# Patient Record
Sex: MALE | Race: BLACK OR AFRICAN AMERICAN | NOT HISPANIC OR LATINO | Employment: FULL TIME | URBAN - METROPOLITAN AREA
[De-identification: names, ages, dates, MRNs, and addresses within clinical notes are randomized per-mention and may not be internally consistent; named-entity substitution may affect disease eponyms.]

---

## 2017-04-07 ENCOUNTER — OFFICE VISIT (OUTPATIENT)
Dept: INTERNAL MEDICINE | Facility: CLINIC | Age: 52
End: 2017-04-07
Payer: COMMERCIAL

## 2017-04-07 ENCOUNTER — LAB VISIT (OUTPATIENT)
Dept: LAB | Facility: HOSPITAL | Age: 52
End: 2017-04-07
Attending: INTERNAL MEDICINE
Payer: COMMERCIAL

## 2017-04-07 DIAGNOSIS — M54.5 LOW BACK PAIN, UNSPECIFIED BACK PAIN LATERALITY, UNSPECIFIED CHRONICITY, WITH SCIATICA PRESENCE UNSPECIFIED: ICD-10-CM

## 2017-04-07 DIAGNOSIS — K76.0 NAFLD (NONALCOHOLIC FATTY LIVER DISEASE): ICD-10-CM

## 2017-04-07 DIAGNOSIS — I10 ESSENTIAL HYPERTENSION: ICD-10-CM

## 2017-04-07 DIAGNOSIS — R07.89 CHEST DISCOMFORT: Primary | ICD-10-CM

## 2017-04-07 LAB
ALBUMIN SERPL BCP-MCNC: 4.2 G/DL
ALP SERPL-CCNC: 52 U/L
ALT SERPL W/O P-5'-P-CCNC: 73 U/L
ANION GAP SERPL CALC-SCNC: 8 MMOL/L
AST SERPL-CCNC: 42 U/L
BILIRUB SERPL-MCNC: 0.8 MG/DL
BUN SERPL-MCNC: 19 MG/DL
CALCIUM SERPL-MCNC: 9.7 MG/DL
CHLORIDE SERPL-SCNC: 102 MMOL/L
CO2 SERPL-SCNC: 29 MMOL/L
COMPLEXED PSA SERPL-MCNC: 0.4 NG/ML
CREAT SERPL-MCNC: 1.1 MG/DL
EST. GFR  (AFRICAN AMERICAN): >60 ML/MIN/1.73 M^2
EST. GFR  (NON AFRICAN AMERICAN): >60 ML/MIN/1.73 M^2
GLUCOSE SERPL-MCNC: 106 MG/DL
POTASSIUM SERPL-SCNC: 4.1 MMOL/L
PROT SERPL-MCNC: 8.2 G/DL
SODIUM SERPL-SCNC: 139 MMOL/L

## 2017-04-07 PROCEDURE — 3079F DIAST BP 80-89 MM HG: CPT | Mod: S$GLB,,, | Performed by: INTERNAL MEDICINE

## 2017-04-07 PROCEDURE — 80053 COMPREHEN METABOLIC PANEL: CPT

## 2017-04-07 PROCEDURE — 1160F RVW MEDS BY RX/DR IN RCRD: CPT | Mod: S$GLB,,, | Performed by: INTERNAL MEDICINE

## 2017-04-07 PROCEDURE — 99999 PR PBB SHADOW E&M-EST. PATIENT-LVL V: CPT | Mod: PBBFAC,,, | Performed by: INTERNAL MEDICINE

## 2017-04-07 PROCEDURE — 99214 OFFICE O/P EST MOD 30 MIN: CPT | Mod: S$GLB,,, | Performed by: INTERNAL MEDICINE

## 2017-04-07 PROCEDURE — 84153 ASSAY OF PSA TOTAL: CPT

## 2017-04-07 PROCEDURE — 36415 COLL VENOUS BLD VENIPUNCTURE: CPT

## 2017-04-07 PROCEDURE — 3075F SYST BP GE 130 - 139MM HG: CPT | Mod: S$GLB,,, | Performed by: INTERNAL MEDICINE

## 2017-04-07 RX ORDER — PRAVASTATIN SODIUM 40 MG/1
40 TABLET ORAL DAILY
Qty: 90 TABLET | Refills: 1 | Status: SHIPPED | OUTPATIENT
Start: 2017-04-07 | End: 2018-01-12 | Stop reason: SDUPTHER

## 2017-04-07 RX ORDER — AMLODIPINE BESYLATE 5 MG/1
5 TABLET ORAL DAILY
Qty: 90 TABLET | Refills: 1 | Status: SHIPPED | OUTPATIENT
Start: 2017-04-07 | End: 2017-06-27 | Stop reason: DRUGHIGH

## 2017-04-07 RX ORDER — LOSARTAN POTASSIUM 100 MG/1
100 TABLET ORAL DAILY
Qty: 90 TABLET | Refills: 1 | Status: SHIPPED | OUTPATIENT
Start: 2017-04-07 | End: 2017-04-11 | Stop reason: ALTCHOICE

## 2017-04-07 NOTE — MR AVS SNAPSHOT
[facility]  [address]  [place] [address]  Phone: [phone]  Fax: [phone]                  Андрей Wooten   2017 10:00 AM   Office Visit    Description:  Male : 1965   Provider:  Khushboo Pugh MD   Department:  [facility]           Reason for Visit     Follow-up           Diagnoses this Visit        Comments    Chest discomfort    -  Primary     NAFLD (nonalcoholic fatty liver disease)         Essential hypertension         Low back pain, unspecified back pain laterality, unspecified chronicity, with sciatica presence unspecified                To Do List           Future Appointments        Provider Department Dept Phone    2017 10:15 AM Khushboo Pugh MD [facility] [phone]    2017 10:30 AM Khushboo Pugh MD [facility] [phone]      Goals (5 Years of Data)     None      Follow-Up and Disposition     Return for EP 4 months.       These Medications        Disp Refills Start End    losartan (COZAAR) 100 MG tablet 90 tablet 1 2017    Take 1 tablet (100 mg total) by mouth once daily. - Oral    Pharmacy: [facility] [address]   [facility] Ph #: [phone]       amlodipine (NORVASC) 5 MG tablet 90 tablet 1 2017    Take 1 tablet (5 mg total) by mouth once daily. - Oral    Pharmacy: [facility] [address] [place] [address] [address] [facility] Ph #: [phone]       pravastatin (PRAVACHOL) 40 MG tablet 90 tablet 1 2017    Take 1 tablet (40 mg total) by mouth once daily. - Oral    Pharmacy: [facility] [address] [place] [address], [place] - [address] [facility] Ph #: [phone]         [facility]     [facility] Nurse Care Line -  Assistance  Unless otherwise directed by your provider, please contact [facility], our nurse care line that is available for 24/7 assistance.     Registered nurses in the [facility] provide: appointment scheduling, clinical advisement, health education, and other advisory services.  Call: [phone] (toll free)               Medications           Message regarding Medications     Verify the changes and/or additions to your medication regime listed below are the same as discussed with your clinician today.  If any of these changes or additions are incorrect, please notify your healthcare provider.             Verify that the below list of medications is an accurate representation of the medications you are currently taking.  If none reported, the list may be blank. If incorrect, please contact your healthcare provider. Carry this list with you in case of emergency.           Current Medications     amlodipine (NORVASC) 5 MG tablet Take 1 tablet (5 mg total) by mouth once daily.    aspirin (ECOTRIN) 81 MG EC tablet Take 81 mg by mouth once daily.    fish oil-omega-3 fatty acids 300-1,000 mg capsule Take 1 g by mouth once daily.    fluticasone (FLONASE) 50 mcg/actuation nasal spray 1 spray by Each Nare route once daily.    losartan (COZAAR) 100 MG tablet Take 1 tablet (100 mg total) by mouth once daily.    pravastatin (PRAVACHOL) 40 MG tablet Take 1 tablet (40 mg total) by mouth once daily.    UBIDECARENONE (COENZYME Q10) 100 mg Tab Take 1 tablet by mouth once daily.    vitamin D 1000 units Tab Take 185 mg by mouth once daily.    albuterol 90 mcg/actuation inhaler Inhale 2 puffs into the lungs every 6 (six) hours as needed for Wheezing.    cetirizine (ZYRTEC) 10 MG tablet Take 1 tablet (10 mg total) by mouth once daily.           Clinical Reference Information           Your Vitals Were     BP Pulse Temp Height Weight SpO2    140/84 (BP Location: Right arm, Patient Position: Sitting, BP Method: Manual) 67 98.2 °F (36.8 °C) (Oral) 5' 6" (1.676 m) 116.6 kg (257 lb) 97%    BMI "                41.48 kg/m2          Blood Pressure          Most Recent Value    BP  (!)  140/84      Allergies as of 4/7/2017     No Known Allergies      Immunizations Administered on Date of Encounter - 4/7/2017     None      Orders Placed During Today's Visit      Normal Orders This Visit    Ambulatory consult to Physical Therapy     Ambulatory Referral to Cardiology     Ambulatory Referral to Hepatology     Future Labs/Procedures Expected by Expires    Comprehensive metabolic panel  4/7/2017 4/7/2018    PSA, Screening  4/7/2017 6/6/2018      Language Assistance Services     ATTENTION: Language assistance services are available, free of charge. Please call [phone].      ATENCIÓN: Si habla español, tiene a cleary disposición servicios gratuitos de asistencia lingüística. Llame al [phone].     CHÚ Ý: N?u b?n nói Ti?ng Vi?t, có các d?ch v? h? tr? ngôn ng? mi?n phí dành cho b?n. G?i s? [phone].         [facility] complies with applicable Federal civil rights laws and does not discriminate on the basis of race, color, national origin, age, disability, or sex.

## 2017-04-11 ENCOUNTER — OFFICE VISIT (OUTPATIENT)
Dept: CARDIOLOGY | Facility: CLINIC | Age: 52
End: 2017-04-11
Payer: COMMERCIAL

## 2017-04-11 VITALS
BODY MASS INDEX: 42.27 KG/M2 | HEART RATE: 68 BPM | HEIGHT: 66 IN | WEIGHT: 263 LBS | SYSTOLIC BLOOD PRESSURE: 146 MMHG | DIASTOLIC BLOOD PRESSURE: 93 MMHG | OXYGEN SATURATION: 97 %

## 2017-04-11 VITALS
SYSTOLIC BLOOD PRESSURE: 138 MMHG | HEIGHT: 66 IN | DIASTOLIC BLOOD PRESSURE: 86 MMHG | BODY MASS INDEX: 41.3 KG/M2 | HEART RATE: 67 BPM | OXYGEN SATURATION: 97 % | WEIGHT: 257 LBS | TEMPERATURE: 98 F

## 2017-04-11 DIAGNOSIS — R94.31 ABNORMAL EKG: ICD-10-CM

## 2017-04-11 DIAGNOSIS — R94.31 ABNORMAL ECG: Chronic | ICD-10-CM

## 2017-04-11 DIAGNOSIS — I10 ESSENTIAL HYPERTENSION: Primary | ICD-10-CM

## 2017-04-11 DIAGNOSIS — E78.5 HYPERLIPIDEMIA, UNSPECIFIED HYPERLIPIDEMIA TYPE: ICD-10-CM

## 2017-04-11 PROCEDURE — 99999 PR PBB SHADOW E&M-EST. PATIENT-LVL III: CPT | Mod: PBBFAC,,, | Performed by: INTERNAL MEDICINE

## 2017-04-11 PROCEDURE — 3077F SYST BP >= 140 MM HG: CPT | Mod: S$GLB,,, | Performed by: INTERNAL MEDICINE

## 2017-04-11 PROCEDURE — 93000 ELECTROCARDIOGRAM COMPLETE: CPT | Mod: S$GLB,,, | Performed by: INTERNAL MEDICINE

## 2017-04-11 PROCEDURE — 3080F DIAST BP >= 90 MM HG: CPT | Mod: S$GLB,,, | Performed by: INTERNAL MEDICINE

## 2017-04-11 PROCEDURE — 1160F RVW MEDS BY RX/DR IN RCRD: CPT | Mod: S$GLB,,, | Performed by: INTERNAL MEDICINE

## 2017-04-11 PROCEDURE — 99204 OFFICE O/P NEW MOD 45 MIN: CPT | Mod: S$GLB,,, | Performed by: INTERNAL MEDICINE

## 2017-04-11 RX ORDER — LOSARTAN POTASSIUM AND HYDROCHLOROTHIAZIDE 12.5; 1 MG/1; MG/1
1 TABLET ORAL DAILY
Qty: 90 TABLET | Refills: 3 | Status: SHIPPED | OUTPATIENT
Start: 2017-04-11 | End: 2017-06-27

## 2017-04-11 RX ORDER — CETIRIZINE HYDROCHLORIDE 10 MG/1
10 TABLET ORAL
COMMUNITY

## 2017-04-11 NOTE — PATIENT INSTRUCTIONS
1.  Discontinue Cozaar (losartan)  2.  Start Hyzaar (losartan/HCTZ) - 1 tablet daily  3.  Obtain lab tests in 2-3 weeks.  4.  Return in 2 months.

## 2017-04-11 NOTE — PROGRESS NOTES
Chief Complaint:  Андрей Wooten is a 52 y.o. male who presents for evaluation of Consult and Abnormal ECG      HPI:   Mr. Wooten is a 52 year-old male referred for abnormal ECG and chest discomfort.  Patient reports no chest discomfort however, just that his ECG is always abnormal.  He was evaluated with stress testing in  - negative for ischemia.  He report no chest pain.  Very mild dyspnea - nonlimiting with walking up stairs.  He has hypertension, VOGEL, and HLP.  Reports DBP is always high.  Uncertain whether he has taken a diuretic in past.      Patient Active Problem List    Diagnosis Date Noted    Abnormal ECG 2017     LVH, NSST, early repolarization      Decreased range of motion of left shoulder 2015    Left shoulder pain 2015    Muscle weakness of left upper extremity 2015    VOGEL (nonalcoholic steatohepatitis) 2015      liver biopsy -  4/1/15- very minimal lobular steatohepatitis, macrosteatosis 20%, microsteatosis 10% no fibrosis. Biopsy was a small sample size of 0.2 and 0.6 cm.     -- Fibrosure= F0-F1  -- Elastography= F2-F3      Morbid obesity 2015    HLD (hyperlipidemia) 2015    HTN (hypertension) 2015    Adhesive capsulitis of left shoulder 2015       Right Arm BP - Sittin/93  Left Arm BP - Sittin/97    Current Outpatient Prescriptions   Medication Sig    albuterol 90 mcg/actuation inhaler Inhale 2 puffs into the lungs every 6 (six) hours as needed for Wheezing.    amlodipine (NORVASC) 5 MG tablet Take 1 tablet (5 mg total) by mouth once daily.    aspirin (ECOTRIN) 81 MG EC tablet Take 81 mg by mouth once daily.    cetirizine (ZYRTEC) 10 MG tablet Take 10 mg by mouth once daily.    fish oil-omega-3 fatty acids 300-1,000 mg capsule Take 1 g by mouth once daily.    fluticasone (FLONASE) 50 mcg/actuation nasal spray 1 spray by Each Nare route once daily.    pravastatin (PRAVACHOL) 40 MG tablet Take 1 tablet (40 mg total)  by mouth once daily.    UBIDECARENONE (COENZYME Q10) 100 mg Tab Take 1 tablet by mouth once daily.    vitamin D 1000 units Tab Take 185 mg by mouth once daily.    losartan-hydrochlorothiazide 100-12.5 mg (HYZAAR) 100-12.5 mg Tab Take 1 tablet by mouth once daily.     Current Facility-Administered Medications   Medication    triamcinolone acetonide injection 40 mg     Review of Systems   Constitution: Negative for diaphoresis, weakness, malaise/fatigue, weight gain and weight loss.   Cardiovascular: Negative for chest pain, claudication, cyanosis, dyspnea on exertion, irregular heartbeat, leg swelling, near-syncope, orthopnea, palpitations, paroxysmal nocturnal dyspnea and syncope.   Respiratory: Negative for cough, hemoptysis, shortness of breath, sleep disturbances due to breathing, snoring, sputum production and wheezing.    Hematologic/Lymphatic: Negative for bleeding problem. Does not bruise/bleed easily.   Skin: Negative for poor wound healing and rash.   Gastrointestinal: Negative for heartburn, hematemesis, hematochezia and melena.   Neurological: Negative for dizziness, light-headedness and loss of balance.   Psychiatric/Behavioral: Negative for altered mental status, depression and memory loss.         Objective:     Physical Exam   Constitutional: He is oriented to person, place, and time. He appears well-developed and well-nourished. No distress. He is not intubated.   HENT:   Head: Atraumatic.   Neck: No JVD present.   Cardiovascular: Normal rate, regular rhythm, S1 normal, S2 normal, normal heart sounds and intact distal pulses.  PMI is not displaced.  Exam reveals no gallop, no S3, no S4, no distant heart sounds, no friction rub, no midsystolic click and no opening snap.    No murmur heard.  Pulses:       Carotid pulses are 2+ on the right side, and 2+ on the left side.       Radial pulses are 2+ on the right side, and 2+ on the left side.        Dorsalis pedis pulses are 2+ on the right side, and  2+ on the left side.        Posterior tibial pulses are 2+ on the right side, and 2+ on the left side.   Pulmonary/Chest: Effort normal and breath sounds normal. No accessory muscle usage. No apnea, no tachypnea and no bradypnea. He is not intubated. No respiratory distress. He has no decreased breath sounds. He has no wheezes. He has no rhonchi. He has no rales. He exhibits no tenderness.   Abdominal: Soft. Normal aorta and bowel sounds are normal. He exhibits no distension, no abdominal bruit, no pulsatile midline mass and no mass. There is no tenderness.   Musculoskeletal: He exhibits no edema.   Neurological: He is alert and oriented to person, place, and time.   Skin: Skin is warm. No rash noted. No erythema. No pallor.   Psychiatric: He has a normal mood and affect. His behavior is normal. Judgment and thought content normal.   Vitals reviewed.      LABS    CMP      Lab Results   Component Value Date    WBC 3.65 (L) 04/22/2016    HGB 14.7 04/22/2016    HCT 44.1 04/22/2016    MCV 83 04/22/2016     04/22/2016     ECHOCARDIOGRAM:    ECG:  NSR, LVH, early repolarization    STRESS TESTING:   Sept 2014:  PET  No ischemia  EF > 70%    Aug 2014:  [place]  EF 63%  ETT  - equivocal, DTS 7      CARDIAC CATHETERIZATION:    Assessment:     1. Essential hypertension    2. Hyperlipidemia, unspecified hyperlipidemia type    3. Abnormal ECG      Doing well - no chest pain.  Chronically abnormal ECG.  BP not adequately controlled.  Addition of a diuretic would be ideal for improved BP control  Plan:     Change Losartan to Losartan 100mg/HCTZ 12.5mg   Check BMP in 2-3 weeks.  RTC 2 months.  No further stress testing indicated at present time.    Continue with current medical plan and lifestyle changes.    I have reviewed the patient's medical history in detail and updated the computerized patient record.    Orders Placed This Encounter   Procedures    Basic metabolic panel     Standing Status:   Future     Standing  Expiration Date:   6/10/2018    IN OFFICE EKG 12-LEAD (to Muse)     Order Specific Question:   Diagnosis     Answer:   Abnormal EKG [[id number]]     He expressed verbal understanding and agreed with the plan          Dav Carney MD, FACC, CCDS  Interventional Cardiology  [facility]

## 2017-04-11 NOTE — LETTER
April 11, 2017      Khushboo Pugh MD  [address]  [place] [address]           [facility]  [address] [place] [address], [address]  [facility] [address]  Phone: [phone]  Fax: [phone]          Patient: Андрей Wooten   MR Number: [id number]   YOB: 1965   Date of Visit: 4/11/2017       Dear Dr. Khushboo Pugh:    Thank you for referring Андрей Wooten to me for evaluation. Attached you will find relevant portions of my assessment and plan of care.    If you have questions, please do not hesitate to call me. I look forward to following Андрей Wooten along with you.    Sincerely,    Dav Carney MD    Enclosure  CC:  No Recipients    If you would like to receive this communication electronically, please contact [email or web address] or [phone] to request more information on [facility] Link access.    For providers and/or their staff who would like to refer a patient to [facility], please contact us through our one-stop-shop provider referral line, [facility], at [phone].    If you feel you have received this communication in error or would no longer like to receive these types of communications, please e-mail [email or web address]

## 2017-04-12 NOTE — PROGRESS NOTES
Subjective:       Patient ID: Андрей Wooten is a 52 y.o. male.    Chief Complaint: Hypertension    HPI: He returns for management of hypertension.  He has had hypertension for over a year.  Current treatment has included medications outlined in medication list.  He denies chest pain or shortness of breath.  No palpitations.  Denies left arm or neck pain.  He complains of lower back pain radiating to the sacral area.  Denies any acute trauma.  No numbness, no weakness.  No dysuria.  No penile discharge    Medications: See med card    Social history: Does not smoke, does not drink alcohol      Review of Systems   Constitutional: Negative for chills, fatigue, fever and unexpected weight change.   Respiratory: Negative for chest tightness and shortness of breath.    Cardiovascular: Negative for chest pain and palpitations.   Gastrointestinal: Negative for abdominal pain and blood in stool.   Neurological: Negative for dizziness, syncope, numbness and headaches.       Objective:      Physical Exam   HENT:   Right Ear: External ear normal.   Left Ear: External ear normal.   Nose: Nose normal.   Mouth/Throat: Oropharynx is clear and moist.   Eyes: Pupils are equal, round, and reactive to light.   Neck: Normal range of motion.   Cardiovascular: Normal rate and regular rhythm.    No murmur heard.  Pulmonary/Chest: Breath sounds normal.   Abdominal: He exhibits no distension. There is no hepatosplenomegaly. There is no tenderness.   Lymphadenopathy:     He has no cervical adenopathy.     He has no axillary adenopathy.   Neurological: He has normal strength and normal reflexes. No cranial nerve deficit or sensory deficit.     rectal exam: Heme-negative, no mass palpated  Lumbar spine without tenderness or redness  Assessment:         assessment and plan: 1.  Hypertension: Check CMP  2.  Lower back pain: Schedule physical therapy  3.  Check PSA  Plan:       As above

## 2017-04-13 ENCOUNTER — OFFICE VISIT (OUTPATIENT)
Dept: URGENT CARE | Facility: CLINIC | Age: 52
End: 2017-04-13
Payer: COMMERCIAL

## 2017-04-13 VITALS
HEIGHT: 66 IN | OXYGEN SATURATION: 98 % | WEIGHT: 260.5 LBS | HEART RATE: 107 BPM | BODY MASS INDEX: 41.87 KG/M2 | SYSTOLIC BLOOD PRESSURE: 140 MMHG | TEMPERATURE: 101 F | DIASTOLIC BLOOD PRESSURE: 96 MMHG | RESPIRATION RATE: 20 BRPM

## 2017-04-13 DIAGNOSIS — M79.10 MYALGIA: ICD-10-CM

## 2017-04-13 DIAGNOSIS — J11.1 INFLUENZA-LIKE ILLNESS: Primary | ICD-10-CM

## 2017-04-13 DIAGNOSIS — R50.9 FEVER, UNSPECIFIED FEVER CAUSE: ICD-10-CM

## 2017-04-13 LAB
CTP QC/QA: YES
FLUAV AG NPH QL: NEGATIVE
FLUBV AG NPH QL: NEGATIVE

## 2017-04-13 PROCEDURE — 3077F SYST BP >= 140 MM HG: CPT | Mod: S$GLB,,, | Performed by: NURSE PRACTITIONER

## 2017-04-13 PROCEDURE — 99213 OFFICE O/P EST LOW 20 MIN: CPT | Mod: S$GLB,,, | Performed by: NURSE PRACTITIONER

## 2017-04-13 PROCEDURE — 99999 PR PBB SHADOW E&M-EST. PATIENT-LVL IV: CPT | Mod: PBBFAC,,, | Performed by: NURSE PRACTITIONER

## 2017-04-13 PROCEDURE — 3080F DIAST BP >= 90 MM HG: CPT | Mod: S$GLB,,, | Performed by: NURSE PRACTITIONER

## 2017-04-13 PROCEDURE — 1160F RVW MEDS BY RX/DR IN RCRD: CPT | Mod: S$GLB,,, | Performed by: NURSE PRACTITIONER

## 2017-04-13 PROCEDURE — 87804 INFLUENZA ASSAY W/OPTIC: CPT | Mod: QW,S$GLB,, | Performed by: NURSE PRACTITIONER

## 2017-04-13 RX ORDER — OSELTAMIVIR PHOSPHATE 75 MG/1
75 CAPSULE ORAL 2 TIMES DAILY
Qty: 10 CAPSULE | Refills: 0 | Status: SHIPPED | OUTPATIENT
Start: 2017-04-13 | End: 2017-04-18

## 2017-04-13 NOTE — MR AVS SNAPSHOT
[facility]  [address] [place] [address]  Phone: [phone]  Fax: [phone]                  Андрей Wooten   2017 5:20 PM   Office Visit    Description:  Male : 1965   Provider:  Leobardo Patrick NP   Department:  [facility]           Reason for Visit     Generalized Body Aches           Diagnoses this Visit        Comments    Influenza-like illness    -  Primary     Fever, unspecified fever cause         Myalgia                To Do List           Future Appointments        Provider Department Dept Phone    2017 1:00 PM Viridiana Stoner, PT [facility] [phone]    2017 3:40 PM ROBERT Childress [facility] - Hepatology [phone]    2017 10:15 AM MD Olvin Chakraborty [facility] - Internal Medicine [phone]    2017 4:00 PM Dav Carney MD [facility] - Cardiology [phone]    2017 10:30 AM MD Olvin Chakraborty [facility] - Internal Medicine [phone]      Goals (5 Years of Data)     None      Follow-Up and Disposition     Return if symptoms worsen or fail to improve.       These Medications        Disp Refills Start End    oseltamivir (TAMIFLU) 75 MG capsule 10 capsule 0 2017    Take 1 capsule (75 mg total) by mouth 2 (two) times daily. - Oral    Pharmacy: [facility] [address] [place] [address] AIRLINE [facility] Ph #: [phone]         [facility]     [facility] On Call Nurse Care Line -  Assistance  Unless otherwise directed by your provider, please contact [facility], our nurse care line that is available for  assistance.     Registered nurses in the [facility] provide: appointment scheduling, clinical advisement, health education, and other advisory services.  Call: [phone] (toll free)               Medications           Message regarding Medications     Verify the changes and/or additions to your medication  "regime listed below are the same as discussed with your clinician today.  If any of these changes or additions are incorrect, please notify your healthcare provider.        START taking these NEW medications        Refills    oseltamivir (TAMIFLU) 75 MG capsule 0    Sig: Take 1 capsule (75 mg total) by mouth 2 (two) times daily.    Class: Normal    Route: Oral           Verify that the below list of medications is an accurate representation of the medications you are currently taking.  If none reported, the list may be blank. If incorrect, please contact your healthcare provider. Carry this list with you in case of emergency.           Current Medications     albuterol 90 mcg/actuation inhaler Inhale 2 puffs into the lungs every 6 (six) hours as needed for Wheezing.    amlodipine (NORVASC) 5 MG tablet Take 1 tablet (5 mg total) by mouth once daily.    aspirin (ECOTRIN) 81 MG EC tablet Take 81 mg by mouth once daily.    cetirizine (ZYRTEC) 10 MG tablet Take 10 mg by mouth once daily.    fish oil-omega-3 fatty acids 300-1,000 mg capsule Take 1 g by mouth once daily.    fluticasone (FLONASE) 50 mcg/actuation nasal spray 1 spray by Each Nare route once daily.    losartan-hydrochlorothiazide 100-12.5 mg (HYZAAR) 100-12.5 mg Tab Take 1 tablet by mouth once daily.    pravastatin (PRAVACHOL) 40 MG tablet Take 1 tablet (40 mg total) by mouth once daily.    UBIDECARENONE (COENZYME Q10) 100 mg Tab Take 1 tablet by mouth once daily.    vitamin D 1000 units Tab Take 185 mg by mouth once daily.    oseltamivir (TAMIFLU) 75 MG capsule Take 1 capsule (75 mg total) by mouth 2 (two) times daily.           Clinical Reference Information           Your Vitals Were     BP Pulse Temp Resp    140/96 (BP Location: Right arm, Patient Position: Sitting, BP Method: Manual) 107 100.5 °F (38.1 °C) (Tympanic) 20    Height Weight SpO2 BMI    5' 6" (1.676 m) 118.2 kg (260 lb 7.6 oz) 98% 42.04 kg/m2      Blood Pressure          Most Recent Value    " BP  (!)  140/96      Allergies as of 4/13/2017     No Known Allergies      Immunizations Administered on Date of Encounter - 4/13/2017     None      Orders Placed During Today's Visit      Normal Orders This Visit    POCT Influenza A/B       Instructions      Influenza     Viruses that cause influenza spread through the air in droplets when someone who has the flu coughs, sneezes, laughs, or talks.   Influenza (the flu) is an infection that affects your respiratory tract. This tract is made up of your mouth, nose, and lungs, and the passages between them. Unlike a cold, the flu can make you very ill. And it can lead to pneumonia, a serious lung infection. The flu can have serious complications and even be fatal for some people. These include older adults, young children, and people with certain chronic conditions.  Who is at risk for the flu?  Anyone can get the flu. But you are more likely to become infected if you:  · Have a weakened immune system  · Work in a healthcare setting where you may be exposed to flu germs  · Live or work with someone who has the flu  · Havent had an annual flu shot  How does the flu spread?  The flu is caused by viruses. The viruses spread through the air in droplets when someone who has the flu coughs, sneezes, laughs, or talks. You can become infected when you inhale these viruses directly. You can also become infected when you touch a surface on which the droplets have landed and then transfer the germs to your eyes, nose, or mouth. Touching used tissues, or sharing utensils, drinking glasses, or a toothbrush with an infected person can expose you to flu viruses, too.  What are the symptoms of the flu?  Flu symptoms tend to come on quickly and may last a few days to a few weeks. They include:  · Fever usually higher than 100.4°F  (38°C) and chills  · Sore throat and headache  · Dry cough  · Runny nose  · Tiredness and weakness  · Muscle aches  Things that make the flu worse  For  some people, the flu can be very serious. The risk for complications is greater for:  · Children younger than age 5  · Adults ages 65 and older  · People with a chronic illness such as diabetes or heart, kidney, or lung disease  · People who live in a nursing home or long-term care facility   How is the flu treated?  The flu usually gets better after 7 days or so. In some cases, your healthcare provider may prescribe an antiviral medicine. This may help you get well sooner. For the medicine to help, you need to take it as soon as possible (ideally within 48 hours) after your symptoms start. If you develop pneumonia or other serious illness, you may need to stay in the hospital.  Easing flu symptoms  · Drink lots of fluids such as water, juice, and warm soup. A good rule is to drink enough so that you urinate your normal amount.  · Get plenty of rest.  · Ask your healthcare provider what to take for fever and pain.  · Call your provider if your fever is 100.4°F (38°C) or higher, or you become dizzy, lightheaded, or short of breath.  Taking steps to protect others  · Wash your hands often, especially after coughing or sneezing. Or clean your hands with an alcohol-based hand  containing at least 60% alcohol.  · Cough or sneeze into a tissue. Then throw the tissue away and wash your hands. If you dont have a tissue, cough and sneeze into the crook of your elbow.  · Stay home until at least 24 hours after you no longer have a fever or chills. Be sure the fever isnt being hidden by fever-reducing medicine.  · Dont share food, utensils, drinking glasses, or a toothbrush with others.  · Ask your healthcare provider if others in your household should get antiviral medicine to help them avoid infection.  How can the flu be prevented?  · One of the best ways to avoid the flu is to get a flu vaccine each year. Viruses that cause the flu change from year to year. For that reason, doctors recommend getting the flu  vaccine each year, as soon as it's available in your area. The vaccine may be given as a shot or as a nasal spray. Your healthcare provider can tell you which vaccine is right for you. The nasal spray is not recommended for the 0867-8712 flu season. The CDC says this is because the nasal spray did not seem to protect against the flu over the last several flu seasons. In the past, it was meant for people ages 2 to 49.  · Wash your hands often. Frequent handwashing is a proven way to help prevent infection.  · Carry an alcohol-based hand gel containing at least 60% alcohol. Use it when you can't use soap and water. Then wash your hands as soon as you can.  · Avoid touching your eyes, nose, and mouth.  · At home and work, clean phones, computer keyboards, and toys often with disinfectant wipes.  · If possible, avoid close contact with others who have the flu or symptoms of the flu.  Handwashing tips  Handwashing is one of the best ways to prevent many common infections. If you are caring for or visiting someone with the flu, wash your hands each time you enter and leave the room. Follow these steps:  · Use warm water and plenty of soap. Rub your hands together well.  · Clean the whole hand, under your nails, between your fingers, and up the wrists.  · Wash for at least 15 seconds.  · Rinse, letting the water run down your fingers, not up your wrists.  · Dry your hands well. Use a paper towel to turn off the faucet and open the door.  Using alcohol-based hand   Alcohol-based hand  are also a good choice. Use them when you can't use soap and water. Follow these steps:  · Squeeze about a tablespoon of gel into the palm of one hand.  · Rub your hands together briskly, cleaning the backs of your hands, the palms, between your fingers, and up the wrists.  · Rub until the gel is gone and your hands are completely dry.  Preventing influenza in healthcare settings  The flu is a special concern for people in  hospitals and long-term care facilities. To help prevent the spread of flu, many hospitals and nursing homes take these steps:  · Healthcare providers wash their hands or use an alcohol-based hand  before and after treating each patient.  · People with the flu have private rooms and bathrooms or share a room with someone with the same infection.  · People at high-risk for the flu but don't have it are encouraged to get the flu and pneumonia vaccines.  · All healthcare workers are encouraged or required to get flu shots.   Date Last Reviewed: 8/27/2014  © 4155-3644 [employer] [address] [place] [address]. All rights reserved. This information is not intended as a substitute for professional medical care. Always follow your healthcare professional's instructions.             Language Assistance Services     ATTENTION: Language assistance services are available, free of charge. Please call [phone].      ATENCIÓN: Si habla español, tiene a cleary disposición servicios gratuitos de asistencia lingüística. Llame al [phone].     CHÚ Ý: N?u b?n nói Ti?ng Vi?t, có các d?ch v? h? tr? ngôn ng? mi?n phí dành cho b?n. G?i s? [phone].         [facility] complies with applicable Federal civil rights laws and does not discriminate on the basis of race, color, national origin, age, disability, or sex.

## 2017-04-13 NOTE — PATIENT INSTRUCTIONS
Influenza     Viruses that cause influenza spread through the air in droplets when someone who has the flu coughs, sneezes, laughs, or talks.   Influenza (the flu) is an infection that affects your respiratory tract. This tract is made up of your mouth, nose, and lungs, and the passages between them. Unlike a cold, the flu can make you very ill. And it can lead to pneumonia, a serious lung infection. The flu can have serious complications and even be fatal for some people. These include older adults, young children, and people with certain chronic conditions.  Who is at risk for the flu?  Anyone can get the flu. But you are more likely to become infected if you:  · Have a weakened immune system  · Work in a healthcare setting where you may be exposed to flu germs  · Live or work with someone who has the flu  · Havent had an annual flu shot  How does the flu spread?  The flu is caused by viruses. The viruses spread through the air in droplets when someone who has the flu coughs, sneezes, laughs, or talks. You can become infected when you inhale these viruses directly. You can also become infected when you touch a surface on which the droplets have landed and then transfer the germs to your eyes, nose, or mouth. Touching used tissues, or sharing utensils, drinking glasses, or a toothbrush with an infected person can expose you to flu viruses, too.  What are the symptoms of the flu?  Flu symptoms tend to come on quickly and may last a few days to a few weeks. They include:  · Fever usually higher than 100.4°F  (38°C) and chills  · Sore throat and headache  · Dry cough  · Runny nose  · Tiredness and weakness  · Muscle aches  Things that make the flu worse  For some people, the flu can be very serious. The risk for complications is greater for:  · Children younger than age 5  · Adults ages 65 and older  · People with a chronic illness such as diabetes or heart, kidney, or lung disease  · People who live in a nursing  home or long-term care facility   How is the flu treated?  The flu usually gets better after 7 days or so. In some cases, your healthcare provider may prescribe an antiviral medicine. This may help you get well sooner. For the medicine to help, you need to take it as soon as possible (ideally within 48 hours) after your symptoms start. If you develop pneumonia or other serious illness, you may need to stay in the hospital.  Easing flu symptoms  · Drink lots of fluids such as water, juice, and warm soup. A good rule is to drink enough so that you urinate your normal amount.  · Get plenty of rest.  · Ask your healthcare provider what to take for fever and pain.  · Call your provider if your fever is 100.4°F (38°C) or higher, or you become dizzy, lightheaded, or short of breath.  Taking steps to protect others  · Wash your hands often, especially after coughing or sneezing. Or clean your hands with an alcohol-based hand  containing at least 60% alcohol.  · Cough or sneeze into a tissue. Then throw the tissue away and wash your hands. If you dont have a tissue, cough and sneeze into the crook of your elbow.  · Stay home until at least 24 hours after you no longer have a fever or chills. Be sure the fever isnt being hidden by fever-reducing medicine.  · Dont share food, utensils, drinking glasses, or a toothbrush with others.  · Ask your healthcare provider if others in your household should get antiviral medicine to help them avoid infection.  How can the flu be prevented?  · One of the best ways to avoid the flu is to get a flu vaccine each year. Viruses that cause the flu change from year to year. For that reason, doctors recommend getting the flu vaccine each year, as soon as it's available in your area. The vaccine may be given as a shot or as a nasal spray. Your healthcare provider can tell you which vaccine is right for you. The nasal spray is not recommended for the 3400-8509 flu season. The CDC says  this is because the nasal spray did not seem to protect against the flu over the last several flu seasons. In the past, it was meant for people ages 2 to 49.  · Wash your hands often. Frequent handwashing is a proven way to help prevent infection.  · Carry an alcohol-based hand gel containing at least 60% alcohol. Use it when you can't use soap and water. Then wash your hands as soon as you can.  · Avoid touching your eyes, nose, and mouth.  · At home and work, clean phones, computer keyboards, and toys often with disinfectant wipes.  · If possible, avoid close contact with others who have the flu or symptoms of the flu.  Handwashing tips  Handwashing is one of the best ways to prevent many common infections. If you are caring for or visiting someone with the flu, wash your hands each time you enter and leave the room. Follow these steps:  · Use warm water and plenty of soap. Rub your hands together well.  · Clean the whole hand, under your nails, between your fingers, and up the wrists.  · Wash for at least 15 seconds.  · Rinse, letting the water run down your fingers, not up your wrists.  · Dry your hands well. Use a paper towel to turn off the faucet and open the door.  Using alcohol-based hand   Alcohol-based hand  are also a good choice. Use them when you can't use soap and water. Follow these steps:  · Squeeze about a tablespoon of gel into the palm of one hand.  · Rub your hands together briskly, cleaning the backs of your hands, the palms, between your fingers, and up the wrists.  · Rub until the gel is gone and your hands are completely dry.  Preventing influenza in healthcare settings  The flu is a special concern for people in hospitals and long-term care facilities. To help prevent the spread of flu, many hospitals and nursing homes take these steps:  · Healthcare providers wash their hands or use an alcohol-based hand  before and after treating each patient.  · People with the flu  have private rooms and bathrooms or share a room with someone with the same infection.  · People at high-risk for the flu but don't have it are encouraged to get the flu and pneumonia vaccines.  · All healthcare workers are encouraged or required to get flu shots.   Date Last Reviewed: 8/27/2014  © 3027-3482 [employer]. [address] [place] [address], [place] [address]. All rights reserved. This information is not intended as a substitute for professional medical care. Always follow your healthcare professional's instructions.

## 2017-04-13 NOTE — PROGRESS NOTES
Subjective:       Patient ID: Андрей Wooten is a 52 y.o. male.    Chief Complaint: Generalized Body Aches    HPI Comments: Pt is a 52 year old male to clinic today with complaints of myalgia, fever, and congestion that began yesterday.     Sinusitis   This is a new problem. The current episode started yesterday. The problem has been gradually worsening since onset. The maximum temperature recorded prior to his arrival was 100.4 - 100.9 F. The fever has been present for less than 1 day. His pain is at a severity of 7/10. The pain is moderate. Associated symptoms include chills, congestion, headaches and sinus pressure. Pertinent negatives include no coughing, diaphoresis, ear pain, hoarse voice, neck pain, shortness of breath, sneezing, sore throat or swollen glands. Past treatments include nothing. The treatment provided no relief.     Review of Systems   Constitutional: Positive for chills and fever. Negative for diaphoresis and fatigue.   HENT: Positive for congestion and sinus pressure. Negative for ear discharge, ear pain, hoarse voice, postnasal drip, rhinorrhea, sneezing and sore throat.    Eyes: Negative for pain.   Respiratory: Negative for cough, chest tightness, shortness of breath and wheezing.    Cardiovascular: Negative for chest pain and palpitations.   Gastrointestinal: Negative for abdominal pain, diarrhea, nausea and vomiting.   Genitourinary: Negative for dysuria.   Musculoskeletal: Positive for myalgias. Negative for back pain and neck pain.   Skin: Negative for rash.   Neurological: Positive for headaches. Negative for dizziness and light-headedness.       Objective:      Physical Exam   Constitutional: He is oriented to person, place, and time. He appears well-developed and well-nourished. No distress.   HENT:   Head: Normocephalic.   Right Ear: Tympanic membrane, external ear and ear canal normal. No tenderness. Tympanic membrane is not bulging.   Left Ear: Tympanic membrane, external ear and  ear canal normal. No tenderness. Tympanic membrane is not bulging.   Nose: No mucosal edema or rhinorrhea. Right sinus exhibits frontal sinus tenderness. Left sinus exhibits maxillary sinus tenderness and frontal sinus tenderness.   Mouth/Throat: Uvula is midline, oropharynx is clear and moist and mucous membranes are normal. No oropharyngeal exudate, posterior oropharyngeal edema or posterior oropharyngeal erythema.   Eyes: Conjunctivae and EOM are normal. Pupils are equal, round, and reactive to light.   Neck: Normal range of motion. Neck supple.   Cardiovascular: Normal rate, regular rhythm, S1 normal, S2 normal and normal heart sounds.  Exam reveals no gallop and no friction rub.    No murmur heard.  Pulmonary/Chest: Effort normal and breath sounds normal. No accessory muscle usage. No apnea, no tachypnea and no bradypnea. No respiratory distress. He has no decreased breath sounds. He has no wheezes. He has no rhonchi. He has no rales.   Abdominal: Bowel sounds are normal.   Musculoskeletal: Normal range of motion.   Lymphadenopathy:        Head (right side): No submental, no submandibular and no tonsillar adenopathy present.        Head (left side): No submental, no submandibular and no tonsillar adenopathy present.     He has no cervical adenopathy.   Neurological: He is alert and oriented to person, place, and time.   Skin: Skin is warm and dry. No rash noted. He is not diaphoretic.   Psychiatric: He has a normal mood and affect. His speech is normal and behavior is normal.   Nursing note and vitals reviewed.      Assessment:       1. Influenza-like illness    2. Fever, unspecified fever cause    3. Myalgia        Plan:   Influenza-like illness  -     oseltamivir (TAMIFLU) 75 MG capsule; Take 1 capsule (75 mg total) by mouth 2 (two) times daily.  Dispense: 10 capsule; Refill: 0    Fever, unspecified fever cause  -     POCT Influenza A/B    Myalgia  -     POCT Influenza A/B        · Your symptoms are likely  due to a viral infection. These infections can last up to 14 days, but you should notice some improvement of your symptoms within the first 7-10 days. Viral infections will not improve with antibiotics. If your symptoms persist >10 days without improvement or if you have any new or worsening symptoms, this is an indication that you may have developed a bacterial infection and should return to your primary care provider or to Urgent Care.   · Getting plenty of rest is very important to fighting infections.  · Increase fluids.   · May apply warm compresses as needed for facial pain and congestion.   · Saline nasal spray to loosen nasal congestion.  · Flonase or Nasacort to reduce inflammation in the sinus cavities.  · You may take an over the counter antihistamine for allergy symptoms such as sneezing, itchy/watery eyes, scratchy throat, or congestion.  · Take Tylenol or Ibuprofen as needed for sore throat, body aches, or fever.  · Follow up with your primary care provider if symptoms persist >10 days or sooner for any new or worsening symptoms.   · Go to the ER for any fever that does not improve with Tylenol/Ibuprofen, neck stiffness, rash, severe headache, vision changes, shortness of breath, chest pain, facial swelling, severe facial pain, or any other new and concerning symptoms.

## 2017-04-17 ENCOUNTER — CLINICAL SUPPORT (OUTPATIENT)
Dept: REHABILITATION | Facility: HOSPITAL | Age: 52
End: 2017-04-17
Attending: INTERNAL MEDICINE
Payer: COMMERCIAL

## 2017-04-17 DIAGNOSIS — M62.9 HAMSTRING TIGHTNESS OF BOTH LOWER EXTREMITIES: ICD-10-CM

## 2017-04-17 DIAGNOSIS — R29.898 WEAKNESS OF BOTH LOWER EXTREMITIES: ICD-10-CM

## 2017-04-17 DIAGNOSIS — R29.898 HIP TIGHTNESS: ICD-10-CM

## 2017-04-17 NOTE — PLAN OF CARE
TIME RECORD    Date: 04/19/2017    Start Time:  1:00  Stop Time:  2:00    PROCEDURES:    TIMED  Procedure Min.   TE 10                     UNTIMED  Procedure Min.   PT eval          Total Timed Minutes:  10  Total Timed Units:  1  Total Untimed Units:  1  Charges Billed/# of units:  2    OUTPATIENT PHYSICAL THERAPY   PATIENT EVALUATION  Onset Date: About 1 year ago  Primary Diagnosis:   1. Weakness of both lower extremities     2. Hamstring tightness of both lower extremities     3. Hip tightness       Treatment Diagnosis: weakness, poor core stabilization, HS and hip tightness  Past Medical History:   Diagnosis Date    HLD (hyperlipidemia)     Hypertension     NAFLD (nonalcoholic fatty liver disease)      Precautions: Standard  Prior Therapy: Therapy for L shoulder  Medications: Андрей Wooten has a current medication list which includes the following prescription(s): albuterol, amlodipine, aspirin, cetirizine, fish oil-omega-3 fatty acids, fluticasone, losartan-hydrochlorothiazide 100-12.5 mg, pravastatin, coenzyme q10, and vitamin d, and the following Facility-Administered Medications: triamcinolone acetonide.  Nutrition:  Obese  History of Present Illness: Insidious onset of low back pain, has recently started to get better with OTC medication  Prior Level of Function: Independent  Social History: Lives with family  Place of Residence (Steps/Adaptations): two story home, bedroom on first floor, stairs at work climb once a week  Functional Deficits Leading to Referral/Nature of Injury: Difficulty with prolonged sitting, bending over, vehicle transfers, and yardwork  Patient Therapy Goals: Get rid of back pain. Become active again.    Subjective     Андрей Wotoen states he has been having pain in his low back/tailbone area that started about a year ago. He does not recall any particular incident that started his pain. He reports being diagnosed with the beginnings of RA in his low back about three years ago.  Previously he was walking for exercise, jumping rope, and stretching but he has not been exercising as much due to increased pain when walking for exercises. 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 Currently his pain gets worse with prolonged sitting, bending over to pick something up from the floor, getting in/out of his vehicle, driving for a long period of time(40 minute drive to get to work), Pain low back has gone down since going to urgent care for flu-like symptoms and starting to rotate between tylenol and ibuprofen for his fever.      Pain:  Location: back  and tailbone   Description: Deep bruise  Activities Which Increase Pain: Sitting, Bending, Walking and Lifting  Activities Which Decrease Pain: OTC medication  Pain Scale: 3/10 at best 4/10 now  8/10 at worst    Objective     Posture: Flat back posture   Palpation: LS junction pain  Sensation: light touch intact  DTRs: 1+ B LE  Range of Motion/Strength:   Lumbar AROM: Pain/Dysfunction with Movement:   Flexion 80* Pulling in hamstrings   Extension 20* Pain LS junction   Right side bending 18*    Left side bending 20*        L/E MMT Right Left Pain/Dysfunction with Movement   Hip Flexion 3+/5 3+/5    Hip Extension 3/5 3/5    Hip Abduction 3+/5 3/5    Knee Flexion 5/5 5/5    Knee Extension 5/5 4+/5    Ankle DF 5/5 5/5    Ankle PF 5/5 5/5          Flexibility: SLR L 60*, SLR R 57* pulling in HS  Gait: Without AD  Analysis: Assistance none, unremarkable  Bed Mobility:Independent  Transfers: Independent  Special Tests: Hip 90/90 L -25*, R -35*, DANETTE negative, Prone Instability Test is negative, PIVM WNL with no complaints of pain  Other: FOTO lumbar 54, 40%<60%  Treatment: Patient was educated on the plan of care and treatment options. He is in agreement with the plan of care. He performed therapeutic exercises 1:1 with PT x 10 minutes of seated and supine hamstring stretch, piriformis  stretch, and TAs.    Assessment       Initial Assessment (Pertinent finding, problem list and factors affecting outcome): Mr. Wooten is a 52 year old male, who presents to the clinic with complaints of low back pain of at least 1 year in duration. He demonstrates lumbar AROM WNL but with complaints of pain at end range extension in his lumbosacral junction. His B hamstring and hip tightness limit his ability to maintain a neutral alignment with ADLs and to use correct body mechanics with ADLs. His B hip weakness and poor core stabilization also limits his ability to perform his usual ADLs with no increase in symptoms and to use correct body mechanics. He is able to ambulate with a normal gait pattern with no increase in symptoms inside the clinic. His score on FOTO lumbar places him in the 40%<60% impaired, limited, or restricted category. He would benefit from physical therapy to improve his strength, flexibility, and core stability in order to return to his PLOF and decrease his complaints of pain with ADLs.  History  Co-morbidities and personal factors that may impact the plan of care Examination  Body Structures and Functions, activity limitations and participation restrictions that may impact the plan of care Clinical Presentation   Decision Making/ Complexity Score   Co-morbidities:   Obesity              Personal Factors:   None Body Regions:B LE, lumbar spine, sacrum    Body Systems: Musculoskeletal - LE weakness, poor core stabilization, B HS and hip tightness, tenderness to palpation over lumbosacral junction; Neuromuscular - flat back posture; Cardiovascular - N/A; Integumentary - N/A          Activity limitations/Participation Restrictions: Patient may not be able to participate in therapy on a consistent basis due to his work schedule.       evolving with changing clinical characteristics Moderate complexity    FOTO lumbar 54, 40%<60%       Rehab Potiential: good    Short Term Goals (4 Weeks):   1. This  patient will be independent with a basic HEP.  2. This patient will increase B SLR by 20* in order to use correct body mechanics when lifting light objects off the floor.   3. This patient will increase B LE strength by 1 grade in order to be able to perform transfers with no increase in symptoms.   4. This patient will have a pain rating of 4/10 at worst with ADLs.  5. Patient able to score greater than or equal to 74 on the FOTO lumbar placing patient in 20%<40% impaired, limited, or restricted category demonstrating overall decreased low back pain with functional activities.   Long Term Goals ( Weeks):   1. This patient will be independent with an updated HEP.  2. This patient will have B LE strength of 5/5 in order to perform his usual yardwork with no increase in symptoms.  3. This patient will have a pain rating of 2/10 at worst with ADLs.  4. Patient able to score greater than or equal to 90 on the FOTO lumbar placing patient in 1%<20% impaired, limited, or restricted category demonstrating overall decreased low back pain with functional activities.    Plan     Certification Period: 04/17/17 to 06/17/17  Recommended Treatment Plan: 1-2 times per week for 8 weeks: Group Therapy, Manual Therapy, Moist Heat/ Ice, Neuromuscular Re-ed, Patient Education, Therapeutic Exercise and Other moadlities prn.  Other Recommendations: None      Therapist: Viridiana Stoner, PT    I CERTIFY THE NEED FOR THESE SERVICES FURNISHED UNDER THIS PLAN OF TREATMENT AND WHILE UNDER MY CARE    Physician's comments: ________________________________________________________________________________________________________________________________________________      Physician's Name: ___________________________________

## 2017-04-19 PROBLEM — R29.898 WEAKNESS OF BOTH LOWER EXTREMITIES: Status: ACTIVE | Noted: 2017-04-19

## 2017-04-19 PROBLEM — R29.898 HIP TIGHTNESS: Status: ACTIVE | Noted: 2017-04-19

## 2017-04-19 PROBLEM — M62.9 HAMSTRING TIGHTNESS OF BOTH LOWER EXTREMITIES: Status: ACTIVE | Noted: 2017-04-19

## 2017-04-21 ENCOUNTER — OFFICE VISIT (OUTPATIENT)
Dept: HEPATOLOGY | Facility: CLINIC | Age: 52
End: 2017-04-21
Payer: COMMERCIAL

## 2017-04-21 VITALS
HEART RATE: 82 BPM | DIASTOLIC BLOOD PRESSURE: 90 MMHG | OXYGEN SATURATION: 98 % | HEIGHT: 67 IN | RESPIRATION RATE: 18 BRPM | BODY MASS INDEX: 40.14 KG/M2 | WEIGHT: 255.75 LBS | TEMPERATURE: 98 F | SYSTOLIC BLOOD PRESSURE: 158 MMHG

## 2017-04-21 DIAGNOSIS — I10 ESSENTIAL HYPERTENSION: ICD-10-CM

## 2017-04-21 DIAGNOSIS — K75.81 NASH (NONALCOHOLIC STEATOHEPATITIS): ICD-10-CM

## 2017-04-21 DIAGNOSIS — E66.01 MORBID OBESITY, UNSPECIFIED OBESITY TYPE: ICD-10-CM

## 2017-04-21 DIAGNOSIS — K76.0 FATTY LIVER: Primary | ICD-10-CM

## 2017-04-21 DIAGNOSIS — R74.8 ELEVATED LIVER ENZYMES: ICD-10-CM

## 2017-04-21 DIAGNOSIS — E78.5 HYPERLIPIDEMIA, UNSPECIFIED HYPERLIPIDEMIA TYPE: ICD-10-CM

## 2017-04-21 PROCEDURE — 99999 PR PBB SHADOW E&M-EST. PATIENT-LVL IV: CPT | Mod: PBBFAC,,, | Performed by: NURSE PRACTITIONER

## 2017-04-21 PROCEDURE — 1160F RVW MEDS BY RX/DR IN RCRD: CPT | Mod: S$GLB,,, | Performed by: NURSE PRACTITIONER

## 2017-04-21 PROCEDURE — 99213 OFFICE O/P EST LOW 20 MIN: CPT | Mod: S$GLB,,, | Performed by: NURSE PRACTITIONER

## 2017-04-21 PROCEDURE — 3080F DIAST BP >= 90 MM HG: CPT | Mod: S$GLB,,, | Performed by: NURSE PRACTITIONER

## 2017-04-21 PROCEDURE — 3077F SYST BP >= 140 MM HG: CPT | Mod: S$GLB,,, | Performed by: NURSE PRACTITIONER

## 2017-04-21 NOTE — MR AVS SNAPSHOT
[facility]  [address]  [facility] [address]  Phone: [phone]  Fax: [phone]                  Андрей Wooten   2017 3:40 PM   Office Visit    Description:  Male : 1965   Provider:  Hailee Reyes NP   Department:  [facility]           Reason for Visit     Fatty Liver           Diagnoses this Visit        Comments    Fatty liver    -  Primary            To Do List           Future Appointments        Provider Department Dept Phone    2017 10:00 AM Viridiana Stoner, PT [facility] [phone]    2017 10:15 AM Khushboo Pugh MD [facility] [phone]    2017 9:00 AM [facility] MRI4 [facility] [phone]    2017 4:00 PM Dav Carney MD [facility] - Cardiology [phone]    2017 10:30 AM Khushboo Pugh MD [facility] [phone]      Goals (5 Years of Data)     None      [facility]     [facility] Nurse Care Line -  Assistance  Unless otherwise directed by your provider, please contact [facility], our nurse care line that is available for  assistance.     Registered nurses in the [facility] Center provide: appointment scheduling, clinical advisement, health education, and other advisory services.  Call: [phone] (toll free)               Medications           Message regarding Medications     Verify the changes and/or additions to your medication regime listed below are the same as discussed with your clinician today.  If any of these changes or additions are incorrect, please notify your healthcare provider.             Verify that the below list of medications is an accurate representation of the medications you are currently taking.  If none reported, the list may be blank. If incorrect, please contact your healthcare provider. Carry this list with you in case of emergency.           Current Medications     amlodipine  "(NORVASC) 5 MG tablet Take 1 tablet (5 mg total) by mouth once daily.    aspirin (ECOTRIN) 81 MG EC tablet Take 81 mg by mouth once daily.    cetirizine (ZYRTEC) 10 MG tablet Take 10 mg by mouth once daily.    fish oil-omega-3 fatty acids 300-1,000 mg capsule Take 1 g by mouth once daily.    fluticasone (FLONASE) 50 mcg/actuation nasal spray 1 spray by Each Nare route once daily.    losartan-hydrochlorothiazide 100-12.5 mg (HYZAAR) 100-12.5 mg Tab Take 1 tablet by mouth once daily.    pravastatin (PRAVACHOL) 40 MG tablet Take 1 tablet (40 mg total) by mouth once daily.    UBIDECARENONE (COENZYME Q10) 100 mg Tab Take 1 tablet by mouth once daily.    vitamin D 1000 units Tab Take 185 mg by mouth once daily.    albuterol 90 mcg/actuation inhaler Inhale 2 puffs into the lungs every 6 (six) hours as needed for Wheezing.           Clinical Reference Information           Your Vitals Were     BP Pulse Temp Resp Height Weight    158/90 (BP Location: Left arm, Patient Position: Sitting) 82 97.6 °F (36.4 °C) (Oral) 18 5' 7" (1.702 m) 116 kg (255 lb 11.7 oz)    SpO2 BMI             98% 40.05 kg/m2         Blood Pressure          Most Recent Value    BP  (!)  158/90      Allergies as of 4/21/2017     No Known Allergies      Immunizations Administered on Date of Encounter - 4/21/2017     None      Orders Placed During Today's Visit     Future Labs/Procedures Expected by Expires    MRI Abdomen Without Contrast  4/21/2017 4/21/2018      Language Assistance Services     ATTENTION: Language assistance services are available, free of charge. Please call [phone].      ATENCIÓN: Si habla español, tiene a cleary disposición servicios gratuitos de asistencia lingüística. Llame al [phone].     GEOVANI Ý: N?u b?n nói Ti?ng Vi?t, có các d?ch v? h? tr? ngôn ng? mi?n phí dành cho b?n. G?i s? [phone].         [facility] complies with applicable Federal civil rights laws and does not discriminate on the basis of race, " color, national origin, age, disability, or sex.

## 2017-04-21 NOTE — PROGRESS NOTES
[facility] Established Patient Visit    Reason for Visit:  F/u elevated liver enzymes    PCP: Khushboo Pugh    HPI:  This is a 52 y.o. male here for f/u of elevated liver enzymes due to VOGEL. He underwent liver biospy in 4/2015 to clarify his liver staging since Fibrosure and elastography scan was conflicting. His fibrosure in 3/2015 showed F0-F1. Elastography scan in 3/2015 showed F2-F3. Liver biopsy revealed minimal steatohepatitis with no fibrosis.     FINAL PATHOLOGIC DIAGNOSIS  LIVER (NEEDLE BIOPSY):  -Very minimal lobular steatohepatitis  -Macrosteatosis, 20%  -Macrosteatosis, 10%  -No Fibrosis  -Blush hepatocyte iron  -Iron and trichrome stains with appropriate controls    Biopsy was a small sample size of 0.2 and 0.6 cm. After last visit, we did Fibroscan that showed F4, cirrhosis and repeat Fibrosure that showed F1-F2. U/s showed enlarged liver at 18.8 cm with steatosis. Spleen nl 10.8. He has normal plts at 244. Normal synthetic liver functioning. ALT > AST. No findings to suggest cirrhosis.     He got 3 Twinrix vaccines but almost a year apart. We can repeat immunity markers with next labs to see if he is immune. If not, will need to repeat vaccines again. His weight is stable. Recent labs are stable. Enzymes were better last year when weight was lower.     He reports he feels well and denies any complaints today. Denies any symptoms of advanced chronic liver disease including jaundice, dark urine, abdominal distention, hematemesis, melena, slowed mentation.       ROS:   GENERAL: Denies fever, chills, (+) weight gain, no fatigue  HEENT: Denies headaches, dizziness, vision/hearing changes  CARDIOVASCULAR: Denies chest pain, palpitations, or edema  RESPIRATORY: Denies dyspnea, cough  GI: Denies abdominal pain, rectal bleeding, nausea, vomiting. No change in bowel pattern or color  : Denies dysuria, hematuria   SKIN: Denies rash, itching   NEURO: Denies confusion, memory loss, or mood  "changes  PSYCH: Denies depression or anxiety  HEME/LYMPH: Denies easy bruising or bleeding      PMHX:  has a past medical history of HLD (hyperlipidemia); Hypertension; and NAFLD (nonalcoholic fatty liver disease).    PSHX:  has a past surgical history that includes Liver biopsy (4/1/15) and Colonoscopy (N/A, 3/2/2016).    The patient's social and family histories were reviewed by me and updated in the appropriate section of the electronic medical record.    Review of patient's allergies indicates:  No Known Allergies    Current Outpatient Rx   Name  Route  Sig  Dispense  Refill    amlodipine (NORVASC) 5 MG tablet    Oral    Take 1 tablet (5 mg total) by mouth once daily.    90 tablet    1      aspirin (ECOTRIN) 81 MG EC tablet    Oral    Take 81 mg by mouth once daily.              fish oil-omega-3 fatty acids 300-1,000 mg capsule    Oral    Take 1 g by mouth once daily.              losartan (COZAAR) 100 MG tablet    Oral    Take 1 tablet (100 mg total) by mouth once daily.    90 tablet    1      UBIDECARENONE (COENZYME Q10) 100 mg Tab    Oral    Take 1 tablet by mouth once daily.              vitamin D 1000 units Tab    Oral    Take 185 mg by mouth once daily.              DISCONTD: polyethylene glycol (GOLYTELY,NULYTELY) 236-22.74-6.74 -5.86 gram suspension        As directed    1 Bottle    0           Objective Findings:    PHYSICAL EXAM:   Friendly Black or  male, in no acute distress; alert and oriented to person, place and time  VITALS: BP (!) 158/90 (BP Location: Left arm, Patient Position: Sitting)  Pulse 82  Temp 97.6 °F (36.4 °C) (Oral)   Resp 18  Ht 5' 7" (1.702 m)  Wt 116 kg (255 lb 11.7 oz)  SpO2 98%  BMI 40.05 kg/m2  HENT: Normocephalic, without obvious abnormality. Oral mucosa pink and moist. Dentition good.  EYES: Sclerae anicteric. No conjunctival pallor.   NECK: Supple. No masses or cervical adenopathy.  CARDIOVASCULAR: Regular rate and rhythm. No " murmurs.  RESPIRATORY: Normal respiratory effort. BBS CTA. No wheezes or crackles.  GI: Obese, soft, non-tender, non-distended. Liver edge palpable. No masses palpable. No ascites.  EXTREMITIES: No clubbing, cyanosis or edema.  SKIN: Warm and dry. No jaundice. No rashes noted to exposed skin. No telangectasias noted. No palmar erythema.  NEURO: Normal gate. No asterixis.  PSYCH: Memory intact. Thought and speech pattern appropriate. Behavior normal. No depression or anxiety noted.      Labs:  Lab Results   Component Value Date    WBC 3.65 (L) 04/22/2016    HGB 14.7 04/22/2016    HCT 44.1 04/22/2016     04/22/2016    CHOL 195 06/28/2016    TRIG 158 (H) 06/28/2016    HDL 29 (L) 06/28/2016     04/07/2017    K 4.1 04/07/2017    CREATININE 1.1 04/07/2017    ALT 73 (H) 04/07/2017    AST 42 (H) 04/07/2017    ALKPHOS 52 (L) 04/07/2017    BILITOT 0.8 04/07/2017    ALBUMIN 4.2 04/07/2017    INR 1.0 04/22/2016       ASSESSMENT:  52 y.o. Black or  male with:  1.  NAFLD  -- transaminases mildly elevated  -- US showed hepatomegaly with steatosis  -- synthetic liver function nl  -- risk factors for fatty liver include morbid obesity, HLD, HTN  2. Conflicting Liver staging  -- Fibrosure 3/2015 = F0-F1  -- Elastography 3/2015 = F2-F3  -- liver biopsy -  4/1/15- very minimal lobular steatohepatitis, macrosteatosis 20%, microsteatosis 10% no fibrosis (biopsy was small sample size though so concern for understaging with this)  -- Fibroscan 4/2016 = F4  -- Fibrosure 4/2016 = F1-F2  -- no findings on labs or imaging to suggest cirrhosis  -- will do MR elastography since BMI > 40 to clarify staging since Fibroscan was outlier and Fibroscan is not accurate in pts with BMI > 40      EDUCATION:   We discussed the manifestations of NAFLD. At this time there is no FDA approved therapy for NAFLD.   The patient and I discussed the importance of diet, exercise, and weight loss for management of NAFLD. We discussed a  low fat, low carb/low sugar, high fiber diet, and a goal of 30 minutes of exercise 5 times per week.   Pt is aware that fatty liver can progress to steatohepatitis and possibly to cirrhosis, putting one at increased risk for liver cancer, liver failure, and death.        PLAN:  1. MRI elastography. Pt requested sedation  2. Will check immunity markers for HBV/HAV with next labs and arrange for vaccination if needed. Did not complete vaccines in proper order but did get 3 w/i one year. Will revaccinate if necessary  3. Weight loss goal of 20-25 lbs over next 6-12 months (5-10 lbs over next 3-6 months)  4. Low fat, low cholesterol, low carb, high fiber diet  5. Exercise, 5 days a week, 30 min a day  6. Recommend good control of cholesterol, blood pressure, blood sugar levels  7. F/u with me in 6 months pending results    Thank you for allowing me to participate in the care of Андрей Wooten    VASQUEZ PinaC

## 2017-04-21 NOTE — LETTER
April 21, 2017      Khushboo Pugh MD  [address]  [place] [address]           [facility]  [address]  [place] [address]  Phone: [phone]  Fax: [phone]          Patient: Андрей Wooten   MR Number: [id number]   YOB: 1965   Date of Visit: 4/21/2017       Dear Dr. Khushboo Pugh:    Thank you for referring Андрей Wooten to me for evaluation. Attached you will find relevant portions of my assessment and plan of care.    If you have questions, please do not hesitate to call me. I look forward to following Андрей Wooten along with you.    Sincerely,    Hailee Reyes, NP    Enclosure  CC:  No Recipients    If you would like to receive this communication electronically, please contact [email or web address] or [phone] to request more information on [facility] access.    For providers and/or their staff who would like to refer a patient to [facility], please contact us through our one-stop-shop provider referral line, [facility], at [phone].    If you feel you have received this communication in error or would no longer like to receive these types of communications, please e-mail [email or web address]

## 2017-04-26 ENCOUNTER — CLINICAL SUPPORT (OUTPATIENT)
Dept: REHABILITATION | Facility: HOSPITAL | Age: 52
End: 2017-04-26
Attending: INTERNAL MEDICINE
Payer: COMMERCIAL

## 2017-04-26 DIAGNOSIS — R29.898 WEAKNESS OF BOTH LOWER EXTREMITIES: ICD-10-CM

## 2017-04-26 DIAGNOSIS — M62.9 HAMSTRING TIGHTNESS OF BOTH LOWER EXTREMITIES: ICD-10-CM

## 2017-04-26 DIAGNOSIS — R29.898 HIP TIGHTNESS: ICD-10-CM

## 2017-04-26 PROCEDURE — 97110 THERAPEUTIC EXERCISES: CPT

## 2017-04-26 NOTE — PROGRESS NOTES
"TIME RECORD    Date:  04/26/2017    Start Time:  10:00  Stop Time:  10:45    PROCEDURES:    TIMED  Procedure Min.   TE 45                     UNTIMED  Procedure Min.             Total Timed Minutes:  45  Total Timed Units:  3  Total Untimed Units:  0  Charges Billed/# of units:  3      Progress/Current Status    Subjective:     Patient ID: Андрей Wooten is a 52 y.o. male.  Diagnosis:   1. Weakness of both lower extremities     2. Hamstring tightness of both lower extremities     3. Hip tightness       Pain: 3 /10  He reports feeling better and has been doing his exercises daily.     Objective:     April was educated and performed therapeutic exercises as per log, 1:1 with PT x 45 minutes to improve strength, flexibility, and core stability. He declined a cold pack at the end of the session.     Date 04/26/17   Visit 2/25   Gcode --   FOTO 2/5   POC exp 06/17/17   FTF 05/17/17   MHP    MT --   HS stretch 10 x 10"   Piriformis stretch 10 x 10"   Supine:    TAs 10 x 3"   LTR 1 x 10   March 1 x 10   Bug --   Bridge 1 x 10   SL hip abd 1 x 10   SLR 1 x 10   Prone:    Alt LE 1 x 10   Alt LE/UE --   Seated:    TAs --   March --   LAQs --   Lats --   Rows --       Leg Press --   Initials DG       Assessment:     Patient was able to perform all of today's exercises with no increase in symptoms prior to leaving the clinic, he reported feeling better. He required occasional cues for positioning with side lying exercises and to maintain his core stabilization with all exercises.     Patient Education/Response:     Patient was given handouts of today's exercises for HEP and instructed to perform his HEP to his tolerance twice a day. He verbalized understanding instructions.     Plans and Goals:     Continue with the plan of care and progress as the patient tolerates.     Short Term Goals (4 Weeks):   1. This patient will be independent with a basic HEP.  2. This patient will increase B SLR by 20* in order to use correct body " mechanics when lifting light objects off the floor.   3. This patient will increase B LE strength by 1 grade in order to be able to perform transfers with no increase in symptoms.   4. This patient will have a pain rating of 4/10 at worst with ADLs.  5. Patient able to score greater than or equal to 74 on the FOTO lumbar placing patient in 20%<40% impaired, limited, or restricted category demonstrating overall decreased low back pain with functional activities.   Long Term Goals ( Weeks):   1. This patient will be independent with an updated HEP.  2. This patient will have B LE strength of 5/5 in order to perform his usual yardwork with no increase in symptoms.  3. This patient will have a pain rating of 2/10 at worst with ADLs.  4. Patient able to score greater than or equal to 90 on the FOTO lumbar placing patient in 1%<20% impaired, limited, or restricted category demonstrating overall decreased low back pain with functional activities.

## 2017-05-01 ENCOUNTER — CLINICAL SUPPORT (OUTPATIENT)
Dept: REHABILITATION | Facility: HOSPITAL | Age: 52
End: 2017-05-01
Attending: INTERNAL MEDICINE
Payer: COMMERCIAL

## 2017-05-01 DIAGNOSIS — R29.898 WEAKNESS OF BOTH LOWER EXTREMITIES: ICD-10-CM

## 2017-05-01 DIAGNOSIS — M62.9 HAMSTRING TIGHTNESS OF BOTH LOWER EXTREMITIES: ICD-10-CM

## 2017-05-01 DIAGNOSIS — R29.898 HIP TIGHTNESS: ICD-10-CM

## 2017-05-01 PROCEDURE — 97110 THERAPEUTIC EXERCISES: CPT

## 2017-05-01 NOTE — PROGRESS NOTES
"TIME RECORD    Date:  05/01/2017    Start Time:  9:00  Stop Time:  9:50    PROCEDURES:    TIMED  Procedure Min.   TE 25   TE sup 25NC                 UNTIMED  Procedure Min.             Total Timed Minutes: 25  Total Timed Units: 2  Total Untimed Units:  0  Charges Billed/# of units: 2      Progress/Current Status    Subjective:     Patient ID: Андрей Wooten is a 52 y.o. male.  Diagnosis:   1. Weakness of both lower extremities     2. Hamstring tightness of both lower extremities     3. Hip tightness       Pain: 4 /10  Patient reports that he has noticed he gets more pain when he lays still too long. He has been doing his HEP with no difficulty.     Objective:     April was educated and performed therapeutic exercises as per log, along with today's progressions and new exercises 1:1 with PT x 25 minutes and supervised by PT x 25 minutes to improve strength, flexibility, and core stability. He declined a cold pack at the end of the session.     Date 05/01/17 04/26/17   Visit 3/25 2/25   Gcode -- --   FOTO 3/5 2/5   POC exp 06/17/17 06/17/17   FTF 05/17/17 05/17/17   MHP     MT  --   HS stretch 10 x 10" 10 x 10"   Piriformis stretch 10 x 10" 10 x 10"   Supine:     TAs 10 x 3" 10 x 3"   LTR 1 x 15 1 x 10   March 1 x 15 1 x 10   Bug -- --   Bridge 1 x 15 1 x 10   SL hip abd 2 x 10 1 x 10   SLR 2 x 10 1 x 10   Prone:     Alt LE 1 x 15 1 x 10   Alt LE/UE -- --   Seated:     TAs -- --   March 1 x 10 --   LAQs 1 x 10 --   Lats -- --   Rows -- --        Leg Press -- --   Initials DG DG       Assessment:     He required occasional cues to maintain his core stabilization with seated exercises. He was able to perform all of today's new exercises and progressions with no increase in symptoms prior to leaving the clinic.     Patient Education/Response:     Patient was instructed to perform his HEP to his tolerance twice a day. He verbalized understanding instructions.     Plans and Goals:     Continue with the plan of care and " progress as the patient tolerates.     Short Term Goals (4 Weeks):   1. This patient will be independent with a basic HEP.  2. This patient will increase B SLR by 20* in order to use correct body mechanics when lifting light objects off the floor.   3. This patient will increase B LE strength by 1 grade in order to be able to perform transfers with no increase in symptoms.   4. This patient will have a pain rating of 4/10 at worst with ADLs.  5. Patient able to score greater than or equal to 74 on the FOTO lumbar placing patient in 20%<40% impaired, limited, or restricted category demonstrating overall decreased low back pain with functional activities.   Long Term Goals ( Weeks):   1. This patient will be independent with an updated HEP.  2. This patient will have B LE strength of 5/5 in order to perform his usual yardwork with no increase in symptoms.  3. This patient will have a pain rating of 2/10 at worst with ADLs.  4. Patient able to score greater than or equal to 90 on the FOTO lumbar placing patient in 1%<20% impaired, limited, or restricted category demonstrating overall decreased low back pain with functional activities.

## 2017-05-16 ENCOUNTER — CLINICAL SUPPORT (OUTPATIENT)
Dept: REHABILITATION | Facility: HOSPITAL | Age: 52
End: 2017-05-16
Attending: INTERNAL MEDICINE
Payer: COMMERCIAL

## 2017-05-16 ENCOUNTER — DOCUMENTATION ONLY (OUTPATIENT)
Dept: REHABILITATION | Facility: HOSPITAL | Age: 52
End: 2017-05-16

## 2017-05-16 DIAGNOSIS — R29.898 HIP TIGHTNESS: ICD-10-CM

## 2017-05-16 DIAGNOSIS — R29.898 WEAKNESS OF BOTH LOWER EXTREMITIES: ICD-10-CM

## 2017-05-16 DIAGNOSIS — M62.9 HAMSTRING TIGHTNESS OF BOTH LOWER EXTREMITIES: ICD-10-CM

## 2017-05-16 PROCEDURE — 97110 THERAPEUTIC EXERCISES: CPT

## 2017-05-16 NOTE — PROGRESS NOTES
"TIME RECORD    Date:  05/16/2017    Start Time:  9:00  Stop Time:  9:47    PROCEDURES:    TIMED  Procedure Min.   TE 47                     UNTIMED  Procedure Min.             Total Timed Minutes: 47  Total Timed Units: 3  Total Untimed Units:  0  Charges Billed/# of units: 3      Progress/Current Status    Subjective:     Patient ID: Андрей Wooten is a 52 y.o. male.  Diagnosis:   1. Weakness of both lower extremities     2. Hamstring tightness of both lower extremities     3. Hip tightness       Pain: 0 /10  He reports doing good this morning with no complaints of pain. He didn't do his HEP as much this past week as he was on vacation.     Objective:     Hallietennadiya was educated and performed therapeutic exercises as per log, along with today's progressions and today's reassessment 1:1 with PT x 47 minutes to improve strength, flexibility, and core stability. He declined a cold pack at the end of the session.     Date 05/16/17 05/01/17 04/26/17   Visit 4/25 3/25 2/25   Gcode -- -- --   FOTO 4/5 3/5 2/5   POC exp 06/17/17 06/17/17 06/17/17   FTF 05/17/17 05/17/17 05/17/17   MHP --     MT --  --   HS stretch 10 x 10" 10 x 10" 10 x 10"   Piriformis stretch 10 x 10" 10 x 10" 10 x 10"   Supine:      TAs 10 x 3" 10 x 3" 10 x 3"   LTR 1 x 15 1 x 15 1 x 10   March 1  x15 1 x 15 1 x 10   Bug -- -- --   Bridge 1 x 15 1 x 15 1 x 10   SL hip abd 2 x 10 2 x 10 1 x 10   SLR 2 x 10 2 x 10 1 x 10   Prone:      Alt LE 1 x 15 1 x 15 1 x 10   Alt LE/UE -- -- --   Seated:      TAs -- -- --   March 1 x 10 1 x 10 --   LAQs 1 x 10 1 x 10 --   Lats -- -- --   Rows -- -- --         Leg Press -- -- --   Initials DG DG DG     Lumbar AROM: Pain/Dysfunction with Movement:   Flexion 80*    Extension 20*    Right side bending 20*    Left side bending 20*        L/E MMT Right Left Pain/Dysfunction with Movement   Hip Flexion 5/5 5/5    Hip Extension 5/5 5/5    Hip Abduction 5/5 4/5    Knee Flexion 5/5 5/5    Knee Extension 5/5 5/5    Ankle DF 5/5 5/5  "   Ankle PF 5/5 5/5      SLR R 70*, L 65*  FOTO lumbar 75, 20%<40%  Hip 90/90 R -20*, L -25*  Assessment:     Patient demonstrated lumbar AROM WNL with no complaints of pain and an increase in LE strength with no complaints of pain. His SLR and Hip 90/90 has also improved with no complaints of back pain. He is able to perform his HEP independent with no difficulty. His score on FOTO lumbar has improved to 75, which places him in the 20%<40% impaired, limited, or restricted category. He has met all goals set for him at this time and is ready for discharge to [facility].    Patient Education/Response:     Patient was instructed to perform his HEP to his tolerance twice a day. He verbalized understanding instructions.     Plans and Goals:     Discharge to [facility].     Short Term Goals (4 Weeks):   1. This patient will be independent with a basic HEP. MET  2. This patient will increase B SLR by 20* in order to use correct body mechanics when lifting light objects off the floor. MET  3. This patient will increase B LE strength by 1 grade in order to be able to perform transfers with no increase in symptoms. MET  4. This patient will have a pain rating of 4/10 at worst with ADLs. MET  5. Patient able to score greater than or equal to 74 on the FOTO lumbar placing patient in 20%<40% impaired, limited, or restricted category demonstrating overall decreased low back pain with functional activities. MET  Long Term Goals ( Weeks):   1. This patient will be independent with an updated HEP. MET  2. This patient will have B LE strength of 5/5 in order to perform his usual yardwork with no increase in symptoms.MET  3. This patient will have a pain rating of 2/10 at worst with ADLs. MET  4. Patient able to score greater than or equal to 90 on the FOTO lumbar placing patient in 1%<20% impaired, limited, or restricted category demonstrating overall decreased low back pain with functional activities.PARTIALLY MET

## 2017-05-16 NOTE — PROGRESS NOTES
Face to Face PTA Conference performed with Yonas Almazan, PTA regarding patient's current status, overall progress, and plan of care    Face to Face PTA Conference performed with Viridiana Stoner, PT regarding patient's current status, overall progress, and plan of care    Yonas Almazan  5/16/2017

## 2017-05-17 NOTE — PLAN OF CARE
REHAB SERVICES OUTPATIENT DISCHARGE SUMMARY  Physical Therapy      Name:  Андрей Wooten  Date:  05/17/17  Date of Evaluation:  04/17/17  Physician:  Khushboo Pugh MD  Total # Of Visits:  4  Cancelled:  1  No Shows:  0  Diagnosis:    1. Weakness of both lower extremities     2. Hamstring tightness of both lower extremities     3. Hip tightness         Physical/Functional Status:  At time of discharge, patient was able to demonstrated lumbar AROM WNL with no complaints of pain and an increase in LE strength with no complaints of pain. His SLR and Hip 90/90 has also improved with no complaints of back pain. He is able to perform his HEP independent with no difficulty. His score on FOTO lumbar has improved to 75, which places him in the 20%<40% impaired, limited, or restricted category. He has met all goals set for him at this time and is ready for discharge to [facility].      The patient is to be discharged from our Therapy service for the following reason(s):  Patient is now asymptomatic and Patient has met all of his/her goals    Degree of Goal Achievement:  Patient has met all goals    Patient Education:  Patient educated to continue with his HEP three times a week to maintain his strength, flexibility, and core stability.    Discharge Plan:  Home Program:  For LE strength, flexibility, core stability, and range of motion.     Viridiana Stoner, PT

## 2017-05-29 ENCOUNTER — HOSPITAL ENCOUNTER (OUTPATIENT)
Dept: RADIOLOGY | Facility: HOSPITAL | Age: 52
Discharge: HOME | End: 2017-05-29
Attending: NURSE PRACTITIONER
Payer: COMMERCIAL

## 2017-05-29 DIAGNOSIS — K76.0 FATTY LIVER: ICD-10-CM

## 2017-05-29 PROCEDURE — 74181 MRI ABDOMEN W/O CONTRAST: CPT | Mod: TC

## 2017-05-29 PROCEDURE — 74181 MRI ABDOMEN W/O CONTRAST: CPT | Mod: 26,,, | Performed by: RADIOLOGY

## 2017-05-30 ENCOUNTER — PATIENT MESSAGE (OUTPATIENT)
Dept: HEPATOLOGY | Facility: CLINIC | Age: 52
End: 2017-05-30

## 2017-06-07 ENCOUNTER — LAB VISIT (OUTPATIENT)
Dept: LAB | Facility: HOSPITAL | Age: 52
End: 2017-06-07
Attending: INTERNAL MEDICINE
Payer: COMMERCIAL

## 2017-06-07 DIAGNOSIS — I10 ESSENTIAL HYPERTENSION: ICD-10-CM

## 2017-06-07 LAB
ANION GAP SERPL CALC-SCNC: 10 MMOL/L
BUN SERPL-MCNC: 21 MG/DL
CALCIUM SERPL-MCNC: 9.4 MG/DL
CHLORIDE SERPL-SCNC: 101 MMOL/L
CO2 SERPL-SCNC: 30 MMOL/L
CREAT SERPL-MCNC: 1.01 MG/DL
EST. GFR  (AFRICAN AMERICAN): >60 ML/MIN/1.73 M^2
EST. GFR  (NON AFRICAN AMERICAN): >60 ML/MIN/1.73 M^2
GLUCOSE SERPL-MCNC: 117 MG/DL
POTASSIUM SERPL-SCNC: 4 MMOL/L
SODIUM SERPL-SCNC: 141 MMOL/L

## 2017-06-07 PROCEDURE — 36415 COLL VENOUS BLD VENIPUNCTURE: CPT | Mod: PO

## 2017-06-07 PROCEDURE — 80048 BASIC METABOLIC PNL TOTAL CA: CPT | Mod: PO

## 2017-06-27 ENCOUNTER — OFFICE VISIT (OUTPATIENT)
Dept: CARDIOLOGY | Facility: CLINIC | Age: 52
End: 2017-06-27
Payer: COMMERCIAL

## 2017-06-27 VITALS
DIASTOLIC BLOOD PRESSURE: 94 MMHG | HEART RATE: 90 BPM | OXYGEN SATURATION: 97 % | HEIGHT: 67 IN | SYSTOLIC BLOOD PRESSURE: 154 MMHG

## 2017-06-27 DIAGNOSIS — E78.5 HYPERLIPIDEMIA, UNSPECIFIED HYPERLIPIDEMIA TYPE: ICD-10-CM

## 2017-06-27 DIAGNOSIS — I10 ESSENTIAL HYPERTENSION: Primary | ICD-10-CM

## 2017-06-27 DIAGNOSIS — R94.31 ABNORMAL ECG: Chronic | ICD-10-CM

## 2017-06-27 PROCEDURE — 99214 OFFICE O/P EST MOD 30 MIN: CPT | Mod: S$GLB,,, | Performed by: INTERNAL MEDICINE

## 2017-06-27 PROCEDURE — 99999 PR PBB SHADOW E&M-EST. PATIENT-LVL II: CPT | Mod: PBBFAC,,, | Performed by: INTERNAL MEDICINE

## 2017-06-27 RX ORDER — HYDROCHLOROTHIAZIDE 12.5 MG/1
1 CAPSULE ORAL DAILY
COMMUNITY
Start: 2017-04-11 | End: 2017-08-08 | Stop reason: SDUPTHER

## 2017-06-27 RX ORDER — AMLODIPINE BESYLATE 10 MG/1
10 TABLET ORAL DAILY
Qty: 90 TABLET | Refills: 3 | Status: SHIPPED | OUTPATIENT
Start: 2017-06-27 | End: 2018-03-19 | Stop reason: SDUPTHER

## 2017-06-27 NOTE — PROGRESS NOTES
Subjective:   Chief Complaint:  Андрей Wooten is a 52 y.o. male who presents for follow-up of Follow-up and Hypertension      HPI:   Mr. Wooten is a 52 year-old male referred for abnormal ECG and chest discomfort.  Patient reports no chest discomfort however, just that his ECG is always abnormal.  He was evaluated with stress testing in  - negative for ischemia.  He report no chest pain.  Very mild dyspnea - nonlimiting with walking up stairs.  He has hypertension, VOGEL, and HLP.  Reports DBP is always high.  Uncertain whether he has taken a diuretic in past.      Last visit 2017.  HCTZ added at that time for uncontrolled hypertension.  Returns for f/u.  BP still uncontrolled.    Patient Active Problem List    Diagnosis Date Noted    Weakness of both lower extremities 2017    Hamstring tightness of both lower extremities 2017    Hip tightness 2017    Abnormal ECG 2017     LVH, NSST, early repolarization      Decreased range of motion of left shoulder 2015    Left shoulder pain 2015    Muscle weakness of left upper extremity 2015    VOGEL (nonalcoholic steatohepatitis) 2015      liver biopsy -  4/1/15- very minimal lobular steatohepatitis, macrosteatosis 20%, microsteatosis 10% no fibrosis. Biopsy was a small sample size of 0.2 and 0.6 cm.     -- Fibrosure= F0-F1  -- Elastography= F2-F3      Morbid obesity 2015    HLD (hyperlipidemia) 2015    HTN (hypertension) 2015    Adhesive capsulitis of left shoulder 2015           Right Arm BP - Sittin/94  Left Arm BP - Sittin/89    Current Outpatient Prescriptions   Medication Sig    albuterol 90 mcg/actuation inhaler Inhale 2 puffs into the lungs every 6 (six) hours as needed for Wheezing.    aspirin (ECOTRIN) 81 MG EC tablet Take 81 mg by mouth once daily.    cetirizine (ZYRTEC) 10 MG tablet Take 10 mg by mouth once daily.    fish oil-omega-3 fatty acids 300-1,000 mg  capsule Take 1 g by mouth once daily.    fluticasone (FLONASE) 50 mcg/actuation nasal spray 1 spray by Each Nare route once daily.    hydrochlorothiazide (MICROZIDE) 12.5 mg capsule Take 1 capsule by mouth once daily at 6am.    pravastatin (PRAVACHOL) 40 MG tablet Take 1 tablet (40 mg total) by mouth once daily.    UBIDECARENONE (COENZYME Q10) 100 mg Tab Take 1 tablet by mouth once daily.    vitamin D 1000 units Tab Take 185 mg by mouth once daily.    amlodipine (NORVASC) 10 MG tablet Take 1 tablet (10 mg total) by mouth once daily.     Current Facility-Administered Medications   Medication    triamcinolone acetonide injection 40 mg       Review of Systems   Constitution: Negative for diaphoresis, weakness, malaise/fatigue, weight gain and weight loss.   HENT: Negative for nosebleeds.    Cardiovascular: Negative for chest pain, claudication, cyanosis, dyspnea on exertion, irregular heartbeat, leg swelling, near-syncope, orthopnea, palpitations, paroxysmal nocturnal dyspnea and syncope.   Respiratory: Negative for cough, hemoptysis, shortness of breath, sleep disturbances due to breathing, snoring, sputum production and wheezing.    Hematologic/Lymphatic: Negative for bleeding problem. Does not bruise/bleed easily.   Skin: Negative for poor wound healing and rash.   Musculoskeletal: Negative for myalgias.   Gastrointestinal: Negative for heartburn, hematemesis, hematochezia and melena.   Neurological: Negative for dizziness, focal weakness, light-headedness and loss of balance.   Psychiatric/Behavioral: Negative for altered mental status, depression and memory loss.         Objective:   Physical Exam   Constitutional: He is oriented to person, place, and time. He appears well-developed and well-nourished. No distress. He is not intubated.   HENT:   Head: Atraumatic.   Neck: No JVD present.   Cardiovascular: Normal rate, regular rhythm, S1 normal, S2 normal, normal heart sounds and intact distal pulses.  PMI is  not displaced.  Exam reveals no gallop, no S3, no S4, no distant heart sounds, no friction rub, no midsystolic click and no opening snap.    No murmur heard.  Pulses:       Carotid pulses are 2+ on the right side, and 2+ on the left side.       Radial pulses are 2+ on the right side, and 2+ on the left side.   Pulmonary/Chest: Effort normal and breath sounds normal. No accessory muscle usage. No apnea, no tachypnea and no bradypnea. He is not intubated. No respiratory distress. He has no decreased breath sounds. He has no wheezes. He has no rhonchi. He has no rales. He exhibits no tenderness.   Abdominal: Soft. Normal aorta and bowel sounds are normal. He exhibits no distension, no abdominal bruit, no pulsatile midline mass and no mass. There is no tenderness.   Musculoskeletal: He exhibits no edema.   Neurological: He is alert and oriented to person, place, and time.   Skin: Skin is warm. No rash noted. No erythema. No pallor.   Psychiatric: He has a normal mood and affect. His behavior is normal. Judgment and thought content normal.   Vitals reviewed.      LABS, ECHO, ECG DATA REVIEWED.  ECHOCARDIOGRAM:     ECG:  NSR, LVH, early repolarization     STRESS TESTING:   Sept 2014:  PET  No ischemia  EF > 70%     Aug 2014:  SALV[facility]OR  EF 63%  ETT  - equivocal, DTS 7        CARDIAC CATHETERIZATION:      Assessment:     1. Essential hypertension    2. Hyperlipidemia, unspecified hyperlipidemia type    3. Abnormal ECG      BP not at target.  Plan:     Increase Amlodipine to 10mg.  May need screening for JENNIFER and hyperaldo if BP still elevated on 3 drugs.  RTC 4 weeks.    Continue with current medical plan and lifestyle changes.    I have reviewed the patient's medical history in detail and updated the computerized patient record.    No orders of the defined types were placed in this encounter.      Follow up as scheduled. Return sooner for concerns or questions      He expressed verbal understanding and agreed with the  plan          Dav Carney MD, FACC, CCDS  Interventional Cardiology  [facility]

## 2017-08-08 ENCOUNTER — OFFICE VISIT (OUTPATIENT)
Dept: INTERNAL MEDICINE | Facility: CLINIC | Age: 52
End: 2017-08-08
Payer: COMMERCIAL

## 2017-08-08 VITALS
OXYGEN SATURATION: 97 % | HEIGHT: 66 IN | DIASTOLIC BLOOD PRESSURE: 82 MMHG | BODY MASS INDEX: 42.31 KG/M2 | WEIGHT: 263.25 LBS | TEMPERATURE: 98 F | SYSTOLIC BLOOD PRESSURE: 120 MMHG | HEART RATE: 81 BPM

## 2017-08-08 DIAGNOSIS — I10 ESSENTIAL HYPERTENSION: Primary | ICD-10-CM

## 2017-08-08 PROCEDURE — 99999 PR PBB SHADOW E&M-EST. PATIENT-LVL IV: CPT | Mod: PBBFAC,,, | Performed by: INTERNAL MEDICINE

## 2017-08-08 PROCEDURE — 3074F SYST BP LT 130 MM HG: CPT | Mod: S$GLB,,, | Performed by: INTERNAL MEDICINE

## 2017-08-08 PROCEDURE — 3079F DIAST BP 80-89 MM HG: CPT | Mod: S$GLB,,, | Performed by: INTERNAL MEDICINE

## 2017-08-08 PROCEDURE — 99214 OFFICE O/P EST MOD 30 MIN: CPT | Mod: S$GLB,,, | Performed by: INTERNAL MEDICINE

## 2017-08-08 PROCEDURE — 3008F BODY MASS INDEX DOCD: CPT | Mod: S$GLB,,, | Performed by: INTERNAL MEDICINE

## 2017-08-08 RX ORDER — HYDROCHLOROTHIAZIDE 12.5 MG/1
12.5 CAPSULE ORAL DAILY
Qty: 30 CAPSULE | Refills: 3 | Status: SHIPPED | OUTPATIENT
Start: 2017-08-08 | End: 2018-03-19 | Stop reason: SDUPTHER

## 2017-08-14 NOTE — PROGRESS NOTES
Subjective:       Patient ID: Андрей Wooten is a 52 y.o. male.    Chief Complaint: Hypertension    HPI: He returns for management of hypertension.  He has had hypertension for over a year.  Current treatment has included medications outlined in medication list.  He denies chest pain or shortness of breath.  No palpitations.  Denies left arm or neck pain.    Past medical history: Hypertension, hyperlipidemia,NAFLD.  He had a colonoscopy March 2016     Medications:  one aspirin daily, Norvasc 10 mg daily, Pravachol 40 mg daily, hydrochlorothiazide 12.5 mg daily     NO KNOWN DRUG ALLERGIES     Review of Systems   Constitutional: Negative for chills, fatigue, fever and unexpected weight change.   Respiratory: Negative for chest tightness and shortness of breath.    Cardiovascular: Negative for chest pain and palpitations.   Gastrointestinal: Negative for abdominal pain and blood in stool.   Neurological: Negative for dizziness, syncope, numbness and headaches.       Objective:      Physical Exam   HENT:   Right Ear: External ear normal.   Left Ear: External ear normal.   Nose: Nose normal.   Mouth/Throat: Oropharynx is clear and moist.   Eyes: Pupils are equal, round, and reactive to light.   Neck: Normal range of motion.   Cardiovascular: Normal rate and regular rhythm.    No murmur heard.  Pulmonary/Chest: Breath sounds normal.   Abdominal: He exhibits no distension. There is no hepatosplenomegaly. There is no tenderness.   Lymphadenopathy:     He has no cervical adenopathy.     He has no axillary adenopathy.   Neurological: He has normal strength and normal reflexes. No cranial nerve deficit or sensory deficit.       Assessment:     assessment and plan: Hypertension: Check CMP and lipid panel      Plan:       As above

## 2017-09-08 ENCOUNTER — CLINICAL SUPPORT (OUTPATIENT)
Dept: INFECTIOUS DISEASES | Facility: CLINIC | Age: 52
End: 2017-09-08
Payer: COMMERCIAL

## 2017-09-08 ENCOUNTER — OFFICE VISIT (OUTPATIENT)
Dept: HEPATOLOGY | Facility: CLINIC | Age: 52
End: 2017-09-08
Payer: COMMERCIAL

## 2017-09-08 VITALS
RESPIRATION RATE: 18 BRPM | DIASTOLIC BLOOD PRESSURE: 89 MMHG | TEMPERATURE: 98 F | BODY MASS INDEX: 42.55 KG/M2 | HEIGHT: 66 IN | WEIGHT: 264.75 LBS | OXYGEN SATURATION: 97 % | SYSTOLIC BLOOD PRESSURE: 164 MMHG | HEART RATE: 81 BPM

## 2017-09-08 DIAGNOSIS — K75.81 NASH (NONALCOHOLIC STEATOHEPATITIS): ICD-10-CM

## 2017-09-08 DIAGNOSIS — E66.01 MORBID OBESITY, UNSPECIFIED OBESITY TYPE: ICD-10-CM

## 2017-09-08 DIAGNOSIS — E78.5 HYPERLIPIDEMIA, UNSPECIFIED HYPERLIPIDEMIA TYPE: ICD-10-CM

## 2017-09-08 DIAGNOSIS — I10 ESSENTIAL HYPERTENSION: ICD-10-CM

## 2017-09-08 DIAGNOSIS — K76.0 FATTY LIVER: ICD-10-CM

## 2017-09-08 DIAGNOSIS — K76.0 FATTY LIVER: Primary | ICD-10-CM

## 2017-09-08 PROCEDURE — 99999 PR PBB SHADOW E&M-EST. PATIENT-LVL V: CPT | Mod: PBBFAC,,, | Performed by: NURSE PRACTITIONER

## 2017-09-08 PROCEDURE — 99214 OFFICE O/P EST MOD 30 MIN: CPT | Mod: S$GLB,,, | Performed by: NURSE PRACTITIONER

## 2017-09-08 PROCEDURE — 3077F SYST BP >= 140 MM HG: CPT | Mod: S$GLB,,, | Performed by: NURSE PRACTITIONER

## 2017-09-08 PROCEDURE — 3079F DIAST BP 80-89 MM HG: CPT | Mod: S$GLB,,, | Performed by: NURSE PRACTITIONER

## 2017-09-08 PROCEDURE — 3008F BODY MASS INDEX DOCD: CPT | Mod: S$GLB,,, | Performed by: NURSE PRACTITIONER

## 2017-09-08 PROCEDURE — 99999 PR PBB SHADOW E&M-EST. PATIENT-LVL II: CPT | Mod: PBBFAC,,,

## 2017-09-08 PROCEDURE — 90636 HEP A/HEP B VACC ADULT IM: CPT | Mod: S$GLB,,, | Performed by: INTERNAL MEDICINE

## 2017-09-08 PROCEDURE — 90471 IMMUNIZATION ADMIN: CPT | Mod: S$GLB,,, | Performed by: INTERNAL MEDICINE

## 2017-09-08 NOTE — PROGRESS NOTES
[facility] Established Patient Visit    Reason for Visit:  F/u elevated liver enzymes    PCP: Khushboo Pugh    HPI:  This is a 52 y.o. male here for f/u of elevated liver enzymes due to VOGEL. He has had conflicting liver staging.    -- Fibrosure 3/2015 = F0-F1  -- Elastography 3/2015 = F2-F3  -- liver biopsy -  4/1/15- very minimal lobular steatohepatitis, macrosteatosis 20%, microsteatosis 10% no fibrosis (biopsy was small sample size though so concern for understaging with this)  -- Fibroscan 4/2016 = F4  -- Fibrosure 4/2016 = F1-F2  -- MRI elastography 5/29/17 - F1-F2    U/s 4/2016 showed enlarged liver at 18.8 cm with steatosis. Spleen nl 10.8. He has normal plts at 244. Normal synthetic liver functioning. ALT > AST. No findings to suggest cirrhosis.     He got 3 Twinrix vaccines but almost a year apart. Will arrange for repeat vaccinations since labs were not done to check immunity. His weight is stable, has gained a few lbs but he just got back from vacation. Recent labs are stable.     He reports he feels well and denies any complaints today. Denies any symptoms of advanced chronic liver disease including jaundice, dark urine, abdominal distention, hematemesis, melena, slowed mentation.       ROS:   GENERAL: Denies fever, chills, (+) weight gain, no fatigue  HEENT: Denies headaches, dizziness, vision/hearing changes  CARDIOVASCULAR: Denies chest pain, palpitations, or edema  RESPIRATORY: Denies dyspnea, cough  GI: Denies abdominal pain, rectal bleeding, nausea, vomiting. No change in bowel pattern or color  : Denies dysuria, hematuria   SKIN: Denies rash, itching   NEURO: Denies confusion, memory loss, or mood changes  PSYCH: Denies depression or anxiety  HEME/LYMPH: Denies easy bruising or bleeding      PMHX:  has a past medical history of HLD (hyperlipidemia); Hypertension; and NAFLD (nonalcoholic fatty liver disease).    PSHX:  has a past surgical history that includes Liver biopsy  "(4/1/15) and Colonoscopy (N/A, 3/2/2016).    The patient's social and family histories were reviewed by me and updated in the appropriate section of the electronic medical record.    Review of patient's allergies indicates:  No Known Allergies    Medications reviewed in Epic      Objective Findings:    PHYSICAL EXAM:   Friendly Black or  male, in no acute distress; alert and oriented to person, place and time  VITALS: BP (!) 164/89 (BP Location: Left arm, Patient Position: Sitting)   Pulse 81   Temp 97.6 °F (36.4 °C) (Oral)   Resp 18   Ht 5' 6" (1.676 m)   Wt 120.1 kg (264 lb 12.4 oz)   SpO2 97%   BMI 42.74 kg/m²   HENT: Normocephalic, without obvious abnormality. Oral mucosa pink and moist. Dentition good.  EYES: Sclerae anicteric.    NECK: Supple.   CARDIOVASCULAR: Regular rate and rhythm. No murmurs.  RESPIRATORY: Normal respiratory effort. BBS CTA. No wheezes or crackles.  GI: Obese, soft, non-tender, non-distended. Liver edge palpable. No masses palpable. No ascites.  EXTREMITIES: No clubbing, cyanosis or edema.  SKIN: Warm and dry. No jaundice. No rashes noted to exposed skin. No telangectasias noted. No palmar erythema.  NEURO: Normal gate. No asterixis.  PSYCH: Memory intact. Thought and speech pattern appropriate. Behavior normal. No depression or anxiety noted.      Labs:  Lab Results   Component Value Date    WBC 3.65 (L) 04/22/2016    HGB 14.7 04/22/2016    HCT 44.1 04/22/2016     04/22/2016    CHOL 161 08/08/2017    TRIG 183 (H) 08/08/2017    HDL 29 (L) 08/08/2017     08/08/2017    K 4.2 08/08/2017    CREATININE 1.0 08/08/2017    ALT 70 (H) 08/08/2017    AST 40 08/08/2017    ALKPHOS 45 (L) 08/08/2017    BILITOT 0.5 08/08/2017    ALBUMIN 3.9 08/08/2017    INR 1.0 04/22/2016       ASSESSMENT:  52 y.o. Black or  male with:  1.  NAFLD  -- transaminases mildly elevated  -- US showed hepatomegaly with steatosis  -- synthetic liver function nl  -- risk " factors for fatty liver include morbid obesity, HLD, HTN  2. Conflicting Liver staging  -- Fibrosure 3/2015 = F0-F1  -- Elastography 3/2015 = F2-F3  -- liver biopsy -  4/1/15- very minimal lobular steatohepatitis, macrosteatosis 20%, microsteatosis 10% no fibrosis (biopsy was small sample size though so concern for understaging with this)  -- Fibroscan 4/2016 = F4  -- Fibrosure 4/2016 = F1-F2  -- MRI elastography 5/29/17 - F1-F2  -- no findings on labs or imaging to suggest cirrhosis      EDUCATION:   We discussed the manifestations of NAFLD. At this time there is no FDA approved therapy for NAFLD.   The patient and I discussed the importance of diet, exercise, and weight loss for management of NAFLD. We discussed a low fat, low carb/low sugar, high fiber diet, and a goal of 30 minutes of exercise 5 times per week.   Pt is aware that fatty liver can progress to steatohepatitis and possibly to cirrhosis, putting one at increased risk for liver cancer, liver failure, and death.        PLAN:  1. Twinrix vaccines, start again today  2. Will submit for VOGEL fibrosis studies that are currently enrolling. He could have repeat biopsy as part of the study to further assess fibrosis staging. No overt findings to suggest cirrhosis except Fibroscan results  3. Weight loss goal of 20-25 lbs over next 6-12 months (5-10 lbs over next 3-6 months)  4. Low fat, low cholesterol, low carb, high fiber diet  5. Exercise, 5 days a week, 30 min a day  6. Recommend good control of cholesterol, blood pressure, blood sugar levels  7. F/u with me in 6 months with u/s and labs same day    Thank you for allowing me to participate in the care of Андрей Wooten    Hailee Reyes, ROBERT-C

## 2017-09-08 NOTE — Clinical Note
Pt for VOGEL fibrosis studies. He has conflicting liver staging.  -- Fibrosure 3/2015 = F0-F1 -- Elastography 3/2015 = F2-F3 -- liver biopsy -  4/1/15- very minimal lobular steatohepatitis, macrosteatosis 20%, microsteatosis 10% no fibrosis (biopsy was small sample size though so concern for understaging with this) -- Fibroscan 4/2016 = F4 -- Fibrosure 4/2016 = F1-F2 -- MR elastography = F1-F2

## 2017-10-06 ENCOUNTER — CLINICAL SUPPORT (OUTPATIENT)
Dept: INFECTIOUS DISEASES | Facility: CLINIC | Age: 52
End: 2017-10-06
Payer: COMMERCIAL

## 2017-10-06 DIAGNOSIS — K76.0 FATTY LIVER: ICD-10-CM

## 2017-10-06 PROCEDURE — 90471 IMMUNIZATION ADMIN: CPT | Mod: S$GLB,,, | Performed by: INTERNAL MEDICINE

## 2017-10-06 PROCEDURE — 90636 HEP A/HEP B VACC ADULT IM: CPT | Mod: S$GLB,,, | Performed by: INTERNAL MEDICINE

## 2017-10-06 PROCEDURE — 99999 PR PBB SHADOW E&M-EST. PATIENT-LVL II: CPT | Mod: PBBFAC,,,

## 2017-11-03 ENCOUNTER — OFFICE VISIT (OUTPATIENT)
Dept: URGENT CARE | Facility: CLINIC | Age: 52
End: 2017-11-03
Payer: COMMERCIAL

## 2017-11-03 VITALS
TEMPERATURE: 98 F | HEART RATE: 98 BPM | RESPIRATION RATE: 18 BRPM | SYSTOLIC BLOOD PRESSURE: 150 MMHG | HEIGHT: 66 IN | WEIGHT: 216 LBS | DIASTOLIC BLOOD PRESSURE: 92 MMHG | BODY MASS INDEX: 34.72 KG/M2 | OXYGEN SATURATION: 98 %

## 2017-11-03 DIAGNOSIS — J32.9 SINUSITIS, UNSPECIFIED CHRONICITY, UNSPECIFIED LOCATION: Primary | ICD-10-CM

## 2017-11-03 PROCEDURE — 99213 OFFICE O/P EST LOW 20 MIN: CPT | Mod: 25,S$GLB,, | Performed by: PHYSICIAN ASSISTANT

## 2017-11-03 PROCEDURE — 96372 THER/PROPH/DIAG INJ SC/IM: CPT | Mod: S$GLB,,, | Performed by: EMERGENCY MEDICINE

## 2017-11-03 RX ORDER — BETAMETHASONE SODIUM PHOSPHATE AND BETAMETHASONE ACETATE 3; 3 MG/ML; MG/ML
6 INJECTION, SUSPENSION INTRA-ARTICULAR; INTRALESIONAL; INTRAMUSCULAR; SOFT TISSUE
Status: COMPLETED | OUTPATIENT
Start: 2017-11-03 | End: 2017-11-03

## 2017-11-03 RX ORDER — AZITHROMYCIN 250 MG/1
TABLET, FILM COATED ORAL
Qty: 6 TABLET | Refills: 0 | Status: SHIPPED | OUTPATIENT
Start: 2017-11-03 | End: 2018-03-19 | Stop reason: ALTCHOICE

## 2017-11-03 RX ADMIN — BETAMETHASONE SODIUM PHOSPHATE AND BETAMETHASONE ACETATE 6 MG: 3; 3 INJECTION, SUSPENSION INTRA-ARTICULAR; INTRALESIONAL; INTRAMUSCULAR; SOFT TISSUE at 04:11

## 2017-11-03 NOTE — PROGRESS NOTES
"Subjective:       Patient ID: Андрей Wooten is a 52 y.o. male.    Vitals:  height is 5' 6" (1.676 m) and weight is 98 kg (216 lb). His temperature is 97.6 °F (36.4 °C). His blood pressure is 150/92 (abnormal) and his pulse is 98. His respiration is 18 and oxygen saturation is 98%.     Chief Complaint: Sinus Problem    Sinus Problem   This is a new problem. Episode onset: 2 days. The problem has been gradually worsening since onset. There has been no fever. His pain is at a severity of 0/10. He is experiencing no pain. Associated symptoms include congestion, coughing and a sore throat. Pertinent negatives include no chills, ear pain, headaches, hoarse voice or shortness of breath. Past treatments include oral decongestants. The treatment provided mild relief.     Review of Systems   Constitution: Positive for malaise/fatigue. Negative for chills and fever.   HENT: Positive for congestion and sore throat. Negative for ear pain and hoarse voice.    Eyes: Negative for discharge and redness.   Cardiovascular: Negative for chest pain, dyspnea on exertion and leg swelling.   Respiratory: Positive for cough and sputum production. Negative for shortness of breath and wheezing.    Musculoskeletal: Negative for myalgias.   Gastrointestinal: Negative for abdominal pain and nausea.   Neurological: Negative for headaches.       Objective:      Physical Exam   Constitutional: He is oriented to person, place, and time. He appears well-developed and well-nourished. He is cooperative.  Non-toxic appearance. He does not appear ill. No distress.   HENT:   Head: Normocephalic and atraumatic.   Right Ear: Hearing, tympanic membrane, external ear and ear canal normal.   Left Ear: Hearing, tympanic membrane, external ear and ear canal normal.   Nose: Mucosal edema and rhinorrhea present. No nasal deformity. No epistaxis. Right sinus exhibits maxillary sinus tenderness. Right sinus exhibits no frontal sinus tenderness. Left sinus exhibits " maxillary sinus tenderness. Left sinus exhibits no frontal sinus tenderness.   Mouth/Throat: Uvula is midline, oropharynx is clear and moist and mucous membranes are normal. No trismus in the jaw. Normal dentition. No uvula swelling. No posterior oropharyngeal erythema.   Eyes: Conjunctivae and lids are normal. No scleral icterus.   Sclera clear bilat   Neck: Trachea normal, full passive range of motion without pain and phonation normal. Neck supple.   Cardiovascular: Normal rate, regular rhythm, normal heart sounds, intact distal pulses and normal pulses.    Pulmonary/Chest: Effort normal and breath sounds normal. No respiratory distress.   Abdominal: Soft. Normal appearance and bowel sounds are normal. He exhibits no distension. There is no tenderness.   Musculoskeletal: Normal range of motion. He exhibits no edema or deformity.   Neurological: He is alert and oriented to person, place, and time. He exhibits normal muscle tone. Coordination normal.   Skin: Skin is warm, dry and intact. He is not diaphoretic. No pallor.   Psychiatric: He has a normal mood and affect. His speech is normal and behavior is normal. Judgment and thought content normal. Cognition and memory are normal.   Nursing note and vitals reviewed.      Assessment:       1. Sinusitis, unspecified chronicity, unspecified location        Plan:         Sinusitis, unspecified chronicity, unspecified location  -     betamethasone acetate-betamethasone sodium phosphate injection 6 mg; Inject 1 mL (6 mg total) into the muscle one time.  -     azithromycin (ZITHROMAX Z-JOON) 250 MG tablet; Take 2 tablets (500 mg) on  Day 1,  followed by 1 tablet (250 mg) once daily on Days 2 through 5.  Dispense: 6 tablet; Refill: 0        Sinusitis (Antibiotic Treatment)    The sinuses are air-filled spaces within the bones of the face. They connect to the inside of the nose. Sinusitis is an inflammation of the tissue lining the sinus cavity. Sinus inflammation can occur  during a cold. It can also be due to allergies to pollens and other particles in the air. Sinusitis can cause symptoms of sinus congestion and fullness. A sinus infection causes fever, headache and facial pain. There is often green or yellow drainage from the nose or into the back of the throat (post-nasal drip). You have been given antibiotics to treat this condition.  Home care:  · Take the full course of antibiotics as instructed. Do not stop taking them, even if you feel better.  · Drink plenty of water, hot tea, and other liquids. This may help thin mucus. It also may promote sinus drainage.  · Heat may help soothe painful areas of the face. Use a towel soaked in hot water. Or,  the shower and direct the hot spray onto your face. Using a vaporizer along with a menthol rub at night may also help.   · An expectorant containing guaifenesin may help thin the mucus and promote drainage from the sinuses.  · Over-the-counter decongestants may be used unless a similar medicine was prescribed. Nasal sprays work the fastest. Use one that contains phenylephrine or oxymetazoline. First blow the nose gently. Then use the spray. Do not use these medicines more often than directed on the label or symptoms may get worse. You may also use tablets containing pseudoephedrine. Avoid products that combine ingredients, because side effects may be increased. Read labels. You can also ask the pharmacist for help. (NOTE: Persons with high blood pressure should not use decongestants. They can raise blood pressure.)  · Over-the-counter antihistamines may help if allergies contributed to your sinusitis.    · Do not use nasal rinses or irrigation during an acute sinus infection, unless told to by your health care provider. Rinsing may spread the infection to other sinuses.  · Use acetaminophen or ibuprofen to control pain, unless another pain medicine was prescribed. (If you have chronic liver or kidney disease or ever had a  stomach ulcer, talk with your doctor before using these medicines. Aspirin should never be used in anyone under 18 years of age who is ill with a fever. It may cause severe liver damage.)  · Don't smoke. This can worsen symptoms.  Follow-up care  Follow up with your healthcare provider or our staff if you are not improving within the next week.  When to seek medical advice  Call your healthcare provider if any of these occur:  · Facial pain or headache becoming more severe  · Stiff neck  · Unusual drowsiness or confusion  · Swelling of the forehead or eyelids  · Vision problems, including blurred or double vision  · Fever of 100.4ºF (38ºC) or higher, or as directed by your healthcare provider  · Seizure  · Breathing problems  · Symptoms not resolving within 10 days  Date Last Reviewed: 4/13/2015  © 8422-1851 [employer]. [address] [place] [address]. All rights reserved. This information is not intended as a substitute for professional medical care. Always follow your healthcare professional's instructions.      Please follow up with your Primary care provider within 2-5 days if your signs and symptoms have not resolved or worsen.     If your condition worsens or fails to improve we recommend that you receive another evaluation at the emergency room immediately or contact your primary medical clinic to discuss your concerns.   You must understand that you have received an Urgent Care treatment only and that you may be released before all of your medical problems are known or treated. You, the patient, will arrange for follow up care as instructed.

## 2017-11-03 NOTE — PATIENT INSTRUCTIONS
Sinusitis (Antibiotic Treatment)    The sinuses are air-filled spaces within the bones of the face. They connect to the inside of the nose. Sinusitis is an inflammation of the tissue lining the sinus cavity. Sinus inflammation can occur during a cold. It can also be due to allergies to pollens and other particles in the air. Sinusitis can cause symptoms of sinus congestion and fullness. A sinus infection causes fever, headache and facial pain. There is often green or yellow drainage from the nose or into the back of the throat (post-nasal drip). You have been given antibiotics to treat this condition.  Home care:  · Take the full course of antibiotics as instructed. Do not stop taking them, even if you feel better.  · Drink plenty of water, hot tea, and other liquids. This may help thin mucus. It also may promote sinus drainage.  · Heat may help soothe painful areas of the face. Use a towel soaked in hot water. Or,  the shower and direct the hot spray onto your face. Using a vaporizer along with a menthol rub at night may also help.   · An expectorant containing guaifenesin may help thin the mucus and promote drainage from the sinuses.  · Over-the-counter decongestants may be used unless a similar medicine was prescribed. Nasal sprays work the fastest. Use one that contains phenylephrine or oxymetazoline. First blow the nose gently. Then use the spray. Do not use these medicines more often than directed on the label or symptoms may get worse. You may also use tablets containing pseudoephedrine. Avoid products that combine ingredients, because side effects may be increased. Read labels. You can also ask the pharmacist for help. (NOTE: Persons with high blood pressure should not use decongestants. They can raise blood pressure.)  · Over-the-counter antihistamines may help if allergies contributed to your sinusitis.    · Do not use nasal rinses or irrigation during an acute sinus infection, unless told to by  your health care provider. Rinsing may spread the infection to other sinuses.  · Use acetaminophen or ibuprofen to control pain, unless another pain medicine was prescribed. (If you have chronic liver or kidney disease or ever had a stomach ulcer, talk with your doctor before using these medicines. Aspirin should never be used in anyone under 18 years of age who is ill with a fever. It may cause severe liver damage.)  · Don't smoke. This can worsen symptoms.  Follow-up care  Follow up with your healthcare provider or our staff if you are not improving within the next week.  When to seek medical advice  Call your healthcare provider if any of these occur:  · Facial pain or headache becoming more severe  · Stiff neck  · Unusual drowsiness or confusion  · Swelling of the forehead or eyelids  · Vision problems, including blurred or double vision  · Fever of 100.4ºF (38ºC) or higher, or as directed by your healthcare provider  · Seizure  · Breathing problems  · Symptoms not resolving within 10 days  Date Last Reviewed: 4/13/2015  © 7897-2092 [employer]. [address] [place] [address]. All rights reserved. This information is not intended as a substitute for professional medical care. Always follow your healthcare professional's instructions.      Please follow up with your Primary care provider within 2-5 days if your signs and symptoms have not resolved or worsen.     If your condition worsens or fails to improve we recommend that you receive another evaluation at the emergency room immediately or contact your primary medical clinic to discuss your concerns.   You must understand that you have received an Urgent Care treatment only and that you may be released before all of your medical problems are known or treated. You, the patient, will arrange for follow up care as instructed.

## 2018-01-12 RX ORDER — PRAVASTATIN SODIUM 40 MG/1
TABLET ORAL
Qty: 90 TABLET | Refills: 0 | Status: SHIPPED | OUTPATIENT
Start: 2018-01-12 | End: 2018-03-19 | Stop reason: SDUPTHER

## 2018-02-15 ENCOUNTER — OFFICE VISIT (OUTPATIENT)
Dept: URGENT CARE | Facility: CLINIC | Age: 53
End: 2018-02-15
Payer: COMMERCIAL

## 2018-02-15 VITALS
OXYGEN SATURATION: 96 % | TEMPERATURE: 98 F | WEIGHT: 260 LBS | SYSTOLIC BLOOD PRESSURE: 165 MMHG | RESPIRATION RATE: 16 BRPM | HEIGHT: 66 IN | HEART RATE: 80 BPM | BODY MASS INDEX: 41.78 KG/M2 | DIASTOLIC BLOOD PRESSURE: 97 MMHG

## 2018-02-15 DIAGNOSIS — J06.9 UPPER RESPIRATORY TRACT INFECTION, UNSPECIFIED TYPE: Primary | ICD-10-CM

## 2018-02-15 PROCEDURE — 99214 OFFICE O/P EST MOD 30 MIN: CPT | Mod: 25,S$GLB,, | Performed by: FAMILY MEDICINE

## 2018-02-15 PROCEDURE — 3008F BODY MASS INDEX DOCD: CPT | Mod: S$GLB,,, | Performed by: FAMILY MEDICINE

## 2018-02-15 PROCEDURE — 96372 THER/PROPH/DIAG INJ SC/IM: CPT | Mod: S$GLB,,, | Performed by: FAMILY MEDICINE

## 2018-02-15 RX ORDER — BETAMETHASONE SODIUM PHOSPHATE AND BETAMETHASONE ACETATE 3; 3 MG/ML; MG/ML
9 INJECTION, SUSPENSION INTRA-ARTICULAR; INTRALESIONAL; INTRAMUSCULAR; SOFT TISSUE
Status: COMPLETED | OUTPATIENT
Start: 2018-02-15 | End: 2018-02-15

## 2018-02-15 RX ORDER — AZITHROMYCIN 250 MG/1
TABLET, FILM COATED ORAL
Qty: 6 TABLET | Refills: 0 | Status: SHIPPED | OUTPATIENT
Start: 2018-02-15 | End: 2018-03-19 | Stop reason: ALTCHOICE

## 2018-02-15 RX ORDER — BENZONATATE 100 MG/1
200 CAPSULE ORAL 3 TIMES DAILY PRN
Qty: 20 CAPSULE | Refills: 0 | Status: SHIPPED | OUTPATIENT
Start: 2018-02-15 | End: 2018-03-19 | Stop reason: ALTCHOICE

## 2018-02-15 RX ADMIN — BETAMETHASONE SODIUM PHOSPHATE AND BETAMETHASONE ACETATE 9 MG: 3; 3 INJECTION, SUSPENSION INTRA-ARTICULAR; INTRALESIONAL; INTRAMUSCULAR; SOFT TISSUE at 01:02

## 2018-02-15 NOTE — PATIENT INSTRUCTIONS
Viral Upper Respiratory Illness (Adult)  You have a viral upper respiratory illness (URI), which is another term for the common cold. This illness is contagious during the first few days. It is spread through the air by coughing and sneezing. It may also be spread by direct contact (touching the sick person and then touching your own eyes, nose, or mouth). Frequent handwashing will decrease risk of spread. Most viral illnesses go away within 7 to 10 days with rest and simple home remedies. Sometimes the illness may last for several weeks. Antibiotics will not kill a virus, and they are generally not prescribed for this condition.    Home care  · If symptoms are severe, rest at home for the first 2 to 3 days. When you resume activity, don't let yourself get too tired.  · Avoid being exposed to cigarette smoke (yours or others).  · You may use acetaminophen or ibuprofen to control pain and fever, unless another medicine was prescribed. (Note: If you have chronic liver or kidney disease, have ever had a stomach ulcer or gastrointestinal bleeding, or are taking blood-thinning medicines, talk with your healthcare provider before using these medicines.) Aspirin should never be given to anyone under 18 years of age who is ill with a viral infection or fever. It may cause severe liver or brain damage.  · Your appetite may be poor, so a light diet is fine. Avoid dehydration by drinking 6 to 8 glasses of fluids per day (water, soft drinks, juices, tea, or soup). Extra fluids will help loosen secretions in the nose and lungs.  · Over-the-counter cold medicines will not shorten the length of time youre sick, but they may be helpful for the following symptoms: cough, sore throat, and nasal and sinus congestion. (Note: Do not use decongestants if you have high blood pressure.)  Follow-up care  Follow up with your healthcare provider, or as advised.  When to seek medical advice  Call your healthcare provider right away if any  "of these occur:  · Cough with lots of colored sputum (mucus)  · Severe headache; face, neck, or ear pain  · Difficulty swallowing due to throat pain  · Fever of 100.4°F (38°C)  Call 911, or get immediate medical care  Call emergency services right away if any of these occur:  · Chest pain, shortness of breath, wheezing, or difficulty breathing  · Coughing up blood  · Inability to swallow due to throat pain  Date Last Reviewed: 9/13/2015 © 2000-2016 [employer] [address] [place] [address]. All rights reserved. This information is not intended as a substitute for professional medical care. Always follow your healthcare professional's instructions.                                                                   URI   If your condition worsens or fails to improve we recommend that you receive another evaluation at the ER immediately or contact your PCP to discuss your concerns or return here. You must understand that you've received an urgent care treatment only and that you may be released before all your medical problems are known or treated. You the patient will arrange for follw care as instructed.   If we discussed that I think your illness is viral, it will not respond to antibiotics and will last 10-14 days. If we discussed "wait and see" antibiotics and if over the next few days the symptoms worsen start the antibiotics I have given you.   If you are female and on BCP and do take the antibiotics, use additional methods to prevent pregnancy while on the antibiotics and for one cycle after.   Flonase (fluticasone) is a nasal spray which is available over the counter and may help with your symptoms.   Zyrtec D, Claritin D or Allegra D can also help with symptoms of congestion and drainage.   If you have hypertension avoid using the "D" which is the decongestant   If you just have drainage you can take plain zyrtec, claritin or allegra   If you just have a congested feeling you can " take pseudoephedrine (unless you have high blood pressure) which you have to sign for behind the counter. Do not buy the phenylephrine which is on the shelf as it is not effective   Rest and fluids are also important.   Tylenol or ibuprofen can also be used as directed for pain unless you have an allergy to them or medical condition such as stomach ulcers, kidney or liver disease or blood thinners etc for which you should not be taking these type of medications.   If you are flying in the next few days Afrin nose drops for the airplane flight upon take off and landing may help. Other than at those times refrain from using afrin.   If you were prescribed a narcotic do not drive or operate heavy machinery while taking these medications.

## 2018-02-15 NOTE — PROGRESS NOTES
"Subjective:       Patient ID: Андрей Wooten is a 53 y.o. male.    Vitals:  height is 5' 6" (1.676 m) and weight is 117.9 kg (260 lb). His temperature is 98.2 °F (36.8 °C). His blood pressure is 165/97 (abnormal) and his pulse is 80. His respiration is 16 and oxygen saturation is 96%.     Chief Complaint: Cough and Sinus Problem    COUGHING, CHEST CONGESTION, AND SINUS PROBLEMS.      Cough   This is a new problem. The current episode started 1 to 4 weeks ago. The problem has been waxing and waning. The problem occurs hourly. The cough is productive of sputum. Associated symptoms include nasal congestion and postnasal drip. Pertinent negatives include no chest pain, chills, ear pain, eye redness, fever, headaches, myalgias, sore throat, shortness of breath or wheezing. The symptoms are aggravated by lying down. He has tried nothing for the symptoms. His past medical history is significant for bronchitis and pneumonia.   Sinus Problem   This is a new problem. The current episode started 1 to 4 weeks ago. The problem has been waxing and waning since onset. There has been no fever. Associated symptoms include congestion and coughing. Pertinent negatives include no chills, ear pain, headaches, hoarse voice, shortness of breath or sore throat.     Review of Systems   Constitution: Negative for chills, fever and malaise/fatigue.   HENT: Positive for congestion and postnasal drip. Negative for ear pain, hoarse voice and sore throat.    Eyes: Negative for discharge and redness.   Cardiovascular: Negative for chest pain, dyspnea on exertion and leg swelling.   Respiratory: Positive for cough and sputum production. Negative for shortness of breath and wheezing.         CHEST CONGESTION   Musculoskeletal: Negative for myalgias.   Gastrointestinal: Negative for abdominal pain and nausea.   Neurological: Negative for headaches.       Objective:      Physical Exam   Constitutional: He is oriented to person, place, and time. He " appears well-developed and well-nourished. He is cooperative.  Non-toxic appearance. He does not appear ill. No distress.   HENT:   Head: Normocephalic and atraumatic.   Right Ear: Hearing, tympanic membrane, external ear and ear canal normal.   Left Ear: Hearing, tympanic membrane, external ear and ear canal normal.   Nose: Nose normal. No mucosal edema, rhinorrhea or nasal deformity. No epistaxis. Right sinus exhibits no maxillary sinus tenderness and no frontal sinus tenderness. Left sinus exhibits no maxillary sinus tenderness and no frontal sinus tenderness.   Mouth/Throat: Uvula is midline, oropharynx is clear and moist and mucous membranes are normal. No trismus in the jaw. Normal dentition. No uvula swelling. No oropharyngeal exudate, posterior oropharyngeal edema or posterior oropharyngeal erythema.   Eyes: Conjunctivae and lids are normal. No scleral icterus.   Sclera clear bilat   Neck: Trachea normal, full passive range of motion without pain and phonation normal. Neck supple.   Cardiovascular: Normal rate, regular rhythm, normal heart sounds, intact distal pulses and normal pulses.    Pulmonary/Chest: Effort normal and breath sounds normal. No respiratory distress. He has no decreased breath sounds. He has no wheezes. He has no rhonchi. He has no rales.   Dry irritated cough.  NO wheezes, rales or rhonchi.    Abdominal: Soft. Normal appearance and bowel sounds are normal. He exhibits no distension. There is no tenderness.   Musculoskeletal: Normal range of motion. He exhibits no edema or deformity.   Lymphadenopathy:        Head (right side): No submental adenopathy present.        Head (left side): No submental adenopathy present.     He has no cervical adenopathy.        Right cervical: No superficial cervical adenopathy present.       Left cervical: No superficial cervical adenopathy present.   Neurological: He is alert and oriented to person, place, and time. He exhibits normal muscle tone.  Coordination normal.   Skin: Skin is warm, dry and intact. He is not diaphoretic. No pallor.   Psychiatric: He has a normal mood and affect. His speech is normal and behavior is normal. Judgment and thought content normal. Cognition and memory are normal.   Nursing note and vitals reviewed.      Assessment:       1. Upper respiratory tract infection, unspecified type        Plan:         Upper respiratory tract infection, unspecified type  -     betamethasone acetate-betamethasone sodium phosphate injection 9 mg; Inject 1.5 mLs (9 mg total) into the muscle one time.  -     azithromycin (ZITHROMAX Z-JOON) 250 MG tablet; Take 2 tablets (500 mg) on  Day 1,  followed by 1 tablet (250 mg) once daily on Days 2 through 5.  Dispense: 6 tablet; Refill: 0  -     benzonatate (TESSALON PERLES) 100 MG capsule; Take 2 capsules (200 mg total) by mouth 3 (three) times daily as needed for Cough.  Dispense: 20 capsule; Refill: 0      Follow Up Comments   Make sure that you follow up with your primary care doctor in the next 2-5 days if needed .  Return to the Urgent Care if signs or symptoms change and certainly if you have worsening symptoms go to the nearest emergency department for further evaluation.

## 2018-03-09 ENCOUNTER — CLINICAL SUPPORT (OUTPATIENT)
Dept: INFECTIOUS DISEASES | Facility: CLINIC | Age: 53
End: 2018-03-09
Payer: COMMERCIAL

## 2018-03-09 DIAGNOSIS — K76.0 FATTY LIVER: ICD-10-CM

## 2018-03-09 PROCEDURE — 90471 IMMUNIZATION ADMIN: CPT | Mod: S$GLB,,, | Performed by: INTERNAL MEDICINE

## 2018-03-09 PROCEDURE — 99999 PR PBB SHADOW E&M-EST. PATIENT-LVL II: CPT | Mod: PBBFAC,,,

## 2018-03-09 PROCEDURE — 90636 HEP A/HEP B VACC ADULT IM: CPT | Mod: S$GLB,,, | Performed by: INTERNAL MEDICINE

## 2018-03-19 ENCOUNTER — LAB VISIT (OUTPATIENT)
Dept: LAB | Facility: HOSPITAL | Age: 53
End: 2018-03-19
Attending: INTERNAL MEDICINE
Payer: COMMERCIAL

## 2018-03-19 ENCOUNTER — OFFICE VISIT (OUTPATIENT)
Dept: INTERNAL MEDICINE | Facility: CLINIC | Age: 53
End: 2018-03-19
Payer: COMMERCIAL

## 2018-03-19 DIAGNOSIS — R94.31 ABNORMAL EKG: Primary | ICD-10-CM

## 2018-03-19 DIAGNOSIS — I10 ESSENTIAL HYPERTENSION: ICD-10-CM

## 2018-03-19 DIAGNOSIS — K76.0 NAFLD (NONALCOHOLIC FATTY LIVER DISEASE): ICD-10-CM

## 2018-03-19 LAB
ALBUMIN SERPL BCP-MCNC: 4.4 G/DL
ALP SERPL-CCNC: 52 U/L
ALT SERPL W/O P-5'-P-CCNC: 108 U/L
ANION GAP SERPL CALC-SCNC: 8 MMOL/L
AST SERPL-CCNC: 51 U/L
BILIRUB SERPL-MCNC: 0.5 MG/DL
BUN SERPL-MCNC: 15 MG/DL
CALCIUM SERPL-MCNC: 10 MG/DL
CHLORIDE SERPL-SCNC: 105 MMOL/L
CO2 SERPL-SCNC: 29 MMOL/L
COMPLEXED PSA SERPL-MCNC: 1.1 NG/ML
CREAT SERPL-MCNC: 1.1 MG/DL
EST. GFR  (AFRICAN AMERICAN): >60 ML/MIN/1.73 M^2
EST. GFR  (NON AFRICAN AMERICAN): >60 ML/MIN/1.73 M^2
GLUCOSE SERPL-MCNC: 123 MG/DL
POTASSIUM SERPL-SCNC: 4 MMOL/L
PROT SERPL-MCNC: 8 G/DL
SODIUM SERPL-SCNC: 142 MMOL/L

## 2018-03-19 PROCEDURE — 36415 COLL VENOUS BLD VENIPUNCTURE: CPT

## 2018-03-19 PROCEDURE — 80053 COMPREHEN METABOLIC PANEL: CPT

## 2018-03-19 PROCEDURE — 99214 OFFICE O/P EST MOD 30 MIN: CPT | Mod: S$GLB,,, | Performed by: INTERNAL MEDICINE

## 2018-03-19 PROCEDURE — 84153 ASSAY OF PSA TOTAL: CPT

## 2018-03-19 PROCEDURE — 99999 PR PBB SHADOW E&M-EST. PATIENT-LVL IV: CPT | Mod: PBBFAC,,, | Performed by: INTERNAL MEDICINE

## 2018-03-19 RX ORDER — PRAVASTATIN SODIUM 40 MG/1
TABLET ORAL
Qty: 90 TABLET | Refills: 1 | Status: SHIPPED | OUTPATIENT
Start: 2018-03-19 | End: 2018-10-29 | Stop reason: SDUPTHER

## 2018-03-19 RX ORDER — AMLODIPINE BESYLATE 10 MG/1
10 TABLET ORAL DAILY
Qty: 90 TABLET | Refills: 1 | Status: SHIPPED | OUTPATIENT
Start: 2018-03-19 | End: 2018-10-29 | Stop reason: SDUPTHER

## 2018-03-19 RX ORDER — HYDROCHLOROTHIAZIDE 12.5 MG/1
12.5 CAPSULE ORAL DAILY
Qty: 90 CAPSULE | Refills: 1 | Status: SHIPPED | OUTPATIENT
Start: 2018-03-19 | End: 2018-05-18

## 2018-03-24 VITALS
WEIGHT: 267 LBS | HEART RATE: 85 BPM | DIASTOLIC BLOOD PRESSURE: 90 MMHG | HEIGHT: 66 IN | SYSTOLIC BLOOD PRESSURE: 146 MMHG | BODY MASS INDEX: 42.91 KG/M2

## 2018-03-25 NOTE — PROGRESS NOTES
Subjective:       Patient ID: Андрей Wooten is a 53 y.o. male.    Chief Complaint: Hypertension    HPI  He returns for management of hypertension.  He has had hypertension for over a year.  Current treatment has included medications outlined in medication list.  He denies chest pain or shortness of breath.  No palpitations.  Denies left arm or neck pain.    Medications: See med card    Social history: Does not smoke, does not drink alcohol      Review of Systems   Constitutional: Negative for chills, fatigue, fever and unexpected weight change.   Respiratory: Negative for chest tightness and shortness of breath.    Cardiovascular: Negative for chest pain and palpitations.   Gastrointestinal: Negative for abdominal pain and blood in stool.   Neurological: Negative for dizziness, syncope, numbness and headaches.       Objective:      Physical Exam   HENT:   Right Ear: External ear normal.   Left Ear: External ear normal.   Nose: Nose normal.   Mouth/Throat: Oropharynx is clear and moist.   Eyes: Pupils are equal, round, and reactive to light.   Neck: Normal range of motion.   Cardiovascular: Normal rate and regular rhythm.    No murmur heard.  Pulmonary/Chest: Breath sounds normal.   Abdominal: He exhibits no distension. There is no hepatomegaly. There is no tenderness.   Lymphadenopathy:     He has no cervical adenopathy.     He has no axillary adenopathy.   Neurological: He has normal strength and normal reflexes. No cranial nerve deficit or sensory deficit.       Assessment/Plan     assessment and plan: Hypertension: He ran out of Norvasc.  Advised him to restart medication as previously prescribed.  Return to clinic in 1 month blood pressure check.  Check CMP and PSA

## 2018-04-16 ENCOUNTER — PATIENT MESSAGE (OUTPATIENT)
Dept: ADMINISTRATIVE | Facility: OTHER | Age: 53
End: 2018-04-16

## 2018-04-26 ENCOUNTER — TELEPHONE (OUTPATIENT)
Dept: HEPATOLOGY | Facility: CLINIC | Age: 53
End: 2018-04-26

## 2018-04-26 ENCOUNTER — OFFICE VISIT (OUTPATIENT)
Dept: HEPATOLOGY | Facility: CLINIC | Age: 53
End: 2018-04-26
Payer: COMMERCIAL

## 2018-04-26 VITALS
RESPIRATION RATE: 18 BRPM | HEIGHT: 66 IN | SYSTOLIC BLOOD PRESSURE: 161 MMHG | OXYGEN SATURATION: 97 % | HEART RATE: 92 BPM | DIASTOLIC BLOOD PRESSURE: 86 MMHG | BODY MASS INDEX: 42.62 KG/M2 | WEIGHT: 265.19 LBS

## 2018-04-26 DIAGNOSIS — E78.5 HYPERLIPIDEMIA, UNSPECIFIED HYPERLIPIDEMIA TYPE: ICD-10-CM

## 2018-04-26 DIAGNOSIS — R74.8 ELEVATED LIVER ENZYMES: ICD-10-CM

## 2018-04-26 DIAGNOSIS — K74.00 LIVER FIBROSIS: ICD-10-CM

## 2018-04-26 DIAGNOSIS — I10 ESSENTIAL HYPERTENSION: ICD-10-CM

## 2018-04-26 DIAGNOSIS — K75.81 NASH (NONALCOHOLIC STEATOHEPATITIS): Primary | ICD-10-CM

## 2018-04-26 DIAGNOSIS — E66.01 MORBID OBESITY: ICD-10-CM

## 2018-04-26 PROCEDURE — 99999 PR PBB SHADOW E&M-EST. PATIENT-LVL V: CPT | Mod: PBBFAC,,, | Performed by: NURSE PRACTITIONER

## 2018-04-26 PROCEDURE — 99214 OFFICE O/P EST MOD 30 MIN: CPT | Mod: S$GLB,,, | Performed by: NURSE PRACTITIONER

## 2018-04-26 NOTE — Clinical Note
I'm actually re-biopsying him now so I'll send you that once I get it. If you want to contact him about the other fibrosis blood test study maybe he's a candidate for that too.

## 2018-04-26 NOTE — Clinical Note
VOGEL fibrosis trial candidate, has conflicting Liver staging: -- Fibrosure 3/2015 = F0-F1 -- Elastography 3/2015 = F2-F3 -- liver biopsy -  4/1/15- very minimal lobular steatohepatitis, macrosteatosis 20%, microsteatosis 10% no fibrosis (biopsy was small sample size though so concern for understaging with this) -- Fibroscan 4/2016 = F4 -- Fibrosure 4/2016 = F1-F2 -- MRI elastography 5/29/17 - F1-F2 -- no findings on labs or imaging to suggest cirrhosis

## 2018-04-26 NOTE — TELEPHONE ENCOUNTER
Patient seen in clinic today 4/26/18 with Hailee Reyes NP. The patient will need an U/S Guided Liver Biopsy.    Patient called on phone. Placed on speaker phone for wife to hear. Pre and Post Procedure teaching for the U/S Guided Liver Biopsy done.   The patient was told to start Holding the Aspirin starting today 4/26/18.  The pt stated he is not taking the Co Q 10 since Dec 2017.    Pre Procedure Labs and U/S are scheduled for Monday 5/14/18.  They are asking that I call them back after 5/14/18 to schedule the Liver Biopsy. They do not have a date in mind at this time. Informed that they have 30 days to do the Procedure. Before having to repeat the Pre Procedure Testing.    Voiced understanding and agreed. Liver Biopsy instructions placed in the mail.

## 2018-04-26 NOTE — PATIENT INSTRUCTIONS
1. Labs and u/s  2. Will arrange liver biopsy once you have these  3. Weight loss goal of 15-20 lbs  4. Low fat, low cholesterol, low carb, high fiber diet  5. Exercise, 5 days a week, 30 min a day  6. Recommend good control of cholesterol, blood pressure, blood sugar levels  7. [employer].com is a good website to look at for diet information  8. appt with Dr. Bernal for weight loss medications  9. Can see about clinical trials after we get biopsy results  10. Follow up in 6 weeks to review biopsy results    3 meals a day made up of the following:  Unlimited green vegetables, tomatoes, mushrooms, spaghetti squash, cauliflower, meat, poultry, seafood, eggs and hard cheeses.   Milk and plain yogurt  Dressings, seasonings, condiments, etc should have less than 2 g sugars.   Beans (1-1.5 cups) or nuts (1/4 cup) can have 1 x a day.   1-2 servings of citrus fruits, berries, pineapple or melon a day (1/2 cup)  Avoid fried foods    No grains, rice, pasta, potatoes, bread, corn, peas, oatmeal, grits, tortillas, crackers, chips, candy, or sweets    No soda, sweet tea, juices or lemonade    W[employer].com for recipes.         Nonalcoholic Fatty Liver Disease (NAFLD)  Nonalcoholic fatty liver disease (NAFLD) is a common disease of the liver. It occurs when you have too much fat in the liver. If NAFLD is severe, it can cause liver damage that seems like the damage caused by drinking too much alcohol. But NAFLD is not caused by drinking alcohol. This sheet tells you more about NAFLD and how it can be managed.    How the liver works   The liver is an organ in the upper right side of the belly (abdomen). It has many important jobs. These include:  · Breaking down (metabolizing) proteins, carbohydrates, and fats  · Making a substance called bile that helps break down fats  · Storing and releasing sugar (glucose) into the blood to give the body energy  · Removing toxins from the blood  · Helping with blood  clotting  Understanding NAFLD  A healthy liver may contain some fat. But if too much fat builds up in the liver, this causes NAFLD. NAFLD can be mild, causing fatty liver. Or it can be more severe and show inflammation, as well as the fat. This can cause non-alcoholic steatohepatitis (VOGEL).  · Fatty liver. With fatty liver, the liver simply has more fat than normal. This extra fat usually does not harm the liver.  · VOGEL. With VOGEL, the fatty liver becomes inflamed over time. VOGEL is serious because it can lead to scarring of the liver (fibrosis). Over time, the scarring may lead to cirrhosis of the liver. This can eventually cause liver failure or liver cancer.  Causes and risk factors of NAFLD  Doctors don't know what causes NAFLD. But certain things make the problem more likely to happen. These include:  · Obesity  · Prediabetes or diabetes  · High levels of fat found in the blood (cholesterol and triglycerides)  · Being exposed to certain medicines   Symptoms of NAFLD  Most people with NAFLD have no symptoms. If symptoms do occur, they can include:  · Tiredness  · Weakness  · Weight loss  · Loss of appetite  · Nausea and vomiting  · Belly pain and cramping  · Yellowing of the skin and eyes (jaundice), as well as dark urine, or light-colored stools  · Swelling in the belly or legs  Diagnosing NAFLD  Your healthcare provider may think you have NAFLD if routine blood tests show high levels of liver enzymes. This may mean you have a liver problem. You may need one or more imaging tests, such as an ultrasound, CT, or MRI. You may need more blood tests to look for other causes of liver disease. You may also need a liver biopsy. During this test, a hollow needle is used to remove a tiny tissue sample from your liver. This tissue is then checked in a lab. This test can find signs of damage to liver tissue. It can also help figure out the cause of the damage and tell the difference between fatty liver and  VOGEL.  Treating NAFLD  Treatment for NAFLD varies for each person. The best early treatment is to treat any underlying conditions causing metabolic syndrome. This is the name for a group of conditions that includes:  · High blood pressure  · High levels of cholesterol and triglycerides  · Being overweight or obese  · Diabetes  Your healthcare provider will monitor your health and treat any symptoms or underlying health problems you have. Your provider will also work with you to control your risk factors. This will make liver damage less likely. In fact, treating those underlying conditions can often improve liver disease. You may need to take certain medicines, but no medicine will cure NAFLD. This is why treating the underlying conditions is most important. Your plan may include:  · Losing extra weight  · Getting regular exercise  · Controlling diabetes and high cholesterol or triglyceride levels  · Taking medicines and vitamins as prescribed by your provider  · Quitting smoking  · Not drinking alcohol  · Eating a healthy and balanced diet  Living with NAFLD  If NAFLD is caught early, it can be managed with treatment. Your healthcare provider will discuss further treatment choices with you as needed.  Be sure to ask your provider about recommended vaccines. These include vaccines for viruses that can cause liver disease.  Date Last Reviewed: 12/1/2016  © 1570-6196 [employer]. [address] [place] [address], [place] [address]. All rights reserved. This information is not intended as a substitute for professional medical care. Always follow your healthcare professional's instructions.

## 2018-04-26 NOTE — PROGRESS NOTES
[facility] Established Patient Visit    Reason for Visit:  F/u elevated liver enzymes    PCP: Khushboo Pugh    HPI:  This is a 53 y.o. male here for f/u of elevated liver enzymes due to VOGEL. He is here with his wife today. He has had conflicting liver staging.    -- Fibrosure 3/2015 = F0-F1  -- Elastography 3/2015 = F2-F3  -- liver biopsy -  4/1/15- very minimal lobular steatohepatitis, macrosteatosis 20%, microsteatosis 10% no fibrosis (biopsy was small sample size though so concern for understaging with this)  -- Fibroscan 4/2016 = F4  -- Fibrosure 4/2016 = F1-F2  -- MRI elastography 5/29/17 - F1-F2    U/s 4/2016 showed enlarged liver at 18.8 cm with steatosis. Spleen nl 10.8. He has normal plts at 244. Normal synthetic liver functioning. ALT > AST. No findings to suggest overt cirrhosis. Transaminases higher on recent labs. Weight up a few lbs since last year.    He reports he feels well and denies any complaints today. Denies any symptoms of advanced chronic liver disease including jaundice, dark urine, abdominal distention, hematemesis, melena, slowed mentation.     Pt does not do any exercise. Reports he has joint pains from being overweight. Wife wants him to lose weight. Reports he eats a lot of sweets and candy. He does not follow healthy diet. Pt does the grocery shopping.      ROS:   GENERAL: Denies fever, chills, (+) weight gain, no fatigue  HEENT: Denies headaches, dizziness, vision/hearing changes  CARDIOVASCULAR: Denies chest pain, palpitations, or edema  RESPIRATORY: Denies dyspnea, cough  GI: Denies abdominal pain, rectal bleeding, nausea, vomiting. No change in bowel pattern or color  : Denies dysuria, hematuria   SKIN: Denies rash, itching   NEURO: Denies confusion, memory loss, or mood changes  PSYCH: Denies depression or anxiety  HEME/LYMPH: Denies easy bruising or bleeding      PMHX:  has a past medical history of HLD (hyperlipidemia); Hypertension; and NAFLD (nonalcoholic  "fatty liver disease).    PSHX:  has a past surgical history that includes Liver biopsy (4/1/15) and Colonoscopy (N/A, 3/2/2016).    The patient's social and family histories were reviewed by me and updated in the appropriate section of the electronic medical record.    Review of patient's allergies indicates:  No Known Allergies    Medications reviewed in Epic      Objective Findings:    PHYSICAL EXAM:   Friendly Black or  male, in no acute distress; alert and oriented to person, place and time  VITALS: BP (!) 161/86 (BP Location: Left arm, Patient Position: Sitting)   Pulse 92   Resp 18   Ht 5' 6" (1.676 m)   Wt 120.3 kg (265 lb 3.4 oz)   SpO2 97%   BMI 42.81 kg/m²   HENT: Normocephalic, without obvious abnormality. Oral mucosa pink and moist. Dentition good.  EYES: Sclerae anicteric.    NECK: Supple.   CARDIOVASCULAR: Regular rate and rhythm. No murmurs.  RESPIRATORY: Normal respiratory effort. BBS CTA. No wheezes or crackles.  GI: Obese, soft, non-tender, non-distended. Liver edge palpable. No masses palpable. No ascites.  EXTREMITIES: No clubbing, cyanosis or edema.  SKIN: Warm and dry. No jaundice. No rashes noted to exposed skin. No telangectasias noted. No palmar erythema.  NEURO: Normal gate. No asterixis.  PSYCH: Memory intact. Thought and speech pattern appropriate. Behavior normal. No depression or anxiety noted.      Labs:  Lab Results   Component Value Date    WBC 3.65 (L) 04/22/2016    HGB 14.7 04/22/2016    HCT 44.1 04/22/2016     04/22/2016    CHOL 161 08/08/2017    TRIG 183 (H) 08/08/2017    HDL 29 (L) 08/08/2017     03/19/2018    K 4.0 03/19/2018    CREATININE 1.1 03/19/2018     (H) 03/19/2018    AST 51 (H) 03/19/2018    ALKPHOS 52 (L) 03/19/2018    BILITOT 0.5 03/19/2018    ALBUMIN 4.4 03/19/2018    INR 1.0 04/22/2016       ASSESSMENT:  53 y.o. Black or  male with:  1.  NAFLD  -- transaminases mildly elevated  -- US showed hepatomegaly with " steatosis  -- synthetic liver function nl  -- risk factors for fatty liver include morbid obesity, HLD, HTN  -- s/p Twinrix vaccines  2. Conflicting Liver staging  -- Fibrosure 3/2015 = F0-F1  -- Elastography 3/2015 = F2-F3  -- liver biopsy -  4/1/15- very minimal lobular steatohepatitis, macrosteatosis 20%, microsteatosis 10% no fibrosis (biopsy was small sample size though so concern for understaging with this)  -- Fibroscan 4/2016 = F4  -- Fibrosure 4/2016 = F1-F2  -- MRI elastography 5/29/17 - F1-F2  -- no findings on labs or imaging to suggest overt cirrhosis      EDUCATION:   We discussed the manifestations of NAFLD. At this time there is no FDA approved therapy for NAFLD.   The patient and I discussed the importance of diet, exercise, and weight loss for management of NAFLD. We discussed a low fat, low carb/low sugar, high fiber diet, and a goal of 30 minutes of exercise 5 times per week.   Pt is aware that fatty liver can progress to steatohepatitis and possibly to cirrhosis, putting one at increased risk for liver cancer, liver failure, and death.      Discussed liver fibrosis staging with pt and wife. He would like to go forward with repeat liver biopsy for more accurate staging. Discussed liver biopsy procedure and possible complications associated with liver biopsy including pain, bleeding, infection, and organ perforation. Reviewed the role of the procedure including confirming of diagnosis and staging of liver disease so pt can be appropriately followed from this point forward.     PLAN:  1. Referral to bariatric medicine  2. Labs and u/s pre-liver biopsy  3. Liver biopsy to be coordinated once he has u/s and labs  4. Will check Hgb A1C with these labs since glucose has been elevated on labs  5. Weight loss goal of 20-25 lbs over next 6-12 months (5-10 lbs over next 3-6 months)  6. Low fat, low cholesterol, low carb, high fiber diet  7. Exercise, 5 days a week, 30 min a day  8. Recommend good control  of cholesterol, blood pressure, blood sugar levels  9. F/u with me in 6 weeks to review liver biopsy results    Thank you for allowing me to participate in the care of VASQUEZ McphersonC

## 2018-04-26 NOTE — LETTER
April 26, 2018      Khushboo Pugh MD  [address]  [place] [address]           [facility]at[facility]  [address]  [place] [address]  Phone: [phone]  Fax: [phone]          Patient: Андрей Wooten   MR Number: [id number]   YOB: 1965   Date of Visit: 4/26/2018       Dear Dr. Khushboo Pugh:    Thank you for referring Андрей Wooten to me for evaluation. Attached you will find relevant portions of my assessment and plan of care.    If you have questions, please do not hesitate to call me. I look forward to following Андрей Wooten along with you.    Sincerely,    Hailee Reyes, NP    Enclosure  CC:  No Recipients    If you would like to receive this communication electronically, please contact [email or web address] or [phone] to request more information on [employer] access.    For providers and/or their staff who would like to refer a patient to [facility], please contact us through our one-stop-shop provider referral line, [facility], at [phone].    If you feel you have received this communication in error or would no longer like to receive these types of communications, please e-mail [email or web address]

## 2018-05-14 ENCOUNTER — HOSPITAL ENCOUNTER (OUTPATIENT)
Dept: RADIOLOGY | Facility: HOSPITAL | Age: 53
Discharge: HOME | End: 2018-05-14
Attending: NURSE PRACTITIONER
Payer: COMMERCIAL

## 2018-05-14 ENCOUNTER — TELEPHONE (OUTPATIENT)
Dept: HEPATOLOGY | Facility: CLINIC | Age: 53
End: 2018-05-14

## 2018-05-14 DIAGNOSIS — K76.0 FATTY LIVER: ICD-10-CM

## 2018-05-14 PROCEDURE — 76700 US EXAM ABDOM COMPLETE: CPT | Mod: 26,,, | Performed by: INTERNAL MEDICINE

## 2018-05-14 PROCEDURE — 76700 US EXAM ABDOM COMPLETE: CPT | Mod: TC

## 2018-05-15 ENCOUNTER — TELEPHONE (OUTPATIENT)
Dept: INTERNAL MEDICINE | Facility: CLINIC | Age: 53
End: 2018-05-15

## 2018-05-15 ENCOUNTER — PATIENT MESSAGE (OUTPATIENT)
Dept: HEPATOLOGY | Facility: CLINIC | Age: 53
End: 2018-05-15

## 2018-05-15 ENCOUNTER — TELEPHONE (OUTPATIENT)
Dept: HEPATOLOGY | Facility: CLINIC | Age: 53
End: 2018-05-15

## 2018-05-15 NOTE — TELEPHONE ENCOUNTER
Patient completed Pre Procedure Labs and U/S for the U/S Guided Liver Biopsy.  Patient wanted to be called after the testing to decide on what date to do the Liver Biopsy.    Patient called. Undecided on what date to do the Biopsy. Will call back.

## 2018-05-18 ENCOUNTER — OFFICE VISIT (OUTPATIENT)
Dept: INTERNAL MEDICINE | Facility: CLINIC | Age: 53
End: 2018-05-18
Payer: COMMERCIAL

## 2018-05-18 DIAGNOSIS — I10 HYPERTENSION, UNSPECIFIED TYPE: ICD-10-CM

## 2018-05-18 DIAGNOSIS — E11.9 DIABETES MELLITUS WITHOUT COMPLICATION: Primary | ICD-10-CM

## 2018-05-18 PROCEDURE — 99999 PR PBB SHADOW E&M-EST. PATIENT-LVL IV: CPT | Mod: PBBFAC,,, | Performed by: INTERNAL MEDICINE

## 2018-05-18 PROCEDURE — 99214 OFFICE O/P EST MOD 30 MIN: CPT | Mod: S$GLB,,, | Performed by: INTERNAL MEDICINE

## 2018-05-18 RX ORDER — TRIAMTERENE AND HYDROCHLOROTHIAZIDE 37.5; 25 MG/1; MG/1
1 CAPSULE ORAL EVERY MORNING
Qty: 30 CAPSULE | Refills: 1 | Status: SHIPPED | OUTPATIENT
Start: 2018-05-18 | End: 2018-06-20 | Stop reason: SDUPTHER

## 2018-05-20 VITALS
BODY MASS INDEX: 40.66 KG/M2 | TEMPERATURE: 98 F | DIASTOLIC BLOOD PRESSURE: 98 MMHG | HEIGHT: 66 IN | WEIGHT: 253 LBS | SYSTOLIC BLOOD PRESSURE: 146 MMHG | HEART RATE: 87 BPM | OXYGEN SATURATION: 98 %

## 2018-05-21 NOTE — PROGRESS NOTES
Subjective:       Patient ID: Андрей Wooten is a 53 y.o. male.    Chief Complaint: Hypertension    HPI  He returns for management of hypertension.  He has had hypertension for over a year.  Current treatment has included medications outlined in medication list.  He denies chest pain or shortness of breath.  No palpitations.  Denies left arm or neck pain.  Discussed with him his recent laboratory results       Past medical history: Hypertension, hyperlipidemia,NAFLD.  He had a colonoscopy March 2016     Medications:  one aspirin daily, Norvasc 10 mg daily, Pravachol 40 mg daily, hydrochlorothiazide 12.5 mg daily     NO KNOWN DRUG ALLERGIES       Review of Systems   Constitutional: Negative for chills, fatigue, fever and unexpected weight change.   Respiratory: Negative for chest tightness and shortness of breath.    Cardiovascular: Negative for chest pain and palpitations.   Gastrointestinal: Negative for abdominal pain and blood in stool.   Neurological: Negative for dizziness, syncope, numbness and headaches.       Objective:      Physical Exam   HENT:   Right Ear: External ear normal.   Left Ear: External ear normal.   Nose: Nose normal.   Mouth/Throat: Oropharynx is clear and moist.   Eyes: Pupils are equal, round, and reactive to light.   Neck: Normal range of motion.   Cardiovascular: Normal rate and regular rhythm.    No murmur heard.  Pulmonary/Chest: Breath sounds normal.   Abdominal: He exhibits no distension. There is no hepatomegaly. There is no tenderness.   Lymphadenopathy:     He has no cervical adenopathy.     He has no axillary adenopathy.   Neurological: He has normal strength and normal reflexes. No cranial nerve deficit or sensory deficit.       Assessment/Plan       Assessment and plan: 1.  Hypertension: Change hydrochlorothiazide to Dyazide 1 by mouth daily.  Return to clinic in 1 month blood pressure check  2.  New onset diabetes.  Schedule diabetes education.  3.  He was here for an urgent care  only appointment.  He will return to clinic for a physical

## 2018-05-22 ENCOUNTER — TELEPHONE (OUTPATIENT)
Dept: HEPATOLOGY | Facility: CLINIC | Age: 53
End: 2018-05-22

## 2018-05-22 NOTE — TELEPHONE ENCOUNTER
Patient called [phone]. No Answer. Cannot leave message. Voicemail not set up.  Need to find out if the patient would like to proceed with the U/S Guided Liver Biopsy. The patient has completed the Pre Procedure Labs and U/S on 5/14/18.    Call placed to home [phone], wife Chema answered. Message relayed. She stated he is more worried about me, than his self. She stated Андрей wants to proceed with the Liver Biopsy. You can call him on the job. Informed he did not leave the job number. I will get him on a 3 way and you can talk to him.    Spoke to the patient and wife on 3 way. He wants to do the Liver Biopsy on Mon or Tues 5/28 or 5/29/18 early am.  Pre and post procedure teaching reinforced. Pt has written instructions also. Both voiced understanding.  Request faxed to Radiology for an appt.

## 2018-05-24 ENCOUNTER — TELEPHONE (OUTPATIENT)
Dept: HEPATOLOGY | Facility: CLINIC | Age: 53
End: 2018-05-24

## 2018-05-24 DIAGNOSIS — K74.00 LIVER FIBROSIS: Primary | ICD-10-CM

## 2018-05-24 DIAGNOSIS — E66.01 MORBID OBESITY: ICD-10-CM

## 2018-05-24 DIAGNOSIS — R74.8 ELEVATED LIVER ENZYMES: ICD-10-CM

## 2018-05-24 DIAGNOSIS — K75.81 NASH (NONALCOHOLIC STEATOHEPATITIS): ICD-10-CM

## 2018-05-24 NOTE — TELEPHONE ENCOUNTER
Received call from Radiology with tentative date for the U/S Guided Liver Biopsy. The patient had requested Mon 5/28/18 early am, check in time 7:30 am.    Patient called. Date and time is acceptable. Pre procedure teaching reinforced. The patient stopped the Fish Oil and Vitamin E 12/2017. Stopped the Aspirin on 4/26/18.   Will return to the clinic to see Hailee Reyes NP on Thurs 6/7/18 at 9:20 am. The appt was already made. The patient instructed to call the clinic on 6/6/18 to check if the Liver Biopsy results are in. Voiced understanding. Appt letter placed in the mail.

## 2018-05-28 ENCOUNTER — SURGERY (OUTPATIENT)
Age: 53
End: 2018-05-28

## 2018-05-28 ENCOUNTER — HOSPITAL ENCOUNTER (OUTPATIENT)
Facility: HOSPITAL | Age: 53
Discharge: HOME | End: 2018-05-28
Attending: INTERNAL MEDICINE | Admitting: INTERNAL MEDICINE
Payer: COMMERCIAL

## 2018-05-28 VITALS
SYSTOLIC BLOOD PRESSURE: 131 MMHG | HEIGHT: 66 IN | DIASTOLIC BLOOD PRESSURE: 89 MMHG | TEMPERATURE: 98 F | RESPIRATION RATE: 18 BRPM | OXYGEN SATURATION: 98 % | WEIGHT: 258 LBS | BODY MASS INDEX: 41.46 KG/M2 | HEART RATE: 92 BPM

## 2018-05-28 DIAGNOSIS — K74.00 LIVER FIBROSIS: ICD-10-CM

## 2018-05-28 DIAGNOSIS — R74.8 ELEVATED LIVER ENZYMES: ICD-10-CM

## 2018-05-28 DIAGNOSIS — K75.81 NASH (NONALCOHOLIC STEATOHEPATITIS): ICD-10-CM

## 2018-05-28 DIAGNOSIS — E66.01 MORBID OBESITY: ICD-10-CM

## 2018-05-28 DIAGNOSIS — K76.89 LIVER DYSFUNCTION: ICD-10-CM

## 2018-05-28 LAB — POCT GLUCOSE: 132 MG/DL (ref 70–110)

## 2018-05-28 PROCEDURE — 88307 TISSUE EXAM BY PATHOLOGIST: CPT | Performed by: PATHOLOGY

## 2018-05-28 PROCEDURE — 82962 GLUCOSE BLOOD TEST: CPT

## 2018-05-28 PROCEDURE — 63600175 PHARM REV CODE 636 W HCPCS: Performed by: RADIOLOGY

## 2018-05-28 PROCEDURE — 88313 SPECIAL STAINS GROUP 2: CPT | Mod: 26,,, | Performed by: PATHOLOGY

## 2018-05-28 RX ORDER — SODIUM CHLORIDE 9 MG/ML
INJECTION, SOLUTION INTRAVENOUS CONTINUOUS
Status: DISCONTINUED | OUTPATIENT
Start: 2018-05-28 | End: 2018-05-28 | Stop reason: HOSPADM

## 2018-05-28 RX ORDER — FENTANYL CITRATE 50 UG/ML
INJECTION, SOLUTION INTRAMUSCULAR; INTRAVENOUS CODE/TRAUMA/SEDATION MEDICATION
Status: COMPLETED | OUTPATIENT
Start: 2018-05-28 | End: 2018-05-28

## 2018-05-28 RX ORDER — MIDAZOLAM HYDROCHLORIDE 1 MG/ML
INJECTION INTRAMUSCULAR; INTRAVENOUS CODE/TRAUMA/SEDATION MEDICATION
Status: COMPLETED | OUTPATIENT
Start: 2018-05-28 | End: 2018-05-28

## 2018-05-28 RX ADMIN — FENTANYL CITRATE 25 MCG: 50 INJECTION, SOLUTION INTRAMUSCULAR; INTRAVENOUS at 09:05

## 2018-05-28 RX ADMIN — FENTANYL CITRATE 50 MCG: 50 INJECTION, SOLUTION INTRAMUSCULAR; INTRAVENOUS at 09:05

## 2018-05-28 RX ADMIN — MIDAZOLAM HYDROCHLORIDE 1 MG: 1 INJECTION, SOLUTION INTRAMUSCULAR; INTRAVENOUS at 09:05

## 2018-05-28 NOTE — PROGRESS NOTES
US guided liver biopsy complete. Pt tolerated well. VSS. No signs or symptoms of distress noted.  Sand bag to ruq for one hour, up at 1027.  Pt will be transferred to ROCU bed and report to be given at bedside.

## 2018-05-28 NOTE — DISCHARGE INSTRUCTIONS
For scheduling: Call Emi at [phone]    For questions or concerns call: TONIA MON-FRI 8 AM- 5PM [phone]. Radiology resident on call [phone].    For immediate concerns that are not emergent, you may call our radiology clinic at: [phone]

## 2018-05-28 NOTE — PROGRESS NOTES
Pt arrived to US 9 for ultrasound liver biopsy.  Name verified using two identifiers.  Allergies verified.  Will continue to monitor.

## 2018-05-28 NOTE — PROGRESS NOTES
Pt arrived to [facility] [place] [facility]. Report received from KHRIS Dimas. Dressing to abdomen dry and intact. Son notified.

## 2018-05-28 NOTE — PROCEDURES
Radiology Post-Procedure Note    Pre Op Diagnosis: VOGEL fibrosis and elevated LFTs    Post Op Diagnosis: Same    Procedure: left lobe random liver biopsy    Procedure performed by: Joie Serrano MD    Written Informed Consent Obtained: Yes    Specimen Removed: YES 1 sample of liver tissue     Estimated Blood Loss: Minimal    Findings:   Using US guidance a 16g monopty biopsy gun was used to take 1 biopsy sample randomly of the left hepatic lobe. Specimen sent to pathology.     Patient tolerated procedure well.    Filemon Rojas MD  PGY - 2  Department of Radiology

## 2018-05-28 NOTE — H&P
Radiology History & Physical      SUBJECTIVE:     Chief Complaint: VOGEL liver fibrosis and elevated LFTs    History of Present Illness:  Андрей Wooten is a 53 y.o. male who presents for US guided liver biopsy for VOGEL fibrosis and elevated LFTs. Patient has had this issue for many years and has been monitored but recently had increase in his LFTs and has now been sent for US guided liver biopsy.       Past Medical History:   Diagnosis Date    HLD (hyperlipidemia)     Hypertension     NAFLD (nonalcoholic fatty liver disease)      Past Surgical History:   Procedure Laterality Date    COLONOSCOPY N/A 3/2/2016    Procedure: COLONOSCOPY;  Surgeon: Angelito Mahoney MD;  Location: [facility] ([address] [place] [address]);  Service: Endoscopy;  Laterality: N/A;    LIVER BIOPSY  4/1/15    very minimal lobular steatohepatitis, macrosteatosis 20%, microsteatosis 10% no fibrosis        Home Meds:   Prior to Admission medications    Medication Sig Start Date End Date Taking? Authorizing Provider   amLODIPine (NORVASC) 10 MG tablet Take 1 tablet (10 mg total) by mouth once daily. 3/19/18 3/19/19 Yes Khushboo Pugh MD   fluticasone (FLONASE) 50 mcg/actuation nasal spray 1 spray by Each Nare route once daily. 4/27/16  Yes Marcela Knott, ROBERT   triamterene-hydrochlorothiazide 37.5-25 mg (DYAZIDE) 37.5-25 mg per capsule Take 1 capsule by mouth every morning. 5/18/18 5/18/19 Yes Khushboo Pugh MD   aspirin (ECOTRIN) 81 MG EC tablet Take 81 mg by mouth once daily.    Historical Provider, MD   cetirizine (ZYRTEC) 10 MG tablet Take 10 mg by mouth once daily.    Historical Provider, MD   pravastatin (PRAVACHOL) 40 MG tablet TAKE 1 TABLET(40 MG) BY MOUTH EVERY DAY 3/19/18   Khushboo Pugh MD   UBIDECARENONE (COENZYME Q10) 100 mg Tab Take 1 tablet by mouth once daily.    Historical Provider, MD   vitamin D 1000 units Tab Take 185 mg by mouth once daily.    Historical Provider, MD     Anticoagulants/Antiplatelets: aspirin - last dose greater than  5 days ago.     Allergies: Review of patient's allergies indicates:  No Known Allergies  Sedation History:  no adverse reactions    Review of Systems:   Hematological: no known coagulopathies  Respiratory: no shortness of breath  Cardiovascular: no chest pain  Gastrointestinal: no abdominal pain  Genito-Urinary: no dysuria  Musculoskeletal: negative  Neurological: no TIA or stroke symptoms         OBJECTIVE:     Vital Signs (Most Recent)  Temp: 98.9 °F (37.2 °C) (05/28/18 0739)  Pulse: 85 (05/28/18 0739)  Resp: 16 (05/28/18 0739)  BP: (!) 149/95 (05/28/18 0739)  SpO2: 98 % (05/28/18 0739)    Physical Exam:  ASA: 2  Mallampati: 2    General: no acute distress  Mental Status: alert and oriented to person, place and time  HEENT: normocephalic, atraumatic  Chest: unlabored breathing  Abdomen: nondistended  Extremity: moves all extremities    Laboratory  Lab Results   Component Value Date    INR 1.1 05/14/2018       Lab Results   Component Value Date    WBC 4.04 05/14/2018    HGB 14.8 05/14/2018    HCT 44.2 05/14/2018    MCV 83 05/14/2018     05/14/2018      Lab Results   Component Value Date     (H) 05/14/2018     05/14/2018    K 4.0 05/14/2018     05/14/2018    CO2 27 05/14/2018    BUN 13 05/14/2018    CREATININE 1.0 05/14/2018    CALCIUM 10.0 05/14/2018    ALT 95 (H) 05/14/2018    AST 54 (H) 05/14/2018    ALBUMIN 4.3 05/14/2018    BILITOT 0.7 05/14/2018       ASSESSMENT/PLAN:     Sedation Plan: Moderate + local.   Patient will undergo US guided liver biopsy.    Filemon Rojas MD  PGY - 2  Department of Radiology

## 2018-05-28 NOTE — DISCHARGE SUMMARY
Radiology Discharge Summary      Hospital Course: No complications    Admit Date: 5/28/2018  Discharge Date: 05/28/2018     Instructions Given to Patient: Yes  Diet: regular diet and Resume prior diet  Activity: activity as tolerated and no driving for today. No bath for 24 hours.     US-guided liver biopsy was performed.     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      Follow-up: As per ordering provider.     Filemon Rojas MD  PGY - 2  Department of Radiology     pt [facility]AITANYA from [facility].  pt was found on the floor ni the bathroom and was found to be unresponsive pt arrives on 100% NRB.  pt is flacid on the right side.  f/s= 46 in triage, pt directly to room 12

## 2018-05-28 NOTE — PROGRESS NOTES
Pt discharged to home.  Discharge instructions given, pt stated understanding.  Dressing to abdomen dry and intact, IV removed.  Pt left with son to home.

## 2018-05-29 ENCOUNTER — PATIENT MESSAGE (OUTPATIENT)
Dept: DIABETES | Facility: CLINIC | Age: 53
End: 2018-05-29

## 2018-06-07 ENCOUNTER — OFFICE VISIT (OUTPATIENT)
Dept: HEPATOLOGY | Facility: CLINIC | Age: 53
End: 2018-06-07
Payer: COMMERCIAL

## 2018-06-07 VITALS
OXYGEN SATURATION: 97 % | TEMPERATURE: 98 F | RESPIRATION RATE: 18 BRPM | DIASTOLIC BLOOD PRESSURE: 90 MMHG | SYSTOLIC BLOOD PRESSURE: 148 MMHG | WEIGHT: 255.5 LBS | HEIGHT: 66 IN | BODY MASS INDEX: 41.06 KG/M2 | HEART RATE: 87 BPM

## 2018-06-07 DIAGNOSIS — I10 ESSENTIAL HYPERTENSION: ICD-10-CM

## 2018-06-07 DIAGNOSIS — R74.8 ELEVATED LIVER ENZYMES: ICD-10-CM

## 2018-06-07 DIAGNOSIS — E66.01 MORBID OBESITY: ICD-10-CM

## 2018-06-07 DIAGNOSIS — E11.9 TYPE 2 DIABETES MELLITUS WITHOUT COMPLICATION, WITHOUT LONG-TERM CURRENT USE OF INSULIN: ICD-10-CM

## 2018-06-07 DIAGNOSIS — E78.5 HYPERLIPIDEMIA, UNSPECIFIED HYPERLIPIDEMIA TYPE: ICD-10-CM

## 2018-06-07 DIAGNOSIS — K75.81 NASH (NONALCOHOLIC STEATOHEPATITIS): Primary | ICD-10-CM

## 2018-06-07 DIAGNOSIS — K74.00 LIVER FIBROSIS: ICD-10-CM

## 2018-06-07 PROBLEM — K76.89 LIVER DYSFUNCTION: Status: RESOLVED | Noted: 2018-05-28 | Resolved: 2018-06-07

## 2018-06-07 PROCEDURE — 99213 OFFICE O/P EST LOW 20 MIN: CPT | Mod: S$GLB,,, | Performed by: NURSE PRACTITIONER

## 2018-06-07 PROCEDURE — 99999 PR PBB SHADOW E&M-EST. PATIENT-LVL IV: CPT | Mod: PBBFAC,,, | Performed by: NURSE PRACTITIONER

## 2018-06-07 NOTE — PROGRESS NOTES
[facility] Established Patient Visit    Reason for Visit:  F/u liver biopsy results    PCP: Khushboo Pugh    HPI:  This is a 53 y.o. male here for f/u of elevated liver enzymes due to VOGEL. He has had conflicting liver staging. He has undergone repeat liver biopsy for further staging of fibrosis that revealed stage 1 out of 4 fibrosis. He may be a candidate for Dr. Garcia's [employer] trial. Research will meet with him today to discuss this. Since his last visit, he started low carb, high protein diet and walking for exercise. Has lost 10 lbs. His recent u/s prior to biopsy showed hepatomegaly at 21.2 cm with steatosis. Spleen nl 8.3 cm. Plts nl 251. He has elevated transaminases with normal synthetic liver functioning. He is now diabetic based on recent Hgb A1C of 6.5. He will be meeting with diabetes education but reports his insurance has denied this. He is calling them today to f/u on this. Has f/u with PCP for this soon too. Will be seeing bariatric medicine soon as well.     He reports he feels well and denies any complaints today. Denies any symptoms of advanced chronic liver disease including jaundice, dark urine, abdominal distention, hematemesis, melena, slowed mentation.     Fibrosis staging:  -- Fibrosure 3/2015 = F0-F1  -- Elastography 3/2015 = F2-F3  -- liver biopsy -  4/1/15- very minimal lobular steatohepatitis, macrosteatosis 20%, microsteatosis 10% no fibrosis (biopsy was small sample size though so concern for understaging with this)  -- Fibroscan 4/2016 = F4  -- Fibrosure 4/2016 = F1-F2  -- MRI elastography 5/29/17 - F1-F2  -- liver biopsy 5/28/18 - VOGEL, macrosteatosis 80%, microsteatosis 10%, stage 1 out of 4 fibrosis    FINAL PATHOLOGIC DIAGNOSIS  LIVER, BOPSY:  Macrovesicular steatosis 80%  Microvesicular steatosis 10%  Steatohepatitis  Frequent ballooned hepatocytes  Focal glycogenated nuclei  Mild focal pericellular fibrosis (stage 1 of 4)  No evidence of increased iron  deposits (grade 0 of 4)  VOGEL CLINICAL RESEARCH NETWORK SCORING SYSTEM:  Steatosis - 3  Lobular inflammation - 2  Hepatocellular ballooning - 2  Fibrosis - 1  Trichrome and iron stains performed.      ROS:   GENERAL: Denies fever, chills, (+) weight loss, no fatigue  HEENT: Denies headaches, dizziness, vision/hearing changes  CARDIOVASCULAR: Denies chest pain, palpitations, or edema  RESPIRATORY: Denies dyspnea, cough  GI: Denies abdominal pain, rectal bleeding, nausea, vomiting. No change in bowel pattern or color  : Denies dysuria, hematuria   SKIN: Denies rash, itching   NEURO: Denies confusion, memory loss, or mood changes  PSYCH: Denies depression or anxiety  HEME/LYMPH: Denies easy bruising or bleeding      PMHX:  has a past medical history of HLD (hyperlipidemia); Hypertension; and NAFLD (nonalcoholic fatty liver disease).    PSHX:  has a past surgical history that includes Liver biopsy (4/1/15); Colonoscopy (N/A, 3/2/2016); and biopsy of liver with ultrasound guidance (N/A, 5/28/2018).    The patient's social and family histories were reviewed by me and updated in the appropriate section of the electronic medical record.    Review of patient's allergies indicates:  No Known Allergies    Current Outpatient Prescriptions on File Prior to Visit   Medication Sig Dispense Refill    amLODIPine (NORVASC) 10 MG tablet Take 1 tablet (10 mg total) by mouth once daily. 90 tablet 1    aspirin (ECOTRIN) 81 MG EC tablet Take 81 mg by mouth once daily.      cetirizine (ZYRTEC) 10 MG tablet Take 10 mg by mouth as needed.       fluticasone (FLONASE) 50 mcg/actuation nasal spray 1 spray by Each Nare route once daily. 16 g 0    pravastatin (PRAVACHOL) 40 MG tablet TAKE 1 TABLET(40 MG) BY MOUTH EVERY DAY 90 tablet 1    triamterene-hydrochlorothiazide 37.5-25 mg (DYAZIDE) 37.5-25 mg per capsule Take 1 capsule by mouth every morning. 30 capsule 1    vitamin D 1000 units Tab Take 185 mg by mouth once daily.       "[DISCONTINUED] UBIDECARENONE (COENZYME Q10) 100 mg Tab Take 1 tablet by mouth once daily.       Current Facility-Administered Medications on File Prior to Visit   Medication Dose Route Frequency Provider Last Rate Last Dose    triamcinolone acetonide injection 40 mg  40 mg INTRABURSAL 1 time in Clinic/HOD Edgar Sanchez Jr., MD              Objective Findings:    PHYSICAL EXAM:   Friendly Black or  male, in no acute distress; alert and oriented to person, place and time  VITALS: BP (!) 148/90 (BP Location: Right arm, Patient Position: Sitting, BP Method: Medium (Automatic))   Pulse 87   Temp 97.8 °F (36.6 °C) (Oral)   Resp 18   Ht 5' 6" (1.676 m)   Wt 115.9 kg (255 lb 8.2 oz)   SpO2 97%   BMI 41.24 kg/m²   HENT: Normocephalic, without obvious abnormality. Oral mucosa pink and moist. Dentition good.  EYES: Sclerae anicteric.    NECK: Supple.   CARDIOVASCULAR: Regular rate and rhythm. No murmurs.  RESPIRATORY: Normal respiratory effort. BBS CTA. No wheezes or crackles.  GI: Obese, soft, non-tender, non-distended. Liver edge palpable. No masses palpable. No ascites.  EXTREMITIES: No clubbing, cyanosis or edema.  SKIN: Warm and dry. No jaundice. No rashes noted to exposed skin. No telangectasias noted. No palmar erythema.  NEURO: Normal gate. No asterixis.  PSYCH: Memory intact. Thought and speech pattern appropriate. Behavior normal. No depression or anxiety noted.      Labs:  Lab Results   Component Value Date    WBC 4.04 05/14/2018    HGB 14.8 05/14/2018    HCT 44.2 05/14/2018     05/14/2018    CHOL 161 08/08/2017    TRIG 183 (H) 08/08/2017    HDL 29 (L) 08/08/2017     05/14/2018    K 4.0 05/14/2018    CREATININE 1.0 05/14/2018    ALT 95 (H) 05/14/2018    AST 54 (H) 05/14/2018    ALKPHOS 49 (L) 05/14/2018    BILITOT 0.7 05/14/2018    ALBUMIN 4.3 05/14/2018    INR 1.1 05/14/2018       ASSESSMENT:  53 y.o. Black or  male with:  1.  VOGEL  -- transaminases mildly " elevated  -- US showed hepatomegaly with steatosis  -- synthetic liver function nl  -- risk factors for fatty liver include morbid obesity, HLD, HTN, DM  -- s/p Twinrix vaccines  -- liver biopsy 5/28/18 - VOGEL, macrosteatosis 80%, microsteatosis 10%, stage 1 out of 4 fibrosis  2. Conflicting Liver staging in past  -- Fibrosure 3/2015 = F0-F1  -- Elastography 3/2015 = F2-F3  -- liver biopsy -  4/1/15- very minimal lobular steatohepatitis, macrosteatosis 20%, microsteatosis 10% no fibrosis (biopsy was small sample size though so concern for understaging with this)  -- Fibroscan 4/2016 = F4  -- Fibrosure 4/2016 = F1-F2  -- MRI elastography 5/29/17 - F1-F2  -- liver biopsy 5/28/18 - VOGEL, macrosteatosis 80%, microsteatosis 10%, stage 1 out of 4 fibrosis  -- no findings on labs or imaging to suggest overt cirrhosis      EDUCATION:   We discussed the manifestations of NAFLD. At this time there is no FDA approved therapy for NAFLD.   The patient and I discussed the importance of diet, exercise, and weight loss for management of NAFLD. We discussed a low fat, low carb/low sugar, high fiber diet, and a goal of 30 minutes of exercise 5 times per week.   Pt is aware that fatty liver can progress to steatohepatitis and possibly to cirrhosis, putting one at increased risk for liver cancer, liver failure, and death.        PLAN:  1. Research to see pt today to discuss Nov[employer] trial  2. Reassured pt no cirrhosis or advanced fibrosis based on liver biopsy. Now we have a more accurate assessment of his liver fibrosis. Can plan to restage him in 2-3 yrs with another MRI elastography since this was more accurate  3. See diabetes education, PCP, and bariatric medicine   4. Commended pt for his weight loss, 10 lbs in 6 weeks. Continue efforts at weight loss. Additional weight loss goal of 20-25 lbs over next 6-12 months   5. Low fat, low cholesterol, low carb, high fiber diet  6. Exercise, 5 days a week, 30 min a day  7. Recommend good  control of cholesterol, blood pressure, blood sugar levels  8. F/u with me in 6 months      Thank you for allowing me to participate in the care of Андрей Reyes, VASQUEZC

## 2018-06-07 NOTE — PATIENT INSTRUCTIONS
1. Call [phone] to schedule your appointment for Diabetes education.   2. Weight loss goal of 15-20 lbs  3. Low fat, low cholesterol, low carb, high fiber, high protein diet  4. Exercise, 5 days a week, 30 min a day  5. Recommend good control of cholesterol, blood pressure, blood sugar levels  6. Kristina will discuss the clinical trial with you  7. See bariatric medicine as scheduled  8. Follow up in 6 months

## 2018-06-11 ENCOUNTER — PATIENT MESSAGE (OUTPATIENT)
Dept: HEPATOLOGY | Facility: CLINIC | Age: 53
End: 2018-06-11

## 2018-06-14 DIAGNOSIS — Z13.5 DIABETIC RETINOPATHY SCREENING: ICD-10-CM

## 2018-06-20 ENCOUNTER — OFFICE VISIT (OUTPATIENT)
Dept: INTERNAL MEDICINE | Facility: CLINIC | Age: 53
End: 2018-06-20
Payer: COMMERCIAL

## 2018-06-20 ENCOUNTER — LAB VISIT (OUTPATIENT)
Dept: LAB | Facility: HOSPITAL | Age: 53
End: 2018-06-20
Attending: INTERNAL MEDICINE
Payer: COMMERCIAL

## 2018-06-20 VITALS
HEIGHT: 66 IN | TEMPERATURE: 99 F | HEART RATE: 88 BPM | OXYGEN SATURATION: 96 % | DIASTOLIC BLOOD PRESSURE: 82 MMHG | WEIGHT: 253 LBS | BODY MASS INDEX: 40.66 KG/M2 | SYSTOLIC BLOOD PRESSURE: 130 MMHG

## 2018-06-20 DIAGNOSIS — E11.9 DIABETES MELLITUS WITHOUT COMPLICATION: ICD-10-CM

## 2018-06-20 DIAGNOSIS — I10 HYPERTENSION, UNSPECIFIED TYPE: ICD-10-CM

## 2018-06-20 DIAGNOSIS — E11.9 DIABETES MELLITUS WITHOUT COMPLICATION: Primary | ICD-10-CM

## 2018-06-20 LAB
CREAT UR-MCNC: 258 MG/DL
MICROALBUMIN UR DL<=1MG/L-MCNC: 30 UG/ML
MICROALBUMIN/CREATININE RATIO: 11.6 UG/MG

## 2018-06-20 PROCEDURE — 99214 OFFICE O/P EST MOD 30 MIN: CPT | Mod: S$GLB,,, | Performed by: INTERNAL MEDICINE

## 2018-06-20 PROCEDURE — 99999 PR PBB SHADOW E&M-EST. PATIENT-LVL IV: CPT | Mod: PBBFAC,,, | Performed by: INTERNAL MEDICINE

## 2018-06-20 PROCEDURE — 82043 UR ALBUMIN QUANTITATIVE: CPT

## 2018-06-20 RX ORDER — TRIAMTERENE AND HYDROCHLOROTHIAZIDE 37.5; 25 MG/1; MG/1
1 CAPSULE ORAL EVERY MORNING
Qty: 90 CAPSULE | Refills: 1 | Status: SHIPPED | OUTPATIENT
Start: 2018-06-20 | End: 2018-10-29 | Stop reason: SDUPTHER

## 2018-06-24 NOTE — PROGRESS NOTES
Subjective:       Patient ID: Андрей Wooten is a 53 y.o. male.    Chief Complaint: Hypertension    HPI  He returns for management of hypertension.  He has had hypertension for over a year.  Current treatment has included medications outlined in medication list.  He denies chest pain or shortness of breath.  No palpitations.  Denies left arm or neck pain. He has diabetes.  Denies polyuria, polydipsia    Medications:  See med card    Social history:  Does not smoke, does not drink alcohol      Review of Systems   Constitutional: Negative for chills, fatigue, fever and unexpected weight change.   Respiratory: Negative for chest tightness and shortness of breath.    Cardiovascular: Negative for chest pain and palpitations.   Gastrointestinal: Negative for abdominal pain and blood in stool.   Neurological: Negative for dizziness, syncope, numbness and headaches.       Objective:      Physical Exam   HENT:   Right Ear: External ear normal.   Left Ear: External ear normal.   Nose: Nose normal.   Mouth/Throat: Oropharynx is clear and moist.   Eyes: Pupils are equal, round, and reactive to light.   Neck: Normal range of motion.   Cardiovascular: Normal rate and regular rhythm.    No murmur heard.  Pulmonary/Chest: Breath sounds normal.   Abdominal: He exhibits no distension. There is no hepatomegaly. There is no tenderness.   Lymphadenopathy:     He has no cervical adenopathy.     He has no axillary adenopathy.   Neurological: He has normal strength and normal reflexes. No cranial nerve deficit or sensory deficit.       Assessment/Plan       Assessment and plan:  1.  Hypertension:  Check BMP  2.  Diabetes:  Unfortunately his insurance will not cover diabetes education.  Urine sent for urine microalbumin.  He reports having his eye exam within the past year

## 2018-07-26 ENCOUNTER — INITIAL CONSULT (OUTPATIENT)
Dept: BARIATRICS | Facility: CLINIC | Age: 53
End: 2018-07-26
Payer: COMMERCIAL

## 2018-07-26 VITALS
WEIGHT: 253.5 LBS | SYSTOLIC BLOOD PRESSURE: 130 MMHG | BODY MASS INDEX: 40.74 KG/M2 | HEIGHT: 66 IN | HEART RATE: 76 BPM | DIASTOLIC BLOOD PRESSURE: 80 MMHG

## 2018-07-26 DIAGNOSIS — I10 ESSENTIAL HYPERTENSION: ICD-10-CM

## 2018-07-26 DIAGNOSIS — K75.81 NASH (NONALCOHOLIC STEATOHEPATITIS): ICD-10-CM

## 2018-07-26 DIAGNOSIS — R06.83 SNORING: ICD-10-CM

## 2018-07-26 DIAGNOSIS — E66.01 CLASS 3 SEVERE OBESITY DUE TO EXCESS CALORIES WITH SERIOUS COMORBIDITY AND BODY MASS INDEX (BMI) OF 40.0 TO 44.9 IN ADULT: Primary | ICD-10-CM

## 2018-07-26 DIAGNOSIS — E11.9 TYPE 2 DIABETES MELLITUS WITHOUT COMPLICATION, WITHOUT LONG-TERM CURRENT USE OF INSULIN: ICD-10-CM

## 2018-07-26 DIAGNOSIS — K74.00 LIVER FIBROSIS: ICD-10-CM

## 2018-07-26 PROCEDURE — 99244 OFF/OP CNSLTJ NEW/EST MOD 40: CPT | Mod: S$GLB,,, | Performed by: INTERNAL MEDICINE

## 2018-07-26 PROCEDURE — 99999 PR PBB SHADOW E&M-EST. PATIENT-LVL III: CPT | Mod: PBBFAC,,, | Performed by: INTERNAL MEDICINE

## 2018-07-26 RX ORDER — DIETHYLPROPION HYDROCHLORIDE 75 MG/1
75 TABLET, EXTENDED RELEASE ORAL DAILY
Qty: 30 TABLET | Refills: 2 | Status: SHIPPED | OUTPATIENT
Start: 2018-07-26 | End: 2018-10-25

## 2018-07-26 NOTE — LETTER
[facility]  [address]  [facility] [address]  Phone: [phone]  Fax: [phone] July 27, 2018      Khushboo Pugh MD  [address]  [facility] [address]    Patient: Андрей Wooten   MR Number: [id number]   YOB: 1965   Date of Visit: 7/26/2018     Dear Dr. Pugh:    Thank you for referring Андрей Wooten to me for evaluation. Below are the relevant portions of my assessment and plan of care.    ASSESSMENT:  1. Class 3 severe obesity due to excess calories with serious comorbidity and body mass index (BMI) of 40.0 to 44.9 in adult    2. Essential hypertension    3. Type 2 diabetes mellitus without complication, without long-term current use of insulin    4. VOGEL (nonalcoholic steatohepatitis)    5. Liver fibrosis    6. Snoring      PLAN: 1. Class 3 severe obesity due to excess calories with serious comorbidity and body mass index (BMI) of 40.0 to 44.9 in adult - Diethylpropion 75 mg TbSR; Take 75 mg by mouth once daily.  Dispense: 30 tablet; Refill: 2  Patient warned of common side effects of diethylpropion including anxiety, insomnia, palpitations and increased blood pressure. It was also explained that it is for short-term usage along with diet and exercise, and that stopping the medication without making lifestyle changes will result in regain of weight. Patient states understanding.     Weight loss medications are controlled substances.  They require routine follow up. Prescription or pills that are lost or destroyed will not be replaced.     Take medication at the start of your day. It is fine to skip if changing shifts, etc.      Exercise 30 min 3 days a week. Gradually increase.   Patient counseled in strategies for long term weight loss and maintenance: Keeping a food diary, exercise for 1 hour a day and eating breakfast everyday.        No soda, sweet tea, juices or lemonade.  All drinks should be 5 calories or less.      3 meals a day made up of the  following:  Unlimited green vegetables, tomatoes, mushrooms, spaghetti squash, cauliflower, meat, poultry, seafood, eggs and hard cheeses.   Milk and plain yogurt  Dressings, seasonings, condiments, etc should have less than 2 g sugars.   Beans (1-1.5 cups) or nuts (1/4 cup) can have 1 x a day.   1-2 servings of citrus fruits, berries, pineapple or melon a day (1/2 cup)  Avoid fried foods     No grains, rice, pasta, potatoes, bread, corn, peas, oatmeal, grits, tortillas, crackers, chips, no soda, sweet tea, juices or lemonade.     [email or web address] for recipes. Moderate carb intake    2. Essential hypertension - The current medical regimen is effective;  continue present plan and medications. Expect improvement with weight loss.      3. Type 2 diabetes mellitus without complication, without long-term current use of insulin  Expect improvement with weight loss. Recheck after 10% TBW lost.       4. VOGEL (nonalcoholic steatohepatitis)- Discussed with patient that the only treatment for fatty liver is to lose weight.  Without doing so, it may progress to worsening of cirrhosis, permanent liver damage and possibly liver failure. Expect improvement with weight loss.       5. Liver fibrosis - See #4     6. Snoring -High ESS could also be related to shift work. Consisder sleep med Eval. Will see how snoring and sleep are doing with weight loss.     If you have questions, please do not hesitate to call me. I look forward to following Андрей along with you.    Sincerely,      Chirstine Bernal MD   Medical Weight Loss   [facility]     AF/marli  CC  Hailee Reyes NP

## 2018-07-26 NOTE — PROGRESS NOTES
Subjective:       Patient ID: Андрей Wooten is a 53 y.o. male.    Chief Complaint: Consult    CC: Weight    Current attempts at weight loss: New pt to me, referred by Hailee Reyes NP and Khushboo Pugh MD   No address on file , with Patient Active Problem List:     Adhesive capsulitis of left shoulder     VOGEL (nonalcoholic steatohepatitis)     Morbid obesity     HLD (hyperlipidemia)     HTN (hypertension)     Decreased range of motion of left shoulder     Left shoulder pain     Muscle weakness of left upper extremity     Abnormal ECG     Weakness of both lower extremities     Hamstring tightness of both lower extremities     Hip tightness     Liver fibrosis 1/4 on biopsy.      Type 2 diabetes mellitus without complication, without long-term current use of insulin- dx in May 2018. Has never been on Meds.      Lab Results       Component                Value               Date                       ALT                      95 (H)              05/14/2018                 AST                      54 (H)              05/14/2018                 GGT                      77                  04/22/2016                 ALKPHOS                  49 (L)              05/14/2018                 BILITOT                  0.7                 05/14/2018              Lab Results       Component                Value               Date                       HGBA1C                   6.5 (H)             05/14/2018            Lab Results       Component                Value               Date                       LDLCALC                  95.4                08/08/2017                 CREATININE               1.1                 06/20/2018     Lab Results       Component                Value               Date                       HGBA1C                   6.5 (H)             05/14/2018            Lab Results       Component                Value               Date                       LDLCALC                  95.4                 08/08/2017                 CREATININE               1.1                 06/20/2018                        Has been cutting back on sugars and starches. Eating more salads and lean meats. Does not exercise        Previous diet attempts: Denies    History of medication for loss: Denies.   checked today     Heaviest weight: 263#    Lightest weight: 150#    Goal weight: under 200#      Last eye exam:    2017. 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 Lives with wife and daughter, 19 yo. He and wife share in cooking. She has Ca.   Breakfast:  Cereal or skips or turkey. Weekends: same.     Lunch: Prev fast food. Now salad and sandwich- turkey or grilled chicken. Weekends: goes out- burger, grilled chicken. Steak    Dinner: rice with cabbage or stew- shrimp or crawfish. Weekends: same.     Snacks: cookies, chips, candy- has been cutting back    Beverages: fruit punch. Some water. Coffee with AS and sugar. Denies ETOH.     Willingness to change: 10/10    EKG:Normal sinus rhythm  Voltage criteria for left ventricular hypertrophy  Nonspecific ST abnormality  Abnormal ECG  No previous ECGs available    PET stress:  Nuclear Quantitative Functional Analysis:   At rest:  LVEF: >= 70 % (normal is >= 51%)  LVED Volume: 70 ml (normal is <=171)  LVES Volume: 15 ml (normal is <=70)  At stress:  LVEF: >= 70 % (normal is >= 51%)  LVED Volume: 90 ml (normal is <=171)  LVES Volume: 23 ml (normal is <=70)    CONCLUSIONS: NORMAL MYOCARDIAL PERFUSION PET STRESS TEST  1. The perfusion scan is free of evidence for myocardial ischemia or injury.   2. Resting wall motion is physiologic. Stress wall motion is physiologic.   3. LV function is normal at rest and stress.  (normal is >= 51%)  4. The ventricular volumes are normal at rest and stress.   5. The extracardiac distribution of radioactivity is normal.   6. There was no previous  "study available to compare.    BMR: [id number]      Review of Systems   Constitutional: Negative for chills and fever.   Respiratory: Negative for shortness of breath.         Bad snoring. PSG 10 years was neg. Did weigh less. ESS 17    Cardiovascular: Negative for chest pain and leg swelling.   Gastrointestinal: Negative for constipation and diarrhea.        Denies GERD   Genitourinary: Negative for difficulty urinating and dysuria.   Musculoskeletal: Positive for back pain. Negative for arthralgias.   Neurological: Negative for dizziness and light-headedness.   Psychiatric/Behavioral: Negative for dysphoric mood. The patient is not nervous/anxious.        Objective:     /80   Pulse 76   Ht 5' 6" (1.676 m)   Wt 115 kg (253 lb 8.5 oz)   BMI 40.92 kg/m²     Physical Exam   Constitutional: He is oriented to person, place, and time. He appears well-developed. No distress.   Morbidly obese     HENT:   Head: Normocephalic and atraumatic.   Eyes: Pupils are equal, round, and reactive to light. No scleral icterus.   Neck: Normal range of motion. Neck supple. No thyromegaly present.   Cardiovascular: Normal rate, regular rhythm and normal heart sounds.  Exam reveals no gallop and no friction rub.    No murmur heard.  Pulmonary/Chest: Effort normal and breath sounds normal. No respiratory distress. He has no wheezes.   Abdominal: Soft. Bowel sounds are normal. He exhibits no distension. There is no tenderness.   Musculoskeletal: Normal range of motion. He exhibits no edema.   Neurological: He is alert and oriented to person, place, and time.   Skin: Skin is warm and dry.   Psychiatric: He has a normal mood and affect. His behavior is normal. Judgment normal.   Vitals reviewed.      Assessment:       1. Class 3 severe obesity due to excess calories with serious comorbidity and body mass index (BMI) of 40.0 to 44.9 in adult    2. Essential hypertension    3. Type 2 diabetes mellitus without complication, without long-term " current use of insulin    4. VOGEL (nonalcoholic steatohepatitis)    5. Liver fibrosis    6. Snoring        Plan:         1. Class 3 severe obesity due to excess calories with serious comorbidity and body mass index (BMI) of 40.0 to 44.9 in adult    - diethylpropion 75 mg TbSR; Take 75 mg by mouth once daily.  Dispense: 30 tablet; Refill: 2  Patient warned of common side effects of diethylpropion including anxiety, insomnia, palpitations and increased blood pressure. It was also explained that it is for short-term usage along with diet and exercise, and that stopping the medication without making lifestyle changes will result in regain of weight. Patient states understanding.    Weight loss medications are controlled substances.  They require routine follow up. Prescription or pills that are lost or destroyed will not be replaced.       Take medication at the start of your day. It is fine to skip if changing shifts, etc.     Exercise 30 min 3 days a week. Gradually increase.     Patient counseled in strategies for long term weight loss and maintenance: Keeping a food diary, exercise for 1 hour a day and eating breakfast everyday.      No soda, sweet tea, juices or lemonade.  All drinks should be 5 calories or less.         3 meals a day made up of the following:  Unlimited green vegetables, tomatoes, mushrooms, spaghetti squash, cauliflower, meat, poultry, seafood, eggs and hard cheeses.   Milk and plain yogurt  Dressings, seasonings, condiments, etc should have less than 2 g sugars.   Beans (1-1.5 cups) or nuts (1/4 cup) can have 1 x a day.   1-2 servings of citrus fruits, berries, pineapple or melon a day (1/2 cup)  Avoid fried foods    No grains, rice, pasta, potatoes, bread, corn, peas, oatmeal, grits, tortillas, crackers, chips    No soda, sweet tea, juices or lemonade    [email or web address] for recipes. Moderate carb intake    2. Essential hypertension  The current medical regimen is effective;  continue present  plan and medications. Expect improvement with weight loss.     3. Type 2 diabetes mellitus without complication, without long-term current use of insulin  Expect improvement with weight loss. Recheck after 10% TBW lost.      4. VOGEL (nonalcoholic steatohepatitis)  Discussed with patient that the only treatment for fatty liver is to lose weight.  Without doing so, it may progress to worsening of cirrhosis, permanent liver damage and possibly liver failure. Expect improvement with weight loss.      5. Liver fibrosis  See #4    6. Snoring  High ESS could also be related to shift work. Consisder sleep med Eval. Will see how snoring and sleep are doing with weight loss.

## 2018-07-26 NOTE — PATIENT INSTRUCTIONS
Patient warned of common side effects of diethylpropion including anxiety, insomnia, palpitations and increased blood pressure. It was also explained that it is for short-term usage along with diet and exercise, and that stopping the medication without making lifestyle changes will result in regain of weight. Patient states understanding.    Weight loss medications are controlled substances.  They require routine follow up. Prescription or pills that are lost or destroyed will not be replaced.       Take medication at the start of your day. It is fine to skip if changing shifts, etc.     Exercise 30 min 3 days a week. Gradually increase.     Patient counseled in strategies for long term weight loss and maintenance: Keeping a food diary, exercise for 1 hour a day and eating breakfast everyday.      No soda, sweet tea, juices or lemonade.  All drinks should be 5 calories or less.         3 meals a day made up of the following:  Unlimited green vegetables, tomatoes, mushrooms, spaghetti squash, cauliflower, meat, poultry, seafood, eggs and hard cheeses.   Milk and plain yogurt  Dressings, seasonings, condiments, etc should have less than 2 g sugars.   Beans (1-1.5 cups) or nuts (1/4 cup) can have 1 x a day.   1-2 servings of citrus fruits, berries, pineapple or melon a day (1/2 cup)  Avoid fried foods    No grains, rice, pasta, potatoes, bread, corn, peas, oatmeal, grits, tortillas, crackers, chips    No soda, sweet tea, juices or lemonade    [email or web address] for recipes. Moderate carb intake      *You can substitute regular dairy and/or dressings, and whole eggs for egg whites in the ideas below.     Meal Ideas for Regular Bariatric Diet  *Recipes and products available at [email or web address].com      Breakfast: (15-20g protein)    - Egg white omelet: 2 egg whites or ½ cup Egg Beaters. (Optional proteins: cheese, shrimp, black beans, chicken, sliced turkey) (Optional veggies: tomatoes, salsa, spinach, mushrooms,  onions, green peppers, or small slice avocado)     - Egg and sausage: 1 egg or ¼ cup Egg Beaters (any variety), with 1 maria eugenia or 2 links of Turkey sausage or Veggie breakfast sausage ([employer] or [employer])    - Crust-less breakfast quiche: To make a glass pie dish, mix 4oz part skim Ricotta, 1 cup skim milk, and 2 eggs as your base. Add protein: shredded cheese, sliced lean ham or turkey, turkey valadez/sausage. Add veggies: tomato, onion, green onion, mushroom, green pepper, spinach, etc.    - Yogurt parfait: Mix 1 - 6oz container Dannon Light N Fit vanilla yogurt, with ¼ cup Kashi Go Lean cereal    - Cottage cheese and fruit: ½ cup part-skim cottage cheese or ricotta cheese topped with fresh fruit or sugar free preserves     - Kristina Lin's Vanilla Egg custard* (add 2 Tbsp instant coffee granules to make Cappuccino Custard*)    - Hi-Protein café latte (skim milk, decaf coffee, 1 scoop protein powder). Optional to add Sugar free syrup or extract flavoring.    Lunch: (20-30g protein)    - ½ cup Black bean soup (Homemade or Progresso), with ¼ cup shredded low-fat cheese. Top with chopped tomato or fresh salsa.     - Lean deli turkey breast and low-fat sliced cheese, mustard or light martin to moisten, rolled up together, or wrapped in a Oscar lettuce leaf    - Chicken salad made from dinner leftovers, moisten with low-fat salad dressing or light martin. Also try leftover salmon, shrimp, tuna or boiled eggs. Serve ½ cup over dark green salad    - Fat-free canned refried beans, topped with ¼ cup shredded low-fat cheese. Top with chopped tomato or fresh salsa.     - Greek salad: Top mixed greens with 1-2oz grilled chicken, tomatoes, red onions, 2-3 kalamata olives, and sprinkle lightly with feta cheese. Spritz with Balsamic vinegar to taste.     - Crust-less lunch quiche: To make a glass pie dish, mix 4oz part skim Ricotta, 1 cup skim milk, and 2 eggs as your base. Add protein: shredded cheese, sliced lean ham or  turkey, shrimp, chicken. Add veggies: tomato, onion, green onion, mushroom, green pepper, spinach, artichoke, broccoli, etc.    - Pizza bake: tomato sauce, low-fat shredded mozzarella and turkey pepperoni or [place] valadez. Add any veggies.    - Cucumber crab bites: Spread ¼ cup crab dip (lump crabmeat + light cream cheese and green onions) over sliced cucumber.     - Chicken with light spinach and artichoke dip*: Puree in : 6oz cooked and drained spinach, 2 cloves garlic, 1 can cannelloni beans, ½ cup chopped green onions, 1 can drained artichoke hearts (not marinated in oil), lemon juice and basil. Mix in 2oz chopped up chicken.    Supper: (20-30g protein)    - Serve grilled fish over dark green salad tossed with low-fat dressing, served with grilled asparagus bolden     - Rotisserie chicken salad: served with sliced strawberries, walnuts, fat-free feta cheese crumbles and 1 tbsp Bells Own Light Raspberry Jackson Vinaigrette    - Shrimp cocktail: Dip cold boiled shrimp in homemade low-sugar cocktail sauce (1/2 cup Kimberly One Carb ketchup, 2 tbsp horseradish, 1/4 tsp hot sauce, 1 tsp Worcestershire sauce, 1 tbsp freshly-squeezed lemon juice). Serve with dark green salad, walnuts, and crumbled blue cheese drizzled with olive oil and Balsamic vinegar    - Tuna Melt: Spread tuna salad onto 2 thick slices of tomato. Top with low-fat cheese and broil until cheese is melted. May also be made with chicken salad of shrimp salad. [place] with different types of cheeses.    - Homemade low-fat Chili using extra lean ground beef or ground turkey. Top with shredded cheese and salsa as desired. May add dollop fat-free sour cream if desired    - Dinner Omelet with shrimp or chicken and onion, green peppers and chives.    - No noodle lasagna: Use sliced zucchini or eggplant in place of noodles.  Layer with part skim ricotta cheese and low sugar meat sauce (use very lean ground beef or ground turkey).    -  Mexican chicken bake: Bake chunks of chicken breast or thigh with taco seasoning, Pace brand enchilada sauce, green onions and low-fat cheese. Serve with ¼ cup black beans or fat free refried beans topped with chopped tomatoes or salsa.    - Jag frozen meatballs, simmered in Classico Marinara sauce. Different flavors of salsa or spaghetti sauce create different dishes! Sprinkle with parmesan cheese. Serve with grilled or steamed veggies, or a dark green salad.    - Simmer boneless skinless chicken thigh chunks in Classico Marinara sauce or roasted salsa until tender with chopped onion, bell pepper, garlic, mushrooms, spinach, etc.     - Hamburger, without the bun, dressed the way you like. Served with grilled or steamed veggies.    - Eggplant parmesan: Bake slices of eggplant at 350 degrees for 15 minutes. Layer tomato sauce, sliced eggplant and low-fat mozzarella cheese in a baking dish and cover with foil. Bake 30-40 more minutes or until bubbly. Uncover and bake at 400 degrees for about 15 more minutes, or until top is slightly crisp.    - Fish tacos: grilled/baked white fish, wrapped in Oscar lettuce leaf, topped with salsa, shredded low-fat cheese, and light coleslaw.    Snacks: (100-200 calories; >5g protein)    - 1 low-fat cheese stick with 8 cherry tomatoes or 1 serving fresh fruit  - 4 thin slices fat-free turkey breast and 1 slice low-fat cheese  - 4 thin slices fat-free honey ham with wedge of melon  - 1/4 cup unsalted nuts with ½ cup fruit  - 6-oz container Dannon Light n Fit vanilla yogurt, topped with 1oz unsalted nuts         - apple, celery or baby carrots spread with 2 Tbsp natural peanut butter or almond butter   - apple slices with 1 oz slice low-fat cheese  - celery, cucumber, bell pepper or baby carrots dipped in ¼ cup hummus bean spread or light spinach and artichoke dip (*recipe in lunch section)  - 100 calorie bag microwave light popcorn with 3 tbsp grated parmesan cheese  - Delano  Links Beef Steak - 14g protein! (similar to beef jerky)  - 2 wedges [place] - Light Herb & Garlic Cheese with sliced cucumber or green bell pepper  - 1/2 cup low-fat cottage cheese with ¼ cup fruit or ¼ cup salsa  - RTD Protein drinks: Atkins, Low Carb Slim Fast, EAS light, Muscle Milk Light, etc.  - Homemade Protein drinks: [facility] Soy95, Isopure, Nectar, UNJURY, Whey Gourmet, etc. Mix 1 scoop powder with 8oz skim/1% milk or light soymilk.  - Protein bars: Atkins, EAS, Pure Protein, Think Thin, Detour, etc. Must have 0-4 grams sugar - Read the label.    Takeout Options: No more than twice/week  Deli - Salads (no pasta or rice), meats, cheeses. Roasted chicken. Lox (salmon)    Mexican - Platters which don't include tortillas, chips, or rice. Go easy on the beans. Example: Fajitas without the tortillas. Ask the  not to bring chips to the table if they are too tempting.    Greek - Meat or fish and vegetable, but no bread or rice. Including hummus, baba ganoush, etc, is OK. Most sit-down Greek restaurants can provide you with cucumber slices for dipping instead of saman bread.    Fast Food (Avoid as much as possible) - Salads (no croutons and limit salad dressing to 2 tbsp), grilled chicken sandwich without the bun and ask for no martin. Irmas low fat chili or Taco Bell pintos and cheese.    BBQ - The meats are fine if you ask for sauces on the side, but most of the traditional side dishes are loaded with carbs. Demond slaw, baked beans and BBQ sauce are typically made with sugar.    Chinese - Nothing deep-fried, no rice or noodles. Many Chinese sauces have starch and sugar in them, so you'll have to use your judgement. If you find that these sauces trigger cravings, or cause Dumping, you can ask for the sauce to be made without sugar or just use soy sauce.        Eating well to be healthy and lose weight does not have to be hard. It also does not have to be time consuming or expensive. There a lots of ways you  can work in healthy choices into your day. Many of these are easy, quick and even family friendly!    Homemade hazelnut becki lait  Brew your favorite brand of hazelnut flavored coffee (Community makes a good one). Microwave 1/2 cup of milk that fits your eating plan (whole, skim or sugar-free almond milk can all work). Add half to 1 oz sugar free hazelnut syrup.     Quick and easy breakfast  1-2 boiled eggs or mini-frittatas with a tangerine. The boiled eggs and mini-frittatas can both be made ahead and last for up to 4 days in the refrigerator. Bonus if you portion them out in ready to go containers or zipper bags.     Breakfast Egg Muffins with Valadez and Spinach  Makes 12 muffins  Ingredients    6 eggs  ¼ cup milk  ¼ teaspoon salt  2 cups grated cheddar cheese  3/4 cup spinach, cooked and drained (about 8 oz fresh spinach)  6 valadez slices, cooked, drained of fat, and chopped  1/2 cup grated Parmesan cheese (optional)    Instructions      Preheat oven to 350 degrees. Use a regular 12-cup muffin pan. Spray the muffin pan with non-stick cooking spray.  In a large bowl, beat eggs until smooth. Add milk, salt, Cheddar cheese and mix. Stir spinach, cooked valadez into the egg mixture. Ladle the egg mixture into greased muffin cups ¾ full.  Top each muffin cup with grated Parmesan cheese.  Bake for 25 minutes. Remove from the oven, let the muffins cool for 30 minutes before removing them from the pan.      Be a brown bagger! When you make dinner, plan for an extra helping. When you serve your plate for dinner, serve an additional helping into a container that you can take with you the next day. If you don't have a refrigerator available during the day, an insulated lunch bag and ice packs will help you safely store you lunch.     Cold Brewed Iced tea. Fill a pitcher with 64 oz filtered water. Add either 4 regular tea bags of your choice or a large iced tea bag. Refrigerate over night then remove the tea bags. The tea will  not be bitter and is super flavorful. Get creative! Try combinations like green tea and hibiscus tea or black tea with lemon zinger. Add orange or lemon slices for even more flavor.     Snack wisely. Protein filled snacks will fill you up, allowing you to get by with fewer calories. String cheese, pork skins (chicharrones), turkey pepperoni, or celery with cream cheese will all fit the bill.       Ditch the take out. Turkey tacos (with or without a low carb tortilla), burgers (without the bun), or fun stir fries are all quick and easy. The whole family will be happy, and you can save calories and money.      Orange Chicken Stir martinez with asparagus   Makes 6 servings  Ingredients:    1.5 lbs boneless skinless chicken breast/tenders, diced into 1-inch pieces  1 Tbsp extra virgin olive or avocado oil, divided  2 lb asparagus, end portions trimmed and remainder diced into 1 1/2-inch pieces  1 small yellow onion, sliced into thin strips  8 oz button mushrooms, sliced  1 Tbsp peeled and finely grated fresh jess  4 cloves garlic, minced  1/2 cup low-sodium chicken broth  Juice of 2 fresh oranges  2 Tbsp low sodium soy sauce  2 Tbsp cornstarch  Sea salt and freshly ground black pepper    Directions:    In a 12-inch non-stick wok, heat 1/2 oil over moderately high heat. Once oil is hot, add diced chicken and season lightly with salt and pepper. Sauté until cooked through, tossing occasionally, about 5-6 minutes.  Place chicken on a large plate and set aside. Return wok, reduce to medium-high heat, add remaining oil.  Once oil is hot, add asparagus, yellow onion and mushrooms, and sauté until tender-crisp, about 4 - 5 minutes, adding in garlic and jess during the last 1 minute of sautéing.  Meanwhile, in a mixing bowl whisk together chicken broth, orange juice, soy sauce and cornstarch until well blended.  Pour chicken broth mixture into skillet with veggies, season with salt and pepper to taste, and bring mixture to a  light boil, stirring constantly. Allow mixture to gently boil, stirring constantly, until thickened, about 1 minute.  Toss chicken into mixture and serve immediately over cauliflower rice or Shirataki noodles.      Skinny Chicken Tortilla Soup  Makes 7 servings    2 teaspoons olive oil  1 cup onion, chopped (about 1 small)  2 cups celery, sliced (about 4 medium stalks)  4 garlic cloves, minced  4 medium tomatoes, chopped  2 cups water  4 cups low-sodium organic chicken broth  3 cups chopped and/or shredded rotisserie chicken, skinless  2 cups sliced carrots (about 3 medium)  1 teaspoon dried oregano leaves  2 teaspoons chili powder  1 teaspoon garlic powder  2 teaspoons cumin  ½ teaspoon cayenne pepper (add less or omit, if you don't want a spicy soup)  ½ teaspoon sea salt + more to taste  ½ teaspoon pepper + more to taste    Directions:   Put all ingredients into a large crock pot. Cook on low for 5-6 hours.     Optional garnish with chopped avocado, chopped fresh cilantro, crumbled Cotija cheese, sour cream, Greek yogurt, your favorite hot sauce.           Vegan Avocado Banana Chocolate Pudding  Makes 4 servings  Ingredients    1 1/2 ripe avocados  2 ripe bananas  6 tbsp raw cacao powder or unsweetened cocoa powder  2-3 tbsp maple syrup (or calorie free sweetener)  1/4 cup almond milk  Instructions    Blend everything together in a  until the consistency is smooth and velvety. Taste and see if more sweetener is needed and stir to make sure everything is evenly mixed. Blend a second time if needed.  Top with banana slices, raw cacao nibs, almond butter, or any other toppings and enjoy!

## 2018-10-11 ENCOUNTER — PATIENT OUTREACH (OUTPATIENT)
Dept: ADMINISTRATIVE | Facility: HOSPITAL | Age: 53
End: 2018-10-11

## 2018-10-11 NOTE — PROGRESS NOTES
10-11-18 VM reminder left of upcoming PCP appt and need to call our office to schedule his Diabetic Eye Photo Exam.

## 2018-10-25 ENCOUNTER — OFFICE VISIT (OUTPATIENT)
Dept: BARIATRICS | Facility: CLINIC | Age: 53
End: 2018-10-25
Payer: COMMERCIAL

## 2018-10-25 VITALS
WEIGHT: 246.69 LBS | SYSTOLIC BLOOD PRESSURE: 130 MMHG | HEIGHT: 66 IN | DIASTOLIC BLOOD PRESSURE: 82 MMHG | BODY MASS INDEX: 39.65 KG/M2 | HEART RATE: 76 BPM

## 2018-10-25 DIAGNOSIS — E66.01 CLASS 2 SEVERE OBESITY WITH BODY MASS INDEX (BMI) OF 35 TO 39.9 WITH SERIOUS COMORBIDITY: Primary | ICD-10-CM

## 2018-10-25 DIAGNOSIS — E11.9 TYPE 2 DIABETES MELLITUS WITHOUT COMPLICATION, WITHOUT LONG-TERM CURRENT USE OF INSULIN: ICD-10-CM

## 2018-10-25 PROCEDURE — 99999 PR PBB SHADOW E&M-EST. PATIENT-LVL III: CPT | Mod: PBBFAC,,, | Performed by: INTERNAL MEDICINE

## 2018-10-25 PROCEDURE — 99213 OFFICE O/P EST LOW 20 MIN: CPT | Mod: S$GLB,,, | Performed by: INTERNAL MEDICINE

## 2018-10-25 RX ORDER — PHENTERMINE HYDROCHLORIDE 37.5 MG/1
37.5 TABLET ORAL
Qty: 30 TABLET | Refills: 2 | Status: SHIPPED | OUTPATIENT
Start: 2018-10-25 | End: 2018-11-24

## 2018-10-25 NOTE — PROGRESS NOTES
"Subjective:       Patient ID: Андрей Wooten is a 53 y.o. male.    Chief Complaint: Follow-up    Pt here today for follow-up. Has lost 7 lbs. Started LCHF diet and diethylpropion. Last filled 9/28/18. He does feel he has a lot more energy. He does say he added more fruit and vegetables. Also feels his portions are down and he is eating smaller portions. Stopped juices. Denies SE.    Lab Results       Component                Value               Date                       HGBA1C                   6.5 (H)             05/14/2018            Lab Results       Component                Value               Date                       LDLCALC                  95.4                08/08/2017                 CREATININE               1.1                 06/20/2018                  Review of Systems   Constitutional: Negative for chills and fever.   Respiratory: Negative for shortness of breath.         Bad snoring. PSG 10 years was neg. Did weigh less. ESS 17    Cardiovascular: Negative for chest pain and leg swelling.   Gastrointestinal: Negative for constipation and diarrhea.        Denies GERD   Genitourinary: Negative for difficulty urinating and dysuria.   Musculoskeletal: Positive for back pain. Negative for arthralgias.   Neurological: Negative for dizziness and light-headedness.   Psychiatric/Behavioral: Negative for dysphoric mood. The patient is not nervous/anxious.        Objective:     /82   Pulse 76   Ht 5' 6" (1.676 m)   Wt 111.9 kg (246 lb 11.1 oz)   BMI 39.82 kg/m²     Physical Exam   Constitutional: He is oriented to person, place, and time. He appears well-developed. No distress.   Morbidly obese     HENT:   Head: Normocephalic and atraumatic.   Eyes: Pupils are equal, round, and reactive to light. No scleral icterus.   Neck: Normal range of motion. Neck supple.   Cardiovascular: Normal rate.   Pulmonary/Chest: Effort normal.   Musculoskeletal: Normal range of motion. He exhibits no edema. "   Neurological: He is alert and oriented to person, place, and time.   Skin: Skin is warm and dry.   Psychiatric: He has a normal mood and affect. His behavior is normal. Judgment normal.   Vitals reviewed.      Assessment:       1. Class 2 severe obesity with body mass index (BMI) of 35 to 39.9 with serious comorbidity    2. Type 2 diabetes mellitus without complication, without long-term current use of insulin        Plan:         Андрей was seen today for follow-up.    Diagnoses and all orders for this visit:    Class 2 severe obesity with body mass index (BMI) of 35 to 39.9 with serious comorbidity  -     phentermine (ADIPEX-P) 37.5 mg tablet; Take 1 tablet (37.5 mg total) by mouth before breakfast.    Type 2 diabetes mellitus without complication, without long-term current use of insulin    Did discuss possible addition of metformin to address insulin resistance that may slow down weight loss. Pt will try adding in more exercise first.     Patient warned of common side effects of phentermine including anxiety, insomnia, palpitations and increased blood pressure. It was also explained that it is for short-term usage along with diet and exercise, and that stopping the medication without making lifestyle changes will result in regain of weight. Patient states understanding.     Weight loss medications are controlled substances.  They require routine follow up. Prescription or pills that are lost or destroyed will not be replaced.       Start phentermine with 1/2 pill a day for at least 1 week to see if that will control your appetite.  Go up to a full pill when needed.       Take medication at the start of your day. It is fine to skip if changing shifts, etc.     Exercise 30 min 3 days a week. Gradually increase. Holiday tips given.     Patient counseled in strategies for long term weight loss and maintenance: Keeping a food diary, exercise for 1 hour a day and eating breakfast everyday.      No soda, sweet tea,  juices or lemonade.  All drinks should be 5 calories or less.         3 meals a day made up of the following:  Unlimited green vegetables, tomatoes, mushrooms, spaghetti squash, cauliflower, meat, poultry, seafood, eggs and hard cheeses.   Milk and plain yogurt  Dressings, seasonings, condiments, etc should have less than 2 g sugars.   Beans (1-1.5 cups) or nuts (1/4 cup) can have 1 x a day.   1-2 servings of citrus fruits, berries, pineapple or melon a day (1/2 cup)  Avoid fried foods    No grains, rice, pasta, potatoes, bread, corn, peas, oatmeal, grits, tortillas, crackers, chips      [email or web address] for recipes. Moderate carb intake

## 2018-10-25 NOTE — PATIENT INSTRUCTIONS
Take medication at the start of your day. It is fine to skip if changing shifts, etc.       Patient warned of common side effects of phentermine including anxiety, insomnia, palpitations and increased blood pressure. It was also explained that it is for short-term usage along with diet and exercise, and that stopping the medication without making lifestyle changes will result in regain of weight. Patient states understanding.     Weight loss medications are controlled substances.  They require routine follow up. Prescription or pills that are lost or destroyed will not be replaced.       Start phentermine with 1/2 pill a day for at least 1 week to see if that will control your appetite.  Go up to a full pill when needed.     Exercise 30 min 3 days a week. Gradually increase.     Patient counseled in strategies for long term weight loss and maintenance: Keeping a food diary, exercise for 1 hour a day and eating breakfast everyday.      No soda, sweet tea, juices or lemonade.  All drinks should be 5 calories or less.         3 meals a day made up of the following:  Unlimited green vegetables, tomatoes, mushrooms, spaghetti squash, cauliflower, meat, poultry, seafood, eggs and hard cheeses.   Milk and plain yogurt  Dressings, seasonings, condiments, etc should have less than 2 g sugars.   Beans (1-1.5 cups) or nuts (1/4 cup) can have 1 x a day.   1-2 servings of citrus fruits, berries, pineapple or melon a day (1/2 cup)  Avoid fried foods    No grains, rice, pasta, potatoes, bread, corn, peas, oatmeal, grits, tortillas, crackers, chips         Www.dietdoctor.com for recipes. Moderate carb intake      Helpful tips to survive the holidays:  - Dont save yourself for the meal: Make sure you continue to eat high protein small meals every 3-4 hours to ensure to do not become over-hungry. Avoid temptation by not showing up to a holiday party or gathering hungry.   - Plan ahead. Bring a dish to a party if you know there may  not be an appropriate option.   - Choose sugar-free drinks: Stick to water or other sugar-free beverages and make sure you are getting 6-8 cups of fluid each day (but not with meals!). Avoid alcohol, carbonated beverages, and high-fat/high-sugar beverages like hot chocolate and eggnog. Try sugar-free hot cocoa made with skim milk or water, or sugar-free spiced tea to add some holiday flair to your beverage (see sugar-free mulled cider recipe below)  - Take your time: Eat mindfully. Dont graze on food throughout the day. Sit down to enjoy your small meals. Chew slowly and thoroughly. Cut your food into small pieces before eating.  - Listen to your body: Stop eating as soon as you feel full. Do not feel pressured to try certain (or all) foods or to eat all of the food on your plate. Listen to your hunger cues.   - REMEMBER: Make your holidays about spending time with family and friends instead of focusing gatherings around food.  - Keep up your exercise routine: Make sure you continue to get regular exercise throughout the holiday season. Encourage friends and family to be active by taking a walk together after a meal, to look at holiday lights, or to window-shop.    Good Holiday Meal Options:  - Roasted Turkey, NO skin. Use low sodium broth instead of gravy.   - Stuffed Bell Peppers made WITHOUT bread crumbs or Rice. Try using parmesan cheese instead  - Gumbo, NO rice. Try picking out mostly the meat/seafood and vegetables with little broth.   - Green Bean Casserole made with 98% fat free cream of mushroom soup and crushed almonds/pecans instead of fried onions  - Side salad w/ low fat dressing. Try a different kind of salad maybe use Kale or spinach.   - Roasted non-starchy vegetables like brussel sprouts, broccoli, green beans, zucchini, butternut squash, cauliflower  - Cauliflower Mash (steam or roast cauliflower, puree w/ low fat cheese, dash of fat free milk and 2-3 sprays of I cant believe its not butter  spray. Add garlic powder and black pepper to season). Use Low sodium broth instead of gravy.   - Try Loaded Cauliflower Mash (Make cauliflower like above cauliflower mash. Top with diced turkey valadez, ¼ cup low fat cheddar cheese and bake @ 350* F for 5-10 minutes, until cheese is melted. Top with minced chives, black pepper and garlic to taste).   - Homemade cranberry sauce using Splenda or another alternative sweetener. Boil fresh cranberries and add splenda to taste. Boil until cranberries break open and then simmer until it reaches the consistency you want (less time for more watery sauce and simmer for longer to create a thicker sauce).   - Deviled eggs: make using low fat martin, mustard, DILL relish (not sweet relish).   - Vegetable tray w/ Greek yogurt Ranch Dip. Mix 1 packet of hidden valley ranch dip mix w/ 16 oz low fat plain greek yogurt.     Good Holiday Dessert Options:  - High protein Pumpkin Cheesecake (see recipe below)  - Pumpkin Whip (see recipe below)  - Quest Apple Pie or Cinnamon Roll flavored protein bar (warm in microwave for 10-15 seconds)  - Eggnog Protein shake (see recipe below)  - Fresh fruit w/ low fat cheese  - Sugar-free Jello Parfaits. Layer Red and Green sugar-free jello in cups and top w/ 2 tbsp Sugar-free cool-whip    Pumpkin Cheesecake    8 ounces fat free cream cheese, softened   2 scoops of vanilla protein powder (<4 g sugar per serving)   ¼ tsp Fine salt   2 eggs, at room temperature   1/3 cup fat free sour cream  1/3 cup fat free half and half  1 15 -ounce can pure pumpkin puree   1 tablespoon pumpkin pie spice, plus more for dusting   Unsalted nuts, crushed  *Add splenda to taste    Directions     1. Preheat the oven to 300 degrees F. Line 18 muffin cups with paper liners. Sprinkle 1 tsp crushed unsalted nuts at the bottom of each of muffin cup liner.     2. In a large bowl, beat the cream cheese, vanilla protein powder and 1/4 teaspoon fine salt on medium-high speed until  smooth and creamy, 2 to 3 minutes. Scrape down the sides, reduce speed to low and beat in the eggs, 1 at a time, until combined. Beat in 1/3 cup fat free sour cream and fat free half and half. Stir in the pumpkin puree and pumpkin pie spice until smooth. Divide evenly among cookie-lined paper cups, filling almost all the way to the top.     3. Bake until the filling is just set, 40 to 45 minutes. A sharp knife inserted into the center will come out moist, but clean. Cool completely in tins on a wire rack. Refrigerate until cold, 4 hours, or overnight. Top with a dusting of pumpkin pie spice.    Recipe altered from the following recipe: http://www.[employer]/recipes/food-network-forrest/mini-pumpkin-cheesecakes-recipe.print.html?oc=linkback    Pumpkin Whip    Box of sugar-free vanilla pudding  Can of pumpkin puree  Pumpkin Pie spice (sprinkle to taste)  ½ cup of sugar-free Cool Whip    Directions:  Make sugar-free pudding according to package directions using fat free or 1% milk. Stir in pumpkin and cool whip. Add pumpkin pie spice to taste.     Egg Nog Protein shake    8 oz skim or 1% milk  1 scoop vanilla protein powder  1 tbsp sugar-free vanilla pudding mix  ½ tsp butter flavor extract  ½ tsp rum extract  ½ tsp cinnamon     Shake together or blend with ice and serve.     Sugar-Free Mulled Cider    3 oz diet cran-apple juice  6 oz water  1 packet sugar-free apple cider mix  ½ tsp apple pie spice  ½ tsp butter flavor extract  1 tbsp Sugar-free Syrup    Mix together. Warm if needed and serve w/ orange wedge and cinnamon stick.

## 2018-10-29 ENCOUNTER — OFFICE VISIT (OUTPATIENT)
Dept: INTERNAL MEDICINE | Facility: CLINIC | Age: 53
End: 2018-10-29
Payer: COMMERCIAL

## 2018-10-29 ENCOUNTER — IMMUNIZATION (OUTPATIENT)
Dept: INTERNAL MEDICINE | Facility: CLINIC | Age: 53
End: 2018-10-29
Payer: COMMERCIAL

## 2018-10-29 DIAGNOSIS — I10 ESSENTIAL HYPERTENSION: ICD-10-CM

## 2018-10-29 DIAGNOSIS — M79.673 PAIN OF FOOT, UNSPECIFIED LATERALITY: ICD-10-CM

## 2018-10-29 DIAGNOSIS — E11.9 DIABETES MELLITUS WITHOUT COMPLICATION: Primary | ICD-10-CM

## 2018-10-29 PROCEDURE — 99214 OFFICE O/P EST MOD 30 MIN: CPT | Mod: S$GLB,,, | Performed by: INTERNAL MEDICINE

## 2018-10-29 PROCEDURE — 90686 IIV4 VACC NO PRSV 0.5 ML IM: CPT | Mod: S$GLB,,, | Performed by: INTERNAL MEDICINE

## 2018-10-29 PROCEDURE — 99999 PR PBB SHADOW E&M-EST. PATIENT-LVL IV: CPT | Mod: PBBFAC,,, | Performed by: INTERNAL MEDICINE

## 2018-10-29 PROCEDURE — 90471 IMMUNIZATION ADMIN: CPT | Mod: S$GLB,,, | Performed by: INTERNAL MEDICINE

## 2018-10-29 RX ORDER — TRIAMTERENE AND HYDROCHLOROTHIAZIDE 37.5; 25 MG/1; MG/1
1 CAPSULE ORAL EVERY MORNING
Qty: 90 CAPSULE | Refills: 1 | Status: SHIPPED | OUTPATIENT
Start: 2018-10-29 | End: 2019-02-28 | Stop reason: SDUPTHER

## 2018-10-29 RX ORDER — PRAVASTATIN SODIUM 40 MG/1
TABLET ORAL
Qty: 90 TABLET | Refills: 1 | Status: SHIPPED | OUTPATIENT
Start: 2018-10-29 | End: 2019-02-28 | Stop reason: SDUPTHER

## 2018-10-29 RX ORDER — AMLODIPINE BESYLATE 10 MG/1
10 TABLET ORAL DAILY
Qty: 90 TABLET | Refills: 1 | Status: SHIPPED | OUTPATIENT
Start: 2018-10-29 | End: 2019-02-28 | Stop reason: SDUPTHER

## 2018-10-30 ENCOUNTER — OFFICE VISIT (OUTPATIENT)
Dept: PODIATRY | Facility: CLINIC | Age: 53
End: 2018-10-30
Payer: COMMERCIAL

## 2018-10-30 ENCOUNTER — HOSPITAL ENCOUNTER (OUTPATIENT)
Dept: RADIOLOGY | Facility: HOSPITAL | Age: 53
Discharge: HOME | End: 2018-10-30
Attending: PODIATRIST
Payer: COMMERCIAL

## 2018-10-30 VITALS
HEART RATE: 81 BPM | HEIGHT: 66 IN | WEIGHT: 242 LBS | BODY MASS INDEX: 38.89 KG/M2 | DIASTOLIC BLOOD PRESSURE: 92 MMHG | SYSTOLIC BLOOD PRESSURE: 142 MMHG

## 2018-10-30 DIAGNOSIS — M21.6X1 PRONATION OF BOTH FEET: ICD-10-CM

## 2018-10-30 DIAGNOSIS — M89.8X7 EXOSTOSIS OF LEFT FOOT: ICD-10-CM

## 2018-10-30 DIAGNOSIS — M21.6X2 PRONATION OF BOTH FEET: ICD-10-CM

## 2018-10-30 DIAGNOSIS — M79.672 LEFT FOOT PAIN: Primary | ICD-10-CM

## 2018-10-30 DIAGNOSIS — M79.672 LEFT FOOT PAIN: ICD-10-CM

## 2018-10-30 DIAGNOSIS — M62.469 GASTROCNEMIUS EQUINUS, UNSPECIFIED LATERALITY: ICD-10-CM

## 2018-10-30 PROCEDURE — 99203 OFFICE O/P NEW LOW 30 MIN: CPT | Mod: S$GLB,,, | Performed by: PODIATRIST

## 2018-10-30 PROCEDURE — 99999 PR PBB SHADOW E&M-EST. PATIENT-LVL III: CPT | Mod: PBBFAC,,, | Performed by: PODIATRIST

## 2018-10-30 PROCEDURE — 73630 X-RAY EXAM OF FOOT: CPT | Mod: TC,PN,LT

## 2018-10-30 PROCEDURE — 73630 X-RAY EXAM OF FOOT: CPT | Mod: 26,LT,, | Performed by: RADIOLOGY

## 2018-10-30 NOTE — PATIENT INSTRUCTIONS
"Avoid barefoot walking. Recommend 1/2-1" heel height to offset tight calf muscle that prevent the achilles tendon from gliding which restricts your ankle range of motion      Lower Body Exercises: Calf Stretch    This exercise both stretches and strengthens your lower body to help your back. Do the exercise as often as suggested by your healthcare provider. As you work out, dont rush or strain. Use an exercise mat, pillow, or folded towel to protect your knees and other sensitive areas.  · Face a wall 2 feet away. Step toward the wall with one foot.  · Place both palms on the wall and bend your front knee.  · Lean forward, keeping the back leg straight and the heel on the floor.  · Hold for 10 to 30 seconds. Switch legs.  Date Last Reviewed: 11/14/2015  © 4991-6033 [employer]. [address] [place] [address], [place] [address]. All rights reserved. This information is not intended as a substitute for professional medical care. Always follow your healthcare professional's instructions.        "

## 2018-10-30 NOTE — LETTER
October 30, 2018      Khushboo Pugh MD  [address]  [facility] [address]           [facility]  [address]  [facility] [address]  Phone: [phone]  Fax: [phone]          Patient: Андрей Wooten   MR Number: [id number]   YOB: 1965   Date of Visit: 10/30/2018       Dear Dr. Khushboo Pugh:    Thank you for referring Андрей Wooten to me for evaluation. Attached you will find relevant portions of my assessment and plan of care.    If you have questions, please do not hesitate to call me. I look forward to following Андрей Wooten along with you.    Sincerely,    Jaiden Butt, DAWSON    Enclosure  CC:  No Recipients    If you would like to receive this communication electronically, please contact [email or web address] or [phone] to request more information on [employer] access.    For providers and/or their staff who would like to refer a patient to [facility], please contact us through our one-stop-shop provider referral line, [facility], at [phone].    If you feel you have received this communication in error or would no longer like to receive these types of communications, please e-mail [email or web address]

## 2018-10-31 NOTE — PROGRESS NOTES
"Subjective:      Patient ID: Андрей Wooten is a 53 y.o. male.    Chief Complaint: Foot Pain (side of left foot)    Андрей is a 53 y.o. male who presents to the podiatry clinic  with complaint of  left foot pain. Onset of the symptoms was several weeks ago. Precipitating event: none known. Current symptoms include: ability to bear weight, but with some pain and worsening symptoms after a period of activity. Aggravating factors: any weight bearing. Symptoms have gradually improved. Patient has had prior foot problems. Evaluation to date: none. Treatment to date: changed his shoes and pain improved. Pain mainly if he walks barefoot without shoes.. Patients rates pain 6/10 on pain scale at it's worse. Today no significant pain.    Vitals:    10/30/18 1009   BP: (!) 142/92   Pulse: 81   Weight: 109.8 kg (242 lb)   Height: 5' 6" (1.676 m)   PainSc:   6        Vitals:    10/30/18 1009   BP: (!) 142/92   Pulse: 81   Weight: 109.8 kg (242 lb)   Height: 5' 6" (1.676 m)   PainSc:   6      Past Medical History:   Diagnosis Date    HLD (hyperlipidemia)     Hypertension     Liver fibrosis 6/7/2018    -- liver biopsy 5/28/18 - VOGEL, macrosteatosis 80%, microsteatosis 10%, stage 1 out of 4 fibrosis -- MRI elastography 5/29/17 - F1-F2    VOGEL (nonalcoholic steatohepatitis)     -- liver biopsy 5/28/18 - VOGEL, macrosteatosis 80%, microsteatosis 10%, stage 1 out of 4 fibrosis    Type 2 diabetes mellitus without complication, without long-term current use of insulin 6/7/2018       Past Surgical History:   Procedure Laterality Date    Biopsy liver and ultrasound N/A 5/28/2018    Performed by [facility] [address] [place] [address]    BIOPSY LIVER AND ULTRASOUND N/A 4/1/2015    Performed by [facility] [address] [place] [address]    BIOPSY OF LIVER WITH ULTRASOUND GUIDANCE N/A 5/28/2018    Procedure: Biopsy liver and ultrasound;  Surgeon: [facility];  Location: [facility] OR [address] [place] [address];  Service: Endoscopy;  " Laterality: N/A;  U/S GUIDED LIVER BIOPSY (60080).  5/28/2018  DOSC 7:30 AM  PROCEDURE 9 AM.  DR RADHA BRADLEY / NOE SLOAN NP.  DB 5/24/18    COLONOSCOPY N/A 3/2/2016    Procedure: COLONOSCOPY;  Surgeon: Angelito Mahoney MD;  Location: [facility] (4TH FLR);  Service: Endoscopy;  Laterality: N/A;    COLONOSCOPY N/A 3/2/2016    Performed by Angelito Mahoney MD at [facility] (4TH FLR)    LIVER BIOPSY  4/1/15    very minimal lobular steatohepatitis, macrosteatosis 20%, microsteatosis 10% no fibrosis     LIVER BIOPSY  05/2018    VOGEL, macrosteatosis 80%, microsteatosis 10%, stage 1 out of 4 fibrosis       Family History   Problem Relation Age of Onset    Heart disease Father     Hypertension Maternal Grandmother     Cirrhosis Neg Hx        Social History     Socioeconomic History    Marital status:      Spouse name: Not on file    Number of children: Not on file    Years of education: Not on file    Highest education level: Not on file   Social Needs    Financial resource strain: Not on file    Food insecurity - worry: Not on file    Food insecurity - inability: Not on file    Transportation needs - medical: Not on file    Transportation needs - non-medical: Not on file   Occupational History    Not on file   Tobacco Use    Smoking status: Never Smoker    Smokeless tobacco: Never Used   Substance and Sexual Activity    Alcohol use: Yes     Comment: 1 per month     Drug use: No    Sexual activity: Not on file   Other Topics Concern    Not on file   Social History Narrative    Not on file       Current Outpatient Medications   Medication Sig Dispense Refill    amLODIPine (NORVASC) 10 MG tablet Take 1 tablet (10 mg total) by mouth once daily. 90 tablet 1    aspirin (ECOTRIN) 81 MG EC tablet Take 81 mg by mouth once daily.      cetirizine (ZYRTEC) 10 MG tablet Take 10 mg by mouth as needed.       fluticasone (FLONASE) 50 mcg/actuation nasal spray 1 spray by Each Nare route once  daily. 16 g 0    phentermine (ADIPEX-P) 37.5 mg tablet Take 1 tablet (37.5 mg total) by mouth before breakfast. 30 tablet 2    pravastatin (PRAVACHOL) 40 MG tablet TAKE 1 TABLET(40 MG) BY MOUTH EVERY DAY 90 tablet 1    triamterene-hydrochlorothiazide 37.5-25 mg (DYAZIDE) 37.5-25 mg per capsule Take 1 capsule by mouth every morning. 90 capsule 1    vitamin D 1000 units Tab Take 185 mg by mouth once daily.       Current Facility-Administered Medications   Medication Dose Route Frequency Provider Last Rate Last Dose    triamcinolone acetonide injection 40 mg  40 mg INTRABURSAL 1 time in Clinic/HOD Edgar Sanchez Jr., MD           Review of patient's allergies indicates:  No Known Allergies      Review of Systems   Constitution: Negative for chills, fever, weakness and malaise/fatigue.   HENT: Negative for congestion.    Cardiovascular: Negative for chest pain, claudication and leg swelling.   Respiratory: Negative for cough and shortness of breath.    Skin: Negative for color change, itching, poor wound healing and rash.   Musculoskeletal: Negative for back pain, joint pain, muscle cramps and muscle weakness.   Gastrointestinal: Negative for nausea and vomiting.   Neurological: Negative for numbness and paresthesias.   Psychiatric/Behavioral: Negative for altered mental status.           Objective:      Physical Exam   Constitutional: He is oriented to person, place, and time. He appears well-developed and well-nourished. No distress.   Cardiovascular: Intact distal pulses.   Pulses:       Dorsalis pedis pulses are 2+ on the right side, and 2+ on the left side.        Posterior tibial pulses are 2+ on the right side, and 2+ on the left side.   CFT< 3 secs all toes bilateral foot, skin temp warm bilateral foot, diminished digital hair growth bilateral foot, no lower extremity edema bilateral.     Musculoskeletal:   Slight to mild pain on palpation inferior to left fifth met styloid process, no pain with ROM or  "MMT.    + equinus that reduces with knee bent bilateral.    Inverted forefoot bilateral. + pes planus bilateral foot.   Neurological: He is alert and oriented to person, place, and time. He has normal strength. No sensory deficit.   Skin: Skin is warm, dry and intact. Capillary refill takes less than 2 seconds. No ecchymosis, no lesion, no petechiae and no rash noted. He is not diaphoretic. No cyanosis or erythema. No pallor. Nails show no clubbing.             Assessment:       Encounter Diagnoses   Name Primary?    Left foot pain Yes    Exostosis of left foot     Gastrocnemius equinus, unspecified laterality     Pronation of both feet          Plan:       Андрей was seen today for foot pain.    Diagnoses and all orders for this visit:    Left foot pain  -     X-Ray Foot Complete Left; Future    Exostosis of left foot  -     X-Ray Foot Complete Left; Future    Gastrocnemius equinus, unspecified laterality    Pronation of both feet      I counseled the patient on his conditions, their implications and medical management.    Dispensed literature on and demonstrated calf muscle stretches. Reviewed appropriate shoe gear and discussed activity modification such as avoiding flat shoes and barefoot walking. Recommend 1/2-1" heel height.    Clinically patient has enlarged fifth metatarsal styloid process and gastrocnemius equinus is partially compensated.    RTC prn as discussed.    .         "

## 2018-11-03 VITALS
TEMPERATURE: 99 F | WEIGHT: 242.75 LBS | SYSTOLIC BLOOD PRESSURE: 132 MMHG | DIASTOLIC BLOOD PRESSURE: 88 MMHG | HEART RATE: 77 BPM | BODY MASS INDEX: 39.01 KG/M2 | HEIGHT: 66 IN

## 2018-11-03 NOTE — PROGRESS NOTES
Subjective:       Patient ID: Андрей Wooten is a 53 y.o. male.    Chief Complaint: Hypertension    HPI  He returns for management of hypertension.  He has had hypertension for over a year.  Current treatment has included medications outlined in medication list.  He denies chest pain or shortness of breath.  No palpitations.  Denies left arm or neck pain. He has diabetes.  Denies polyuria, polydipsia    Medications:  See med list    Social history:  Does not smoke, does not drink alcohol      Review of Systems   Constitutional: Negative for chills, fatigue, fever and unexpected weight change.   Respiratory: Negative for chest tightness and shortness of breath.    Cardiovascular: Negative for chest pain and palpitations.   Gastrointestinal: Negative for abdominal pain and blood in stool.   Neurological: Negative for dizziness, syncope, numbness and headaches.       Objective:      Physical Exam   HENT:   Right Ear: External ear normal.   Left Ear: External ear normal.   Nose: Nose normal.   Mouth/Throat: Oropharynx is clear and moist.   Eyes: Pupils are equal, round, and reactive to light.   Neck: Normal range of motion.   Cardiovascular: Normal rate and regular rhythm.   No murmur heard.  Pulmonary/Chest: Breath sounds normal.   Abdominal: He exhibits no distension. There is no hepatomegaly. There is no tenderness.   Lymphadenopathy:     He has no cervical adenopathy.     He has no axillary adenopathy.   Neurological: He has normal strength and normal reflexes. No cranial nerve deficit or sensory deficit.       Assessment/Plan       Assessment and plan:  1.  Hypertension:  Check CMP and lipid panel  2.  Diabetes:  Check A1c

## 2018-11-10 ENCOUNTER — TELEPHONE (OUTPATIENT)
Dept: INTERNAL MEDICINE | Facility: CLINIC | Age: 53
End: 2018-11-10

## 2018-11-10 NOTE — TELEPHONE ENCOUNTER
Have attempted to contact patient by phone numerous times about his lab result- no answer. Will mail letter asking him to contact our office

## 2019-01-11 ENCOUNTER — OFFICE VISIT (OUTPATIENT)
Dept: URGENT CARE | Facility: CLINIC | Age: 54
End: 2019-01-11
Payer: COMMERCIAL

## 2019-01-11 VITALS
DIASTOLIC BLOOD PRESSURE: 87 MMHG | OXYGEN SATURATION: 98 % | HEART RATE: 85 BPM | RESPIRATION RATE: 16 BRPM | TEMPERATURE: 99 F | WEIGHT: 240 LBS | SYSTOLIC BLOOD PRESSURE: 140 MMHG | HEIGHT: 66 IN | BODY MASS INDEX: 38.57 KG/M2

## 2019-01-11 DIAGNOSIS — J04.0 LARYNGITIS: ICD-10-CM

## 2019-01-11 DIAGNOSIS — R03.0 ELEVATED BLOOD PRESSURE READING: Primary | ICD-10-CM

## 2019-01-11 DIAGNOSIS — J06.9 UPPER RESPIRATORY TRACT INFECTION, UNSPECIFIED TYPE: ICD-10-CM

## 2019-01-11 PROCEDURE — 99214 OFFICE O/P EST MOD 30 MIN: CPT | Mod: S$GLB,,, | Performed by: NURSE PRACTITIONER

## 2019-01-11 PROCEDURE — 99214 PR OFFICE/OUTPT VISIT, EST, LEVL IV, 30-39 MIN: ICD-10-PCS | Mod: S$GLB,,, | Performed by: NURSE PRACTITIONER

## 2019-01-11 RX ORDER — AZITHROMYCIN 250 MG/1
TABLET, FILM COATED ORAL
Qty: 6 TABLET | Refills: 0 | Status: SHIPPED | OUTPATIENT
Start: 2019-01-11 | End: 2019-02-19 | Stop reason: ALTCHOICE

## 2019-01-11 RX ORDER — PHENTERMINE HYDROCHLORIDE 37.5 MG/1
TABLET ORAL
Refills: 2 | COMMUNITY
Start: 2018-12-19 | End: 2019-02-19

## 2019-01-11 RX ORDER — PREDNISONE 20 MG/1
20 TABLET ORAL DAILY
Qty: 4 TABLET | Refills: 0 | Status: SHIPPED | OUTPATIENT
Start: 2019-01-11 | End: 2019-01-15

## 2019-01-12 NOTE — PATIENT INSTRUCTIONS
"Please follow up with your Primary care provider within 2-5 days if your signs and symptoms have not resolved or worsen.  The usual course of cold symptoms are 10-14 days.     If your condition worsens or fails to improve we recommend that you receive another evaluation at the emergency room immediately or contact your primary medical clinic to discuss your concerns.     You must understand that you have received an Urgent Care treatment only and that you may be released before all of your medical problems are known or treated.   You, the patient, will arrange for follow up care as instructed.     Tylenol or Ibuprofen can also be used as directed for pain/fever unless you have an allergy to them or medical condition such as stomach ulcers, kidney or liver disease or blood thinners etc for which you should not be taking these type of medications.     Take over the counter cough medication as directed as needed for cough.  You should avoid medications with pseudoephedrine or phenylephrine (any medication with "D") if you have high blood pressure as this can cause an elevation in your blood pressure. Instead consider Corcidin HBP as needed to prevent an elevated blood pressure.     Natural remedies of symptoms (as needed) include humidification, saline nasal sprays, and/or steamy showers.  Increase fluids, warm tea with honey, cough drops as needed.  You may also use salt water gargles for sore throat.    IF you received a steroid shot today - As discussed, this can elevate your blood pressure, elevate your blood sugar, water weight gain, nervous energy, redness to the face and dimpling of the skin at the injection site.   Elevated Blood Pressure    Your blood pressure was elevated during your visit to the urgent care.     It was not so high that immediate care was needed but it is recommended that you monitor your blood pressure over the next week or two to make sure that it is not staying elevated.      Please have " "your blood pressure taken 2-3 times daily at different times of the day.  Write all of those blood pressures down and record the time that they were taken.     Keep all that information and take it with you to see your Primary Care Physician.     If you are taking antihypertensives, please continue your medication regularly as prescribed.      If your blood pressure is consistently above 140/90 you will need to follow up with your PCP more quickly.     - According to ACEP guidelines, workup and treatment of hypertension in asymptomatic patient is not warranted in the ED:    (1) Initiating treatment for asymptomatic hypertension in the ED is not necessary when patients have follow-up.    (2) Rapidly lowering blood pressure in asymptomatic patients in the ED is unnecessary and may be harmful in some patients.    (3) When ED treatment for asymptomatic hypertension is initiated, blood pressure management should attempt to gradually lower blood pressure and should not be expected to be normalized during the initial ED visit.        Call your doctor immediately or seek emergency care if you have any of the following:  · Chest pain or shortness of breath (call 911)  · Moderate to severe headache  · Weakness in the muscles of your face, arms, or legs  · Trouble speaking  · Extreme drowsiness  · Confusion  · Fainting or dizziness  · Pulsating or rushing sound in your ears  · Unexplained nosebleed  · Weakness, tingling, or numbness of your face, arms, or legs  · Change in vision  · Blood pressure measured at home that is greater than 180/110     Wait and See Antibiotic    You have been given a "wait and see" antibiotic. You should wait to see if symptoms improve within the next 48-72 hours before you start the antibiotic.      It is important to follow these instructions as antibiotic resistance is high.  Your symptoms will likely resolve without this prescription.      If you do start the antibiotic, please complete the " antibiotic as directed on the bottle.      If you are female and on BCP use additional methods to prevent pregnancy while on antibiotics and for one cycle after.       Viral Upper Respiratory Illness (Adult)  You have a viral upper respiratory illness (URI), which is another term for the common cold. This illness is contagious during the first few days. It is spread through the air by coughing and sneezing. It may also be spread by direct contact (touching the sick person and then touching your own eyes, nose, or mouth). Frequent handwashing will decrease risk of spread. Most viral illnesses go away within 7 to 10 days with rest and simple home remedies. Sometimes the illness may last for several weeks. Antibiotics will not kill a virus, and they are generally not prescribed for this condition.    Home care  · If symptoms are severe, rest at home for the first 2 to 3 days. When you resume activity, don't let yourself get too tired.  · Avoid being exposed to cigarette smoke (yours or others).  · You may use acetaminophen or ibuprofen to control pain and fever, unless another medicine was prescribed. (Note: If you have chronic liver or kidney disease, have ever had a stomach ulcer or gastrointestinal bleeding, or are taking blood-thinning medicines, talk with your healthcare provider before using these medicines.) Aspirin should never be given to anyone under 18 years of age who is ill with a viral infection or fever. It may cause severe liver or brain damage.  · Your appetite may be poor, so a light diet is fine. Avoid dehydration by drinking 6 to 8 glasses of fluids per day (water, soft drinks, juices, tea, or soup). Extra fluids will help loosen secretions in the nose and lungs.  · Over-the-counter cold medicines will not shorten the length of time youre sick, but they may be helpful for the following symptoms: cough, sore throat, and nasal and sinus congestion. (Note: Do not use decongestants if you have high  blood pressure.)  Follow-up care  Follow up with your healthcare provider, or as advised.  When to seek medical advice  Call your healthcare provider right away if any of these occur:  · Cough with lots of colored sputum (mucus)  · Severe headache; face, neck, or ear pain  · Difficulty swallowing due to throat pain  · Fever of 100.4°F (38°C)  Call 911, or get immediate medical care  Call emergency services right away if any of these occur:  · Chest pain, shortness of breath, wheezing, or difficulty breathing  · Coughing up blood  · Inability to swallow due to throat pain  Date Last Reviewed: 9/13/2015  © 5729-1409 [employer]. [address] [place] [address], [place] [address]. All rights reserved. This information is not intended as a substitute for professional medical care. Always follow your healthcare professional's instructions.

## 2019-01-12 NOTE — PROGRESS NOTES
"Subjective:       Patient ID: Андрей Wooten is a 54 y.o. male.    Vitals:  height is 5' 6" (1.676 m) and weight is 108.9 kg (240 lb). His oral temperature is 98.6 °F (37 °C). His blood pressure is 140/87 (abnormal) and his pulse is 85. His respiration is 16 and oxygen saturation is 98%.     Chief Complaint: Sinus Problem    Patient presents today with a sinus issue he says. He reports he has had a dry cough producing light yellow sputum , he says it has improved. However he has sinus pressure in facial area and still somewhat congested. He reports he was taking zyrtec and it has helped some.  No fever .      Sinus Problem   This is a new problem. The current episode started in the past 7 days (4 days). The problem has been gradually improving since onset. There has been no fever. His pain is at a severity of 2/10. The pain is mild. Associated symptoms include congestion, coughing, a hoarse voice and sinus pressure. Pertinent negatives include no chills, ear pain, headaches, shortness of breath, sneezing, sore throat or swollen glands. Treatments tried: robitussin ,zyrtec  The treatment provided moderate relief.       Constitution: Negative for chills, fatigue and fever.   HENT: Positive for congestion, sinus pain and sinus pressure. Negative for ear pain and sore throat.    Neck: Negative for painful lymph nodes.   Cardiovascular: Negative for chest pain and leg swelling.   Eyes: Negative for double vision and blurred vision.   Respiratory: Positive for cough. Negative for shortness of breath.    Gastrointestinal: Negative for nausea, vomiting and diarrhea.   Genitourinary: Negative for dysuria, frequency and urgency.   Musculoskeletal: Negative for joint pain, joint swelling, muscle cramps and muscle ache.   Skin: Negative for color change, pale and rash.   Allergic/Immunologic: Negative for seasonal allergies and sneezing.   Neurological: Negative for dizziness, history of vertigo, light-headedness, passing out " and headaches.   Hematologic/Lymphatic: Negative for swollen lymph nodes, easy bruising/bleeding and history of blood clots. Does not bruise/bleed easily.   Psychiatric/Behavioral: Negative for nervous/anxious, sleep disturbance and depression. The patient is not nervous/anxious.        Objective:      Physical Exam   Constitutional: He is oriented to person, place, and time. He appears well-developed and well-nourished. He is cooperative.  Non-toxic appearance. He does not appear ill. No distress.   HENT:   Head: Normocephalic and atraumatic.   Right Ear: Hearing, tympanic membrane, external ear and ear canal normal.   Left Ear: Hearing, tympanic membrane, external ear and ear canal normal.   Nose: Mucosal edema and rhinorrhea present. No nasal deformity. No epistaxis. Right sinus exhibits no maxillary sinus tenderness and no frontal sinus tenderness. Left sinus exhibits no maxillary sinus tenderness and no frontal sinus tenderness.   Mouth/Throat: Uvula is midline and mucous membranes are normal. No trismus in the jaw. Normal dentition. No uvula swelling. Oropharyngeal exudate present. No posterior oropharyngeal edema or posterior oropharyngeal erythema.   Eyes: Conjunctivae and lids are normal. No scleral icterus.   Sclera clear bilat   Neck: Trachea normal, full passive range of motion without pain and phonation normal. Neck supple.   Cardiovascular: Normal rate, regular rhythm, normal heart sounds, intact distal pulses and normal pulses.   Pulmonary/Chest: Effort normal and breath sounds normal. No respiratory distress.   Abdominal: Soft. Normal appearance and bowel sounds are normal. He exhibits no distension. There is no tenderness.   Musculoskeletal: Normal range of motion. He exhibits no edema or deformity.   Neurological: He is alert and oriented to person, place, and time. He exhibits normal muscle tone. Coordination normal.   Skin: Skin is warm, dry and intact. He is not diaphoretic. No pallor.  "  Psychiatric: He has a normal mood and affect. His speech is normal and behavior is normal. Judgment and thought content normal. Cognition and memory are normal.   Nursing note and vitals reviewed.      Assessment:       1. Elevated blood pressure reading    2. Upper respiratory tract infection, unspecified type    3. Laryngitis        Plan:         Elevated blood pressure reading    Upper respiratory tract infection, unspecified type  -     azithromycin (ZITHROMAX Z-JOON) 250 MG tablet; Take 2 tablets (500 mg) on  Day 1,  followed by 1 tablet (250 mg) once daily on Days 2 through 5.  Dispense: 6 tablet; Refill: 0  -     predniSONE (DELTASONE) 20 MG tablet; Take 1 tablet (20 mg total) by mouth once daily. for 4 days  Dispense: 4 tablet; Refill: 0    Laryngitis      Patient Instructions     Please follow up with your Primary care provider within 2-5 days if your signs and symptoms have not resolved or worsen.  The usual course of cold symptoms are 10-14 days.     If your condition worsens or fails to improve we recommend that you receive another evaluation at the emergency room immediately or contact your primary medical clinic to discuss your concerns.     You must understand that you have received an Urgent Care treatment only and that you may be released before all of your medical problems are known or treated.   You, the patient, will arrange for follow up care as instructed.     Tylenol or Ibuprofen can also be used as directed for pain/fever unless you have an allergy to them or medical condition such as stomach ulcers, kidney or liver disease or blood thinners etc for which you should not be taking these type of medications.     Take over the counter cough medication as directed as needed for cough.  You should avoid medications with pseudoephedrine or phenylephrine (any medication with "D") if you have high blood pressure as this can cause an elevation in your blood pressure. Instead consider Corcidin HBP as " needed to prevent an elevated blood pressure.     Natural remedies of symptoms (as needed) include humidification, saline nasal sprays, and/or steamy showers.  Increase fluids, warm tea with honey, cough drops as needed.  You may also use salt water gargles for sore throat.    IF you received a steroid shot today - As discussed, this can elevate your blood pressure, elevate your blood sugar, water weight gain, nervous energy, redness to the face and dimpling of the skin at the injection site.   Elevated Blood Pressure    Your blood pressure was elevated during your visit to the urgent care.     It was not so high that immediate care was needed but it is recommended that you monitor your blood pressure over the next week or two to make sure that it is not staying elevated.      Please have your blood pressure taken 2-3 times daily at different times of the day.  Write all of those blood pressures down and record the time that they were taken.     Keep all that information and take it with you to see your Primary Care Physician.     If you are taking antihypertensives, please continue your medication regularly as prescribed.      If your blood pressure is consistently above 140/90 you will need to follow up with your PCP more quickly.     - According to ACEP guidelines, workup and treatment of hypertension in asymptomatic patient is not warranted in the ED:    (1) Initiating treatment for asymptomatic hypertension in the ED is not necessary when patients have follow-up.    (2) Rapidly lowering blood pressure in asymptomatic patients in the ED is unnecessary and may be harmful in some patients.    (3) When ED treatment for asymptomatic hypertension is initiated, blood pressure management should attempt to gradually lower blood pressure and should not be expected to be normalized during the initial ED visit.        Call your doctor immediately or seek emergency care if you have any of the following:  · Chest pain or  "shortness of breath (call 911)  · Moderate to severe headache  · Weakness in the muscles of your face, arms, or legs  · Trouble speaking  · Extreme drowsiness  · Confusion  · Fainting or dizziness  · Pulsating or rushing sound in your ears  · Unexplained nosebleed  · Weakness, tingling, or numbness of your face, arms, or legs  · Change in vision  · Blood pressure measured at home that is greater than 180/110     Wait and See Antibiotic    You have been given a "wait and see" antibiotic. You should wait to see if symptoms improve within the next 48-72 hours before you start the antibiotic.      It is important to follow these instructions as antibiotic resistance is high.  Your symptoms will likely resolve without this prescription.      If you do start the antibiotic, please complete the antibiotic as directed on the bottle.      If you are female and on BCP use additional methods to prevent pregnancy while on antibiotics and for one cycle after.       Viral Upper Respiratory Illness (Adult)  You have a viral upper respiratory illness (URI), which is another term for the common cold. This illness is contagious during the first few days. It is spread through the air by coughing and sneezing. It may also be spread by direct contact (touching the sick person and then touching your own eyes, nose, or mouth). Frequent handwashing will decrease risk of spread. Most viral illnesses go away within 7 to 10 days with rest and simple home remedies. Sometimes the illness may last for several weeks. Antibiotics will not kill a virus, and they are generally not prescribed for this condition.    Home care  · If symptoms are severe, rest at home for the first 2 to 3 days. When you resume activity, don't let yourself get too tired.  · Avoid being exposed to cigarette smoke (yours or others).  · You may use acetaminophen or ibuprofen to control pain and fever, unless another medicine was prescribed. (Note: If you have chronic liver " or kidney disease, have ever had a stomach ulcer or gastrointestinal bleeding, or are taking blood-thinning medicines, talk with your healthcare provider before using these medicines.) Aspirin should never be given to anyone under 18 years of age who is ill with a viral infection or fever. It may cause severe liver or brain damage.  · Your appetite may be poor, so a light diet is fine. Avoid dehydration by drinking 6 to 8 glasses of fluids per day (water, soft drinks, juices, tea, or soup). Extra fluids will help loosen secretions in the nose and lungs.  · Over-the-counter cold medicines will not shorten the length of time youre sick, but they may be helpful for the following symptoms: cough, sore throat, and nasal and sinus congestion. (Note: Do not use decongestants if you have high blood pressure.)  Follow-up care  Follow up with your healthcare provider, or as advised.  When to seek medical advice  Call your healthcare provider right away if any of these occur:  · Cough with lots of colored sputum (mucus)  · Severe headache; face, neck, or ear pain  · Difficulty swallowing due to throat pain  · Fever of 100.4°F (38°C)  Call 911, or get immediate medical care  Call emergency services right away if any of these occur:  · Chest pain, shortness of breath, wheezing, or difficulty breathing  · Coughing up blood  · Inability to swallow due to throat pain  Date Last Reviewed: 9/13/2015  © 1035-8428 [employer]. [address] [place] [address], [place] [address]. All rights reserved. This information is not intended as a substitute for professional medical care. Always follow your healthcare professional's instructions.

## 2019-01-14 ENCOUNTER — TELEPHONE (OUTPATIENT)
Dept: URGENT CARE | Facility: CLINIC | Age: 54
End: 2019-01-14

## 2019-02-05 ENCOUNTER — TELEPHONE (OUTPATIENT)
Dept: BARIATRICS | Facility: CLINIC | Age: 54
End: 2019-02-05

## 2019-02-05 NOTE — TELEPHONE ENCOUNTER
Was not able to leave a voicemail patient Voicemail is not set up. Tried to call and speak to the patient to see if we could reschedule his appt that he has with dr Bernal feb 14th because she will not be here.

## 2019-02-12 ENCOUNTER — TELEPHONE (OUTPATIENT)
Dept: BARIATRICS | Facility: CLINIC | Age: 54
End: 2019-02-12

## 2019-02-12 ENCOUNTER — PATIENT MESSAGE (OUTPATIENT)
Dept: BARIATRICS | Facility: CLINIC | Age: 54
End: 2019-02-12

## 2019-02-12 NOTE — TELEPHONE ENCOUNTER
Called patient so that I can reschedule his appt he have with Dr Bernal 02/14/19 because she will not be here. Was not able to leave voicemail, mailbox was not set up.

## 2019-02-19 ENCOUNTER — OFFICE VISIT (OUTPATIENT)
Dept: BARIATRICS | Facility: CLINIC | Age: 54
End: 2019-02-19
Payer: COMMERCIAL

## 2019-02-19 VITALS
HEIGHT: 66 IN | BODY MASS INDEX: 38.55 KG/M2 | WEIGHT: 239.88 LBS | SYSTOLIC BLOOD PRESSURE: 118 MMHG | HEART RATE: 81 BPM | DIASTOLIC BLOOD PRESSURE: 72 MMHG

## 2019-02-19 DIAGNOSIS — E66.01 CLASS 2 SEVERE OBESITY WITH BODY MASS INDEX (BMI) OF 35 TO 39.9 WITH SERIOUS COMORBIDITY: Primary | ICD-10-CM

## 2019-02-19 DIAGNOSIS — E11.9 TYPE 2 DIABETES MELLITUS WITHOUT COMPLICATION, WITHOUT LONG-TERM CURRENT USE OF INSULIN: ICD-10-CM

## 2019-02-19 PROCEDURE — 99213 PR OFFICE/OUTPT VISIT, EST, LEVL III, 20-29 MIN: ICD-10-PCS | Mod: S$GLB,,, | Performed by: INTERNAL MEDICINE

## 2019-02-19 PROCEDURE — 99999 PR PBB SHADOW E&M-EST. PATIENT-LVL III: ICD-10-PCS | Mod: PBBFAC,,, | Performed by: INTERNAL MEDICINE

## 2019-02-19 PROCEDURE — 99999 PR PBB SHADOW E&M-EST. PATIENT-LVL III: CPT | Mod: PBBFAC,,, | Performed by: INTERNAL MEDICINE

## 2019-02-19 PROCEDURE — 99213 OFFICE O/P EST LOW 20 MIN: CPT | Mod: S$GLB,,, | Performed by: INTERNAL MEDICINE

## 2019-02-19 RX ORDER — PHENTERMINE HYDROCHLORIDE 37.5 MG/1
37.5 TABLET ORAL
Qty: 30 TABLET | Refills: 2 | Status: SHIPPED | OUTPATIENT
Start: 2019-03-19 | End: 2019-04-18

## 2019-02-19 RX ORDER — METFORMIN HYDROCHLORIDE 500 MG/1
500 TABLET ORAL 2 TIMES DAILY WITH MEALS
Qty: 180 TABLET | Refills: 0 | Status: SHIPPED | OUTPATIENT
Start: 2019-02-19 | End: 2019-06-28 | Stop reason: SDUPTHER

## 2019-02-19 RX ORDER — DIETHYLPROPION HYDROCHLORIDE 75 MG/1
75 TABLET, EXTENDED RELEASE ORAL DAILY
Qty: 30 TABLET | Refills: 0 | Status: SHIPPED | OUTPATIENT
Start: 2019-02-19 | End: 2021-04-12

## 2019-02-19 NOTE — PATIENT INSTRUCTIONS
Start metformin with dinner for 2 weeks, then breakfast and dinner. Always take with food.  No Alcohol.         Now: Patient warned of common side effects of diethylpropion including anxiety, insomnia, palpitations and increased blood pressure. It was also explained that it is for short-term usage along with diet and exercise, and that stopping the medication without making lifestyle changes will result in regain of weight. Patient states understanding.    Weight loss medications are controlled substances.  They require routine follow up. Prescription or pills that are lost or destroyed will not be replaced.         Next month: Patient warned of common side effects of phentermine including anxiety, insomnia, palpitations and increased blood pressure. It was also explained that it is for short-term usage along with diet and exercise, and that stopping the medication without making lifestyle changes will result in regain of weight. Patient states understanding.     Weight loss medications are controlled substances.  They require routine follow up. Prescription or pills that are lost or destroyed will not be replaced.       Start phentermine with 1/2 pill a day for at least 1 week to see if that will control your appetite.  Go up to a full pill when needed.       Take medication at the start of your day. It is fine to skip if changing shifts, etc.     Exercise 30 min 3 days a week. Gradually increase. Holiday tips given.     Patient counseled in strategies for long term weight loss and maintenance: Keeping a food diary, exercise for 1 hour a day and eating breakfast everyday.      No soda, sweet tea, juices or lemonade.  All drinks should be 5 calories or less.         3 meals a day made up of the following:  Unlimited green vegetables, tomatoes, mushrooms, spaghetti squash, cauliflower, meat, poultry, seafood, eggs and hard cheeses.   Milk and plain yogurt  Dressings, seasonings, condiments, etc should have less than  2 g sugars.   Beans (1-1.5 cups) or nuts (1/4 cup) can have 1 x a day.   1-2 servings of citrus fruits, berries, pineapple or melon a day (1/2 cup)  Avoid fried foods    No grains, rice, pasta, potatoes, bread, corn, peas, oatmeal, grits, tortillas, crackers, chips      [email or web address] for recipes. Moderate carb intake      Lower Carb Comfort Food Dupes      Skinny Bell Pepper Maikel Boats  Yields: 18 boats  Servin boats  Calories: 145  Total Fat: 9g  Saturated Fat: 4g  Trans Fat: 0g  Cholesterol: 50mg  Sodium: 293mg  Carbohydrates: 4g  Fiber: 1g  Sugars: 2g  Protein: 13g  SmartPoints: 4     Ingredients   1 pound lean ground turkey   1 teaspoons chili powder   1 teaspoon cumin   1/2 teaspoon black pepper   1/4 teaspoon kosher or sea salt   3/4 cup salsa, no sugar added   1 cup grated cheddar cheese, reduced-fat   3 bell peppers  Directions  Remove seeds, core, and membrane from bell peppers then slice each one into 6 verticle pieces where they dip down. Set sliced bell peppers aside.  Cook ground turkey over medium-high heat, breaking up as it cooks. Cook until the turkey loses it's pink color and is cooked through. Drain off any fat.  Preheat oven to 375 degrees.  Combine cooked turkey with spices and salsa. Evenly distribute mixture into the bell pepper boats, top with cheese. Bake on a parchment lined baking sheet for 10 minutes or until cheese is melted and peppers are hot.  NOTE: If you prefer much softer bell peppers, add a few tablespoons water to the bottom of a large casserole dish, add filled nachos, cover tightly with foil and bake 15 minutes.  Remove from the oven and add additional toppings, If desired.  Optional ingredients: sliced Jalepeno peppers, diced avocado, fat-free Greek yogurt or sour cream, or sliced green onions.    Cedar Chicken Spaghetti Squash  Yields: 4 servings  Calories: 457  Total Fat: 23g  Saturated Fat: 8g  Trans Fat: 0g  Cholesterol: 201mg   Sodium: 1146mg  Carbohydrates: 19g  Fiber: 4g  Sugar: 9g  Protein: 44g  SmartPoints: 13    Ingredients   1 large spaghetti squash   1 small onion, diced   2 medium carrots, diced   2 celery stalks, diced   1 pound cooked chicken, shredded   1/2 cup hot sauce   1/4 cup Homemade Ranch dressing   1/2 teaspoon garlic powder   salt and pepper to taste   1 cup low-fat shredded cheddar cheese   2 eggs   1/4 cup green onion, chopped  Directions  Preheat oven to 400 degrees F and spray a baking sheet with cooking spray.  Slice your spaghetti squash in half lengthwise and scoop out the seeds, then spray the cut side of the squash with a little olive oil cooking spray and place cut side down on the baking pan.  Roast spaghetti squash for 30-45 minutes or until it is tender.  While the squash is cooking, sauté the onion, celery, and carrots until softened and mostly cooked through.  In a large bowl, combine the sauteed vegetables, chicken, hot sauce, ranch dressing, garlic powder, salt, pepper, eggs, and cheese.  Once the squash is cooked through, allow to cool slightly then use a fork to scrape the insides into your chicken mixture, making sure not to tear the skins.  Make sure that the spaghetti squash is well incorporated with the other ingredients, then divide the mixture between the spaghetti squash halves.  Bake at 350 degrees for 30-35 minutes, or until hot and bubbly.  Remove from the oven and garnish with the green onions and additional ranch or hot sauce if desired.    Lasagna Zucchini [employer]ive  Servings: 8 [employer]ucchinVoyager [employer]rapeutics  Ingredients   Report this ad    4 medium zucchini (2 1/2 lbs), sliced into halves through the length*   1 cup (8.6 oz) part-skim ricotta cheese   1 large egg   1 1/2 Tbsp chopped fresh parsley , plus more for garnish   1 1/4 cups (5 oz) shredded mozzarella cheese   1/2 cup (2 oz) finely shredded parmesan cheese   8 oz 93% lean ground beef or lean ground turkey   4 tsp olive oil  , divided   Salt and freshly ground black pepper   1 3/4 cup roasted garlic marinara sauce (low sugar)   1 Tbsp chopped fresh basil , plus more for garnish  Instructions  1. Preheat oven to 400 degrees. Using a spoon, scoop centers from zucchini while leaving a 1/4-inch rim to create boats. Set aside.  2. In a mixing bowl stir together ricotta cheese, egg and 1 1/2 Tbsp of the parsley. Season lightly with salt and pepper. Stir in 1/2 cup of the mozzarella cheese and the parmesan cheese. Set aside.  3. Heat 2 tsp of the olive oil in a large non-stick skillet over medium-high heat. Crumble beef into pan, season with salt and pepper and cook, stirring occasionally and breaking up beef when stirring, until browned (there shouldn't be any excess fat but if you happened to use a fattier beef then just drain excess rendered fat). Stir in marinara sauce and 1 Tbsp of the basil, remove from heat.  4. To assemble boats, brush both sides of of zucchini lightly with remaining 2 tsp olive oil and place in two baking pans (I used a 13 by 9 and a 9 by 9). Divide cheese mixture among zucchini spooning about 2 1/2 Tbsp into each, then spread cheese mixture into and even layer. Divide sauce among zucchini adding a few heaping spoonfuls to each. Cover baking dishes with foil and place in oven side by side and bake in preheated oven 30 minutes. Remove from oven, sprinkle tops with remaining 3/4 cup mozzarella, return to oven and bake until cheese has melted and zucchini is tender, about 5 minutes. Sprinkle tops with with fresh basil and parsley and serve warm.  5. *Look for zucchini that is wider and more uniform in width. The skinnier zucchini won't fit much filling.  6. Recipe source: [employer]    Chicken Avocado Lime Soup  Prep Time: 15 minutes  Cook Time: 20 minutes  Ingredients   Report this ad    1 1/2 lbs boneless skinless chicken breasts*   1 Tbsp olive oil   1 cup chopped green onions (including whites, mince the  "whites)   2 jalapeños , seeded and minced (leave seeds if you want soup spicy, omit if you don't like heat)   2 cloves garlic , minced   4 (14.5 oz) cans low-sodium chicken broth   2 Cassie tomatoes , seeded and diced   1/2 tsp ground cumin   Salt and freshly ground black pepper   1/3 cup chopped cilantro   3 Tbsp fresh lime juice   3 medium avocados , peeled, cored and diced   Tortilla chips , curtis vito cheese, sour cream for serving (optional)    Instructions  1. In a large pot heat 1 Tbsp olive oil over medium heat. Once hot, add green onions and jalapenos and saute until tender, about 2 minutes, adding garlic during last 30 seconds of sauteing. Add chicken broth, tomatoes, cumin, season with salt and pepper to taste and add chicken breasts. Bring mixture to a boil over medium-high heat. Then reduce heat to medium, cover with lid and allow to cook, stirring occasionally, until chicken has cooked through 10 - 15 minutes (cook time will vary based on thickness of chicken breasts). Reduce burner to warm heat, remove chicken from pan and let rest on a cutting board 5 minutes, then shred chicken and return to soup. Stir in cilantro and lime juice. Add avocados to soup just before serving (if you don't plan on serving the soup right away, I would recommend adding the avocados to each bowl individually, about 1/2 an avocado per serving). Serve with tortilla chips, cheese and sour cream if desired.  2. *For thicker chicken breasts, cut breasts in half through the length (thickness) of the breasts, they will cook faster and more evenly.  3. Recipe Source: adapted slightly from [facility] Miss        CAULIFLOWER "MAC" AND CHEESE    INGREDIENTS   1 small head cauliflower cut into small florets about 5-6 cups   1/2 small onion, diced   1 teaspoon olive oil   Kosher salt   Freshly ground black pepper   2 tablespoons parsley   1 teaspoon paprika   2 tablespoons butter   2 tablespoons flour   1 1/4 cups milk, " (whole milk or coconut is best)   1/2 teaspoon granulated garlic   1 teaspoon mustard (optional)   1/2 teaspoon paprika   2 1/2 cups grated extra sharp cheddar cheese (reserve 1/2 cup)     PREPARATION  1. Preheat oven to 400°F. Place cauliflower florets on a baking sheet, mix with diced onion and oil, sprinkle with salt and pepper. Roast for 20-25 minutes tossing half way through until browned.  2. Warm the milk in the microwave, so it is just heated through (this helps prevent the cheese sauce from clumping). Heat a medium saucepan over medium med-high heat. Add 2 tablespoons butter and flour then whisk for 2 minutes (until a smooth garry is made), add milk and continue whisking until sauce thickens. Once smooth add salt and pepper and other spices. Then add the cheese reserving 1/2 cup. Mix with spatula and add cauliflower, stir gently. Now add the reserved cheese, mix just enough to evenly distribute.   4. Place mixture into a 8x8 inch greased casserole dish and cover with paprika and parsley. Bake in oven for 20 minutes until toasty and bubbly.

## 2019-02-19 NOTE — PROGRESS NOTES
"Subjective:       Patient ID: Андрей Wooten is a 54 y.o. male.    Chief Complaint: Follow-up    Pt here today for follow-up. Has lost 7 more lbs, net neg 14 lbs. On LCHF diet and phentermine. Last filled 1/18/19. He does feel he has a lot more energy. He does say he added more fruit and vegetables. Also feels his portions are down and he is eating smaller portions. Stopped juices. Denies SE. He feels like the phentermine did suppress his appetite more. Only SE was leg cramping.     Lab Results       Component                Value               Date                       HGBA1C                   6.3 (H)             10/29/2018                 HGBA1C                   6.5 (H)             05/14/2018            Lab Results       Component                Value               Date                       LDLCALC                  167.0 (H)           10/29/2018                 CREATININE               1.2                 10/29/2018                  Review of Systems   Constitutional: Negative for chills and fever.   Respiratory: Negative for shortness of breath.         Bad snoring. PSG 10 years was neg. Did weigh less. ESS 17    Cardiovascular: Negative for chest pain and leg swelling.   Gastrointestinal: Negative for constipation and diarrhea.        Denies GERD   Genitourinary: Negative for difficulty urinating and dysuria.   Musculoskeletal: Positive for back pain. Negative for arthralgias.   Neurological: Negative for dizziness and light-headedness.   Psychiatric/Behavioral: Negative for dysphoric mood. The patient is not nervous/anxious.        Objective:     /72 (BP Location: Right arm, Patient Position: Sitting, BP Method: Large (Manual))   Pulse 81   Ht 5' 6" (1.676 m)   Wt 108.8 kg (239 lb 13.8 oz)   BMI 38.71 kg/m²     Physical Exam   Constitutional: He is oriented to person, place, and time. He appears well-developed. No distress.   Morbidly obese     HENT:   Head: Normocephalic and atraumatic.   Eyes: " Pupils are equal, round, and reactive to light. No scleral icterus.   Neck: Normal range of motion. Neck supple.   Cardiovascular: Normal rate.   Pulmonary/Chest: Effort normal.   Musculoskeletal: Normal range of motion. He exhibits no edema.   Neurological: He is alert and oriented to person, place, and time.   Skin: Skin is warm and dry.   Psychiatric: He has a normal mood and affect. His behavior is normal. Judgment normal.   Vitals reviewed.      Assessment:       1. Class 2 severe obesity with body mass index (BMI) of 35 to 39.9 with serious comorbidity    2. Type 2 diabetes mellitus without complication, without long-term current use of insulin        Plan:         Андрей was seen today for follow-up.    Diagnoses and all orders for this visit:    Class 2 severe obesity with body mass index (BMI) of 35 to 39.9 with serious comorbidity  -     diethylpropion 75 mg TbSR; Take 75 mg by mouth once daily.  -     phentermine (ADIPEX-P) 37.5 mg tablet; Take 1 tablet (37.5 mg total) by mouth before breakfast.    Type 2 diabetes mellitus without complication, without long-term current use of insulin  -     metFORMIN (GLUCOPHAGE) 500 MG tablet; Take 1 tablet (500 mg total) by mouth 2 (two) times daily with meals.         Will add metformin to address the insulin resistance. He is making some improvements with his labs, but may still be hindering weight loss.     Now: Patient warned of common side effects of diethylpropion including anxiety, insomnia, palpitations and increased blood pressure. It was also explained that it is for short-term usage along with diet and exercise, and that stopping the medication without making lifestyle changes will result in regain of weight. Patient states understanding.    Weight loss medications are controlled substances.  They require routine follow up. Prescription or pills that are lost or destroyed will not be replaced.         Next month: Patient warned of common side effects of  phentermine including anxiety, insomnia, palpitations and increased blood pressure. It was also explained that it is for short-term usage along with diet and exercise, and that stopping the medication without making lifestyle changes will result in regain of weight. Patient states understanding.     Weight loss medications are controlled substances.  They require routine follow up. Prescription or pills that are lost or destroyed will not be replaced.       Start phentermine with 1/2 pill a day for at least 1 week to see if that will control your appetite.  Go up to a full pill when needed.       Take medication at the start of your day. It is fine to skip if changing shifts, etc.     Exercise 30 min 3 days a week. Gradually increase. Holiday tips given.     Patient counseled in strategies for long term weight loss and maintenance: Keeping a food diary, exercise for 1 hour a day and eating breakfast everyday.      No soda, sweet tea, juices or lemonade.  All drinks should be 5 calories or less.         3 meals a day made up of the following:  Unlimited green vegetables, tomatoes, mushrooms, spaghetti squash, cauliflower, meat, poultry, seafood, eggs and hard cheeses.   Milk and plain yogurt  Dressings, seasonings, condiments, etc should have less than 2 g sugars.   Beans (1-1.5 cups) or nuts (1/4 cup) can have 1 x a day.   1-2 servings of citrus fruits, berries, pineapple or melon a day (1/2 cup)  Avoid fried foods    No grains, rice, pasta, potatoes, bread, corn, peas, oatmeal, grits, tortillas, crackers, chips      Www.[email or web address] for recipes. Moderate carb intake

## 2019-02-28 ENCOUNTER — OFFICE VISIT (OUTPATIENT)
Dept: INTERNAL MEDICINE | Facility: CLINIC | Age: 54
End: 2019-02-28
Payer: COMMERCIAL

## 2019-02-28 VITALS
HEART RATE: 81 BPM | WEIGHT: 238.56 LBS | DIASTOLIC BLOOD PRESSURE: 86 MMHG | HEIGHT: 66 IN | SYSTOLIC BLOOD PRESSURE: 134 MMHG | BODY MASS INDEX: 38.34 KG/M2 | OXYGEN SATURATION: 97 %

## 2019-02-28 DIAGNOSIS — E11.9 DIABETES MELLITUS WITHOUT COMPLICATION: Primary | ICD-10-CM

## 2019-02-28 DIAGNOSIS — I10 ESSENTIAL HYPERTENSION: ICD-10-CM

## 2019-02-28 PROCEDURE — 99999 PR PBB SHADOW E&M-EST. PATIENT-LVL IV: ICD-10-PCS | Mod: PBBFAC,,, | Performed by: INTERNAL MEDICINE

## 2019-02-28 PROCEDURE — 99214 PR OFFICE/OUTPT VISIT, EST, LEVL IV, 30-39 MIN: ICD-10-PCS | Mod: S$GLB,,, | Performed by: INTERNAL MEDICINE

## 2019-02-28 PROCEDURE — 99999 PR PBB SHADOW E&M-EST. PATIENT-LVL IV: CPT | Mod: PBBFAC,,, | Performed by: INTERNAL MEDICINE

## 2019-02-28 PROCEDURE — 99214 OFFICE O/P EST MOD 30 MIN: CPT | Mod: S$GLB,,, | Performed by: INTERNAL MEDICINE

## 2019-02-28 RX ORDER — TRIAMTERENE AND HYDROCHLOROTHIAZIDE 37.5; 25 MG/1; MG/1
1 CAPSULE ORAL EVERY MORNING
Qty: 90 CAPSULE | Refills: 1 | Status: SHIPPED | OUTPATIENT
Start: 2019-02-28 | End: 2019-06-28 | Stop reason: SDUPTHER

## 2019-02-28 RX ORDER — PRAVASTATIN SODIUM 40 MG/1
TABLET ORAL
Qty: 90 TABLET | Refills: 1 | Status: SHIPPED | OUTPATIENT
Start: 2019-02-28 | End: 2019-06-28 | Stop reason: SDUPTHER

## 2019-02-28 RX ORDER — AMLODIPINE BESYLATE 10 MG/1
10 TABLET ORAL DAILY
Qty: 90 TABLET | Refills: 1 | Status: SHIPPED | OUTPATIENT
Start: 2019-02-28 | End: 2019-06-28 | Stop reason: SDUPTHER

## 2019-03-01 ENCOUNTER — LAB VISIT (OUTPATIENT)
Dept: LAB | Facility: HOSPITAL | Age: 54
End: 2019-03-01
Attending: INTERNAL MEDICINE
Payer: COMMERCIAL

## 2019-03-01 DIAGNOSIS — E11.9 DIABETES MELLITUS WITHOUT COMPLICATION: ICD-10-CM

## 2019-03-01 LAB
ALBUMIN SERPL BCP-MCNC: 4.7 G/DL
ALP SERPL-CCNC: 55 U/L
ALT SERPL W/O P-5'-P-CCNC: 67 U/L
ANION GAP SERPL CALC-SCNC: 9 MMOL/L
AST SERPL-CCNC: 71 U/L
BILIRUB SERPL-MCNC: 0.5 MG/DL
BUN SERPL-MCNC: 21 MG/DL
CALCIUM SERPL-MCNC: 9.4 MG/DL
CHLORIDE SERPL-SCNC: 98 MMOL/L
CHOLEST SERPL-MCNC: 190 MG/DL
CHOLEST/HDLC SERPL: 5.8 {RATIO}
CO2 SERPL-SCNC: 31 MMOL/L
CREAT SERPL-MCNC: 1.1 MG/DL
EST. GFR  (AFRICAN AMERICAN): >60 ML/MIN/1.73 M^2
EST. GFR  (NON AFRICAN AMERICAN): >60 ML/MIN/1.73 M^2
ESTIMATED AVG GLUCOSE: 134 MG/DL
GLUCOSE SERPL-MCNC: 109 MG/DL
HBA1C MFR BLD HPLC: 6.3 %
HDLC SERPL-MCNC: 33 MG/DL
HDLC SERPL: 17.4 %
LDLC SERPL CALC-MCNC: 113.2 MG/DL
NONHDLC SERPL-MCNC: 157 MG/DL
POTASSIUM SERPL-SCNC: 3.7 MMOL/L
PROT SERPL-MCNC: 8.4 G/DL
SODIUM SERPL-SCNC: 138 MMOL/L
TRIGL SERPL-MCNC: 219 MG/DL

## 2019-03-01 PROCEDURE — 80053 COMPREHEN METABOLIC PANEL: CPT | Mod: PO

## 2019-03-01 PROCEDURE — 83036 HEMOGLOBIN GLYCOSYLATED A1C: CPT

## 2019-03-01 PROCEDURE — 80061 LIPID PANEL: CPT

## 2019-03-01 PROCEDURE — 36415 COLL VENOUS BLD VENIPUNCTURE: CPT | Mod: PO

## 2019-03-06 NOTE — PROGRESS NOTES
Subjective:       Patient ID: Андрей Wooten is a 54 y.o. male.    Chief Complaint: Hypertension    HPI  He returns for management of hypertension.  He has had hypertension for over a year.  Current treatment has included medications outlined in medication list.  He denies chest pain or shortness of breath.  No palpitations.  Denies left arm or neck pain. He has diabetes.  Denies polyuria, polydipsia       Past medical history: Hypertension, hyperlipidemia, diabetes, NAFLD.  He had a colonoscopy March 2016     Medications:  one aspirin daily, Norvasc 10 mg daily, Pravachol 40 mg daily, metformin 500 mg b.i.d., Dyazide 1 p.o. daily     NO KNOWN DRUG ALLERGIES       Review of Systems   Constitutional: Negative for chills, fatigue, fever and unexpected weight change.   Respiratory: Negative for chest tightness and shortness of breath.    Cardiovascular: Negative for chest pain and palpitations.   Gastrointestinal: Negative for abdominal pain and blood in stool.   Neurological: Negative for dizziness, syncope, numbness and headaches.       Objective:      Physical Exam   HENT:   Right Ear: External ear normal.   Left Ear: External ear normal.   Nose: Nose normal.   Mouth/Throat: Oropharynx is clear and moist.   Eyes: Pupils are equal, round, and reactive to light.   Neck: Normal range of motion.   Cardiovascular: Normal rate and regular rhythm.   No murmur heard.  Pulmonary/Chest: Breath sounds normal.   Abdominal: He exhibits no distension. There is no hepatomegaly. There is no tenderness.   Lymphadenopathy:     He has no cervical adenopathy.     He has no axillary adenopathy.   Neurological: He has normal strength and normal reflexes. No cranial nerve deficit or sensory deficit.       Assessment/Plan     assessment and plan:  1.  Hypertension:  Check CMP and lipid panel  2.  Diabetes:  Check A1c

## 2019-06-21 DIAGNOSIS — E11.9 TYPE 2 DIABETES MELLITUS WITHOUT COMPLICATION, UNSPECIFIED WHETHER LONG TERM INSULIN USE: ICD-10-CM

## 2019-06-28 ENCOUNTER — DOCUMENTATION ONLY (OUTPATIENT)
Dept: TRANSPLANT | Facility: CLINIC | Age: 54
End: 2019-06-28

## 2019-06-28 ENCOUNTER — OFFICE VISIT (OUTPATIENT)
Dept: INTERNAL MEDICINE | Facility: CLINIC | Age: 54
End: 2019-06-28
Payer: COMMERCIAL

## 2019-06-28 ENCOUNTER — LAB VISIT (OUTPATIENT)
Dept: LAB | Facility: HOSPITAL | Age: 54
End: 2019-06-28
Attending: INTERNAL MEDICINE
Payer: COMMERCIAL

## 2019-06-28 ENCOUNTER — TELEPHONE (OUTPATIENT)
Dept: HEPATOLOGY | Facility: CLINIC | Age: 54
End: 2019-06-28

## 2019-06-28 VITALS
WEIGHT: 235.88 LBS | BODY MASS INDEX: 37.02 KG/M2 | HEIGHT: 67 IN | OXYGEN SATURATION: 97 % | DIASTOLIC BLOOD PRESSURE: 88 MMHG | SYSTOLIC BLOOD PRESSURE: 138 MMHG | HEART RATE: 90 BPM

## 2019-06-28 DIAGNOSIS — E11.9 TYPE 2 DIABETES MELLITUS WITHOUT COMPLICATION, WITHOUT LONG-TERM CURRENT USE OF INSULIN: ICD-10-CM

## 2019-06-28 DIAGNOSIS — I10 ESSENTIAL HYPERTENSION: ICD-10-CM

## 2019-06-28 DIAGNOSIS — K76.0 NAFLD (NONALCOHOLIC FATTY LIVER DISEASE): Primary | ICD-10-CM

## 2019-06-28 LAB
ALBUMIN SERPL BCP-MCNC: 4.5 G/DL (ref 3.5–5.2)
ALP SERPL-CCNC: 47 U/L (ref 55–135)
ALT SERPL W/O P-5'-P-CCNC: 57 U/L (ref 10–44)
ANION GAP SERPL CALC-SCNC: 9 MMOL/L (ref 8–16)
AST SERPL-CCNC: 33 U/L (ref 10–40)
BASOPHILS # BLD AUTO: 0.02 K/UL (ref 0–0.2)
BASOPHILS NFR BLD: 0.5 % (ref 0–1.9)
BILIRUB SERPL-MCNC: 0.5 MG/DL (ref 0.1–1)
BUN SERPL-MCNC: 17 MG/DL (ref 6–20)
CALCIUM SERPL-MCNC: 10.5 MG/DL (ref 8.7–10.5)
CHLORIDE SERPL-SCNC: 99 MMOL/L (ref 95–110)
CO2 SERPL-SCNC: 30 MMOL/L (ref 23–29)
COMPLEXED PSA SERPL-MCNC: 0.53 NG/ML (ref 0–4)
CREAT SERPL-MCNC: 1.1 MG/DL (ref 0.5–1.4)
DIFFERENTIAL METHOD: ABNORMAL
EOSINOPHIL # BLD AUTO: 0 K/UL (ref 0–0.5)
EOSINOPHIL NFR BLD: 1 % (ref 0–8)
ERYTHROCYTE [DISTWIDTH] IN BLOOD BY AUTOMATED COUNT: 13.5 % (ref 11.5–14.5)
EST. GFR  (AFRICAN AMERICAN): >60 ML/MIN/1.73 M^2
EST. GFR  (NON AFRICAN AMERICAN): >60 ML/MIN/1.73 M^2
ESTIMATED AVG GLUCOSE: 137 MG/DL (ref 68–131)
GLUCOSE SERPL-MCNC: 90 MG/DL (ref 70–110)
HBA1C MFR BLD HPLC: 6.4 % (ref 4–5.6)
HCT VFR BLD AUTO: 46.8 % (ref 40–54)
HGB BLD-MCNC: 15.4 G/DL (ref 14–18)
LYMPHOCYTES # BLD AUTO: 2.2 K/UL (ref 1–4.8)
LYMPHOCYTES NFR BLD: 55.8 % (ref 18–48)
MCH RBC QN AUTO: 27.6 PG (ref 27–31)
MCHC RBC AUTO-ENTMCNC: 32.9 G/DL (ref 32–36)
MCV RBC AUTO: 84 FL (ref 82–98)
MONOCYTES # BLD AUTO: 0.4 K/UL (ref 0.3–1)
MONOCYTES NFR BLD: 9.8 % (ref 4–15)
NEUTROPHILS # BLD AUTO: 1.3 K/UL (ref 1.8–7.7)
NEUTROPHILS NFR BLD: 32.9 % (ref 38–73)
PLATELET # BLD AUTO: 246 K/UL (ref 150–350)
PMV BLD AUTO: 9.2 FL (ref 9.2–12.9)
POTASSIUM SERPL-SCNC: 3.8 MMOL/L (ref 3.5–5.1)
PROT SERPL-MCNC: 8.6 G/DL (ref 6–8.4)
RBC # BLD AUTO: 5.57 M/UL (ref 4.6–6.2)
SODIUM SERPL-SCNC: 138 MMOL/L (ref 136–145)
TSH SERPL DL<=0.005 MIU/L-ACNC: 1.4 UIU/ML (ref 0.4–4)
WBC # BLD AUTO: 3.87 K/UL (ref 3.9–12.7)

## 2019-06-28 PROCEDURE — 83036 HEMOGLOBIN GLYCOSYLATED A1C: CPT

## 2019-06-28 PROCEDURE — 99214 PR OFFICE/OUTPT VISIT, EST, LEVL IV, 30-39 MIN: ICD-10-PCS | Mod: S$GLB,,, | Performed by: INTERNAL MEDICINE

## 2019-06-28 PROCEDURE — 84153 ASSAY OF PSA TOTAL: CPT

## 2019-06-28 PROCEDURE — 85025 COMPLETE CBC W/AUTO DIFF WBC: CPT

## 2019-06-28 PROCEDURE — 99999 PR PBB SHADOW E&M-EST. PATIENT-LVL IV: ICD-10-PCS | Mod: PBBFAC,,, | Performed by: INTERNAL MEDICINE

## 2019-06-28 PROCEDURE — 80053 COMPREHEN METABOLIC PANEL: CPT

## 2019-06-28 PROCEDURE — 99214 OFFICE O/P EST MOD 30 MIN: CPT | Mod: S$GLB,,, | Performed by: INTERNAL MEDICINE

## 2019-06-28 PROCEDURE — 99999 PR PBB SHADOW E&M-EST. PATIENT-LVL IV: CPT | Mod: PBBFAC,,, | Performed by: INTERNAL MEDICINE

## 2019-06-28 PROCEDURE — 84443 ASSAY THYROID STIM HORMONE: CPT

## 2019-06-28 PROCEDURE — 36415 COLL VENOUS BLD VENIPUNCTURE: CPT

## 2019-06-28 RX ORDER — PRAVASTATIN SODIUM 40 MG/1
TABLET ORAL
Qty: 90 TABLET | Refills: 1 | Status: SHIPPED | OUTPATIENT
Start: 2019-06-28 | End: 2019-10-28 | Stop reason: SDUPTHER

## 2019-06-28 RX ORDER — AMLODIPINE BESYLATE 10 MG/1
10 TABLET ORAL DAILY
Qty: 90 TABLET | Refills: 1 | Status: SHIPPED | OUTPATIENT
Start: 2019-06-28 | End: 2019-10-28 | Stop reason: SDUPTHER

## 2019-06-28 RX ORDER — PHENTERMINE HYDROCHLORIDE 37.5 MG/1
TABLET ORAL
COMMUNITY
Start: 2019-06-27 | End: 2020-05-19

## 2019-06-28 RX ORDER — METFORMIN HYDROCHLORIDE 500 MG/1
500 TABLET ORAL 2 TIMES DAILY WITH MEALS
Qty: 180 TABLET | Refills: 1 | Status: SHIPPED | OUTPATIENT
Start: 2019-06-28 | End: 2019-10-28 | Stop reason: SDUPTHER

## 2019-06-28 RX ORDER — TRIAMTERENE AND HYDROCHLOROTHIAZIDE 37.5; 25 MG/1; MG/1
1 CAPSULE ORAL EVERY MORNING
Qty: 90 CAPSULE | Refills: 1 | Status: SHIPPED | OUTPATIENT
Start: 2019-06-28 | End: 2019-10-28 | Stop reason: SDUPTHER

## 2019-06-28 NOTE — LETTER
June 28, 2019    Андрей Wooten  [address] [place] [id number]      Dear Андрей Wooten:    Your doctor has referred you to the [facility]. We are sending this letter to remind you to make an appointment with us to complete the referral process.     Please call us at [phone] to schedule an appointment. We look forward to seeing you soon.     If you received a call and have been scheduled, please disregard this letter.       Sincerely,        [facility]   [address] [place] [id number]  [phone]

## 2019-06-28 NOTE — TELEPHONE ENCOUNTER
----- Message from Larisa Muniz LPN sent at 6/28/2019 10:58 AM CDT -----  Pt records reviewed.   Pt will be referred to Hepatology.  NAFLD (nonalcoholic fatty liver disease)  Initial referral received  from the [facility].   Referring Provider/diagnosis  Khushboo Pugh MD

## 2019-06-28 NOTE — NURSING
Pt records reviewed.   Pt will be referred to Hepatology.  NAFLD (nonalcoholic fatty liver disease)  Initial referral received  from the [facility].   Referring Provider/diagnosis  Khushboo Pugh MD      Referral letter sent to patient.

## 2019-07-05 NOTE — PROGRESS NOTES
Subjective:       Patient ID: Андрей Wooten is a 54 y.o. male.    Chief Complaint: Hypertension    HPI  He returns for management of hypertension.  He has had hypertension for over a year.  Current treatment has included medications outlined in medication list.  He denies chest pain or shortness of breath.  No palpitations.  Denies left arm or neck pain. He has diabetes.  Denies polyuria, polydipsia    Medications:  See med list    Social history:  Does not smoke, does not drink alcohol      Review of Systems   Constitutional: Negative for chills, fatigue, fever and unexpected weight change.   Respiratory: Negative for chest tightness and shortness of breath.    Cardiovascular: Negative for chest pain and palpitations.   Gastrointestinal: Negative for abdominal pain and blood in stool.   Neurological: Negative for dizziness, syncope, numbness and headaches.       Objective:      Physical Exam   HENT:   Right Ear: External ear normal.   Left Ear: External ear normal.   Nose: Nose normal.   Mouth/Throat: Oropharynx is clear and moist.   Eyes: Pupils are equal, round, and reactive to light.   Neck: Normal range of motion.   Cardiovascular: Normal rate and regular rhythm.   No murmur heard.  Pulmonary/Chest: Breath sounds normal.   Abdominal: He exhibits no distension. There is no hepatomegaly. There is no tenderness.   Lymphadenopathy:     He has no cervical adenopathy.     He has no axillary adenopathy.   Neurological: He has normal strength and normal reflexes. No cranial nerve deficit or sensory deficit.       Assessment/Plan     assessment and plan:  1.  Hypertension:  Check CMP and CBC  2.  Diabetes:  Check A1c, TSH, urine microalbumin, PSA.  He reports having his eye exam within the past year

## 2019-07-22 ENCOUNTER — OFFICE VISIT (OUTPATIENT)
Dept: HEPATOLOGY | Facility: CLINIC | Age: 54
End: 2019-07-22
Payer: COMMERCIAL

## 2019-07-22 VITALS
BODY MASS INDEX: 37.37 KG/M2 | SYSTOLIC BLOOD PRESSURE: 141 MMHG | WEIGHT: 238.13 LBS | HEART RATE: 84 BPM | DIASTOLIC BLOOD PRESSURE: 90 MMHG | HEIGHT: 67 IN | OXYGEN SATURATION: 98 %

## 2019-07-22 DIAGNOSIS — E11.9 TYPE 2 DIABETES MELLITUS WITHOUT COMPLICATION, WITHOUT LONG-TERM CURRENT USE OF INSULIN: ICD-10-CM

## 2019-07-22 DIAGNOSIS — E66.9 CLASS 2 OBESITY WITH BODY MASS INDEX (BMI) OF 37.0 TO 37.9 IN ADULT, UNSPECIFIED OBESITY TYPE, UNSPECIFIED WHETHER SERIOUS COMORBIDITY PRESENT: ICD-10-CM

## 2019-07-22 DIAGNOSIS — E78.5 HYPERLIPIDEMIA, UNSPECIFIED HYPERLIPIDEMIA TYPE: ICD-10-CM

## 2019-07-22 DIAGNOSIS — K74.00 LIVER FIBROSIS: ICD-10-CM

## 2019-07-22 DIAGNOSIS — I10 ESSENTIAL HYPERTENSION: ICD-10-CM

## 2019-07-22 DIAGNOSIS — K75.81 NASH (NONALCOHOLIC STEATOHEPATITIS): Primary | ICD-10-CM

## 2019-07-22 PROCEDURE — 99999 PR PBB SHADOW E&M-EST. PATIENT-LVL IV: ICD-10-PCS | Mod: PBBFAC,,, | Performed by: NURSE PRACTITIONER

## 2019-07-22 PROCEDURE — 99213 OFFICE O/P EST LOW 20 MIN: CPT | Mod: S$GLB,,, | Performed by: NURSE PRACTITIONER

## 2019-07-22 PROCEDURE — 99213 PR OFFICE/OUTPT VISIT, EST, LEVL III, 20-29 MIN: ICD-10-PCS | Mod: S$GLB,,, | Performed by: NURSE PRACTITIONER

## 2019-07-22 PROCEDURE — 99999 PR PBB SHADOW E&M-EST. PATIENT-LVL IV: CPT | Mod: PBBFAC,,, | Performed by: NURSE PRACTITIONER

## 2019-07-22 NOTE — PROGRESS NOTES
[facility] Established Patient Visit    Reason for Visit:  F/u VOGEL    PCP: Khushboo Pugh    HPI:  This is a 54 y.o. male here for f/u of elevated liver enzymes due to VOGEL. Since his last visit, he started low carb, high protein diet and walking for exercise. Is also following with bariatric medicine. Has lost close to 20 lbs. He has elevated transaminases with normal synthetic liver functioning. Enzymes have improved with weight loss. He has declined to participate in VOGEL trials.    He reports he feels well and denies any complaints today. Denies any symptoms of advanced chronic liver disease including jaundice, dark urine, abdominal distention, hematemesis, melena, slowed mentation.     Fibrosis staging:  -- Fibrosure 3/2015 = F0-F1  -- Elastography 3/2015 = F2-F3  -- liver biopsy -  4/1/15- very minimal lobular steatohepatitis, macrosteatosis 20%, microsteatosis 10% no fibrosis (biopsy was small sample size though so concern for understaging with this)  -- Fibroscan 4/2016 = F4   -- Fibrosure 4/2016 = F1-F2  -- MRI elastography 5/29/17 - F1-F2  -- liver biopsy 5/28/18 - VOGEL, macrosteatosis 80%, microsteatosis 10%, stage 1 out of 4 fibrosis    FINAL PATHOLOGIC DIAGNOSIS  LIVER, BOPSY:  Macrovesicular steatosis 80%  Microvesicular steatosis 10%  Steatohepatitis  Frequent ballooned hepatocytes  Focal glycogenated nuclei  Mild focal pericellular fibrosis (stage 1 of 4)  No evidence of increased iron deposits (grade 0 of 4)  VOGEL CLINICAL RESEARCH NETWORK SCORING SYSTEM:  Steatosis - 3  Lobular inflammation - 2  Hepatocellular ballooning - 2  Fibrosis - 1  Trichrome and iron stains performed.      ROS:   GENERAL: Denies fever, chills, (+) weight loss, no fatigue  HEENT: Denies headaches, dizziness, vision/hearing changes  CARDIOVASCULAR: Denies chest pain, palpitations, or edema  RESPIRATORY: Denies dyspnea, cough  GI: Denies abdominal pain, rectal bleeding, nausea, vomiting. No change in bowel  pattern or color  : Denies dysuria, hematuria   SKIN: Denies rash, itching   NEURO: Denies confusion, memory loss, or mood changes  PSYCH: Denies depression or anxiety  HEME/LYMPH: Denies easy bruising or bleeding      PMHX:  has a past medical history of HLD (hyperlipidemia), Hypertension, Liver fibrosis (6/7/2018), VOGEL (nonalcoholic steatohepatitis), and Type 2 diabetes mellitus without complication, without long-term current use of insulin (6/7/2018).    PSHX:  has a past surgical history that includes Colonoscopy (N/A, 3/2/2016); Biopsy of liver with ultrasound guidance (N/A, 5/28/2018); Liver biopsy (4/1/15); and Liver biopsy (05/2018).    The patient's social and family histories were reviewed by me and updated in the appropriate section of the electronic medical record.    Review of patient's allergies indicates:  No Known Allergies     Current Outpatient Medications on File Prior to Visit   Medication Sig Dispense Refill    amLODIPine (NORVASC) 10 MG tablet Take 1 tablet (10 mg total) by mouth once daily. 90 tablet 1    aspirin (ECOTRIN) 81 MG EC tablet Take 81 mg by mouth once daily.      cetirizine (ZYRTEC) 10 MG tablet Take 10 mg by mouth as needed.       diethylpropion 75 mg TbSR Take 75 mg by mouth once daily. 30 tablet 0    fluticasone (FLONASE) 50 mcg/actuation nasal spray 1 spray by Each Nare route once daily. 16 g 0    metFORMIN (GLUCOPHAGE) 500 MG tablet Take 1 tablet (500 mg total) by mouth 2 (two) times daily with meals. 180 tablet 1    phentermine (ADIPEX-P) 37.5 mg tablet       pravastatin (PRAVACHOL) 40 MG tablet TAKE 1 TABLET(40 MG) BY MOUTH EVERY DAY 90 tablet 1    triamterene-hydrochlorothiazide 37.5-25 mg (DYAZIDE) 37.5-25 mg per capsule Take 1 capsule by mouth every morning. 90 capsule 1    vitamin D 1000 units Tab Take 185 mg by mouth once daily.       No current facility-administered medications on file prior to visit.           Objective Findings:    PHYSICAL EXAM:  "  Friendly Black or  male, in no acute distress; alert and oriented to person, place and time  VITALS: BP (!) 141/90 (BP Location: Right arm, Patient Position: Sitting, BP Method: Medium (Automatic))   Pulse 84   Ht 5' 7" (1.702 m)   Wt 108 kg (238 lb 1.6 oz)   SpO2 98%   BMI 37.29 kg/m²   HENT: Normocephalic, without obvious abnormality. Oral mucosa pink and moist. Dentition good.  EYES: Sclerae anicteric.    NECK: Supple.   CARDIOVASCULAR: Regular rate and rhythm. No murmurs.  RESPIRATORY: Normal respiratory effort. BBS CTA. No wheezes or crackles.  GI: Obese, soft, non-tender, non-distended. Liver edge palpable. No masses palpable. No ascites.  EXTREMITIES: No clubbing, cyanosis or edema.  SKIN: Warm and dry. No jaundice. No rashes noted to exposed skin. No telangectasias noted. No palmar erythema.  NEURO: Normal gate. No asterixis.  PSYCH: Memory intact. Thought and speech pattern appropriate. Behavior normal. No depression or anxiety noted.      Labs:  Lab Results   Component Value Date    WBC 3.87 (L) 06/28/2019    HGB 15.4 06/28/2019    HCT 46.8 06/28/2019     06/28/2019    CHOL 190 03/01/2019    TRIG 219 (H) 03/01/2019    HDL 33 (L) 03/01/2019     06/28/2019    K 3.8 06/28/2019    CREATININE 1.1 06/28/2019    ALT 57 (H) 06/28/2019    AST 33 06/28/2019    ALKPHOS 47 (L) 06/28/2019    BILITOT 0.5 06/28/2019    ALBUMIN 4.5 06/28/2019    INR 1.1 05/14/2018       ASSESSMENT:  54 y.o. Black or  male with:  1.  VOGEL  -- transaminases mildly elevated  -- US showed hepatomegaly with steatosis  -- synthetic liver function nl  -- risk factors for fatty liver include h/o morbid obesity, BMI now 37, HLD, HTN, DM  -- s/p Twinrix vaccines  -- liver biopsy 5/28/18 - VOGEL, macrosteatosis 80%, microsteatosis 10%, stage 1 out of 4 fibrosis  2. Liver fibrosis  -- Fibrosure 3/2015 = F0-F1  -- Elastography 3/2015 = F2-F3  -- liver biopsy -  4/1/15- very minimal lobular " steatohepatitis, macrosteatosis 20%, microsteatosis 10% no fibrosis (biopsy was small sample size though so concern for understaging with this)  -- Fibroscan 4/2016 = F4  -- Fibrosure 4/2016 = F1-F2  -- MRI elastography 5/29/17 - F1-F2  -- liver biopsy 5/28/18 - VOGEL, macrosteatosis 80%, microsteatosis 10%, stage 1 out of 4 fibrosis  -- no findings on labs or imaging to suggest overt cirrhosis      EDUCATION:   We discussed the manifestations of NAFLD. At this time there is no FDA approved therapy for NAFLD.   The patient and I discussed the importance of diet, exercise, and weight loss for management of NAFLD. We discussed a low fat, low carb/low sugar, high fiber diet, and a goal of 30 minutes of exercise 5 times per week.   Pt is aware that fatty liver can progress to steatohepatitis and possibly to cirrhosis, putting one at increased risk for liver cancer, liver failure, and death.        PLAN:  1. Repeat MRI elastography in 1 year since this was more accurate  2. Continue efforts at weight loss  3. Low fat, low cholesterol, low carb, high fiber diet  4. Exercise, 5 days a week, 30 min a day  5. Recommend good control of cholesterol, blood pressure, blood sugar levels  6. Repeat MRI elastography and f/u in 1 year      Thank you for allowing me to participate in the care of VASQUEZ SzymanskiC

## 2019-10-28 ENCOUNTER — OFFICE VISIT (OUTPATIENT)
Dept: INTERNAL MEDICINE | Facility: CLINIC | Age: 54
End: 2019-10-28
Payer: COMMERCIAL

## 2019-10-28 DIAGNOSIS — E66.9 OBESITY, UNSPECIFIED CLASSIFICATION, UNSPECIFIED OBESITY TYPE, UNSPECIFIED WHETHER SERIOUS COMORBIDITY PRESENT: Primary | ICD-10-CM

## 2019-10-28 DIAGNOSIS — E11.9 TYPE 2 DIABETES MELLITUS WITHOUT COMPLICATION, WITHOUT LONG-TERM CURRENT USE OF INSULIN: ICD-10-CM

## 2019-10-28 DIAGNOSIS — I10 ESSENTIAL HYPERTENSION: ICD-10-CM

## 2019-10-28 PROCEDURE — 99999 PR PBB SHADOW E&M-EST. PATIENT-LVL IV: ICD-10-PCS | Mod: PBBFAC,,, | Performed by: INTERNAL MEDICINE

## 2019-10-28 PROCEDURE — 99999 PR PBB SHADOW E&M-EST. PATIENT-LVL IV: CPT | Mod: PBBFAC,,, | Performed by: INTERNAL MEDICINE

## 2019-10-28 PROCEDURE — 99214 PR OFFICE/OUTPT VISIT, EST, LEVL IV, 30-39 MIN: ICD-10-PCS | Mod: S$GLB,,, | Performed by: INTERNAL MEDICINE

## 2019-10-28 PROCEDURE — 99214 OFFICE O/P EST MOD 30 MIN: CPT | Mod: S$GLB,,, | Performed by: INTERNAL MEDICINE

## 2019-10-28 RX ORDER — METFORMIN HYDROCHLORIDE 500 MG/1
500 TABLET ORAL 2 TIMES DAILY WITH MEALS
Qty: 180 TABLET | Refills: 1 | Status: SHIPPED | OUTPATIENT
Start: 2019-10-28 | End: 2020-05-19 | Stop reason: SDUPTHER

## 2019-10-28 RX ORDER — PRAVASTATIN SODIUM 40 MG/1
TABLET ORAL
Qty: 90 TABLET | Refills: 1 | Status: SHIPPED | OUTPATIENT
Start: 2019-10-28 | End: 2020-02-26 | Stop reason: SDUPTHER

## 2019-10-28 RX ORDER — AMLODIPINE BESYLATE 10 MG/1
10 TABLET ORAL DAILY
Qty: 90 TABLET | Refills: 1 | Status: SHIPPED | OUTPATIENT
Start: 2019-10-28 | End: 2020-02-26 | Stop reason: SDUPTHER

## 2019-10-28 RX ORDER — TRIAMTERENE AND HYDROCHLOROTHIAZIDE 37.5; 25 MG/1; MG/1
1 CAPSULE ORAL EVERY MORNING
Qty: 90 CAPSULE | Refills: 1 | Status: SHIPPED | OUTPATIENT
Start: 2019-10-28 | End: 2020-02-26 | Stop reason: SDUPTHER

## 2019-11-02 VITALS
OXYGEN SATURATION: 98 % | BODY MASS INDEX: 39.46 KG/M2 | SYSTOLIC BLOOD PRESSURE: 138 MMHG | HEIGHT: 66 IN | WEIGHT: 245.56 LBS | DIASTOLIC BLOOD PRESSURE: 78 MMHG | HEART RATE: 73 BPM

## 2019-11-02 NOTE — PROGRESS NOTES
Subjective:       Patient ID: Андрей Wooten is a 54 y.o. male.    Chief Complaint: Hypertension    HPI  He returns for management of hypertension.  He has had hypertension for over a year.  Current treatment has included medications outlined in medication list.  He denies chest pain or shortness of breath.  No palpitations.  Denies left arm or neck pain. He has diabetes.  Denies polyuria, polydipsia    Medications:  See med list    Social history:  Does not smoke, does not drink alcohol      Review of Systems   Constitutional: Negative for chills, fatigue, fever and unexpected weight change.   Respiratory: Negative for chest tightness and shortness of breath.    Cardiovascular: Negative for chest pain and palpitations.   Gastrointestinal: Negative for abdominal pain and blood in stool.   Neurological: Negative for dizziness, syncope, numbness and headaches.       Objective:      Physical Exam   HENT:   Right Ear: External ear normal.   Left Ear: External ear normal.   Nose: Nose normal.   Mouth/Throat: Oropharynx is clear and moist.   Eyes: Pupils are equal, round, and reactive to light.   Neck: Normal range of motion.   Cardiovascular: Normal rate and regular rhythm.   No murmur heard.  Pulmonary/Chest: Breath sounds normal.   Abdominal: He exhibits no distension. There is no hepatomegaly. There is no tenderness.   Lymphadenopathy:     He has no cervical adenopathy.     He has no axillary adenopathy.   Neurological: He has normal strength and normal reflexes. No cranial nerve deficit or sensory deficit.       Assessment/Plan       Assessment and plan:  1.  Hypertension:  Check CMP  2.  Diabetes:  Check A1c

## 2020-02-12 ENCOUNTER — PATIENT OUTREACH (OUTPATIENT)
Dept: ADMINISTRATIVE | Facility: HOSPITAL | Age: 55
End: 2020-02-12

## 2020-02-26 ENCOUNTER — LAB VISIT (OUTPATIENT)
Dept: LAB | Facility: HOSPITAL | Age: 55
End: 2020-02-26
Attending: INTERNAL MEDICINE
Payer: COMMERCIAL

## 2020-02-26 ENCOUNTER — OFFICE VISIT (OUTPATIENT)
Dept: INTERNAL MEDICINE | Facility: CLINIC | Age: 55
End: 2020-02-26
Payer: COMMERCIAL

## 2020-02-26 VITALS
DIASTOLIC BLOOD PRESSURE: 92 MMHG | WEIGHT: 238.31 LBS | SYSTOLIC BLOOD PRESSURE: 138 MMHG | HEIGHT: 66 IN | HEART RATE: 74 BPM | BODY MASS INDEX: 38.3 KG/M2 | TEMPERATURE: 98 F | OXYGEN SATURATION: 98 %

## 2020-02-26 DIAGNOSIS — I10 ESSENTIAL HYPERTENSION: ICD-10-CM

## 2020-02-26 DIAGNOSIS — E11.9 TYPE 2 DIABETES MELLITUS WITHOUT COMPLICATION, WITHOUT LONG-TERM CURRENT USE OF INSULIN: ICD-10-CM

## 2020-02-26 LAB
ALBUMIN SERPL BCP-MCNC: 4.4 G/DL (ref 3.5–5.2)
ALP SERPL-CCNC: 51 U/L (ref 55–135)
ALT SERPL W/O P-5'-P-CCNC: 59 U/L (ref 10–44)
ANION GAP SERPL CALC-SCNC: 10 MMOL/L (ref 8–16)
AST SERPL-CCNC: 36 U/L (ref 10–40)
BILIRUB SERPL-MCNC: 0.6 MG/DL (ref 0.1–1)
BUN SERPL-MCNC: 16 MG/DL (ref 6–20)
CALCIUM SERPL-MCNC: 9.9 MG/DL (ref 8.7–10.5)
CHLORIDE SERPL-SCNC: 97 MMOL/L (ref 95–110)
CHOLEST SERPL-MCNC: 210 MG/DL (ref 120–199)
CHOLEST/HDLC SERPL: 5.7 {RATIO} (ref 2–5)
CO2 SERPL-SCNC: 30 MMOL/L (ref 23–29)
CREAT SERPL-MCNC: 1.1 MG/DL (ref 0.5–1.4)
EST. GFR  (AFRICAN AMERICAN): >60 ML/MIN/1.73 M^2
EST. GFR  (NON AFRICAN AMERICAN): >60 ML/MIN/1.73 M^2
ESTIMATED AVG GLUCOSE: 137 MG/DL (ref 68–131)
GLUCOSE SERPL-MCNC: 109 MG/DL (ref 70–110)
HBA1C MFR BLD HPLC: 6.4 % (ref 4–5.6)
HDLC SERPL-MCNC: 37 MG/DL (ref 40–75)
HDLC SERPL: 17.6 % (ref 20–50)
LDLC SERPL CALC-MCNC: 134.4 MG/DL (ref 63–159)
NONHDLC SERPL-MCNC: 173 MG/DL
POTASSIUM SERPL-SCNC: 3.6 MMOL/L (ref 3.5–5.1)
PROT SERPL-MCNC: 8.7 G/DL (ref 6–8.4)
SODIUM SERPL-SCNC: 137 MMOL/L (ref 136–145)
TRIGL SERPL-MCNC: 193 MG/DL (ref 30–150)

## 2020-02-26 PROCEDURE — 3044F PR MOST RECENT HEMOGLOBIN A1C LEVEL <7.0%: ICD-10-PCS | Mod: CPTII,S$GLB,, | Performed by: INTERNAL MEDICINE

## 2020-02-26 PROCEDURE — 99214 OFFICE O/P EST MOD 30 MIN: CPT | Mod: S$GLB,,, | Performed by: INTERNAL MEDICINE

## 2020-02-26 PROCEDURE — 3044F HG A1C LEVEL LT 7.0%: CPT | Mod: CPTII,S$GLB,, | Performed by: INTERNAL MEDICINE

## 2020-02-26 PROCEDURE — 99214 PR OFFICE/OUTPT VISIT, EST, LEVL IV, 30-39 MIN: ICD-10-PCS | Mod: S$GLB,,, | Performed by: INTERNAL MEDICINE

## 2020-02-26 PROCEDURE — 3075F PR MOST RECENT SYSTOLIC BLOOD PRESS GE 130-139MM HG: ICD-10-PCS | Mod: CPTII,S$GLB,, | Performed by: INTERNAL MEDICINE

## 2020-02-26 PROCEDURE — 83036 HEMOGLOBIN GLYCOSYLATED A1C: CPT

## 2020-02-26 PROCEDURE — 3080F PR MOST RECENT DIASTOLIC BLOOD PRESSURE >= 90 MM HG: ICD-10-PCS | Mod: CPTII,S$GLB,, | Performed by: INTERNAL MEDICINE

## 2020-02-26 PROCEDURE — 99999 PR PBB SHADOW E&M-EST. PATIENT-LVL V: ICD-10-PCS | Mod: PBBFAC,,, | Performed by: INTERNAL MEDICINE

## 2020-02-26 PROCEDURE — 3008F PR BODY MASS INDEX (BMI) DOCUMENTED: ICD-10-PCS | Mod: CPTII,S$GLB,, | Performed by: INTERNAL MEDICINE

## 2020-02-26 PROCEDURE — 36415 COLL VENOUS BLD VENIPUNCTURE: CPT

## 2020-02-26 PROCEDURE — 80061 LIPID PANEL: CPT

## 2020-02-26 PROCEDURE — 80053 COMPREHEN METABOLIC PANEL: CPT

## 2020-02-26 PROCEDURE — 3008F BODY MASS INDEX DOCD: CPT | Mod: CPTII,S$GLB,, | Performed by: INTERNAL MEDICINE

## 2020-02-26 PROCEDURE — 3080F DIAST BP >= 90 MM HG: CPT | Mod: CPTII,S$GLB,, | Performed by: INTERNAL MEDICINE

## 2020-02-26 PROCEDURE — 99999 PR PBB SHADOW E&M-EST. PATIENT-LVL V: CPT | Mod: PBBFAC,,, | Performed by: INTERNAL MEDICINE

## 2020-02-26 PROCEDURE — 3075F SYST BP GE 130 - 139MM HG: CPT | Mod: CPTII,S$GLB,, | Performed by: INTERNAL MEDICINE

## 2020-02-26 RX ORDER — AMLODIPINE BESYLATE 10 MG/1
10 TABLET ORAL DAILY
Qty: 90 TABLET | Refills: 1 | Status: SHIPPED | OUTPATIENT
Start: 2020-02-26 | End: 2020-05-19 | Stop reason: SDUPTHER

## 2020-02-26 RX ORDER — PRAVASTATIN SODIUM 40 MG/1
TABLET ORAL
Qty: 90 TABLET | Refills: 1 | Status: SHIPPED | OUTPATIENT
Start: 2020-02-26 | End: 2020-05-19 | Stop reason: SDUPTHER

## 2020-02-26 RX ORDER — LOSARTAN POTASSIUM 50 MG/1
50 TABLET ORAL DAILY
Qty: 30 TABLET | Refills: 3 | Status: SHIPPED | OUTPATIENT
Start: 2020-02-26 | End: 2020-05-19 | Stop reason: SDUPTHER

## 2020-02-26 RX ORDER — TRIAMTERENE AND HYDROCHLOROTHIAZIDE 37.5; 25 MG/1; MG/1
1 CAPSULE ORAL EVERY MORNING
Qty: 90 CAPSULE | Refills: 1 | Status: SHIPPED | OUTPATIENT
Start: 2020-02-26 | End: 2020-05-19 | Stop reason: SDUPTHER

## 2020-03-01 NOTE — PROGRESS NOTES
Subjective:       Patient ID: Андрей Wooten is a 55 y.o. male.    Chief Complaint: Hypertension    HPI  He returns for management of hypertension.  He has had hypertension for over a year.  Current treatment has included medications outlined in medication list.  He denies chest pain or shortness of breath.  No palpitations.  Denies left arm or neck pain. He has diabetes.  Denies polyuria, polydipsia    Medications:  See med list    Social history:  Does not smoke, does not drink alcohol      Review of Systems   Constitutional: Negative for chills, fatigue, fever and unexpected weight change.   Respiratory: Negative for chest tightness and shortness of breath.    Cardiovascular: Negative for chest pain and palpitations.   Gastrointestinal: Negative for abdominal pain and blood in stool.   Neurological: Negative for dizziness, syncope, numbness and headaches.       Objective:      Physical Exam   HENT:   Right Ear: External ear normal.   Left Ear: External ear normal.   Nose: Nose normal.   Mouth/Throat: Oropharynx is clear and moist.   Eyes: Pupils are equal, round, and reactive to light.   Neck: Normal range of motion.   Cardiovascular: Normal rate and regular rhythm.   No murmur heard.  Pulmonary/Chest: Breath sounds normal.   Abdominal: He exhibits no distension. There is no hepatomegaly. There is no tenderness.   Lymphadenopathy:     He has no cervical adenopathy.     He has no axillary adenopathy.   Neurological: He has normal strength and normal reflexes. No cranial nerve deficit or sensory deficit.       Assessment/Plan       Assessment and plan:  1.  Hypertension:  Add Cozaar 50 mg daily.  Return to clinic in 1 month blood pressure check  2.  Diabetes:  Check CMP, lipid panel, A1c

## 2020-03-02 ENCOUNTER — PATIENT OUTREACH (OUTPATIENT)
Dept: ADMINISTRATIVE | Facility: OTHER | Age: 55
End: 2020-03-02

## 2020-03-03 ENCOUNTER — CLINICAL SUPPORT (OUTPATIENT)
Dept: DIABETES | Facility: CLINIC | Age: 55
End: 2020-03-03
Payer: COMMERCIAL

## 2020-03-03 DIAGNOSIS — E11.9 TYPE 2 DIABETES MELLITUS WITHOUT COMPLICATION, WITHOUT LONG-TERM CURRENT USE OF INSULIN: ICD-10-CM

## 2020-03-03 PROCEDURE — 99999 PR PBB SHADOW E&M-EST. PATIENT-LVL I: CPT | Mod: PBBFAC,,, | Performed by: DIETITIAN, REGISTERED

## 2020-03-03 PROCEDURE — G0108 PR DIAB MANAGE TRN  PER INDIV: ICD-10-PCS | Mod: S$GLB,,, | Performed by: DIETITIAN, REGISTERED

## 2020-03-03 PROCEDURE — 99999 PR PBB SHADOW E&M-EST. PATIENT-LVL I: ICD-10-PCS | Mod: PBBFAC,,, | Performed by: DIETITIAN, REGISTERED

## 2020-03-03 PROCEDURE — G0108 DIAB MANAGE TRN  PER INDIV: HCPCS | Mod: S$GLB,,, | Performed by: DIETITIAN, REGISTERED

## 2020-03-05 NOTE — PROGRESS NOTES
Diabetes Education  Author: Dyana Chávez RD  Date: 3/5/2020    Diabetes Care Management Summary  Diabetes Education Record Assessment/Progress: Initial  Current Diabetes Risk Level: Moderate     Last A1c:   Lab Results   Component Value Date    HGBA1C 6.4 (H) 02/26/2020     Last visit with Diabetes Educator: Last Education Visit: Not Found      Diabetes Type  Diabetes Type : Type II    Diabetes History  Diabetes Diagnosis: 1-3 years  Current Treatment: Oral Medication(Metformin)  Reviewed Problem List with Patient: Yes    Health Maintenance was reviewed today with patient. Discussed with patient importance of routine eye exams, foot exams/foot care, blood work (i.e.: A1c, microalbumin, and lipid), dental visits, yearly flu vaccine, and pneumonia vaccine as indicated by PCP. Patient verbalized understanding.     Health Maintenance Topics with due status: Not Due       Topic Last Completion Date    Colonoscopy 03/02/2016    PROSTATE-SPECIFIC ANTIGEN 06/28/2019    Low Dose Statin 02/26/2020    Lipid Panel 02/26/2020    Hemoglobin A1c 02/26/2020     Health Maintenance Due   Topic Date Due    Foot Exam  01/06/1975    Eye Exam  01/06/1975    TETANUS VACCINE  01/06/1983    Pneumococcal Vaccine (Medium Risk) (1 of 1 - PPSV23) 01/06/1984       Nutrition  Meal Planning: skipping meals, eats out often(Varies 2' Shift Work)  What type of beverages do you drink?: water  Meal Plan 24 Hour Recall - Breakfast: cereal, grits and eggs w/ sausage or valadez  Meal Plan 24 Hour Recall - Lunch: red beans and rice, spaghetti (Plate Lunches)  Meal Plan 24 Hour Recall - Dinner: Roast, Chicken, Fish, Steak, Veggies, Peas, Salads  Meal Plan 24 Hour Recall - Snack: Candy, Cookies, Candy    Monitoring   Self Monitoring : No Meter    Exercise   Exercise Type: use exercise equipment  Intensity: Moderate  Frequency: 3-5 Times per week  Duration: 1 hour    Current Diabetes Treatment   Current Treatment: Oral Medication(Metformin)    Social  History  Preferred Learning Method: Face to Face  Primary Support: Self            DDS-2 Score  ( > 3 = SIGNIFICANT DISTRESS): 2.5                   Barriers to Change  Barriers to Change: None  Learning Challenges : None    Readiness to Learn   Readiness to Learn : Acceptance    Cultural Influences  Cultural Influences: None    Diabetes Education Assessment/Progress  Diabetes Disease Process (diabetes disease process and treatment options): Individual Session, Written Materials Provided, Instructed, Demonstrates Understanding/Competency(verbalizes/demonstrates), Discussion, Comprehends Key Points  Nutrition (Incorporating nutritional management into one's lifestyle): Individual Session, Written Materials Provided, Instructed, Demonstrates Understanding/Competency (verbalizes/demonstrates), Comprehends Key Points  Physical Activity (incorporating physical activity into one's lifestyle): Individual Session, Written Materials Provided, Instructed, Demonstrates Understanding/Competency (verbalizes/demonstrates), Discussion, Comprehends Key Points  Medications (states correct name, dose, onset, peak, duration, side effects & timing of meds): Individual Session, Written Materials Provided, Instructed, Demonstrates Understanding/Competency(verbalizes/demonstrates), Discussion, Comprehends Key Points  Monitoring (monitoring blood glucose/other parameters & using results): Individual Session, Written Materials Provided, Instructed, Demonstrates Understanding/Competency (verbalizes/demonstrates), Discussion, Comprehends Key Points  Acute Complications (preventing, detecting, and treating acute complications): Individual Session, Written Materials Provided, Instructed, Demonstrates Understanding/Competency (verbalizes/demonstrates), Discussion, Comprehends Key Points  Chronic Complications (preventing, detecting, and treating chronic complications): Individual Session, Written Materials Provided, Instructed, Demonstrates  Understanding/Competency (verbalizes/demonstrates), Discussion, Comprehends Key Points  Clinical (diabetes, other pertinent medical history, and relevant comorbidities reviewed during visit): Discussion, Comprehends Key Points  Cognitive (knowledge of self-management skills, functional health literacy): Discussion, Comprehends Key Points  Psychosocial (emotional response to diabetes): Discussion, Comprehends Key Points  Diabetes Distress and Support Systems: Discussion, Comprehends Key Points  Behavioral (readiness for change, lifestyle practices, self-care behaviors): Discussion, Comprehends Key Points    Goals  Patient has selected/evaluated goals during today's session: Yes, selected  Healthy Eating: Set(Patient will limit CHOs at meals in order to have bg wnl )  Start Date: 03/03/20              Diabetes Meal Plan  Restrictions: Restricted Carbohydrate  Carbohydrate Per Snack : 15-20g    Today's Self-Management Care Plan was developed with the patient's input and is based on barriers identified during today's assessment.    The long and short-term goals in the care plan were written with the patient/caregiver's input. The patient has agreed to work toward these goals to improve his overall diabetes control.      The patient received a copy of today's self-management plan and verbalized understanding of the care plan, goals, and all of today's instructions.      The patient was encouraged to communicate with his physician and care team regarding his condition(s) and treatment.  I provided the patient with my contact information today and encouraged him to contact me via phone or patient portal as needed.     Education Units of Time   Time Spent: 60 min

## 2020-05-19 ENCOUNTER — OFFICE VISIT (OUTPATIENT)
Dept: INTERNAL MEDICINE | Facility: CLINIC | Age: 55
End: 2020-05-19
Payer: COMMERCIAL

## 2020-05-19 ENCOUNTER — LAB VISIT (OUTPATIENT)
Dept: LAB | Facility: HOSPITAL | Age: 55
End: 2020-05-19
Attending: INTERNAL MEDICINE
Payer: COMMERCIAL

## 2020-05-19 DIAGNOSIS — M25.519 SHOULDER PAIN, UNSPECIFIED CHRONICITY, UNSPECIFIED LATERALITY: Primary | ICD-10-CM

## 2020-05-19 DIAGNOSIS — E11.9 TYPE 2 DIABETES MELLITUS WITHOUT COMPLICATION, WITHOUT LONG-TERM CURRENT USE OF INSULIN: ICD-10-CM

## 2020-05-19 DIAGNOSIS — I10 ESSENTIAL HYPERTENSION: ICD-10-CM

## 2020-05-19 DIAGNOSIS — E66.01 CLASS 2 SEVERE OBESITY WITH BODY MASS INDEX (BMI) OF 35 TO 39.9 WITH SERIOUS COMORBIDITY: ICD-10-CM

## 2020-05-19 LAB
ALBUMIN/CREAT UR: 4.5 UG/MG (ref 0–30)
CREAT UR-MCNC: 110 MG/DL (ref 23–375)
MICROALBUMIN UR DL<=1MG/L-MCNC: 5 UG/ML

## 2020-05-19 PROCEDURE — 3044F HG A1C LEVEL LT 7.0%: CPT | Mod: CPTII,S$GLB,, | Performed by: INTERNAL MEDICINE

## 2020-05-19 PROCEDURE — 3008F PR BODY MASS INDEX (BMI) DOCUMENTED: ICD-10-PCS | Mod: CPTII,S$GLB,, | Performed by: INTERNAL MEDICINE

## 2020-05-19 PROCEDURE — 99214 PR OFFICE/OUTPT VISIT, EST, LEVL IV, 30-39 MIN: ICD-10-PCS | Mod: S$GLB,,, | Performed by: INTERNAL MEDICINE

## 2020-05-19 PROCEDURE — 82043 UR ALBUMIN QUANTITATIVE: CPT

## 2020-05-19 PROCEDURE — 3008F BODY MASS INDEX DOCD: CPT | Mod: CPTII,S$GLB,, | Performed by: INTERNAL MEDICINE

## 2020-05-19 PROCEDURE — 3074F PR MOST RECENT SYSTOLIC BLOOD PRESSURE < 130 MM HG: ICD-10-PCS | Mod: CPTII,S$GLB,, | Performed by: INTERNAL MEDICINE

## 2020-05-19 PROCEDURE — 3074F SYST BP LT 130 MM HG: CPT | Mod: CPTII,S$GLB,, | Performed by: INTERNAL MEDICINE

## 2020-05-19 PROCEDURE — 3078F PR MOST RECENT DIASTOLIC BLOOD PRESSURE < 80 MM HG: ICD-10-PCS | Mod: CPTII,S$GLB,, | Performed by: INTERNAL MEDICINE

## 2020-05-19 PROCEDURE — 3044F PR MOST RECENT HEMOGLOBIN A1C LEVEL <7.0%: ICD-10-PCS | Mod: CPTII,S$GLB,, | Performed by: INTERNAL MEDICINE

## 2020-05-19 PROCEDURE — 99214 OFFICE O/P EST MOD 30 MIN: CPT | Mod: S$GLB,,, | Performed by: INTERNAL MEDICINE

## 2020-05-19 PROCEDURE — 99999 PR PBB SHADOW E&M-EST. PATIENT-LVL IV: CPT | Mod: PBBFAC,,, | Performed by: INTERNAL MEDICINE

## 2020-05-19 PROCEDURE — 99999 PR PBB SHADOW E&M-EST. PATIENT-LVL IV: ICD-10-PCS | Mod: PBBFAC,,, | Performed by: INTERNAL MEDICINE

## 2020-05-19 PROCEDURE — 3078F DIAST BP <80 MM HG: CPT | Mod: CPTII,S$GLB,, | Performed by: INTERNAL MEDICINE

## 2020-05-19 RX ORDER — METFORMIN HYDROCHLORIDE 500 MG/1
500 TABLET ORAL 2 TIMES DAILY WITH MEALS
Qty: 180 TABLET | Refills: 1 | Status: SHIPPED | OUTPATIENT
Start: 2020-05-19 | End: 2020-09-22 | Stop reason: SDUPTHER

## 2020-05-19 RX ORDER — AMLODIPINE BESYLATE 10 MG/1
10 TABLET ORAL DAILY
Qty: 90 TABLET | Refills: 1 | Status: SHIPPED | OUTPATIENT
Start: 2020-05-19 | End: 2020-09-01

## 2020-05-19 RX ORDER — LOSARTAN POTASSIUM 50 MG/1
50 TABLET ORAL DAILY
Qty: 90 TABLET | Refills: 1 | Status: SHIPPED | OUTPATIENT
Start: 2020-05-19 | End: 2020-09-22 | Stop reason: SDUPTHER

## 2020-05-19 RX ORDER — TRIAMTERENE AND HYDROCHLOROTHIAZIDE 37.5; 25 MG/1; MG/1
1 CAPSULE ORAL EVERY MORNING
Qty: 90 CAPSULE | Refills: 1 | Status: SHIPPED | OUTPATIENT
Start: 2020-05-19 | End: 2020-09-22 | Stop reason: SDUPTHER

## 2020-05-19 RX ORDER — PRAVASTATIN SODIUM 40 MG/1
TABLET ORAL
Qty: 90 TABLET | Refills: 1 | Status: SHIPPED | OUTPATIENT
Start: 2020-05-19 | End: 2020-09-01

## 2020-05-24 VITALS
OXYGEN SATURATION: 97 % | WEIGHT: 237.19 LBS | BODY MASS INDEX: 38.12 KG/M2 | TEMPERATURE: 98 F | SYSTOLIC BLOOD PRESSURE: 124 MMHG | HEIGHT: 66 IN | HEART RATE: 85 BPM | DIASTOLIC BLOOD PRESSURE: 78 MMHG

## 2020-05-24 NOTE — PROGRESS NOTES
Subjective:       Patient ID: Андрей Wooten is a 55 y.o. male.    Chief Complaint: Hypertension    HPI  He returns for management of hypertension.  He has had hypertension for over a year.  Current treatment has included medications outlined in medication list.  He denies chest pain or shortness of breath.  No palpitations.  Denies left arm or neck pain.  He has diabetes.  Denies polyuria, polydipsia    Medications:  See med list    Social history:  Does not smoke, does not drink alcohol      Review of Systems   Constitutional: Negative for chills, fatigue, fever and unexpected weight change.   Respiratory: Negative for chest tightness and shortness of breath.    Cardiovascular: Negative for chest pain and palpitations.   Gastrointestinal: Negative for abdominal pain and blood in stool.   Neurological: Negative for dizziness, syncope, numbness and headaches.       Objective:      Physical Exam   HENT:   Right Ear: External ear normal.   Left Ear: External ear normal.   Nose: Nose normal.   Mouth/Throat: Oropharynx is clear and moist.   Eyes: Pupils are equal, round, and reactive to light.   Neck: Normal range of motion.   Cardiovascular: Normal rate and regular rhythm.   No murmur heard.  Pulmonary/Chest: Breath sounds normal.   Abdominal: He exhibits no distension. There is no hepatomegaly. There is no tenderness.   Lymphadenopathy:     He has no cervical adenopathy.     He has no axillary adenopathy.   Neurological: He has normal strength and normal reflexes. No cranial nerve deficit or sensory deficit.       Assessment/Plan       Assessment and plan:  1.  Hypertension:  Check CMP  2.  Diabetes:  Check A1c and urine microalbumin

## 2020-05-27 ENCOUNTER — CLINICAL SUPPORT (OUTPATIENT)
Dept: REHABILITATION | Facility: HOSPITAL | Age: 55
End: 2020-05-27
Payer: COMMERCIAL

## 2020-05-27 DIAGNOSIS — M25.611 DECREASED RANGE OF MOTION OF RIGHT SHOULDER: ICD-10-CM

## 2020-05-27 DIAGNOSIS — M25.519 SHOULDER PAIN, UNSPECIFIED CHRONICITY, UNSPECIFIED LATERALITY: ICD-10-CM

## 2020-05-27 DIAGNOSIS — M75.41 SHOULDER IMPINGEMENT SYNDROME, RIGHT: ICD-10-CM

## 2020-05-27 DIAGNOSIS — R29.3 POOR POSTURE: ICD-10-CM

## 2020-05-27 DIAGNOSIS — R29.898 WEAKNESS OF RIGHT UPPER EXTREMITY: ICD-10-CM

## 2020-05-27 PROBLEM — M62.9 HAMSTRING TIGHTNESS OF BOTH LOWER EXTREMITIES: Status: RESOLVED | Noted: 2017-04-19 | Resolved: 2020-05-27

## 2020-05-27 PROCEDURE — 97110 THERAPEUTIC EXERCISES: CPT | Mod: PO

## 2020-05-27 PROCEDURE — 97162 PT EVAL MOD COMPLEX 30 MIN: CPT | Mod: PO

## 2020-05-27 NOTE — PLAN OF CARE
[facility]  Physical Therapy Initial Evaluation    Date: 5/27/2020   Name: Андрей Wooten  Clinic Number: [id number]    Therapy Diagnosis:   Encounter Diagnoses   Name Primary?    Shoulder pain, unspecified chronicity, unspecified laterality     Decreased range of motion of right shoulder     Weakness of right upper extremity     Poor posture     Shoulder impingement syndrome, right      Physician: Khushboo Pugh MD    Physician Orders: PT Eval and Treat   Medical Diagnosis from Referral: M25.519 (ICD-10-CM) - Shoulder pain, unspecified chronicity, unspecified laterality  Evaluation Date: 5/27/2020  Authorization Period Expiration: 12/31/2020  Plan of Care Expiration: 7/27/2020  Visit # / Visits authorized: 1/ 30    Time In: 10:15 am  Time Out: 11:00 am  Total Appointment Time (timed & untimed codes): 45 minutes    Precautions: Standard and Diabetes    Subjective   Date of onset: April 2020  History of current condition - Андрей reports: an insidious onset to his right shoulder pain. He gets increased pain in his right shoulder with quick overhead movements, sleeping on his right side, pulling hard on something(valves at work), pulling a shirt over his head, reaching across his body for the seatbelt and washing his back, driving a long distance with arm on top of steering wheel increases pain, carrying something upstairs, and picking up a case of water increases his pain. He does report an history of right shoulder dislocation while in high school and he has been doing some of the exercises for his shoulder from his previous episode of therapy for the left shoulder.       Medical History:   Past Medical History:   Diagnosis Date    HLD (hyperlipidemia)     Hypertension     Liver fibrosis 6/7/2018    -- liver biopsy 5/28/18 - VOGEL, macrosteatosis 80%, microsteatosis 10%, stage 1 out of 4 fibrosis -- MRI elastography 5/29/17 - F1-F2    VOGEL (nonalcoholic steatohepatitis)     --  liver biopsy 5/28/18 - VOGEL, macrosteatosis 80%, microsteatosis 10%, stage 1 out of 4 fibrosis    Type 2 diabetes mellitus without complication, without long-term current use of insulin 6/7/2018       Surgical History:   Андрей Wooten  has a past surgical history that includes Colonoscopy (N/A, 3/2/2016); Biopsy of liver with ultrasound guidance (N/A, 5/28/2018); Liver biopsy (4/1/15); and Liver biopsy (05/2018).    Medications:   Андрей has a current medication list which includes the following prescription(s): amlodipine, aspirin, cetirizine, diethylpropion, fluticasone propionate, losartan, metformin, pravastatin, triamterene-hydrochlorothiazide 37.5-25 mg, and vitamin d.    Allergies:   Review of patient's allergies indicates:  No Known Allergies     Imaging, none: no recent images available    Prior Therapy: Patient previously seen in this facility for low back pain  Social History: two story home, lives with their family  Occupation:   Prior Level of Function: independent  Current Level of Function: mod I with pain    Pain:  Current 3/10, worst 8/10, best 2/10   Location: right shoulder   Description: Sharp  Aggravating Factors: Morning, Lifting and reaching  Easing Factors: hot bath and stretching    Pts goals: 100% range without pain    Objective     Observation: Patient is a 55 year old male, who presents to the clinic in no apparent distress. He is right handed.     Posture: forward head and rounded shoulders      Passive Range of Motion:   Shoulder Left Right   Flexion 175 160   Abduction 180 170   ER at 90 80 90   IR 90 90      Active Range of Motion:   Shoulder Left Right   Flexion 170 130   Abduction 180 150   ER at 90 C7 T2   IR T12 T9     Upper Extremity Strength   (L) UE (R) UE   Shoulder flexion: 5/5 4/5   Shoulder Abduction: 4/5 4-/5   Shoulder ER 5/5 5/5   Shoulder IR 5/5 5/5   Lower Trap 4+/5 4/5   Middle Trap 5/5 5/5   Rhomboids 5/5 5/5         Special Tests:  AC Joint Left  "Right   AC Joint Compression Test negative negative   Empty Can Test negative negative   Drop Arm test negative Negative, but pain   Subscaputlaris Lift Off negative positive   Neer's Test negative positive       Joint Mobility: bilateral glenohumeral accessory mobility is equal and pain free    Palpation: tender to palpation over R AC joint, supraspinatus muscle belly, infraspinatus     Sensation: light touch intact    Flexibility: R AC joint 3 fingers from table, L AC joint 2 fingers from table.     Scapular Control/Dyskinesis:    Normal / Subtle / Obvious  Comments    Left  Normal --   Right  normal --          Limitation/Restriction for FOTO Shoulder Survey    Therapist reviewed FOTO scores for Андрей Wooten on 5/27/2020.   FOTO documents entered into [employer] - see Media section.    Limitation Score: 44%         TREATMENT   Treatment Time In: 10:50 am  Treatment Time Out: 11:00 am  Total Treatment time (time-based codes) separate from Evaluation: 10 minutes    Андрей received therapeutic exercises to develop strength, ROM and flexibility for 10 minutes including:    Date  5/27/2020   VISIT 1/30   FOTO 1/5   POC EXP. DATE 7/27/2020   FACE-TO-FACE 6/27/2020       TABLE:    Supine pec str w/ towel --   Snow angels 1 x 5   Butterflies  --   Wand flexion ---   Wand abduction --   Wand ER --   Side lying flexion --   Side lying abduction --   Side lying ER --   PRONE:    Shoulder flexion --   Shoulder scaption --   Shoulder abduction --   SEATED:    Scapula squeezes 1 x 10   Shoulder rolls --       STANDING:    Door way stretch 5 x 10"   Wall walks --   Theraband  - W's  - T's  - I's  - Rows  - ER  -IR   --  --  --  --  --  1 x 5 RTB           Initials DG     Home Exercises and Patient Education Provided    Education provided:   - compliance with HEP  - role of PT and goals for PT    Written Home Exercises Provided: yes.  Exercises were reviewed and Андрей was able to demonstrate them prior to the end of the session.  " Андрей demonstrated good  understanding of the education provided.     See EMR under Patient Instructions for exercises provided 5/27/2020.    Assessment   Андрей is a 55 y.o. male referred to outpatient Physical Therapy with a medical diagnosis of Shoulder pain. Pt presents with decreased AROM of right shoulder, pec major tightness R>L, right UE weakness, and poor posture that is consistent with his diagnosis. Due to his decreased ROM, muscle weakness, and muscle tightness he has difficulty performing his usual work duties, overhead activities, reaching across his body, lifting and carrying objects, bathing, and dressing without an increase in symptoms. His score on FOTO Shoulder Survey indicates 44% impaired, limited, or restricted.     Pt prognosis is Good.   Pt will benefit from skilled outpatient Physical Therapy to address the deficits stated above and in the chart below, provide pt/family education, and to maximize pt's level of independence.     Plan of care discussed with patient: Yes  Pt's spiritual, cultural and educational needs considered and patient is agreeable to the plan of care and goals as stated below:     Anticipated Barriers for therapy: none    Medical Necessity is demonstrated by the following  History  Co-morbidities and personal factors that may impact the plan of care Co-morbidities:   diabetes, high BMI, HTN and level of undertstanding of current condition    Personal Factors:   no deficits     moderate   Examination  Body Structures and Functions, activity limitations and participation restrictions that may impact the plan of care Body Regions:   upper extremities    Body Systems:    ROM  strength  motor control  flexibility    Participation Restrictions:   Difficulty with overhead activities, reaching across body, lifting, carrying, pulling, performing usual work activities, prolonged driving    Activity limitations:   Learning and applying knowledge  no deficits    General Tasks and  Commands  no deficits    Communication  no deficits    Mobility  lifting and carrying objects  driving (bike, car, motorcycle)    Self care  caring for body parts (brushing teeth, shaving, grooming)  dressing    Domestic Life  shopping    Interactions/Relationships  no deficits    Life Areas  employment    Community and Social Life  community life  recreation and leisure         moderate   Clinical Presentation evolving clinical presentation with changing clinical characteristics moderate   Decision Making/ Complexity Score: moderate     Goals:  Short Term Goals: 4 weeks   1. This patient will be independent with a basic HEP.  2. This patient will have R shoulder active range of motion of 170 degrees flexion in order to pull a shirt over his head with no complaints of pain.  3. This patient will increase R shoulder strength 1 grade in order to be able to  a case of water with no increase in symptoms.   4. This patient will have a pain rating of 5/10 at worst with ADLs.   5. Patient will be able to achieve greater than or equal to 66 on the FOTO Shoulder Survey indicating patient is 34% impaired, limited, or restricted and demonstrating overall improved functional ability with upper extremity.    Long Term Goals: 8 weeks   1. This patient will be independent with an updated HEP.  2. This patient will have R shoulder active range of motion equal to L shoulder active range of motion  in order to wash his back with no complaints of pain.  3. 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   4. This patient will have a pain rating of 1/10 at worst with ADLs.   5. Patient will be able to achieve greater than or equal to 76 on the FOTO Shoulder Survey indicating patient is 24% impaired, limited, or restricted and demonstrating overall improved functional ability with upper extremity.       Plan   Plan of care Certification: 5/27/2020 to  7/27/2020.    Outpatient Physical Therapy 2 times weekly for 8 weeks to include the following interventions: Manual Therapy, Moist Heat/ Ice, Neuromuscular Re-ed, Patient Education, Therapeutic Exercise and IASTM. Dry needling with manual therapy techniques to decrease pain, inflammation and swelling, increase circulation and promote healing process.    Viridiana Stoner, PT

## 2020-05-27 NOTE — PATIENT INSTRUCTIONS
Arm Sweep: [employer] [place] [employer] [place]        Get ON TARGET. Lie on towel roll, feet on floor. Keeping arms on floor, sweep out and up beyond head. Stop and stretch as tightness develops.  Hold 30 seconds, working up to 2 minutes. Repeat 1 times. Do 2 sessions per day.      Copyright © [employer]. All rights reserved.   Scapula Adduction With Pectorals, Mid-Range         doorframe with palms against frame and arms at 90°. Lean forward and squeeze shoulder blades. Hold 10 seconds.  Repeat 5 times per session. Do 2 sessions per day.    Copyright © [employer]. All rights reserved.   Scapular Retraction (Standing)        With arms at sides, pinch shoulder blades together.  Repeat 10 times per set. Do 1 sets per session. Do 2 sessions per day.     [email or web address]     Copyright © [employer]. All rights reserved.   Strengthening: Resisted Internal Rotation        Hold tubing in right hand, elbow at side and forearm out. Rotate forearm in across body.  Repeat 10 times per set. Do 3 sets per session. Do 2 sessions per day.     [email or web address]     Copyright © [employer]. All rights reserved.

## 2020-06-02 ENCOUNTER — CLINICAL SUPPORT (OUTPATIENT)
Dept: REHABILITATION | Facility: HOSPITAL | Age: 55
End: 2020-06-02
Payer: COMMERCIAL

## 2020-06-02 DIAGNOSIS — M75.41 SHOULDER IMPINGEMENT SYNDROME, RIGHT: ICD-10-CM

## 2020-06-02 DIAGNOSIS — R29.898 WEAKNESS OF RIGHT UPPER EXTREMITY: ICD-10-CM

## 2020-06-02 DIAGNOSIS — M25.611 DECREASED RANGE OF MOTION OF RIGHT SHOULDER: ICD-10-CM

## 2020-06-02 DIAGNOSIS — R29.3 POOR POSTURE: ICD-10-CM

## 2020-06-02 PROCEDURE — 97110 THERAPEUTIC EXERCISES: CPT | Mod: PO

## 2020-06-02 NOTE — PATIENT INSTRUCTIONS
"    W I T Y standing with band    WITY For Shoulder and Upper Back  W: Elevate arms lifting elbows to shoulder height, externally rotate to form "W"  I: Pull hands down toward hips to form "I"  T: Hands away from body, thumbs pointing behind you form "T"  Y: Raise arms overhead slightly away from vertical, form "Y"     Do 3 sets of 10 reps of each 2 x a day.  External Rotator Cuff Stretch, Supine        Lie supine, fingers clasped behind head, elbows close together. Pull elbows backward while pinching shoulder blades.  Repeat 10 times per session. Do 2 sessions per day.    Copyright © [employer]. All rights reserved.     "

## 2020-06-02 NOTE — PROGRESS NOTES
"  Physical Therapy Treatment Note     Name: Андрей Wooten  Clinic Number: [id number]    Therapy Diagnosis:   Encounter Diagnoses   Name Primary?    Decreased range of motion of right shoulder     Weakness of right upper extremity     Poor posture     Shoulder impingement syndrome, right      Physician: Khushboo Pugh MD    Visit Date: 6/2/2020    Physician Orders: PT Eval and Treat   Medical Diagnosis from Referral: M25.519 (ICD-10-CM) - Shoulder pain, unspecified chronicity, unspecified laterality  Evaluation Date: 5/27/2020  Authorization Period Expiration: 12/31/2020  Plan of Care Expiration: 7/27/2020  Visit # / Visits authorized: 1/ 30    Time In: 4:00 pm  Time Out: 4:35 pm  Total Billable Time: 32 minutes    Precautions: Standard and Diabetes    Subjective     Pt reports: feeling better, he feels less pain after he stretches.  He was compliant with home exercise program.  Response to previous treatment: no increase soreness  Functional change: none    Pain: 2/10  Location: right shoulder      Objective     Андрей received therapeutic exercises to develop strength, ROM, flexibility and posture for 32 minutes including:    Date  6/2/2020   VISIT 2/30   FOTO 2/5   POC EXP. DATE 7/27/2020   FACE-TO-FACE 6/27/2020       TABLE:    Supine pec str w/ towel 1'   Snow angels 1 x 10   Butterflies  1 x 10   Side lying flexion --   Side lying abduction --   Side lying ER --   PRONE:    Shoulder flexion --   Shoulder scaption --   Shoulder abduction --   SEATED:    Scapula squeezes 1 x 10   UBE L2 x 3'   STANDING:    Door way stretch 5 x 10"       Theraband  - W's  - T's  - I's  - Rows  - ER  - IR   --  1 x 10 RTB  --  2 x 10 RTB  2 x 10 RTB  3 x 10 RTB           Initials DG     Home Exercises Provided and Patient Education Provided     Education provided:   - continued compliance with HEP    Written Home Exercises Provided: yes.  Exercises were reviewed and Андрей was able to demonstrate them prior to the end of the " session.  Андрей demonstrated good  understanding of the education provided.     See EMR under Patient Instructions for exercises provided 6/2/2020.    Assessment     Андрей was able to begin making progress towards his goals as he was able to perform all of today's activities with no increase in symptoms prior to leaving the clinic. He required occasional verbal and tactile cues for scapula placement with standing theraband exercises. He was able to demonstrate active flexion of R shoulder equal to L shoulder at the start of the session.   Андрей is progressing well towards his goals.   Pt prognosis is Good.     Pt will continue to benefit from skilled outpatient physical therapy to address the deficits listed in the problem list box on initial evaluation, provide pt/family education and to maximize pt's level of independence in the home and community environment.     Pt's spiritual, cultural and educational needs considered and pt agreeable to plan of care and goals.     Anticipated barriers to physical therapy: none    Goals:   Short Term Goals: 4 weeks   1. This patient will be independent with a basic HEP. In progress, not met  2. This patient will have R shoulder active range of motion of 170 degrees flexion in order to pull a shirt over his head with no complaints of pain. In progress, not met  3. This patient will increase R shoulder strength 1 grade in order to be able to  a case of water with no increase in symptoms. In progress, not met  4. This patient will have a pain rating of 5/10 at worst with ADLs. In progress, not met  5. Patient will be able to achieve greater than or equal to 66 on the FOTO Shoulder Survey indicating patient is 34% impaired, limited, or restricted and demonstrating overall improved functional ability with upper extremity. In progress, not met     Long Term Goals: 8 weeks   1. This patient will be independent with an updated HEP. In progress, not met  2. This patient will  have R shoulder active range of motion equal to L shoulder active range of motion  in order to wash his back with no complaints of pain. In progress, not met  3. 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 In progress, not met  4. This patient will have a pain rating of 1/10 at worst with ADLs. In progress, not met  5. Patient will be able to achieve greater than or equal to 76 on the FOTO Shoulder Survey indicating patient is 24% impaired, limited, or restricted and demonstrating overall improved functional ability with upper extremity. In progress, not met    Plan     Begin prone shoulder strengthening next visit.     Viridiana Stoner, PT

## 2020-06-03 PROBLEM — M75.41 SHOULDER IMPINGEMENT SYNDROME, RIGHT: Status: ACTIVE | Noted: 2020-06-03

## 2020-06-03 PROBLEM — R29.3 POOR POSTURE: Status: ACTIVE | Noted: 2020-06-03

## 2020-06-03 PROBLEM — R29.898 WEAKNESS OF RIGHT UPPER EXTREMITY: Status: ACTIVE | Noted: 2020-06-03

## 2020-06-03 PROBLEM — M25.611 DECREASED RANGE OF MOTION OF RIGHT SHOULDER: Status: ACTIVE | Noted: 2020-06-03

## 2020-06-11 ENCOUNTER — CLINICAL SUPPORT (OUTPATIENT)
Dept: REHABILITATION | Facility: HOSPITAL | Age: 55
End: 2020-06-11
Payer: COMMERCIAL

## 2020-06-11 DIAGNOSIS — R29.898 WEAKNESS OF RIGHT UPPER EXTREMITY: ICD-10-CM

## 2020-06-11 DIAGNOSIS — M25.611 DECREASED RANGE OF MOTION OF RIGHT SHOULDER: ICD-10-CM

## 2020-06-11 DIAGNOSIS — R29.3 POOR POSTURE: ICD-10-CM

## 2020-06-11 DIAGNOSIS — M75.41 SHOULDER IMPINGEMENT SYNDROME, RIGHT: ICD-10-CM

## 2020-06-11 PROCEDURE — 97110 THERAPEUTIC EXERCISES: CPT | Mod: PO

## 2020-06-11 NOTE — PROGRESS NOTES
"  Physical Therapy Treatment Note     Name: Андрей Wootne  Clinic Number: [id number]    Therapy Diagnosis:   Encounter Diagnoses   Name Primary?    Decreased range of motion of right shoulder     Weakness of right upper extremity     Poor posture     Shoulder impingement syndrome, right      Physician: Khushboo Pugh MD    Visit Date: 6/11/2020    Physician Orders: PT Eval and Treat   Medical Diagnosis from Referral: M25.519 (ICD-10-CM) - Shoulder pain, unspecified chronicity, unspecified laterality  Evaluation Date: 5/27/2020  Authorization Period Expiration: 12/31/2020  Plan of Care Expiration: 7/27/2020  Visit # / Visits authorized: 3/ 30    Time In: 10:45 am  Time Out: 11:30 am  Total Billable Time: 45 minutes    Precautions: Standard and Diabetes    Subjective     Pt reports: having dull ache in posterior aspect of shoulder but overall decreased symptoms since beginning therapy.  He was compliant with home exercise program.  Response to previous treatment: no increase soreness  Functional change: none    Pain: 2/10  Location: right shoulder      Objective     Андрей received therapeutic exercises to develop strength, ROM, flexibility and posture for 45 minutes including:    Date  6/11/2020 6/2/2020   VISIT 3/30 2/30   FOTO 3/5 2/5   POC EXP. DATE 7/27/2020 7/27/2020   FACE-TO-FACE 6/27/2020 6/27/2020        TABLE:     Supine pec str w/ towel 2' 1'   Snow angels 3 x 10 1 x 10   Butterflies  3 x 10 1 x 10   Side lying flexion 1 x 15 --   Side lying abduction 1 x 15 --   Side lying ER 1 x 15 --   PRONE:     Shoulder flexion -- --   Shoulder scaption 1 x 10 --   Shoulder abduction 1 x 10 --   SEATED:     Scapula squeezes 15 x 3" 1 x 10   UBE L2.5 x 3' L2 x 3'   STANDING:     Door way stretch 5 x 10" 5 x 10"        Theraband  - W's  - T's  - I's  - Rows  - ER  - IR   1 x 10 YTB  1 x 10 RTB  1 x 10 RTB  3 x 10 GTB  2 x 10 RTB  2 x 10 RTB   --  1 x 10 RTB  --  2 x 10 RTB  2 x 10 RTB  3 x 10 RTB           "   Initials JOSUE SKINNER     Home Exercises Provided and Patient Education Provided     Education provided:   - continued compliance with HEP    Written Home Exercises Provided: yes.  Exercises were reviewed and Андрей was able to demonstrate them prior to the end of the session.  Андрей demonstrated good  understanding of the education provided.     See EMR under Patient Instructions for exercises provided 6/2/2020.    Assessment     Андрей has been compliant with home exercises and stretching and reporting decrease in the symptoms.  He is progressed with exercises today, but requires verbal and tactile cues for proper scapular setting during exercises as well as verbal cues to avoid upper trap substitution.  He is noted to have have tenderness and increased tissue tension in right upper trapezius and posterior rotator cuff musculature.    Андрей is progressing well towards his goals.   Pt prognosis is Good.     Pt will continue to benefit from skilled outpatient physical therapy to address the deficits listed in the problem list box on initial evaluation, provide pt/family education and to maximize pt's level of independence in the home and community environment.     Pt's spiritual, cultural and educational needs considered and pt agreeable to plan of care and goals.     Anticipated barriers to physical therapy: none    Goals:   Short Term Goals: 4 weeks   1. This patient will be independent with a basic HEP. In progress, not met  2. This patient will have R shoulder active range of motion of 170 degrees flexion in order to pull a shirt over his head with no complaints of pain. In progress, not met  3. This patient will increase R shoulder strength 1 grade in order to be able to  a case of water with no increase in symptoms. In progress, not met  4. This patient will have a pain rating of 5/10 at worst with ADLs. In progress, not met  5. Patient will be able to achieve greater than or equal to 66 on the FOTO  Shoulder Survey indicating patient is 34% impaired, limited, or restricted and demonstrating overall improved functional ability with upper extremity. In progress, not met     Long Term Goals: 8 weeks   1. This patient will be independent with an updated HEP. In progress, not met  2. This patient will have R shoulder active range of motion equal to L shoulder active range of motion  in order to wash his back with no complaints of pain. In progress, not met  3. 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 In progress, not met  4. This patient will have a pain rating of 1/10 at worst with ADLs. In progress, not met  5. Patient will be able to achieve greater than or equal to 76 on the FOTO Shoulder Survey indicating patient is 24% impaired, limited, or restricted and demonstrating overall improved functional ability with upper extremity. In progress, not met    Plan     Continue with PT POC and progress prone scapular stabilization exercises next visit.      Campos Cheek, PT

## 2020-06-15 ENCOUNTER — CLINICAL SUPPORT (OUTPATIENT)
Dept: REHABILITATION | Facility: HOSPITAL | Age: 55
End: 2020-06-15
Payer: COMMERCIAL

## 2020-06-15 DIAGNOSIS — M75.41 SHOULDER IMPINGEMENT SYNDROME, RIGHT: ICD-10-CM

## 2020-06-15 DIAGNOSIS — M25.611 DECREASED RANGE OF MOTION OF RIGHT SHOULDER: ICD-10-CM

## 2020-06-15 DIAGNOSIS — R29.898 WEAKNESS OF RIGHT UPPER EXTREMITY: ICD-10-CM

## 2020-06-15 DIAGNOSIS — R29.3 POOR POSTURE: ICD-10-CM

## 2020-06-15 PROCEDURE — 97110 THERAPEUTIC EXERCISES: CPT | Mod: PO

## 2020-06-15 NOTE — PROGRESS NOTES
"  Physical Therapy Treatment Note     Name: Андрей Wooten  Clinic Number: [id number]    Therapy Diagnosis:   No diagnosis found.  Physician: Khushboo Pugh MD    Visit Date: 6/15/2020    Physician Orders: PT Eval and Treat   Medical Diagnosis from Referral: M25.519 (ICD-10-CM) - Shoulder pain, unspecified chronicity, unspecified laterality  Evaluation Date: 5/27/2020  Authorization Period Expiration: 12/31/2020  Plan of Care Expiration: 7/27/2020  Visit # / Visits authorized: 4/ 30    Time In: 9:30 am  Time Out: 10:19 am  Total Billable Time: 46  minutes    Precautions: Standard and Diabetes    Subjective     Pt reports: having some increased soreness from the new exercises   He was compliant with home exercise program.  Response to previous treatment: slight increase in soreness  Functional change: none    Pain: 3/10  Location: right shoulder      Objective     Андрей received therapeutic exercises to develop strength, ROM, flexibility and posture for 46 minutes including:    Date  6/15/2020 6/11/2020 6/2/2020   VISIT 4/30 3/30 2/30   FOTO 4/5 3/5 2/5   POC EXP. DATE 7/27/2020 7/27/2020 7/27/2020   FACE-TO-FACE 6/27/2020 6/27/2020 6/27/2020         TABLE:      Supine pec str w/ towel Half roll  2' 2' 1'   Snow angels Half roll  1 x 15 3 x 10 1 x 10   Butterflies  Half roll  1 x 15 3 x 10 1 x 10   Side lying flexion 2 x 10 1 x 15 --   Side lying abduction 2 x 10 1 x 15 --   Side lying ER 2 x 10 1 x 15 --   PRONE:      Shoulder flexion 1 x 10 -- --   Shoulder scaption 1 x 10 1 x 10 --   Shoulder abduction 1 x 10 1 x 10 --   SEATED:      Scapula squeezes 15 x 3" 15 x 3" 1 x 10   UBE L3 x 5' L2.5 x 3' L2 x 3'   STANDING:      Door way stretch 5 x 10" 5 x 10" 5 x 10"         Theraband  - W's  - T's  - I's  - Rows  - ER  - IR   1 x 15 YTB  1 x 15 RTB  1 x 15 RTB  3 x 10 GTB  3 x 10 RTB  3 x 10 RTB   1 x 10 YTB  1 x 10 RTB  1 x 10 RTB  3 x 10 GTB  2 x 10 RTB  2 x 10 RTB   --  1 x 10 RTB  --  2 x 10 RTB  2 x 10 RTB  3 x " 10 RTB               Initials DG MA DG     Home Exercises Provided and Patient Education Provided     Education provided:   - continued compliance with HEP    Written Home Exercises Provided: yes.  Exercises were reviewed and Андрей was able to demonstrate them prior to the end of the session.  Андрей demonstrated good  understanding of the education provided.     See EMR under Patient Instructions for exercises provided 6/2/2020.    Assessment     Андрей was able to complete all of today's progressions and new exercises with no increase in symptoms. He continues to require occasional verbal or tactile cues for scapula placement with all exercises. He continues to fatigue quickly with prone exercises and required more frequent cues for scapula placement.   Андрей is progressing well towards his goals.   Pt prognosis is Good.     Pt will continue to benefit from skilled outpatient physical therapy to address the deficits listed in the problem list box on initial evaluation, provide pt/family education and to maximize pt's level of independence in the home and community environment.     Pt's spiritual, cultural and educational needs considered and pt agreeable to plan of care and goals.     Anticipated barriers to physical therapy: none    Goals:   Short Term Goals: 4 weeks   1. This patient will be independent with a basic HEP. In progress, not met  2. This patient will have R shoulder active range of motion of 170 degrees flexion in order to pull a shirt over his head with no complaints of pain. In progress, not met  3. This patient will increase R shoulder strength 1 grade in order to be able to  a case of water with no increase in symptoms. In progress, not met  4. This patient will have a pain rating of 5/10 at worst with ADLs. In progress, not met  5. Patient will be able to achieve greater than or equal to 66 on the FOTO Shoulder Survey indicating patient is 34% impaired, limited, or restricted and  demonstrating overall improved functional ability with upper extremity. In progress, not met     Long Term Goals: 8 weeks   1. This patient will be independent with an updated HEP. In progress, not met  2. This patient will have R shoulder active range of motion equal to L shoulder active range of motion  in order to wash his back with no complaints of pain. In progress, not met  3. 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 In progress, not met  4. This patient will have a pain rating of 1/10 at worst with ADLs. In progress, not met  5. Patient will be able to achieve greater than or equal to 76 on the FOTO Shoulder Survey indicating patient is 24% impaired, limited, or restricted and demonstrating overall improved functional ability with upper extremity. In progress, not met    Plan     Continue with PT POC and increase to blue resistance band next visit for rows.     Viridiana Stoner, PT

## 2020-06-24 ENCOUNTER — DOCUMENTATION ONLY (OUTPATIENT)
Dept: REHABILITATION | Facility: HOSPITAL | Age: 55
End: 2020-06-24

## 2020-06-24 NOTE — PROGRESS NOTES
"  Physical Therapy Treatment Note     Name: Андрей Wooten  Clinic Number: [id number]    Therapy Diagnosis:   Encounter Diagnoses   Name Primary?    Decreased range of motion of right shoulder Yes    Weakness of right upper extremity     Poor posture     Shoulder impingement syndrome, right      Physician: Khushboo Pugh MD    Visit Date: 6/25/2020    Physician Orders: PT Eval and Treat   Medical Diagnosis from Referral: M25.519 (ICD-10-CM) - Shoulder pain, unspecified chronicity, unspecified laterality  Evaluation Date: 5/27/2020  Authorization Period Expiration: 12/31/2020  Plan of Care Expiration: 7/27/2020  Visit # / Visits authorized: 5 / 30    Time In: 10:50 am  Time Out: 11:30 am  Total Billable Time: 40 minutes    Precautions: Standard and Diabetes    Subjective     Pt reports: having some increased soreness from the new exercises   He was compliant with home exercise program.  Response to previous treatment: slight increase in soreness  Functional change: none    Pain: 2/10  Location: right shoulder      Objective     Андрей received therapeutic exercises to develop strength, ROM, flexibility and posture for 40 minutes including:    Date  6/15/2020 6/15/2020 6/11/2020 6/2/2020   VISIT 5/30 4/30 3/30 2/30   FOTO 5/5 4/5 3/5 2/5   POC EXP. DATE 7/27/2020 7/27/2020 7/27/2020 7/27/2020   FACE-TO-FACE 7/24/2020 6/27/2020 6/27/2020 6/27/2020          TABLE:       Supine pec str w/ towel 1/2 roll  2' Half roll  2' 2' 1'   Snow angels 1 x 15 Half roll  1 x 15 3 x 10 1 x 10   Butterflies  1 x 15 Half roll  1 x 15 3 x 10 1 x 10   Side lying flexion 2 x 10 x 1# 2 x 10 1 x 15 --   Side lying abduction 2 x 10 x 1# 2 x 10 1 x 15 --   Side lying ER 2 x 10 x 1# 2 x 10 1 x 15 --   PRONE:       Shoulder flexion 2 x 10 1 x 10 -- --   Shoulder scaption 2 x 10 1 x 10 1 x 10 --   Shoulder abduction 2 x 10 1 x 10 1 x 10 --   Row 1 x 10      SEATED:       Scapula squeezes 15 x 3" 15 x 3" 15 x 3" 1 x 10   UBE Not today L3 x 5' " "L2.5 x 3' L2 x 3'   STANDING:       Door way stretch 3 x 20" 5 x 10" 5 x 10" 5 x 10"   Theraband  - W's  - T's  - I's  - Rows  - ER  - IR   2 x 15 YTB  2 x 15 RTB  1 x 15 RTB  2 x 15 GTB  2 x 15 GTB  2 x 15 GTB   1 x 15 YTB  1 x 15 RTB  1 x 15 RTB  3 x 10 GTB  3 x 10 RTB  3 x 10 RTB   1 x 10 YTB  1 x 10 RTB  1 x 10 RTB  3 x 10 GTB  2 x 10 RTB  2 x 10 RTB   --  1 x 10 RTB  --  2 x 10 RTB  2 x 10 RTB  3 x 10 RTB                 Initials SB DG MA DG     Home Exercises Provided and Patient Education Provided     Education provided:   - continued compliance with HEP    Written Home Exercises Provided: yes.  Exercises were reviewed and Андрей was able to demonstrate them prior to the end of the session.  Андрей demonstrated good  understanding of the education provided.     See EMR under Patient Instructions for exercises provided 6/2/2020.    Assessment     Андрей was able to complete all of today's progressions and new exercises with no increase in symptoms. He continues to require occasional verbal or tactile cues for scapular setting with all exercises. He continues to fatigue quickly with prone exercises and required more frequent cues for scapula placement.   Андрей is progressing well towards his goals.   Pt prognosis is Good.     Pt will continue to benefit from skilled outpatient physical therapy to address the deficits listed in the problem list box on initial evaluation, provide pt/family education and to maximize pt's level of independence in the home and community environment.     Pt's spiritual, cultural and educational needs considered and pt agreeable to plan of care and goals.     Anticipated barriers to physical therapy: none    Goals:   Short Term Goals: 4 weeks   1. This patient will be independent with a basic HEP. In progress, not met  2. This patient will have R shoulder active range of motion of 170 degrees flexion in order to pull a shirt over his head with no complaints of pain. In progress, not " met  3. This patient will increase R shoulder strength 1 grade in order to be able to  a case of water with no increase in symptoms. In progress, not met  4. This patient will have a pain rating of 5/10 at worst with ADLs. In progress, not met  5. Patient will be able to achieve greater than or equal to 66 on the FOTO Shoulder Survey indicating patient is 34% impaired, limited, or restricted and demonstrating overall improved functional ability with upper extremity. In progress, not met     Long Term Goals: 8 weeks   1. This patient will be independent with an updated HEP. In progress, not met  2. This patient will have R shoulder active range of motion equal to L shoulder active range of motion  in order to wash his back with no complaints of pain. In progress, not met  3. 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 In progress, not met  4. This patient will have a pain rating of 1/10 at worst with ADLs. In progress, not met  5. Patient will be able to achieve greater than or equal to 76 on the FOTO Shoulder Survey indicating patient is 24% impaired, limited, or restricted and demonstrating overall improved functional ability with upper extremity. In progress, not met    Plan     Continue with PT POC and increase to blue resistance band next visit for rows.     Marcela Rose, PTA 1/6

## 2020-06-24 NOTE — PROGRESS NOTES
Face to Face PTA Conference performed with Viridiana Stoner, PT regarding patient's current status, overall progress, and plan of care    Marcela Rose, SINAN  6/24/2020    Face to Face PTA Conference performed with Marcela Rose PTA regarding patient's current status, overall progress, and plan of care    Viridiana Stoner, PT  6/24/2020

## 2020-06-25 ENCOUNTER — CLINICAL SUPPORT (OUTPATIENT)
Dept: REHABILITATION | Facility: HOSPITAL | Age: 55
End: 2020-06-25
Payer: COMMERCIAL

## 2020-06-25 DIAGNOSIS — M25.611 DECREASED RANGE OF MOTION OF RIGHT SHOULDER: Primary | ICD-10-CM

## 2020-06-25 DIAGNOSIS — M75.41 SHOULDER IMPINGEMENT SYNDROME, RIGHT: ICD-10-CM

## 2020-06-25 DIAGNOSIS — R29.3 POOR POSTURE: ICD-10-CM

## 2020-06-25 DIAGNOSIS — R29.898 WEAKNESS OF RIGHT UPPER EXTREMITY: ICD-10-CM

## 2020-06-25 PROCEDURE — 97110 THERAPEUTIC EXERCISES: CPT | Mod: PO,CQ

## 2020-06-26 ENCOUNTER — CLINICAL SUPPORT (OUTPATIENT)
Dept: REHABILITATION | Facility: HOSPITAL | Age: 55
End: 2020-06-26
Payer: COMMERCIAL

## 2020-06-26 DIAGNOSIS — R29.3 POOR POSTURE: ICD-10-CM

## 2020-06-26 DIAGNOSIS — R29.898 WEAKNESS OF RIGHT UPPER EXTREMITY: ICD-10-CM

## 2020-06-26 DIAGNOSIS — M25.611 DECREASED RANGE OF MOTION OF RIGHT SHOULDER: ICD-10-CM

## 2020-06-26 DIAGNOSIS — M75.41 SHOULDER IMPINGEMENT SYNDROME, RIGHT: ICD-10-CM

## 2020-06-26 PROCEDURE — 97110 THERAPEUTIC EXERCISES: CPT | Mod: PO

## 2020-06-26 PROCEDURE — 97140 MANUAL THERAPY 1/> REGIONS: CPT | Mod: PO

## 2020-06-26 NOTE — PROGRESS NOTES
Physical Therapy Treatment Note     Name: Андрей Wooten  Clinic Number: [id number]    Therapy Diagnosis:   Encounter Diagnoses   Name Primary?    Decreased range of motion of right shoulder     Weakness of right upper extremity     Poor posture     Shoulder impingement syndrome, right      Physician: Khushboo Pugh MD    Visit Date: 6/26/2020    Physician Orders: PT Eval and Treat   Medical Diagnosis from Referral: M25.519 (ICD-10-CM) - Shoulder pain, unspecified chronicity, unspecified laterality  Evaluation Date: 5/27/2020  Authorization Period Expiration: 12/31/2020  Plan of Care Expiration: 7/27/2020  Visit # / Visits authorized: 6/ 30    Time In: 8:45 am  Time Out: 9:23 am  Total Billable Time: 38 minutes    Precautions: Standard and Diabetes    Subjective     Pt reports: having some increased soreness from the new exercises   He was compliant with home exercise program.  Response to previous treatment: slight increase in soreness  Functional change: none    Pain: 2/10  Location: right shoulder      Objective     Андрей received therapeutic exercises to develop strength, ROM, flexibility and posture for 23 minutes including:    Date  6/26/2020 6/15/2020 6/15/2020 6/11/2020 6/2/2020   VISIT 6/30 5/30 4/30 3/30 2/30   FOTO -- 5/5 4/5 3/5 2/5   POC EXP. DATE 7/27/2020 7/27/2020 7/27/2020 7/27/2020 7/27/2020   FACE-TO-FACE 7/24/2020 7/24/2020 6/27/2020 6/27/2020 6/27/2020           TABLE:        Supine pec str w/ towel 1' towel    1/2 roll  2' Half roll  2' 2' 1'   Snow angels 2 x 10 towel  1 x 15 Half roll  1 x 15 3 x 10 1 x 10   Butterflies  2 x 10  1 x 15 Half roll  1 x 15 3 x 10 1 x 10   Side lying flexion 2 x 10 x 1# 2 x 10 x 1# 2 x 10 1 x 15 --   Side lying abduction 2 x 10 x 1# 2 x 10 x 1# 2 x 10 1 x 15 --   Side lying ER 2 x 10 x 1# 2 x 10 x 1# 2 x 10 1 x 15 --   PRONE:        Shoulder flexion 2 x 10  2 x 10 1 x 10 -- --   Shoulder scaption 1 x 15  2 x 10 1 x 10 1 x 10 --   Shoulder abduction 1 x 15  " 2 x 10 1 x 10 1 x 10 --   Row 1 x 15  1 x 10      SEATED:        Scapula squeezes 20 x 3" 15 x 3" 15 x 3" 15 x 3" 1 x 10   UBE NT Not today L3 x 5' L2.5 x 3' L2 x 3'   STANDING:        Door way stretch 3 x 20" 3 x 20" 5 x 10" 5 x 10" 5 x 10"   Theraband  - W's  - T's  - I's  - Rows  - ER  - IR NT   2 x 15 YTB  2 x 15 RTB  1 x 15 RTB  2 x 15 GTB  2 x 15 GTB  2 x 15 GTB   1 x 15 YTB  1 x 15 RTB  1 x 15 RTB  3 x 10 GTB  3 x 10 RTB  3 x 10 RTB   1 x 10 YTB  1 x 10 RTB  1 x 10 RTB  3 x 10 GTB  2 x 10 RTB  2 x 10 RTB   --  1 x 10 RTB  --  2 x 10 RTB  2 x 10 RTB  3 x 10 RTB                   Initials BJ SB DG MA DG     Андрей received manual therapy today via IASTM, STM, and cupping massage to R shoulder blade and UT for 15 minutes     Home Exercises Provided and Patient Education Provided     Education provided:   - continued compliance with HEP    Written Home Exercises Provided: yes.  Exercises were reviewed and Андрей was able to demonstrate them prior to the end of the session.  Андрей demonstrated good  understanding of the education provided.     See EMR under Patient Instructions for exercises provided 6/2/2020.    Assessment     Андрей was experiencing some increased pain today, thus manual therapy was implemented to combat pain and soreness. Patient attends therapy back to back days, thus making him more prone to soreness. Patient tolerated manual therapy well with no adverse reaction. Pectoralis stretching was regressed to towel roll today due to pain and soreness. Prone exercise repetitions were reduce today due to obvious soreness and fatigue of the R scapular muscles. Patient required occasional verbal cueing for proper scapular setting during prone exercises, but began to lose form during prone rows. All therex was not performed today.     Андрей is progressing well towards his goals.   Pt prognosis is Good.     Pt will continue to benefit from skilled outpatient physical therapy to address the deficits " listed in the problem list box on initial evaluation, provide pt/family education and to maximize pt's level of independence in the home and community environment.     Pt's spiritual, cultural and educational needs considered and pt agreeable to plan of care and goals.     Anticipated barriers to physical therapy: none    Goals:   Short Term Goals: 4 weeks   1. This patient will be independent with a basic HEP. In progress, not met  2. This patient will have R shoulder active range of motion of 170 degrees flexion in order to pull a shirt over his head with no complaints of pain. In progress, not met  3. This patient will increase R shoulder strength 1 grade in order to be able to  a case of water with no increase in symptoms. In progress, not met  4. This patient will have a pain rating of 5/10 at worst with ADLs. In progress, not met  5. Patient will be able to achieve greater than or equal to 66 on the FOTO Shoulder Survey indicating patient is 34% impaired, limited, or restricted and demonstrating overall improved functional ability with upper extremity. In progress, not met     Long Term Goals: 8 weeks   1. This patient will be independent with an updated HEP. In progress, not met  2. This patient will have R shoulder active range of motion equal to L shoulder active range of motion  in order to wash his back with no complaints of pain. In progress, not met  3. 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 In progress, not met  4. This patient will have a pain rating of 1/10 at worst with ADLs. In progress, not met  5. Patient will be able to achieve greater than or equal to 76 on the FOTO Shoulder Survey indicating patient is 24% impaired, limited, or restricted and demonstrating overall improved functional ability with upper extremity. In progress, not met    Plan     Continue with PT POC and increase to blue resistance band  next visit for rows. Resume pec stretch on 1/2 foam. Resume full POC next.     Nara Cali, PT, DPT

## 2020-06-29 ENCOUNTER — CLINICAL SUPPORT (OUTPATIENT)
Dept: REHABILITATION | Facility: HOSPITAL | Age: 55
End: 2020-06-29
Payer: COMMERCIAL

## 2020-06-29 DIAGNOSIS — M25.611 DECREASED RANGE OF MOTION OF RIGHT SHOULDER: ICD-10-CM

## 2020-06-29 DIAGNOSIS — R29.3 POOR POSTURE: ICD-10-CM

## 2020-06-29 DIAGNOSIS — R29.898 WEAKNESS OF RIGHT UPPER EXTREMITY: ICD-10-CM

## 2020-06-29 DIAGNOSIS — M75.41 SHOULDER IMPINGEMENT SYNDROME, RIGHT: ICD-10-CM

## 2020-06-29 PROCEDURE — 97110 THERAPEUTIC EXERCISES: CPT | Mod: PO

## 2020-06-29 NOTE — PROGRESS NOTES
Physical Therapy Treatment Note     Name: Андрей Wooten  Clinic Number: [id number]    Therapy Diagnosis:   Encounter Diagnoses   Name Primary?    Decreased range of motion of right shoulder     Weakness of right upper extremity     Poor posture     Shoulder impingement syndrome, right      Physician: Khushboo Pugh MD    Visit Date: 6/29/2020    Physician Orders: PT Eval and Treat   Medical Diagnosis from Referral: M25.519 (ICD-10-CM) - Shoulder pain, unspecified chronicity, unspecified laterality  Evaluation Date: 5/27/2020  Authorization Period Expiration: 12/31/2020  Plan of Care Expiration: 7/27/2020  Visit # / Visits authorized: 7/ 30    Time In: 9:30 am  Time Out: 10:08 am  Total Billable Time: 38 minutes    Precautions: Standard and Diabetes    Subjective     Pt reports: no pain and having better motion   He was compliant with home exercise program.  Response to previous treatment: no increase in soreness  Functional change: able to pull a shirt over his head,  a case of water with no difficulty    Pain: 0/10  Location: right shoulder      Objective     Андрей received therapeutic exercises to develop strength, ROM, flexibility and posture for 38 minutes including:    Date  6/29/2020 6/26/2020 6/15/2020 6/15/2020 6/11/2020 6/2/2020   VISIT 7/30 6/30 5/30 4/30 3/30 2/30   FOTO -- -- 5/5 4/5 3/5 2/5   POC EXP. DATE 7/27/2020 7/27/2020 7/27/2020 7/27/2020 7/27/2020 7/27/2020   FACE-TO-FACE 7/24/2020 7/24/2020 7/24/2020 6/27/2020 6/27/2020 6/27/2020            TABLE:         Supine pec str w/ towel 1/2 roll 1' towel    1/2 roll  2' Half roll  2' 2' 1'   Snow angels 2 x 15 2 x 10 towel  1 x 15 Half roll  1 x 15 3 x 10 1 x 10   Butterflies  2 x 15 2 x 10  1 x 15 Half roll  1 x 15 3 x 10 1 x 10   Side lying flexion 2 x 15 x 2# 2 x 10 x 1# 2 x 10 x 1# 2 x 10 1 x 15 --   Side lying abduction 2 x 15 x 2# 2 x 10 x 1# 2 x 10 x 1# 2 x 10 1 x 15 --   Side lying ER 1 x 15 x 2#  1 x 15 x 1# 2 x 10 x 1# 2 x 10  "x 1# 2 x 10 1 x 15 --   PRONE:         Shoulder flexion 1 x 10 x 1# 2 x 10  2 x 10 1 x 10 -- --   Shoulder scaption 1 x 10 x 1# 1 x 15  2 x 10 1 x 10 1 x 10 --   Shoulder abduction 1 x 10 x 1# 1 x 15  2 x 10 1 x 10 1 x 10 --   Row  1 x 15  1 x 10      SEATED:         Scapula squeezes 20 x 3" 20 x 3" 15 x 3" 15 x 3" 15 x 3" 1 x 10   UBE -- NT Not today L3 x 5' L2.5 x 3' L2 x 3'   STANDING:         Door way stretch 3 x 20" 3 x 20" 3 x 20" 5 x 10" 5 x 10" 5 x 10"   Theraband  - W's  - T's  - I's  - Rows  - ER  - IR   2 x 15 YTB  2 x 15 RTB  2 x 15 RTB  2 x 15 GTB  2 x 15 GTB  2 x 15 GTB NT   2 x 15 YTB  2 x 15 RTB  1 x 15 RTB  2 x 15 GTB  2 x 15 GTB  2 x 15 GTB   1 x 15 YTB  1 x 15 RTB  1 x 15 RTB  3 x 10 GTB  3 x 10 RTB  3 x 10 RTB   1 x 10 YTB  1 x 10 RTB  1 x 10 RTB  3 x 10 GTB  2 x 10 RTB  2 x 10 RTB   --  1 x 10 RTB  --  2 x 10 RTB  2 x 10 RTB  3 x 10 RTB                     Initials DG BJ SB DG MA DG       Home Exercises Provided and Patient Education Provided     Education provided:   - continued compliance with HEP    Written Home Exercises Provided: yes.  Exercises were reviewed and Андрей was able to demonstrate them prior to the end of the session.  Андрей demonstrated good  understanding of the education provided.     See EMR under Patient Instructions for exercises provided 6/2/2020.    Assessment     Андрей able to demonstrate an increase in active flexion and ER on R shoulder equal to L shoulder. He required one verbal cue with his prone scaption for hand position. He performed all of today's progressions with no increase in symptoms prior to leaving the clinic.     Андрей is progressing well towards his goals.   Pt prognosis is Good.     Pt will continue to benefit from skilled outpatient physical therapy to address the deficits listed in the problem list box on initial evaluation, provide pt/family education and to maximize pt's level of independence in the home and community environment.     Pt's " spiritual, cultural and educational needs considered and pt agreeable to plan of care and goals.     Anticipated barriers to physical therapy: none    Goals:   Short Term Goals: 4 weeks   1. This patient will be independent with a basic HEP. Met  2. This patient will have R shoulder active range of motion of 170 degrees flexion in order to pull a shirt over his head with no complaints of pain. Met  3. This patient will increase R shoulder strength 1 grade in order to be able to  a case of water with no increase in symptoms. Met  4. This patient will have a pain rating of 5/10 at worst with ADLs. In progress, not met  5. Patient will be able to achieve greater than or equal to 66 on the FOTO Shoulder Survey indicating p1 x 1atient is 34% impaired, limited, or restricted and demonstrating overall improved functional ability with upper extremity. In progress, not met     Long Term Goals: 8 weeks   1. This patient will be independent with an updated HEP. In progress, not met  2. This patient will have R shoulder active range of motion equal to L shoulder active range of motion  in order to wash his back with no complaints of pain. In progress, not met  3. 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 In progress, not met  4. This patient will have a pain rating of 1/10 at worst with ADLs. In progress, not met  5. Patient will be able to achieve greater than or equal to 76 on the FOTO Shoulder Survey indicating patient is 24% impaired, limited, or restricted and demonstrating overall improved functional ability with upper extremity. In progress, not met    Plan     Continue with PT POC and increase resistance with all theraband exercises and reps with prone exercises next visit.     Viridiana Stoner, PT, DPT

## 2020-07-08 ENCOUNTER — CLINICAL SUPPORT (OUTPATIENT)
Dept: REHABILITATION | Facility: HOSPITAL | Age: 55
End: 2020-07-08
Payer: COMMERCIAL

## 2020-07-08 DIAGNOSIS — M25.611 DECREASED RANGE OF MOTION OF RIGHT SHOULDER: ICD-10-CM

## 2020-07-08 DIAGNOSIS — M75.41 SHOULDER IMPINGEMENT SYNDROME, RIGHT: ICD-10-CM

## 2020-07-08 DIAGNOSIS — R29.3 POOR POSTURE: ICD-10-CM

## 2020-07-08 DIAGNOSIS — R29.898 WEAKNESS OF RIGHT UPPER EXTREMITY: ICD-10-CM

## 2020-07-08 PROCEDURE — 97110 THERAPEUTIC EXERCISES: CPT | Mod: PO

## 2020-07-08 NOTE — PROGRESS NOTES
Physical Therapy Treatment Note     Name: Андрей Wooten  Clinic Number: [id number]    Therapy Diagnosis:   Encounter Diagnoses   Name Primary?    Decreased range of motion of right shoulder     Weakness of right upper extremity     Poor posture     Shoulder impingement syndrome, right      Physician: Khushboo Pugh MD    Visit Date: 7/8/2020    Physician Orders: PT Eval and Treat   Medical Diagnosis from Referral: M25.519 (ICD-10-CM) - Shoulder pain, unspecified chronicity, unspecified laterality  Evaluation Date: 5/27/2020  Authorization Period Expiration: 12/31/2020  Plan of Care Expiration: 7/27/2020  Visit # / Visits authorized: 8/ 30    Time In: 9:45 am  Time Out: 10:30 am  Total Billable Time: 45 minutes    Precautions: Standard and Diabetes    Subjective     Pt reports:a little soreness along the top of his shoulder   He was compliant with home exercise program.  Response to previous treatment: little soreness  Functional change: able to pull a shirt over his head,  a case of water with no difficulty    Pain: 0/10  Location: right shoulder      Objective     Андрей received therapeutic exercises to develop strength, ROM, flexibility and posture for 45 minutes including:    Date  7/8/2020 6/29/2020 6/26/2020 6/15/2020 6/15/2020 6/11/2020 6/2/2020   VISIT 8/30 7/30 6/30 5/30 4/30 3/30 2/30   FOTO -- -- -- 5/5 4/5 3/5 2/5   POC EXP. DATE 7/27/2020 7/27/2020 7/27/2020 7/27/2020 7/27/2020 7/27/2020 7/27/2020   FACE-TO-FACE 7/24/2020 7/24/2020 7/24/2020 7/24/2020 6/27/2020 6/27/2020 6/27/2020             TABLE:          Supine pec str w/ towel 1/2 roll  2' 1/2 roll 1' towel    1/2 roll  2' Half roll  2' 2' 1'   Snow angels 2 x 15 2 x 15 2 x 10 towel  1 x 15 Half roll  1 x 15 3 x 10 1 x 10   Butterflies  2 x 15 2 x 15 2 x 10  1 x 15 Half roll  1 x 15 3 x 10 1 x 10   Side lying flexion 2 x 15 x 2# 2 x 15 x 2# 2 x 10 x 1# 2 x 10 x 1# 2 x 10 1 x 15 --   Side lying abduction 2 x 15 x 2# 2 x 15 x 2# 2 x 10  "x 1# 2 x 10 x 1# 2 x 10 1 x 15 --   Side lying ER 2 x 15 x 2# 1 x 15 x 2#  1 x 15 x 1# 2 x 10 x 1# 2 x 10 x 1# 2 x 10 1 x 15 --   PRONE:          Shoulder flexion 2 x 10 x 1# 1 x 10 x 1# 2 x 10  2 x 10 1 x 10 -- --   Shoulder scaption 2 x 10 x 1# 1 x 10 x 1# 1 x 15  2 x 10 1 x 10 1 x 10 --   Shoulder abduction 2 x 10 x 1# 1 x 10 x 1# 1 x 15  2 x 10 1 x 10 1 x 10 --   Row   1 x 15  1 x 10      SEATED:          Scapula squeezes 20 x 3" 20 x 3" 20 x 3" 15 x 3" 15 x 3" 15 x 3" 1 x 10   UBE -- -- NT Not today L3 x 5' L2.5 x 3' L2 x 3'   STANDING:          Door way stretch 3 x 20" 3 x 20" 3 x 20" 3 x 20" 5 x 10" 5 x 10" 5 x 10"   Theraband  - W's  - T's  - I's  - Rows  - ER  - IR  -Y   2 x 10 RTB  2 x 15 RTB  2 x 15 RTB  2 x 15 RTB  2 x 15 RTB  2 x 15 RTB  2 x 10 RTB   2 x 15 YTB  2 x 15 RTB  2 x 15 RTB  2 x 15 GTB  2 x 15 GTB  2 x 15 GTB NT   2 x 15 YTB  2 x 15 RTB  1 x 15 RTB  2 x 15 GTB  2 x 15 GTB  2 x 15 GTB   1 x 15 YTB  1 x 15 RTB  1 x 15 RTB  3 x 10 GTB  3 x 10 RTB  3 x 10 RTB   1 x 10 YTB  1 x 10 RTB  1 x 10 RTB  3 x 10 GTB  2 x 10 RTB  2 x 10 RTB   --  1 x 10 RTB  --  2 x 10 RTB  2 x 10 RTB  3 x 10 RTB                       Initials DG DG BJ SB DG MA DG       Home Exercises Provided and Patient Education Provided     Education provided:   - continued compliance with HEP    Written Home Exercises Provided: yes.  Exercises were reviewed and Андрей was able to demonstrate them prior to the end of the session.  Андрей demonstrated good  understanding of the education provided.     See EMR under Patient Instructions for exercises provided 6/2/2020.    Assessment     Андрей completed all of today's progressions and new exercise with no increase in symptoms prior to leaving the clinic. He required one tactile cue with ER to avoid substitutions as he fatigued.    Андрей is progressing well towards his goals.   Pt prognosis is Good.     Pt will continue to benefit from skilled outpatient physical therapy to address the " deficits listed in the problem list box on initial evaluation, provide pt/family education and to maximize pt's level of independence in the home and community environment.     Pt's spiritual, cultural and educational needs considered and pt agreeable to plan of care and goals.     Anticipated barriers to physical therapy: none    Goals:   Short Term Goals: 4 weeks   1. This patient will be independent with a basic HEP. Met  2. This patient will have R shoulder active range of motion of 170 degrees flexion in order to pull a shirt over his head with no complaints of pain. Met  3. This patient will increase R shoulder strength 1 grade in order to be able to  a case of water with no increase in symptoms. Met  4. This patient will have a pain rating of 5/10 at worst with ADLs. In progress, not met  5. Patient will be able to achieve greater than or equal to 66 on the FOTO Shoulder Survey indicating p1 x 1atient is 34% impaired, limited, or restricted and demonstrating overall improved functional ability with upper extremity. In progress, not met     Long Term Goals: 8 weeks   1. This patient will be independent with an updated HEP. In progress, not met  2. This patient will have R shoulder active range of motion equal to L shoulder active range of motion  in order to wash his back with no complaints of pain. In progress, not met  3. 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 In progress, not met  4. This patient will have a pain rating of 1/10 at worst with ADLs. In progress, not met  5. Patient will be able to achieve greater than or equal to 76 on the FOTO Shoulder Survey indicating patient is 24% impaired, limited, or restricted and demonstrating overall improved functional ability with upper extremity. In progress, not met    Plan     Continue with PT POC and increase resistance with all theraband exercises and reps with prone  exercises next visit.     Viridiana Stoner, PT, DPT

## 2020-07-15 ENCOUNTER — CLINICAL SUPPORT (OUTPATIENT)
Dept: REHABILITATION | Facility: HOSPITAL | Age: 55
End: 2020-07-15
Payer: COMMERCIAL

## 2020-07-15 DIAGNOSIS — M25.611 DECREASED RANGE OF MOTION OF RIGHT SHOULDER: ICD-10-CM

## 2020-07-15 DIAGNOSIS — R29.3 POOR POSTURE: ICD-10-CM

## 2020-07-15 DIAGNOSIS — M75.41 SHOULDER IMPINGEMENT SYNDROME, RIGHT: ICD-10-CM

## 2020-07-15 DIAGNOSIS — R29.898 WEAKNESS OF RIGHT UPPER EXTREMITY: ICD-10-CM

## 2020-07-15 PROCEDURE — 97110 THERAPEUTIC EXERCISES: CPT | Mod: PO

## 2020-07-15 NOTE — PROGRESS NOTES
Physical Therapy Treatment Note     Name: Андрей Wooten  Clinic Number: [id number]    Therapy Diagnosis:   Encounter Diagnoses   Name Primary?    Decreased range of motion of right shoulder     Weakness of right upper extremity     Poor posture     Shoulder impingement syndrome, right      Physician: Khushboo Pugh MD    Visit Date: 7/15/2020    Physician Orders: PT Eval and Treat   Medical Diagnosis from Referral: M25.519 (ICD-10-CM) - Shoulder pain, unspecified chronicity, unspecified laterality  Evaluation Date: 5/27/2020  Authorization Period Expiration: 12/31/2020  Plan of Care Expiration: 7/27/2020  Visit # / Visits authorized: 9/ 30    Time In: 9:45 am  Time Out: 10:15 am  Total Billable Time: 30 minutes    Precautions: Standard and Diabetes    Subjective     Pt reports: no pain today and was able to work in the yard yesterday with no difficulty.   He was compliant with home exercise program.  Response to previous treatment: little soreness  Functional change: able to do yard work    Pain: 0/10  Location: right shoulder      Objective     Андрей received therapeutic exercises to develop strength, ROM, flexibility and posture for 45 minutes including:    Date  7/15/2020 7/8/2020 6/29/2020 6/26/2020 6/15/2020 6/15/2020 6/11/2020 6/2/2020   VISIT 9/30 8/30 7/30 6/30 5/30 4/30 3/30 2/30   FOTO -- -- -- -- 5/5 4/5 3/5 2/5   POC EXP. DATE 7/27/2020 7/27/2020 7/27/2020 7/27/2020 7/27/2020 7/27/2020 7/27/2020 7/27/2020   FACE-TO-FACE 7/24/2020 7/24/2020 7/24/2020 7/24/2020 7/24/2020 6/27/2020 6/27/2020 6/27/2020              TABLE:           Supine pec str w/ towel 1/2 roll  2' 1/2 roll  2' 1/2 roll 1' towel    1/2 roll  2' Half roll  2' 2' 1'   Snow angels 1 x 30 2 x 15 2 x 15 2 x 10 towel  1 x 15 Half roll  1 x 15 3 x 10 1 x 10   Butterflies  1 x 30 2 x 15 2 x 15 2 x 10  1 x 15 Half roll  1 x 15 3 x 10 1 x 10   Side lying flexion 1 x 30 x 2# 2 x 15 x 2# 2 x 15 x 2# 2 x 10 x 1# 2 x 10 x 1# 2 x 10 1 x 15 --  "  Side lying abduction 1 x 30 x 2# 2 x 15 x 2# 2 x 15 x 2# 2 x 10 x 1# 2 x 10 x 1# 2 x 10 1 x 15 --   Side lying ER 1 x 30 x 2# 2 x 15 x 2# 1 x 15 x 2#  1 x 15 x 1# 2 x 10 x 1# 2 x 10 x 1# 2 x 10 1 x 15 --   PRONE:           Shoulder flexion 3 x 10 x 1# 2 x 10 x 1# 1 x 10 x 1# 2 x 10  2 x 10 1 x 10 -- --   Shoulder scaption 3 x 10 x 1# 2 x 10 x 1# 1 x 10 x 1# 1 x 15  2 x 10 1 x 10 1 x 10 --   Shoulder abduction 3 x 10 x 1# 2 x 10 x 1# 1 x 10 x 1# 1 x 15  2 x 10 1 x 10 1 x 10 --   Row --   1 x 15  1 x 10      SEATED:           Scapula squeezes 20 x 3" 20 x 3" 20 x 3" 20 x 3" 15 x 3" 15 x 3" 15 x 3" 1 x 10   UBE -- -- -- NT Not today L3 x 5' L2.5 x 3' L2 x 3'   STANDING:           Door way stretch 3 x 20" 3 x 20" 3 x 20" 3 x 20" 3 x 20" 5 x 10" 5 x 10" 5 x 10"   Theraband  - W's  - T's  - I's  - Rows  - ER  - IR  -Y   2 x 15 RTB  2 x 15 RTB  2 x 15 RTB  2 x 15 RTB  2 x 15 RTB  2 x 15 RTB  2 x 15 RTB   2 x 10 RTB  2 x 15 RTB  2 x 15 RTB  2 x 15 RTB  2 x 15 RTB  2 x 15 RTB  2 x 10 RTB   2 x 15 YTB  2 x 15 RTB  2 x 15 RTB  2 x 15 GTB  2 x 15 GTB  2 x 15 GTB NT   2 x 15 YTB  2 x 15 RTB  1 x 15 RTB  2 x 15 GTB  2 x 15 GTB  2 x 15 GTB   1 x 15 YTB  1 x 15 RTB  1 x 15 RTB  3 x 10 GTB  3 x 10 RTB  3 x 10 RTB   1 x 10 YTB  1 x 10 RTB  1 x 10 RTB  3 x 10 GTB  2 x 10 RTB  2 x 10 RTB   --  1 x 10 RTB  --  2 x 10 RTB  2 x 10 RTB  3 x 10 RTB                         Initials DG SUSANNE SKINNER BJ SB SUSANNE SKINNER       Home Exercises Provided and Patient Education Provided     Education provided:   - continued compliance with HEP    Written Home Exercises Provided: yes.  Exercises were reviewed and Андрей was able to demonstrate them prior to the end of the session.  Андрей demonstrated good  understanding of the education provided.     See EMR under Patient Instructions for exercises provided 6/2/2020.    Assessment     Андрей was able to perform all of today's progressions with no increase in symptoms prior to leaving the clinic. He performed " all of today's activities indpendently.    Андрей is progressing well towards his goals.   Pt prognosis is Good.     Pt will continue to benefit from skilled outpatient physical therapy to address the deficits listed in the problem list box on initial evaluation, provide pt/family education and to maximize pt's level of independence in the home and community environment.     Pt's spiritual, cultural and educational needs considered and pt agreeable to plan of care and goals.     Anticipated barriers to physical therapy: none    Goals:   Short Term Goals: 4 weeks   1. This patient will be independent with a basic HEP. Met  2. This patient will have R shoulder active range of motion of 170 degrees flexion in order to pull a shirt over his head with no complaints of pain. Met  3. This patient will increase R shoulder strength 1 grade in order to be able to  a case of water with no increase in symptoms. Met  4. This patient will have a pain rating of 5/10 at worst with ADLs. Met  5. Patient will be able to achieve greater than or equal to 66 on the FOTO Shoulder Survey indicating p1 x 1atient is 34% impaired, limited, or restricted and demonstrating overall improved functional ability with upper extremity. In progress, not met     Long Term Goals: 8 weeks   1. This patient will be independent with an updated HEP. Met  2. This patient will have R shoulder active range of motion equal to L shoulder active range of motion  in order to wash his back with no complaints of pain. In progress, not met  3. 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 In progress, not met  4. This patient will have a pain rating of 1/10 at worst with ADLs. Met  5. Patient will be able to achieve greater than or equal to 76 on the FOTO Shoulder Survey indicating patient is 24% impaired, limited, or restricted and demonstrating overall improved functional ability  with upper extremity. In progress, not met    Plan     Reassess next visit for discharge to [facility].    Viridiana Stoner, PT, DPT

## 2020-07-21 ENCOUNTER — CLINICAL SUPPORT (OUTPATIENT)
Dept: REHABILITATION | Facility: HOSPITAL | Age: 55
End: 2020-07-21
Payer: COMMERCIAL

## 2020-07-21 DIAGNOSIS — R29.3 POOR POSTURE: ICD-10-CM

## 2020-07-21 DIAGNOSIS — M25.611 DECREASED RANGE OF MOTION OF RIGHT SHOULDER: ICD-10-CM

## 2020-07-21 DIAGNOSIS — M75.41 SHOULDER IMPINGEMENT SYNDROME, RIGHT: ICD-10-CM

## 2020-07-21 DIAGNOSIS — R29.898 WEAKNESS OF RIGHT UPPER EXTREMITY: ICD-10-CM

## 2020-07-21 PROCEDURE — 97110 THERAPEUTIC EXERCISES: CPT | Mod: PO

## 2020-07-21 NOTE — PLAN OF CARE
Outpatient Therapy Discharge Summary     Name: Андрей Wooten  Clinic Number: [id number]    Therapy Diagnosis:   Encounter Diagnoses   Name Primary?    Decreased range of motion of right shoulder     Weakness of right upper extremity     Poor posture     Shoulder impingement syndrome, right      Physician: Khushboo Pugh MD    Physician Orders: PT Eval and Treat   Medical Diagnosis from Referral: M25.519 (ICD-10-CM) - Shoulder pain, unspecified chronicity, unspecified laterality  Evaluation Date: 5/27/2020    Date of Last visit: 7/21/2020  Total Visits Received: 10  Cancelled Visits: 0  No Show Visits: 0    Assessment    Андрей is able to demonstrate AROM of right shoulder equal to left shoulder with no complaints of pain. His B UE strength is 5/5 with no complaints of pain. He is no longer limited in his usual ADLs by his strength or ROM. He has met all goals set for him at this time and is independent with his HEP. He is ready for discharge to [facility].  Goals:  Short Term Goals: 4 weeks   1. This patient will be independent with a basic HEP. Met  2. This patient will have R shoulder active range of motion of 170 degrees flexion in order to pull a shirt over his head with no complaints of pain. Met  3. This patient will increase R shoulder strength 1 grade in order to be able to  a case of water with no increase in symptoms. Met  4. This patient will have a pain rating of 5/10 at worst with ADLs. Met  5. Patient will be able to achieve greater than or equal to 66 on the FOTO Shoulder Survey indicating p1 x 1atient is 34% impaired, limited, or restricted and demonstrating overall improved functional ability with upper extremity. Met     Long Term Goals: 8 weeks   1. This patient will be independent with an updated HEP. Met  2. This patient will have R shoulder active range of motion equal to L shoulder active range of motion  in order to wash his back with no complaints of pain. Met  3. This patient will  have R shoulder strength of 5/5 in order to be able to turn a valve at work with no increase in symptoms. Met  4. This patient will have a pain rating of 1/10 at worst with ADLs. Met  5. Patient will be able to achieve greater than or equal to 76 on the FOTO Shoulder Survey indicating patient is 24% impaired, limited, or restricted and demonstrating overall improved functional ability with upper extremity.Met    Discharge reason: Patient is now asymptomatic and Patient has met all of his/her goals    Plan   This patient is discharged from Physical Therapy

## 2020-07-21 NOTE — PROGRESS NOTES
Physical Therapy Treatment Note     Name: Андрей Wooten  Clinic Number: [id number]    Therapy Diagnosis:   Encounter Diagnoses   Name Primary?    Decreased range of motion of right shoulder     Weakness of right upper extremity     Poor posture     Shoulder impingement syndrome, right      Physician: Khushboo Pugh MD    Visit Date: 7/21/2020    Physician Orders: PT Eval and Treat   Medical Diagnosis from Referral: M25.519 (ICD-10-CM) - Shoulder pain, unspecified chronicity, unspecified laterality  Evaluation Date: 5/27/2020  Authorization Period Expiration: 12/31/2020  Plan of Care Expiration: 7/27/2020  Visit # / Visits authorized: 10/ 30    Time In: 8:45 am  Time Out: 9:20 am  Total Billable Time: 35 minutes    Precautions: Standard and Diabetes    Subjective     Pt reports: no pain today and is ready for discharge to his HEP.  He was compliant with home exercise program.  Response to previous treatment: little soreness  Functional change: able to do yard work    Pain: 0/10  Location: right shoulder      Objective     Андрей received therapeutic exercises to develop strength, ROM, flexibility and posture, along with reassessment for 35 minutes including:    Date  7/21/2020 7/15/2020 7/8/2020 6/29/2020 6/26/2020 6/15/2020 6/15/2020 6/11/2020 6/2/2020   VISIT 10/30 9/30 8/30 7/30 6/30 5/30 4/30 3/30 2/30   FOTO -- -- -- -- -- 5/5 4/5 3/5 2/5   POC EXP. DATE 7/27/2020 7/27/2020 7/27/2020 7/27/2020 7/27/2020 7/27/2020 7/27/2020 7/27/2020 7/27/2020   FACE-TO-FACE D/C 7/24/2020 7/24/2020 7/24/2020 7/24/2020 7/24/2020 6/27/2020 6/27/2020 6/27/2020               TABLE:            Supine pec str w/ towel Not today 1/2 roll  2' 1/2 roll  2' 1/2 roll 1' towel    1/2 roll  2' Half roll  2' 2' 1'   Snow angels Not today 1 x 30 2 x 15 2 x 15 2 x 10 towel  1 x 15 Half roll  1 x 15 3 x 10 1 x 10   Butterflies  Not today 1 x 30 2 x 15 2 x 15 2 x 10  1 x 15 Half roll  1 x 15 3 x 10 1 x 10   Side lying flexion 1 x 30 x 2# 1  "x 30 x 2# 2 x 15 x 2# 2 x 15 x 2# 2 x 10 x 1# 2 x 10 x 1# 2 x 10 1 x 15 --   Side lying abduction 1 x 30 x 2# 1 x 30 x 2# 2 x 15 x 2# 2 x 15 x 2# 2 x 10 x 1# 2 x 10 x 1# 2 x 10 1 x 15 --   Side lying ER 1x 30 x 2# 1 x 30 x 2# 2 x 15 x 2# 1 x 15 x 2#  1 x 15 x 1# 2 x 10 x 1# 2 x 10 x 1# 2 x 10 1 x 15 --   PRONE:            Shoulder flexion 2 x 15 x  2# 3 x 10 x 1# 2 x 10 x 1# 1 x 10 x 1# 2 x 10  2 x 10 1 x 10 -- --   Shoulder scaption 2 x 15 x 2# 3 x 10 x 1# 2 x 10 x 1# 1 x 10 x 1# 1 x 15  2 x 10 1 x 10 1 x 10 --   Shoulder abduction 2 x 15 x 2# 3 x 10 x 1# 2 x 10 x 1# 1 x 10 x 1# 1 x 15  2 x 10 1 x 10 1 x 10 --   Row  --   1 x 15  1 x 10      SEATED:            Scapula squeezes 20 x 3" 20 x 3" 20 x 3" 20 x 3" 20 x 3" 15 x 3" 15 x 3" 15 x 3" 1 x 10   UBE -- -- -- -- NT Not today L3 x 5' L2.5 x 3' L2 x 3'   STANDING:            Door way stretch 3 x 20" 3 x 20" 3 x 20" 3 x 20" 3 x 20" 3 x 20" 5 x 10" 5 x 10" 5 x 10"   Theraband  - W's  - T's  - I's  - Rows  - ER  - IR  -Y   2 x 15 RTB  2 x 15 RTB  2 x 15 RTB  2 x 15 RTB  2 x 15 RTB  2 x 15 RTB  2 x 15 RTB   2 x 15 RTB  2 x 15 RTB  2 x 15 RTB  2 x 15 RTB  2 x 15 RTB  2 x 15 RTB  2 x 15 RTB   2 x 10 RTB  2 x 15 RTB  2 x 15 RTB  2 x 15 RTB  2 x 15 RTB  2 x 15 RTB  2 x 10 RTB   2 x 15 YTB  2 x 15 RTB  2 x 15 RTB  2 x 15 GTB  2 x 15 GTB  2 x 15 GTB NT   2 x 15 YTB  2 x 15 RTB  1 x 15 RTB  2 x 15 GTB  2 x 15 GTB  2 x 15 GTB   1 x 15 YTB  1 x 15 RTB  1 x 15 RTB  3 x 10 GTB  3 x 10 RTB  3 x 10 RTB   1 x 10 YTB  1 x 10 RTB  1 x 10 RTB  3 x 10 GTB  2 x 10 RTB  2 x 10 RTB   --  1 x 10 RTB  --  2 x 10 RTB  2 x 10 RTB  3 x 10 RTB   Initials DG DG DG DG BJ SB DG MA DG        Active Range of Motion:   Shoulder Left Right   Flexion 170 170   Abduction 165 165   ER at 90 T2 T2   IR T10 T9      Upper Extremity Strength    (L) UE (R) UE   Shoulder flexion: 5/5 5/5   Shoulder Abduction: 5/5 5/5   Shoulder ER 5/5 5/5   Shoulder IR 5/5 5/5   Lower Trap 5/5 5/5   Middle Trap 5/5 5/5 "   Rhomboids 5/5 5/5     FOTO Shoulder Survey 13% impaired, limited, or restricted    Home Exercises Provided and Patient Education Provided     Education provided:   - continued compliance with HEP    Written Home Exercises Provided: yes.  Exercises were reviewed and Андрей was able to demonstrate them prior to the end of the session.  Андрей demonstrated good  understanding of the education provided.     See EMR under Patient Instructions for exercises provided 6/2/2020.    Assessment     Андрей is able to demonstrate AROM of right shoulder equal to left shoulder with no complaints of pain. His B UE strength is 5/5 with no complaints of pain. He is no longer limited in his usual ADLs by his strength or ROM. He has met all goals set for him at this time and is independent with his HEP. He is ready for [facility] to [facility].    Андрей is progressing well towards his goals.   Pt prognosis is Good.     Pt will continue to benefit from skilled outpatient physical therapy to address the deficits listed in the problem list box on initial evaluation, provide pt/family education and to maximize pt's level of independence in the home and community environment.     Pt's spiritual, cultural and educational needs considered and pt agreeable to plan of care and goals.     Anticipated barriers to physical therapy: none    Goals:   Short Term Goals: 4 weeks   1. This patient will be independent with a basic HEP. Met  2. This patient will have R shoulder active range of motion of 170 degrees flexion in order to pull a shirt over his head with no complaints of pain. Met  3. This patient will increase R shoulder strength 1 grade in order to be able to  a case of water with no increase in symptoms. Met  4. This patient will have a pain rating of 5/10 at worst with ADLs. Met  5. Patient will be able to achieve greater than or equal to 66 on the FOTO Shoulder Survey indicating p1 x 1atient is 34% impaired, limited, or restricted and  demonstrating overall improved functional ability with upper extremity. Met     Long Term Goals: 8 weeks   1. This patient will be independent with an updated HEP. Met  2. This patient will have R shoulder active range of motion equal to L shoulder active range of motion  in order to wash his back with no complaints of pain. Met  3. 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 Met  4. This patient will have a pain rating of 1/10 at worst with ADLs. Met  5. Patient will be able to achieve greater than or equal to 76 on the FOTO Shoulder Survey indicating patient is 24% impaired, limited, or restricted and demonstrating overall improved functional ability with upper extremity.Met    Plan     Discharge to [facility].    Viridiana Stnoer, PT, DPT

## 2020-08-28 DIAGNOSIS — E11.9 TYPE 2 DIABETES MELLITUS WITHOUT COMPLICATION, UNSPECIFIED WHETHER LONG TERM INSULIN USE: ICD-10-CM

## 2020-09-22 ENCOUNTER — OFFICE VISIT (OUTPATIENT)
Dept: INTERNAL MEDICINE | Facility: CLINIC | Age: 55
End: 2020-09-22
Payer: COMMERCIAL

## 2020-09-22 DIAGNOSIS — E11.9 TYPE 2 DIABETES MELLITUS WITHOUT COMPLICATION, WITHOUT LONG-TERM CURRENT USE OF INSULIN: ICD-10-CM

## 2020-09-22 DIAGNOSIS — K76.0 NAFLD (NONALCOHOLIC FATTY LIVER DISEASE): Primary | ICD-10-CM

## 2020-09-22 DIAGNOSIS — I10 ESSENTIAL HYPERTENSION: ICD-10-CM

## 2020-09-22 PROCEDURE — 99999 PR PBB SHADOW E&M-EST. PATIENT-LVL IV: ICD-10-PCS | Mod: PBBFAC,,, | Performed by: INTERNAL MEDICINE

## 2020-09-22 PROCEDURE — 3074F PR MOST RECENT SYSTOLIC BLOOD PRESSURE < 130 MM HG: ICD-10-PCS | Mod: CPTII,S$GLB,, | Performed by: INTERNAL MEDICINE

## 2020-09-22 PROCEDURE — 99214 PR OFFICE/OUTPT VISIT, EST, LEVL IV, 30-39 MIN: ICD-10-PCS | Mod: S$GLB,,, | Performed by: INTERNAL MEDICINE

## 2020-09-22 PROCEDURE — 3044F HG A1C LEVEL LT 7.0%: CPT | Mod: CPTII,S$GLB,, | Performed by: INTERNAL MEDICINE

## 2020-09-22 PROCEDURE — 99999 PR PBB SHADOW E&M-EST. PATIENT-LVL IV: CPT | Mod: PBBFAC,,, | Performed by: INTERNAL MEDICINE

## 2020-09-22 PROCEDURE — 3008F BODY MASS INDEX DOCD: CPT | Mod: CPTII,S$GLB,, | Performed by: INTERNAL MEDICINE

## 2020-09-22 PROCEDURE — 3078F DIAST BP <80 MM HG: CPT | Mod: CPTII,S$GLB,, | Performed by: INTERNAL MEDICINE

## 2020-09-22 PROCEDURE — 99214 OFFICE O/P EST MOD 30 MIN: CPT | Mod: S$GLB,,, | Performed by: INTERNAL MEDICINE

## 2020-09-22 PROCEDURE — 3044F PR MOST RECENT HEMOGLOBIN A1C LEVEL <7.0%: ICD-10-PCS | Mod: CPTII,S$GLB,, | Performed by: INTERNAL MEDICINE

## 2020-09-22 PROCEDURE — 3078F PR MOST RECENT DIASTOLIC BLOOD PRESSURE < 80 MM HG: ICD-10-PCS | Mod: CPTII,S$GLB,, | Performed by: INTERNAL MEDICINE

## 2020-09-22 PROCEDURE — 3008F PR BODY MASS INDEX (BMI) DOCUMENTED: ICD-10-PCS | Mod: CPTII,S$GLB,, | Performed by: INTERNAL MEDICINE

## 2020-09-22 PROCEDURE — 3074F SYST BP LT 130 MM HG: CPT | Mod: CPTII,S$GLB,, | Performed by: INTERNAL MEDICINE

## 2020-09-22 RX ORDER — TRIAMTERENE AND HYDROCHLOROTHIAZIDE 37.5; 25 MG/1; MG/1
1 CAPSULE ORAL EVERY MORNING
Qty: 90 CAPSULE | Refills: 1 | Status: SHIPPED | OUTPATIENT
Start: 2020-09-22 | End: 2021-04-12 | Stop reason: SDUPTHER

## 2020-09-22 RX ORDER — LOSARTAN POTASSIUM 50 MG/1
50 TABLET ORAL DAILY
Qty: 90 TABLET | Refills: 1 | Status: SHIPPED | OUTPATIENT
Start: 2020-09-22 | End: 2021-04-12 | Stop reason: SDUPTHER

## 2020-09-22 RX ORDER — METFORMIN HYDROCHLORIDE 500 MG/1
500 TABLET ORAL
Qty: 90 TABLET | Refills: 1
Start: 2020-09-22 | End: 2023-04-11

## 2020-09-22 RX ORDER — PRAVASTATIN SODIUM 40 MG/1
40 TABLET ORAL DAILY
Qty: 90 TABLET | Refills: 1 | Status: SHIPPED | OUTPATIENT
Start: 2020-09-22 | End: 2021-04-12 | Stop reason: SDUPTHER

## 2020-09-22 RX ORDER — AMLODIPINE BESYLATE 10 MG/1
10 TABLET ORAL DAILY
Qty: 90 TABLET | Refills: 1 | Status: SHIPPED | OUTPATIENT
Start: 2020-09-22 | End: 2021-04-12 | Stop reason: SDUPTHER

## 2020-09-27 VITALS
DIASTOLIC BLOOD PRESSURE: 78 MMHG | WEIGHT: 247.56 LBS | HEIGHT: 66 IN | SYSTOLIC BLOOD PRESSURE: 110 MMHG | BODY MASS INDEX: 39.79 KG/M2 | HEART RATE: 76 BPM | OXYGEN SATURATION: 98 % | TEMPERATURE: 99 F

## 2020-09-27 NOTE — PROGRESS NOTES
Subjective:       Patient ID: Андрей Wooten is a 55 y.o. male.    Chief Complaint: Hypertension    HPI  He returns for management of hypertension.  He has had hypertension for over a year.  Current treatment has included medications outlined in medication list.  He denies chest pain or shortness of breath.  No palpitations.  Denies left arm or neck pain.       Past medical history: Hypertension, hyperlipidemia, diabetes, NAFLD.  He had a colonoscopy March 2016     Medications:  one aspirin daily, Norvasc 10 mg daily, Pravachol 40 mg daily, metformin 500 mg daily, Dyazide 1 p.o. daily, Cozaar 50 mg daily     NO KNOWN DRUG ALLERGIES    Review of Systems   Constitutional: Negative for chills, fatigue, fever and unexpected weight change.   Respiratory: Negative for chest tightness and shortness of breath.    Cardiovascular: Negative for chest pain and palpitations.   Gastrointestinal: Negative for abdominal pain and blood in stool.   Neurological: Negative for dizziness, syncope, numbness and headaches.       Objective:      Physical Exam  HENT:      Right Ear: External ear normal.      Left Ear: External ear normal.      Nose: Nose normal.      Mouth/Throat:      Mouth: Mucous membranes are moist.      Pharynx: Oropharynx is clear.   Eyes:      Pupils: Pupils are equal, round, and reactive to light.   Neck:      Musculoskeletal: Normal range of motion.   Cardiovascular:      Rate and Rhythm: Normal rate and regular rhythm.      Heart sounds: No murmur.   Pulmonary:      Breath sounds: Normal breath sounds.   Abdominal:      General: There is no distension.      Palpations: There is no hepatomegaly.      Tenderness: There is no abdominal tenderness.   Lymphadenopathy:      Cervical: No cervical adenopathy.      Upper Body:      Right upper body: No axillary adenopathy.      Left upper body: No axillary adenopathy.   Neurological:      Cranial Nerves: No cranial nerve deficit.      Sensory: No sensory deficit.       Motor: Motor function is intact.      Deep Tendon Reflexes: Reflexes are normal and symmetric.         Assessment/Plan       Assessment and plan:  Hypertension:  Check CMP, CBC, A1c, PSA.  He reports having his eye exam within the past year.  Discussed podiatry appointment, he declined

## 2020-10-01 ENCOUNTER — DOCUMENTATION ONLY (OUTPATIENT)
Dept: TRANSPLANT | Facility: CLINIC | Age: 55
End: 2020-10-01

## 2020-10-01 NOTE — LETTER
October 1, 2020    Андрей Wooten  [address] [place] [id number]      Dear Андрей Wooten:    Your doctor has referred you to the [facility]. We are sending this letter to remind you to make an appointment with us to complete the referral process.     Please call us at [phone] to schedule an appointment. We look forward to seeing you soon.     If you received a call and have been scheduled, please disregard this letter.       Sincerely,        [facility]   [address] [place] [id number]  [phone]

## 2020-10-01 NOTE — NURSING
Pt records reviewed.   Pt will be referred to Hepatology.    Initial referral received  from the workque.   Referring Provider/diagnosis  EP of kenji Reyes. Needs follow up. Referred by  Khushboo Pugh MD  VOGEL>  Message to hepatology staff.        Referral letter sent to patient.

## 2020-10-07 ENCOUNTER — TELEPHONE (OUTPATIENT)
Dept: HEPATOLOGY | Facility: CLINIC | Age: 55
End: 2020-10-07
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
## 2021-01-11 ENCOUNTER — PATIENT OUTREACH (OUTPATIENT)
Dept: ADMINISTRATIVE | Facility: HOSPITAL | Age: 56
End: 2021-01-11

## 2021-01-22 ENCOUNTER — OFFICE VISIT (OUTPATIENT)
Dept: INTERNAL MEDICINE | Facility: CLINIC | Age: 56
End: 2021-01-22
Payer: COMMERCIAL

## 2021-01-22 ENCOUNTER — OFFICE VISIT (OUTPATIENT)
Dept: HEPATOLOGY | Facility: CLINIC | Age: 56
End: 2021-01-22
Payer: COMMERCIAL

## 2021-01-22 ENCOUNTER — LAB VISIT (OUTPATIENT)
Dept: LAB | Facility: HOSPITAL | Age: 56
End: 2021-01-22
Attending: INTERNAL MEDICINE
Payer: COMMERCIAL

## 2021-01-22 ENCOUNTER — TELEPHONE (OUTPATIENT)
Dept: HEPATOLOGY | Facility: CLINIC | Age: 56
End: 2021-01-22

## 2021-01-22 VITALS
HEART RATE: 85 BPM | DIASTOLIC BLOOD PRESSURE: 84 MMHG | SYSTOLIC BLOOD PRESSURE: 135 MMHG | OXYGEN SATURATION: 95 % | BODY MASS INDEX: 41.03 KG/M2 | HEIGHT: 66 IN | WEIGHT: 255.31 LBS

## 2021-01-22 DIAGNOSIS — I10 ESSENTIAL HYPERTENSION: ICD-10-CM

## 2021-01-22 DIAGNOSIS — E11.9 DIABETES MELLITUS WITHOUT COMPLICATION: Primary | ICD-10-CM

## 2021-01-22 DIAGNOSIS — I10 HYPERTENSION, UNSPECIFIED TYPE: ICD-10-CM

## 2021-01-22 DIAGNOSIS — E11.9 DIABETES MELLITUS WITHOUT COMPLICATION: ICD-10-CM

## 2021-01-22 DIAGNOSIS — E66.01 MORBID OBESITY: ICD-10-CM

## 2021-01-22 DIAGNOSIS — K76.0 NAFLD (NONALCOHOLIC FATTY LIVER DISEASE): ICD-10-CM

## 2021-01-22 DIAGNOSIS — E78.5 HYPERLIPIDEMIA, UNSPECIFIED HYPERLIPIDEMIA TYPE: ICD-10-CM

## 2021-01-22 DIAGNOSIS — E11.9 TYPE 2 DIABETES MELLITUS WITHOUT COMPLICATION, WITHOUT LONG-TERM CURRENT USE OF INSULIN: ICD-10-CM

## 2021-01-22 DIAGNOSIS — K74.00 LIVER FIBROSIS: ICD-10-CM

## 2021-01-22 DIAGNOSIS — K75.81 NASH (NONALCOHOLIC STEATOHEPATITIS): Primary | ICD-10-CM

## 2021-01-22 LAB
ALBUMIN SERPL BCP-MCNC: 4.6 G/DL (ref 3.5–5.2)
ALP SERPL-CCNC: 47 U/L (ref 55–135)
ALT SERPL W/O P-5'-P-CCNC: 73 U/L (ref 10–44)
ANION GAP SERPL CALC-SCNC: 8 MMOL/L (ref 8–16)
AST SERPL-CCNC: 45 U/L (ref 10–40)
BILIRUB SERPL-MCNC: 0.6 MG/DL (ref 0.1–1)
BUN SERPL-MCNC: 22 MG/DL (ref 6–20)
CALCIUM SERPL-MCNC: 10.2 MG/DL (ref 8.7–10.5)
CHLORIDE SERPL-SCNC: 98 MMOL/L (ref 95–110)
CHOLEST SERPL-MCNC: 228 MG/DL (ref 120–199)
CHOLEST/HDLC SERPL: 7.9 {RATIO} (ref 2–5)
CO2 SERPL-SCNC: 30 MMOL/L (ref 23–29)
CREAT SERPL-MCNC: 1.2 MG/DL (ref 0.5–1.4)
EST. GFR  (AFRICAN AMERICAN): >60 ML/MIN/1.73 M^2
EST. GFR  (NON AFRICAN AMERICAN): >60 ML/MIN/1.73 M^2
ESTIMATED AVG GLUCOSE: 137 MG/DL (ref 68–131)
GLUCOSE SERPL-MCNC: 115 MG/DL (ref 70–110)
HBA1C MFR BLD HPLC: 6.4 % (ref 4–5.6)
HDLC SERPL-MCNC: 29 MG/DL (ref 40–75)
HDLC SERPL: 12.7 % (ref 20–50)
LDLC SERPL CALC-MCNC: 135.6 MG/DL (ref 63–159)
NONHDLC SERPL-MCNC: 199 MG/DL
POTASSIUM SERPL-SCNC: 3.8 MMOL/L (ref 3.5–5.1)
PROT SERPL-MCNC: 8.7 G/DL (ref 6–8.4)
SODIUM SERPL-SCNC: 136 MMOL/L (ref 136–145)
TRIGL SERPL-MCNC: 317 MG/DL (ref 30–150)

## 2021-01-22 PROCEDURE — 99999 PR PBB SHADOW E&M-EST. PATIENT-LVL V: ICD-10-PCS | Mod: PBBFAC,,, | Performed by: INTERNAL MEDICINE

## 2021-01-22 PROCEDURE — 3008F PR BODY MASS INDEX (BMI) DOCUMENTED: ICD-10-PCS | Mod: CPTII,S$GLB,, | Performed by: INTERNAL MEDICINE

## 2021-01-22 PROCEDURE — 3044F HG A1C LEVEL LT 7.0%: CPT | Mod: CPTII,S$GLB,, | Performed by: PHYSICIAN ASSISTANT

## 2021-01-22 PROCEDURE — 3075F PR MOST RECENT SYSTOLIC BLOOD PRESS GE 130-139MM HG: ICD-10-PCS | Mod: CPTII,S$GLB,, | Performed by: INTERNAL MEDICINE

## 2021-01-22 PROCEDURE — 3079F PR MOST RECENT DIASTOLIC BLOOD PRESSURE 80-89 MM HG: ICD-10-PCS | Mod: CPTII,S$GLB,, | Performed by: PHYSICIAN ASSISTANT

## 2021-01-22 PROCEDURE — 36415 COLL VENOUS BLD VENIPUNCTURE: CPT

## 2021-01-22 PROCEDURE — 1126F AMNT PAIN NOTED NONE PRSNT: CPT | Mod: S$GLB,,, | Performed by: PHYSICIAN ASSISTANT

## 2021-01-22 PROCEDURE — 3008F BODY MASS INDEX DOCD: CPT | Mod: CPTII,S$GLB,, | Performed by: INTERNAL MEDICINE

## 2021-01-22 PROCEDURE — 1126F PR PAIN SEVERITY QUANTIFIED, NO PAIN PRESENT: ICD-10-PCS | Mod: S$GLB,,, | Performed by: PHYSICIAN ASSISTANT

## 2021-01-22 PROCEDURE — 3075F PR MOST RECENT SYSTOLIC BLOOD PRESS GE 130-139MM HG: ICD-10-PCS | Mod: CPTII,S$GLB,, | Performed by: PHYSICIAN ASSISTANT

## 2021-01-22 PROCEDURE — 99999 PR PBB SHADOW E&M-EST. PATIENT-LVL V: CPT | Mod: PBBFAC,,, | Performed by: INTERNAL MEDICINE

## 2021-01-22 PROCEDURE — 99999 PR PBB SHADOW E&M-EST. PATIENT-LVL V: ICD-10-PCS | Mod: PBBFAC,,, | Performed by: PHYSICIAN ASSISTANT

## 2021-01-22 PROCEDURE — 99214 OFFICE O/P EST MOD 30 MIN: CPT | Mod: S$GLB,,, | Performed by: PHYSICIAN ASSISTANT

## 2021-01-22 PROCEDURE — 3079F DIAST BP 80-89 MM HG: CPT | Mod: CPTII,S$GLB,, | Performed by: PHYSICIAN ASSISTANT

## 2021-01-22 PROCEDURE — 3079F DIAST BP 80-89 MM HG: CPT | Mod: CPTII,S$GLB,, | Performed by: INTERNAL MEDICINE

## 2021-01-22 PROCEDURE — 3044F HG A1C LEVEL LT 7.0%: CPT | Mod: CPTII,S$GLB,, | Performed by: INTERNAL MEDICINE

## 2021-01-22 PROCEDURE — 83036 HEMOGLOBIN GLYCOSYLATED A1C: CPT

## 2021-01-22 PROCEDURE — 3044F PR MOST RECENT HEMOGLOBIN A1C LEVEL <7.0%: ICD-10-PCS | Mod: CPTII,S$GLB,, | Performed by: INTERNAL MEDICINE

## 2021-01-22 PROCEDURE — 3008F BODY MASS INDEX DOCD: CPT | Mod: CPTII,S$GLB,, | Performed by: PHYSICIAN ASSISTANT

## 2021-01-22 PROCEDURE — 3075F SYST BP GE 130 - 139MM HG: CPT | Mod: CPTII,S$GLB,, | Performed by: PHYSICIAN ASSISTANT

## 2021-01-22 PROCEDURE — 3075F SYST BP GE 130 - 139MM HG: CPT | Mod: CPTII,S$GLB,, | Performed by: INTERNAL MEDICINE

## 2021-01-22 PROCEDURE — 3079F PR MOST RECENT DIASTOLIC BLOOD PRESSURE 80-89 MM HG: ICD-10-PCS | Mod: CPTII,S$GLB,, | Performed by: INTERNAL MEDICINE

## 2021-01-22 PROCEDURE — 99214 PR OFFICE/OUTPT VISIT, EST, LEVL IV, 30-39 MIN: ICD-10-PCS | Mod: S$GLB,,, | Performed by: INTERNAL MEDICINE

## 2021-01-22 PROCEDURE — 80061 LIPID PANEL: CPT

## 2021-01-22 PROCEDURE — 3044F PR MOST RECENT HEMOGLOBIN A1C LEVEL <7.0%: ICD-10-PCS | Mod: CPTII,S$GLB,, | Performed by: PHYSICIAN ASSISTANT

## 2021-01-22 PROCEDURE — 99999 PR PBB SHADOW E&M-EST. PATIENT-LVL V: CPT | Mod: PBBFAC,,, | Performed by: PHYSICIAN ASSISTANT

## 2021-01-22 PROCEDURE — 99214 OFFICE O/P EST MOD 30 MIN: CPT | Mod: S$GLB,,, | Performed by: INTERNAL MEDICINE

## 2021-01-22 PROCEDURE — 80053 COMPREHEN METABOLIC PANEL: CPT

## 2021-01-22 PROCEDURE — 99214 PR OFFICE/OUTPT VISIT, EST, LEVL IV, 30-39 MIN: ICD-10-PCS | Mod: S$GLB,,, | Performed by: PHYSICIAN ASSISTANT

## 2021-01-22 PROCEDURE — 1126F AMNT PAIN NOTED NONE PRSNT: CPT | Mod: S$GLB,,, | Performed by: INTERNAL MEDICINE

## 2021-01-22 PROCEDURE — 1126F PR PAIN SEVERITY QUANTIFIED, NO PAIN PRESENT: ICD-10-PCS | Mod: S$GLB,,, | Performed by: INTERNAL MEDICINE

## 2021-01-22 PROCEDURE — 3008F PR BODY MASS INDEX (BMI) DOCUMENTED: ICD-10-PCS | Mod: CPTII,S$GLB,, | Performed by: PHYSICIAN ASSISTANT

## 2021-01-22 RX ORDER — INSULIN PUMP SYRINGE, 3 ML
EACH MISCELLANEOUS
Qty: 1 EACH | Refills: 0 | Status: SHIPPED | OUTPATIENT
Start: 2021-01-22

## 2021-01-22 RX ORDER — LANCETS
EACH MISCELLANEOUS
Qty: 100 EACH | Refills: 11 | Status: SHIPPED | OUTPATIENT
Start: 2021-01-22

## 2021-01-25 VITALS
HEIGHT: 66 IN | SYSTOLIC BLOOD PRESSURE: 130 MMHG | TEMPERATURE: 99 F | HEART RATE: 89 BPM | DIASTOLIC BLOOD PRESSURE: 86 MMHG | WEIGHT: 256.38 LBS | OXYGEN SATURATION: 97 % | BODY MASS INDEX: 41.2 KG/M2

## 2021-02-01 ENCOUNTER — TELEPHONE (OUTPATIENT)
Dept: HEPATOLOGY | Facility: CLINIC | Age: 56
End: 2021-02-01

## 2021-02-01 ENCOUNTER — HOSPITAL ENCOUNTER (OUTPATIENT)
Dept: RADIOLOGY | Facility: HOSPITAL | Age: 56
Discharge: HOME | End: 2021-02-01
Attending: PHYSICIAN ASSISTANT
Payer: COMMERCIAL

## 2021-02-01 DIAGNOSIS — K76.0 NAFLD (NONALCOHOLIC FATTY LIVER DISEASE): ICD-10-CM

## 2021-02-01 PROCEDURE — 76391 MR ELASTOGRAPHY: ICD-10-PCS | Mod: 26,,, | Performed by: RADIOLOGY

## 2021-02-01 PROCEDURE — 76391 MR ELASTOGRAPHY: CPT | Mod: TC

## 2021-02-01 PROCEDURE — 76391 MR ELASTOGRAPHY: CPT | Mod: 26,,, | Performed by: RADIOLOGY

## 2021-03-04 ENCOUNTER — TELEPHONE (OUTPATIENT)
Dept: ADMINISTRATIVE | Facility: HOSPITAL | Age: 56
End: 2021-03-04

## 2021-03-04 ENCOUNTER — PATIENT OUTREACH (OUTPATIENT)
Dept: ADMINISTRATIVE | Facility: HOSPITAL | Age: 56
End: 2021-03-04

## 2021-04-06 ENCOUNTER — PATIENT OUTREACH (OUTPATIENT)
Dept: ADMINISTRATIVE | Facility: HOSPITAL | Age: 56
End: 2021-04-06

## 2021-04-12 ENCOUNTER — OFFICE VISIT (OUTPATIENT)
Dept: INTERNAL MEDICINE | Facility: CLINIC | Age: 56
End: 2021-04-12
Payer: COMMERCIAL

## 2021-04-12 ENCOUNTER — HOSPITAL ENCOUNTER (OUTPATIENT)
Dept: RADIOLOGY | Facility: HOSPITAL | Age: 56
Discharge: HOME | End: 2021-04-12
Attending: INTERNAL MEDICINE
Payer: COMMERCIAL

## 2021-04-12 DIAGNOSIS — I10 ESSENTIAL HYPERTENSION: ICD-10-CM

## 2021-04-12 DIAGNOSIS — M54.9 DORSALGIA, UNSPECIFIED: ICD-10-CM

## 2021-04-12 DIAGNOSIS — E11.9 TYPE 2 DIABETES MELLITUS WITHOUT COMPLICATION, WITHOUT LONG-TERM CURRENT USE OF INSULIN: ICD-10-CM

## 2021-04-12 DIAGNOSIS — R52 PAIN: Primary | ICD-10-CM

## 2021-04-12 PROCEDURE — 3008F PR BODY MASS INDEX (BMI) DOCUMENTED: ICD-10-PCS | Mod: CPTII,S$GLB,, | Performed by: INTERNAL MEDICINE

## 2021-04-12 PROCEDURE — 3079F PR MOST RECENT DIASTOLIC BLOOD PRESSURE 80-89 MM HG: ICD-10-PCS | Mod: CPTII,S$GLB,, | Performed by: INTERNAL MEDICINE

## 2021-04-12 PROCEDURE — 99214 OFFICE O/P EST MOD 30 MIN: CPT | Mod: S$GLB,,, | Performed by: INTERNAL MEDICINE

## 2021-04-12 PROCEDURE — 72110 X-RAY EXAM L-2 SPINE 4/>VWS: CPT | Mod: TC

## 2021-04-12 PROCEDURE — 3074F SYST BP LT 130 MM HG: CPT | Mod: CPTII,S$GLB,, | Performed by: INTERNAL MEDICINE

## 2021-04-12 PROCEDURE — 99999 PR PBB SHADOW E&M-EST. PATIENT-LVL IV: ICD-10-PCS | Mod: PBBFAC,,, | Performed by: INTERNAL MEDICINE

## 2021-04-12 PROCEDURE — 99214 PR OFFICE/OUTPT VISIT, EST, LEVL IV, 30-39 MIN: ICD-10-PCS | Mod: S$GLB,,, | Performed by: INTERNAL MEDICINE

## 2021-04-12 PROCEDURE — 3008F BODY MASS INDEX DOCD: CPT | Mod: CPTII,S$GLB,, | Performed by: INTERNAL MEDICINE

## 2021-04-12 PROCEDURE — 3044F PR MOST RECENT HEMOGLOBIN A1C LEVEL <7.0%: ICD-10-PCS | Mod: CPTII,S$GLB,, | Performed by: INTERNAL MEDICINE

## 2021-04-12 PROCEDURE — 3074F PR MOST RECENT SYSTOLIC BLOOD PRESSURE < 130 MM HG: ICD-10-PCS | Mod: CPTII,S$GLB,, | Performed by: INTERNAL MEDICINE

## 2021-04-12 PROCEDURE — 3044F HG A1C LEVEL LT 7.0%: CPT | Mod: CPTII,S$GLB,, | Performed by: INTERNAL MEDICINE

## 2021-04-12 PROCEDURE — 72110 XR LUMBAR SPINE COMPLETE 5 VIEW: ICD-10-PCS | Mod: 26,,, | Performed by: RADIOLOGY

## 2021-04-12 PROCEDURE — 99999 PR PBB SHADOW E&M-EST. PATIENT-LVL IV: CPT | Mod: PBBFAC,,, | Performed by: INTERNAL MEDICINE

## 2021-04-12 PROCEDURE — 3079F DIAST BP 80-89 MM HG: CPT | Mod: CPTII,S$GLB,, | Performed by: INTERNAL MEDICINE

## 2021-04-12 PROCEDURE — 72110 X-RAY EXAM L-2 SPINE 4/>VWS: CPT | Mod: 26,,, | Performed by: RADIOLOGY

## 2021-04-12 RX ORDER — CYCLOBENZAPRINE HCL 10 MG
10 TABLET ORAL 3 TIMES DAILY
COMMUNITY
Start: 2021-04-09 | End: 2021-04-12 | Stop reason: ALTCHOICE

## 2021-04-12 RX ORDER — IBUPROFEN 800 MG/1
800 TABLET ORAL EVERY 8 HOURS PRN
COMMUNITY
Start: 2021-04-09 | End: 2021-04-12 | Stop reason: ALTCHOICE

## 2021-04-12 RX ORDER — LOSARTAN POTASSIUM 50 MG/1
50 TABLET ORAL DAILY
Qty: 90 TABLET | Refills: 1 | Status: SHIPPED | OUTPATIENT
Start: 2021-04-12 | End: 2021-10-20

## 2021-04-12 RX ORDER — ACETAMINOPHEN AND CODEINE PHOSPHATE 300; 30 MG/1; MG/1
1 TABLET ORAL 3 TIMES DAILY PRN
Qty: 21 TABLET | Refills: 0 | Status: SHIPPED | OUTPATIENT
Start: 2021-04-12 | End: 2021-04-19

## 2021-04-12 RX ORDER — MELOXICAM 7.5 MG/1
7.5 TABLET ORAL DAILY PRN
Qty: 20 TABLET | Refills: 1 | Status: SHIPPED | OUTPATIENT
Start: 2021-04-12 | End: 2021-04-19

## 2021-04-12 RX ORDER — AMLODIPINE BESYLATE 10 MG/1
10 TABLET ORAL DAILY
Qty: 90 TABLET | Refills: 1 | Status: SHIPPED | OUTPATIENT
Start: 2021-04-12 | End: 2021-12-01 | Stop reason: SDUPTHER

## 2021-04-12 RX ORDER — TRIAMTERENE AND HYDROCHLOROTHIAZIDE 37.5; 25 MG/1; MG/1
1 CAPSULE ORAL EVERY MORNING
Qty: 90 CAPSULE | Refills: 1 | Status: SHIPPED | OUTPATIENT
Start: 2021-04-12 | End: 2021-08-16

## 2021-04-12 RX ORDER — PRAVASTATIN SODIUM 40 MG/1
40 TABLET ORAL DAILY
Qty: 90 TABLET | Refills: 1 | Status: SHIPPED | OUTPATIENT
Start: 2021-04-12 | End: 2021-08-26 | Stop reason: ALTCHOICE

## 2021-04-15 VITALS
OXYGEN SATURATION: 98 % | SYSTOLIC BLOOD PRESSURE: 122 MMHG | TEMPERATURE: 99 F | HEIGHT: 66 IN | WEIGHT: 249.81 LBS | DIASTOLIC BLOOD PRESSURE: 84 MMHG | BODY MASS INDEX: 40.15 KG/M2 | HEART RATE: 74 BPM

## 2021-05-10 ENCOUNTER — PATIENT MESSAGE (OUTPATIENT)
Dept: INTERNAL MEDICINE | Facility: CLINIC | Age: 56
End: 2021-05-10

## 2021-05-11 ENCOUNTER — TELEPHONE (OUTPATIENT)
Dept: INTERNAL MEDICINE | Facility: CLINIC | Age: 56
End: 2021-05-11

## 2021-05-13 ENCOUNTER — OFFICE VISIT (OUTPATIENT)
Dept: FAMILY MEDICINE | Facility: CLINIC | Age: 56
End: 2021-05-13
Payer: COMMERCIAL

## 2021-05-13 VITALS
BODY MASS INDEX: 40.36 KG/M2 | HEART RATE: 80 BPM | SYSTOLIC BLOOD PRESSURE: 130 MMHG | DIASTOLIC BLOOD PRESSURE: 80 MMHG | HEIGHT: 66 IN | WEIGHT: 251.13 LBS | TEMPERATURE: 98 F | OXYGEN SATURATION: 97 %

## 2021-05-13 DIAGNOSIS — S39.011A STRAIN OF ABDOMINAL MUSCLE, INITIAL ENCOUNTER: Primary | ICD-10-CM

## 2021-05-13 PROCEDURE — 99214 OFFICE O/P EST MOD 30 MIN: CPT | Mod: S$GLB,,, | Performed by: STUDENT IN AN ORGANIZED HEALTH CARE EDUCATION/TRAINING PROGRAM

## 2021-05-13 PROCEDURE — 1125F PR PAIN SEVERITY QUANTIFIED, PAIN PRESENT: ICD-10-PCS | Mod: S$GLB,,, | Performed by: STUDENT IN AN ORGANIZED HEALTH CARE EDUCATION/TRAINING PROGRAM

## 2021-05-13 PROCEDURE — 1125F AMNT PAIN NOTED PAIN PRSNT: CPT | Mod: S$GLB,,, | Performed by: STUDENT IN AN ORGANIZED HEALTH CARE EDUCATION/TRAINING PROGRAM

## 2021-05-13 PROCEDURE — 3008F PR BODY MASS INDEX (BMI) DOCUMENTED: ICD-10-PCS | Mod: CPTII,S$GLB,, | Performed by: STUDENT IN AN ORGANIZED HEALTH CARE EDUCATION/TRAINING PROGRAM

## 2021-05-13 PROCEDURE — 99214 PR OFFICE/OUTPT VISIT, EST, LEVL IV, 30-39 MIN: ICD-10-PCS | Mod: S$GLB,,, | Performed by: STUDENT IN AN ORGANIZED HEALTH CARE EDUCATION/TRAINING PROGRAM

## 2021-05-13 PROCEDURE — 3008F BODY MASS INDEX DOCD: CPT | Mod: CPTII,S$GLB,, | Performed by: STUDENT IN AN ORGANIZED HEALTH CARE EDUCATION/TRAINING PROGRAM

## 2021-05-13 RX ORDER — ACETAMINOPHEN AND CODEINE PHOSPHATE 300; 60 MG/1; MG/1
1 TABLET ORAL EVERY 6 HOURS PRN
Qty: 28 TABLET | Refills: 0 | Status: SHIPPED | OUTPATIENT
Start: 2021-05-13 | End: 2021-05-23

## 2021-05-13 RX ORDER — MELOXICAM 7.5 MG/1
7.5 TABLET ORAL DAILY PRN
Qty: 14 TABLET | Refills: 0 | Status: SHIPPED | OUTPATIENT
Start: 2021-05-13 | End: 2021-06-11 | Stop reason: SDUPTHER

## 2021-05-24 ENCOUNTER — PATIENT MESSAGE (OUTPATIENT)
Dept: HEPATOLOGY | Facility: CLINIC | Age: 56
End: 2021-05-24

## 2021-06-07 ENCOUNTER — TELEPHONE (OUTPATIENT)
Dept: HEPATOLOGY | Facility: CLINIC | Age: 56
End: 2021-06-07

## 2021-06-07 ENCOUNTER — PATIENT MESSAGE (OUTPATIENT)
Dept: HEPATOLOGY | Facility: CLINIC | Age: 56
End: 2021-06-07

## 2021-06-07 DIAGNOSIS — E66.01 MORBID OBESITY: ICD-10-CM

## 2021-06-07 DIAGNOSIS — E11.9 TYPE 2 DIABETES MELLITUS WITHOUT COMPLICATION, WITHOUT LONG-TERM CURRENT USE OF INSULIN: Primary | ICD-10-CM

## 2021-06-11 ENCOUNTER — OFFICE VISIT (OUTPATIENT)
Dept: FAMILY MEDICINE | Facility: CLINIC | Age: 56
End: 2021-06-11
Payer: COMMERCIAL

## 2021-06-11 VITALS
SYSTOLIC BLOOD PRESSURE: 118 MMHG | DIASTOLIC BLOOD PRESSURE: 72 MMHG | OXYGEN SATURATION: 96 % | TEMPERATURE: 98 F | HEART RATE: 88 BPM | HEIGHT: 66 IN | WEIGHT: 249.25 LBS | BODY MASS INDEX: 40.06 KG/M2

## 2021-06-11 DIAGNOSIS — M54.50 ACUTE LEFT-SIDED LOW BACK PAIN WITHOUT SCIATICA: Primary | ICD-10-CM

## 2021-06-11 PROCEDURE — 99214 PR OFFICE/OUTPT VISIT, EST, LEVL IV, 30-39 MIN: ICD-10-PCS | Mod: S$GLB,,, | Performed by: STUDENT IN AN ORGANIZED HEALTH CARE EDUCATION/TRAINING PROGRAM

## 2021-06-11 PROCEDURE — 3008F BODY MASS INDEX DOCD: CPT | Mod: CPTII,S$GLB,, | Performed by: STUDENT IN AN ORGANIZED HEALTH CARE EDUCATION/TRAINING PROGRAM

## 2021-06-11 PROCEDURE — 99214 OFFICE O/P EST MOD 30 MIN: CPT | Mod: S$GLB,,, | Performed by: STUDENT IN AN ORGANIZED HEALTH CARE EDUCATION/TRAINING PROGRAM

## 2021-06-11 PROCEDURE — 1125F PR PAIN SEVERITY QUANTIFIED, PAIN PRESENT: ICD-10-PCS | Mod: S$GLB,,, | Performed by: STUDENT IN AN ORGANIZED HEALTH CARE EDUCATION/TRAINING PROGRAM

## 2021-06-11 PROCEDURE — 1125F AMNT PAIN NOTED PAIN PRSNT: CPT | Mod: S$GLB,,, | Performed by: STUDENT IN AN ORGANIZED HEALTH CARE EDUCATION/TRAINING PROGRAM

## 2021-06-11 PROCEDURE — 3008F PR BODY MASS INDEX (BMI) DOCUMENTED: ICD-10-PCS | Mod: CPTII,S$GLB,, | Performed by: STUDENT IN AN ORGANIZED HEALTH CARE EDUCATION/TRAINING PROGRAM

## 2021-06-11 RX ORDER — ACETAMINOPHEN AND CODEINE PHOSPHATE 300; 30 MG/1; MG/1
1 TABLET ORAL EVERY 8 HOURS PRN
Qty: 15 TABLET | Refills: 0 | Status: SHIPPED | OUTPATIENT
Start: 2021-06-11 | End: 2021-06-16

## 2021-06-11 RX ORDER — TIZANIDINE HYDROCHLORIDE 4 MG/1
4 CAPSULE, GELATIN COATED ORAL 3 TIMES DAILY PRN
Qty: 60 CAPSULE | Refills: 0 | Status: SHIPPED | OUTPATIENT
Start: 2021-06-11 | End: 2021-06-21

## 2021-06-11 RX ORDER — MELOXICAM 7.5 MG/1
7.5 TABLET ORAL DAILY PRN
Qty: 20 TABLET | Refills: 0 | Status: SHIPPED | OUTPATIENT
Start: 2021-06-11 | End: 2022-08-15

## 2021-06-14 ENCOUNTER — PATIENT OUTREACH (OUTPATIENT)
Dept: ADMINISTRATIVE | Facility: HOSPITAL | Age: 56
End: 2021-06-14

## 2021-06-30 ENCOUNTER — TELEPHONE (OUTPATIENT)
Dept: BARIATRICS | Facility: CLINIC | Age: 56
End: 2021-06-30

## 2021-07-01 ENCOUNTER — CLINICAL SUPPORT (OUTPATIENT)
Dept: REHABILITATION | Facility: HOSPITAL | Age: 56
End: 2021-07-01
Attending: STUDENT IN AN ORGANIZED HEALTH CARE EDUCATION/TRAINING PROGRAM
Payer: COMMERCIAL

## 2021-07-01 DIAGNOSIS — R29.898 HIP TIGHTNESS: ICD-10-CM

## 2021-07-01 DIAGNOSIS — R29.898 WEAKNESS OF BOTH LOWER EXTREMITIES: ICD-10-CM

## 2021-07-01 DIAGNOSIS — M62.9 HAMSTRING TIGHTNESS OF BOTH LOWER EXTREMITIES: ICD-10-CM

## 2021-07-01 DIAGNOSIS — M54.50 ACUTE LEFT-SIDED LOW BACK PAIN WITHOUT SCIATICA: ICD-10-CM

## 2021-07-01 PROCEDURE — 97162 PT EVAL MOD COMPLEX 30 MIN: CPT | Mod: PO

## 2021-07-05 PROBLEM — M62.9 HAMSTRING TIGHTNESS OF BOTH LOWER EXTREMITIES: Status: ACTIVE | Noted: 2021-07-05

## 2021-07-05 PROBLEM — R29.898 WEAKNESS OF BOTH LOWER EXTREMITIES: Status: ACTIVE | Noted: 2021-07-05

## 2021-07-05 PROBLEM — R29.898 HIP TIGHTNESS: Status: ACTIVE | Noted: 2021-07-05

## 2021-07-14 ENCOUNTER — CLINICAL SUPPORT (OUTPATIENT)
Dept: REHABILITATION | Facility: HOSPITAL | Age: 56
End: 2021-07-14
Attending: STUDENT IN AN ORGANIZED HEALTH CARE EDUCATION/TRAINING PROGRAM
Payer: COMMERCIAL

## 2021-07-14 DIAGNOSIS — M62.9 HAMSTRING TIGHTNESS OF BOTH LOWER EXTREMITIES: ICD-10-CM

## 2021-07-14 DIAGNOSIS — R29.898 WEAKNESS OF BOTH LOWER EXTREMITIES: ICD-10-CM

## 2021-07-14 DIAGNOSIS — R29.898 HIP TIGHTNESS: ICD-10-CM

## 2021-07-14 PROCEDURE — 97110 THERAPEUTIC EXERCISES: CPT | Mod: PO

## 2021-07-19 ENCOUNTER — CLINICAL SUPPORT (OUTPATIENT)
Dept: REHABILITATION | Facility: HOSPITAL | Age: 56
End: 2021-07-19
Attending: STUDENT IN AN ORGANIZED HEALTH CARE EDUCATION/TRAINING PROGRAM
Payer: COMMERCIAL

## 2021-07-19 DIAGNOSIS — R29.898 WEAKNESS OF BOTH LOWER EXTREMITIES: ICD-10-CM

## 2021-07-19 DIAGNOSIS — R29.898 HIP TIGHTNESS: ICD-10-CM

## 2021-07-19 DIAGNOSIS — M62.9 HAMSTRING TIGHTNESS OF BOTH LOWER EXTREMITIES: ICD-10-CM

## 2021-07-19 PROCEDURE — 97110 THERAPEUTIC EXERCISES: CPT | Mod: PO

## 2021-07-20 ENCOUNTER — CLINICAL SUPPORT (OUTPATIENT)
Dept: REHABILITATION | Facility: HOSPITAL | Age: 56
End: 2021-07-20
Attending: STUDENT IN AN ORGANIZED HEALTH CARE EDUCATION/TRAINING PROGRAM
Payer: COMMERCIAL

## 2021-07-20 DIAGNOSIS — M62.9 HAMSTRING TIGHTNESS OF BOTH LOWER EXTREMITIES: ICD-10-CM

## 2021-07-20 DIAGNOSIS — R29.898 WEAKNESS OF BOTH LOWER EXTREMITIES: ICD-10-CM

## 2021-07-20 DIAGNOSIS — R29.898 HIP TIGHTNESS: ICD-10-CM

## 2021-07-20 PROCEDURE — 97110 THERAPEUTIC EXERCISES: CPT | Mod: PO

## 2021-07-26 ENCOUNTER — DOCUMENTATION ONLY (OUTPATIENT)
Dept: BARIATRICS | Facility: CLINIC | Age: 56
End: 2021-07-26

## 2021-07-29 ENCOUNTER — CLINICAL SUPPORT (OUTPATIENT)
Dept: REHABILITATION | Facility: HOSPITAL | Age: 56
End: 2021-07-29
Attending: STUDENT IN AN ORGANIZED HEALTH CARE EDUCATION/TRAINING PROGRAM
Payer: COMMERCIAL

## 2021-07-29 DIAGNOSIS — R29.898 HIP TIGHTNESS: ICD-10-CM

## 2021-07-29 DIAGNOSIS — M62.9 HAMSTRING TIGHTNESS OF BOTH LOWER EXTREMITIES: ICD-10-CM

## 2021-07-29 DIAGNOSIS — R29.898 WEAKNESS OF BOTH LOWER EXTREMITIES: ICD-10-CM

## 2021-07-29 PROCEDURE — 97110 THERAPEUTIC EXERCISES: CPT | Mod: PO

## 2021-08-12 ENCOUNTER — OFFICE VISIT (OUTPATIENT)
Dept: FAMILY MEDICINE | Facility: CLINIC | Age: 56
End: 2021-08-12
Payer: COMMERCIAL

## 2021-08-12 ENCOUNTER — HOSPITAL ENCOUNTER (OUTPATIENT)
Dept: RADIOLOGY | Facility: HOSPITAL | Age: 56
Discharge: HOME | End: 2021-08-12
Attending: STUDENT IN AN ORGANIZED HEALTH CARE EDUCATION/TRAINING PROGRAM
Payer: COMMERCIAL

## 2021-08-12 VITALS
DIASTOLIC BLOOD PRESSURE: 68 MMHG | BODY MASS INDEX: 40.74 KG/M2 | WEIGHT: 253.5 LBS | OXYGEN SATURATION: 97 % | SYSTOLIC BLOOD PRESSURE: 134 MMHG | HEIGHT: 66 IN | TEMPERATURE: 97 F | HEART RATE: 103 BPM

## 2021-08-12 DIAGNOSIS — R06.09 DYSPNEA ON EXERTION: ICD-10-CM

## 2021-08-12 DIAGNOSIS — M25.461 PAIN AND SWELLING OF RIGHT KNEE: ICD-10-CM

## 2021-08-12 DIAGNOSIS — M25.561 PAIN AND SWELLING OF RIGHT KNEE: ICD-10-CM

## 2021-08-12 DIAGNOSIS — M25.461 PAIN AND SWELLING OF RIGHT KNEE: Primary | ICD-10-CM

## 2021-08-12 DIAGNOSIS — M25.561 PAIN AND SWELLING OF RIGHT KNEE: Primary | ICD-10-CM

## 2021-08-12 PROCEDURE — 3044F PR MOST RECENT HEMOGLOBIN A1C LEVEL <7.0%: ICD-10-PCS | Mod: CPTII,S$GLB,, | Performed by: STUDENT IN AN ORGANIZED HEALTH CARE EDUCATION/TRAINING PROGRAM

## 2021-08-12 PROCEDURE — 3078F PR MOST RECENT DIASTOLIC BLOOD PRESSURE < 80 MM HG: ICD-10-PCS | Mod: CPTII,S$GLB,, | Performed by: STUDENT IN AN ORGANIZED HEALTH CARE EDUCATION/TRAINING PROGRAM

## 2021-08-12 PROCEDURE — 3075F SYST BP GE 130 - 139MM HG: CPT | Mod: CPTII,S$GLB,, | Performed by: STUDENT IN AN ORGANIZED HEALTH CARE EDUCATION/TRAINING PROGRAM

## 2021-08-12 PROCEDURE — 99214 OFFICE O/P EST MOD 30 MIN: CPT | Mod: S$GLB,,, | Performed by: STUDENT IN AN ORGANIZED HEALTH CARE EDUCATION/TRAINING PROGRAM

## 2021-08-12 PROCEDURE — 1159F PR MEDICATION LIST DOCUMENTED IN MEDICAL RECORD: ICD-10-PCS | Mod: CPTII,S$GLB,, | Performed by: STUDENT IN AN ORGANIZED HEALTH CARE EDUCATION/TRAINING PROGRAM

## 2021-08-12 PROCEDURE — 99214 PR OFFICE/OUTPT VISIT, EST, LEVL IV, 30-39 MIN: ICD-10-PCS | Mod: S$GLB,,, | Performed by: STUDENT IN AN ORGANIZED HEALTH CARE EDUCATION/TRAINING PROGRAM

## 2021-08-12 PROCEDURE — 1160F PR REVIEW ALL MEDS BY PRESCRIBER/CLIN PHARMACIST DOCUMENTED: ICD-10-PCS | Mod: CPTII,S$GLB,, | Performed by: STUDENT IN AN ORGANIZED HEALTH CARE EDUCATION/TRAINING PROGRAM

## 2021-08-12 PROCEDURE — 1160F RVW MEDS BY RX/DR IN RCRD: CPT | Mod: CPTII,S$GLB,, | Performed by: STUDENT IN AN ORGANIZED HEALTH CARE EDUCATION/TRAINING PROGRAM

## 2021-08-12 PROCEDURE — 1126F PR PAIN SEVERITY QUANTIFIED, NO PAIN PRESENT: ICD-10-PCS | Mod: CPTII,S$GLB,, | Performed by: STUDENT IN AN ORGANIZED HEALTH CARE EDUCATION/TRAINING PROGRAM

## 2021-08-12 PROCEDURE — 3008F PR BODY MASS INDEX (BMI) DOCUMENTED: ICD-10-PCS | Mod: CPTII,S$GLB,, | Performed by: STUDENT IN AN ORGANIZED HEALTH CARE EDUCATION/TRAINING PROGRAM

## 2021-08-12 PROCEDURE — 1159F MED LIST DOCD IN RCRD: CPT | Mod: CPTII,S$GLB,, | Performed by: STUDENT IN AN ORGANIZED HEALTH CARE EDUCATION/TRAINING PROGRAM

## 2021-08-12 PROCEDURE — 1126F AMNT PAIN NOTED NONE PRSNT: CPT | Mod: CPTII,S$GLB,, | Performed by: STUDENT IN AN ORGANIZED HEALTH CARE EDUCATION/TRAINING PROGRAM

## 2021-08-12 PROCEDURE — 3078F DIAST BP <80 MM HG: CPT | Mod: CPTII,S$GLB,, | Performed by: STUDENT IN AN ORGANIZED HEALTH CARE EDUCATION/TRAINING PROGRAM

## 2021-08-12 PROCEDURE — 3044F HG A1C LEVEL LT 7.0%: CPT | Mod: CPTII,S$GLB,, | Performed by: STUDENT IN AN ORGANIZED HEALTH CARE EDUCATION/TRAINING PROGRAM

## 2021-08-12 PROCEDURE — 73562 X-RAY EXAM OF KNEE 3: CPT | Mod: TC,FY,PO,RT

## 2021-08-12 PROCEDURE — 3075F PR MOST RECENT SYSTOLIC BLOOD PRESS GE 130-139MM HG: ICD-10-PCS | Mod: CPTII,S$GLB,, | Performed by: STUDENT IN AN ORGANIZED HEALTH CARE EDUCATION/TRAINING PROGRAM

## 2021-08-12 PROCEDURE — 3008F BODY MASS INDEX DOCD: CPT | Mod: CPTII,S$GLB,, | Performed by: STUDENT IN AN ORGANIZED HEALTH CARE EDUCATION/TRAINING PROGRAM

## 2021-08-16 ENCOUNTER — TELEPHONE (OUTPATIENT)
Dept: INTERNAL MEDICINE | Facility: CLINIC | Age: 56
End: 2021-08-16

## 2021-08-17 ENCOUNTER — CLINICAL SUPPORT (OUTPATIENT)
Dept: REHABILITATION | Facility: HOSPITAL | Age: 56
End: 2021-08-17
Attending: STUDENT IN AN ORGANIZED HEALTH CARE EDUCATION/TRAINING PROGRAM
Payer: COMMERCIAL

## 2021-08-17 DIAGNOSIS — R29.898 WEAKNESS OF BOTH LOWER EXTREMITIES: ICD-10-CM

## 2021-08-17 DIAGNOSIS — R29.898 HIP TIGHTNESS: ICD-10-CM

## 2021-08-17 DIAGNOSIS — M62.9 HAMSTRING TIGHTNESS OF BOTH LOWER EXTREMITIES: ICD-10-CM

## 2021-08-17 PROCEDURE — 97110 THERAPEUTIC EXERCISES: CPT | Mod: PO

## 2021-08-20 ENCOUNTER — HOSPITAL ENCOUNTER (OUTPATIENT)
Dept: RADIOLOGY | Facility: HOSPITAL | Age: 56
Discharge: HOME | End: 2021-08-20
Attending: STUDENT IN AN ORGANIZED HEALTH CARE EDUCATION/TRAINING PROGRAM
Payer: COMMERCIAL

## 2021-08-20 ENCOUNTER — HOSPITAL ENCOUNTER (OUTPATIENT)
Dept: CARDIOLOGY | Facility: HOSPITAL | Age: 56
Discharge: HOME | End: 2021-08-20
Attending: STUDENT IN AN ORGANIZED HEALTH CARE EDUCATION/TRAINING PROGRAM
Payer: COMMERCIAL

## 2021-08-20 ENCOUNTER — TELEPHONE (OUTPATIENT)
Dept: FAMILY MEDICINE | Facility: CLINIC | Age: 56
End: 2021-08-20

## 2021-08-20 DIAGNOSIS — R06.09 DYSPNEA ON EXERTION: ICD-10-CM

## 2021-08-20 DIAGNOSIS — I10 ESSENTIAL HYPERTENSION: ICD-10-CM

## 2021-08-20 DIAGNOSIS — E11.9 TYPE 2 DIABETES MELLITUS WITHOUT COMPLICATION, WITHOUT LONG-TERM CURRENT USE OF INSULIN: Primary | ICD-10-CM

## 2021-08-20 DIAGNOSIS — K75.81 NASH (NONALCOHOLIC STEATOHEPATITIS): ICD-10-CM

## 2021-08-20 DIAGNOSIS — E78.5 HYPERLIPIDEMIA, UNSPECIFIED HYPERLIPIDEMIA TYPE: ICD-10-CM

## 2021-08-20 LAB
CV STRESS BASE HR: 85 BPM
DIASTOLIC BLOOD PRESSURE: 110 MMHG
OHS CV CPX 85 PERCENT MAX PREDICTED HEART RATE MALE: 139
OHS CV CPX MAX PREDICTED HEART RATE: 164
OHS CV CPX PATIENT IS FEMALE: 0
OHS CV CPX PATIENT IS MALE: 1
OHS CV CPX PEAK DIASTOLIC BLOOD PRESSURE: 110 MMHG
OHS CV CPX PEAK HEAR RATE: 107 BPM
OHS CV CPX PEAK RATE PRESSURE PRODUCT: NORMAL
OHS CV CPX PEAK SYSTOLIC BLOOD PRESSURE: 170 MMHG
OHS CV CPX PERCENT MAX PREDICTED HEART RATE ACHIEVED: 65
OHS CV CPX RATE PRESSURE PRODUCT PRESENTING: NORMAL
SYSTOLIC BLOOD PRESSURE: 152 MMHG

## 2021-08-20 PROCEDURE — 93018 NUCLEAR STRESS TEST (CUPID ONLY): ICD-10-PCS | Mod: ,,, | Performed by: INTERNAL MEDICINE

## 2021-08-20 PROCEDURE — 93016 NUCLEAR STRESS TEST (CUPID ONLY): ICD-10-PCS | Mod: ,,, | Performed by: INTERNAL MEDICINE

## 2021-08-20 PROCEDURE — 63600175 PHARM REV CODE 636 W HCPCS: Performed by: STUDENT IN AN ORGANIZED HEALTH CARE EDUCATION/TRAINING PROGRAM

## 2021-08-20 PROCEDURE — 93016 CV STRESS TEST SUPVJ ONLY: CPT | Mod: ,,, | Performed by: INTERNAL MEDICINE

## 2021-08-20 PROCEDURE — 78452 NM MYOCARDIAL PERFUSION SPECT MULTI PHARM: ICD-10-PCS | Mod: 26,,, | Performed by: RADIOLOGY

## 2021-08-20 PROCEDURE — 78452 HT MUSCLE IMAGE SPECT MULT: CPT | Mod: 26,,, | Performed by: RADIOLOGY

## 2021-08-20 PROCEDURE — 78452 HT MUSCLE IMAGE SPECT MULT: CPT | Mod: TC

## 2021-08-20 PROCEDURE — 93018 CV STRESS TEST I&R ONLY: CPT | Mod: ,,, | Performed by: INTERNAL MEDICINE

## 2021-08-20 PROCEDURE — 93017 CV STRESS TEST TRACING ONLY: CPT

## 2021-08-20 PROCEDURE — A9502 TC99M TETROFOSMIN: HCPCS

## 2021-08-20 RX ORDER — REGADENOSON 0.08 MG/ML
0.4 INJECTION, SOLUTION INTRAVENOUS ONCE
Status: COMPLETED | OUTPATIENT
Start: 2021-08-20 | End: 2021-08-20

## 2021-08-20 RX ADMIN — REGADENOSON 0.4 MG: 0.08 INJECTION, SOLUTION INTRAVENOUS at 11:08

## 2021-08-25 ENCOUNTER — CLINICAL SUPPORT (OUTPATIENT)
Dept: REHABILITATION | Facility: HOSPITAL | Age: 56
End: 2021-08-25
Attending: STUDENT IN AN ORGANIZED HEALTH CARE EDUCATION/TRAINING PROGRAM
Payer: COMMERCIAL

## 2021-08-25 ENCOUNTER — LAB VISIT (OUTPATIENT)
Dept: LAB | Facility: HOSPITAL | Age: 56
End: 2021-08-25
Attending: STUDENT IN AN ORGANIZED HEALTH CARE EDUCATION/TRAINING PROGRAM
Payer: COMMERCIAL

## 2021-08-25 DIAGNOSIS — E78.5 HYPERLIPIDEMIA, UNSPECIFIED HYPERLIPIDEMIA TYPE: ICD-10-CM

## 2021-08-25 DIAGNOSIS — M62.9 HAMSTRING TIGHTNESS OF BOTH LOWER EXTREMITIES: ICD-10-CM

## 2021-08-25 DIAGNOSIS — I10 ESSENTIAL HYPERTENSION: ICD-10-CM

## 2021-08-25 DIAGNOSIS — R29.898 WEAKNESS OF BOTH LOWER EXTREMITIES: ICD-10-CM

## 2021-08-25 DIAGNOSIS — K75.81 NASH (NONALCOHOLIC STEATOHEPATITIS): ICD-10-CM

## 2021-08-25 DIAGNOSIS — E11.9 TYPE 2 DIABETES MELLITUS WITHOUT COMPLICATION, WITHOUT LONG-TERM CURRENT USE OF INSULIN: ICD-10-CM

## 2021-08-25 DIAGNOSIS — R29.898 HIP TIGHTNESS: ICD-10-CM

## 2021-08-25 LAB
ALBUMIN SERPL BCP-MCNC: 4.8 G/DL (ref 3.5–5.2)
ALP SERPL-CCNC: 54 U/L (ref 38–126)
ALT SERPL W/O P-5'-P-CCNC: 88 U/L (ref 10–44)
ANION GAP SERPL CALC-SCNC: 11 MMOL/L (ref 8–16)
AST SERPL-CCNC: 57 U/L (ref 15–46)
BASOPHILS # BLD AUTO: 0.04 K/UL (ref 0–0.2)
BASOPHILS NFR BLD: 0.9 % (ref 0–1.9)
BILIRUB SERPL-MCNC: 0.7 MG/DL (ref 0.1–1)
CALCIUM SERPL-MCNC: 10.1 MG/DL (ref 8.7–10.5)
CHLORIDE SERPL-SCNC: 101 MMOL/L (ref 95–110)
CHOLEST SERPL-MCNC: 162 MG/DL (ref 120–199)
CHOLEST/HDLC SERPL: 5.8 {RATIO} (ref 2–5)
CO2 SERPL-SCNC: 28 MMOL/L (ref 23–29)
CREAT SERPL-MCNC: 0.89 MG/DL (ref 0.5–1.4)
DIFFERENTIAL METHOD: ABNORMAL
EOSINOPHIL # BLD AUTO: 0.1 K/UL (ref 0–0.5)
EOSINOPHIL NFR BLD: 1.8 % (ref 0–8)
ERYTHROCYTE [DISTWIDTH] IN BLOOD BY AUTOMATED COUNT: 13 % (ref 11.5–14.5)
EST. GFR  (AFRICAN AMERICAN): >60 ML/MIN/1.73 M^2
EST. GFR  (NON AFRICAN AMERICAN): >60 ML/MIN/1.73 M^2
ESTIMATED AVG GLUCOSE: 134 MG/DL (ref 68–131)
GLUCOSE SERPL-MCNC: 122 MG/DL (ref 70–110)
HBA1C MFR BLD: 6.3 % (ref 4–5.6)
HCT VFR BLD AUTO: 44.3 % (ref 40–54)
HDLC SERPL-MCNC: 28 MG/DL (ref 40–75)
HDLC SERPL: 17.3 % (ref 20–50)
HGB BLD-MCNC: 14.7 G/DL (ref 14–18)
IMM GRANULOCYTES # BLD AUTO: 0.01 K/UL (ref 0–0.04)
IMM GRANULOCYTES NFR BLD AUTO: 0.2 % (ref 0–0.5)
LDLC SERPL CALC-MCNC: 92.8 MG/DL (ref 63–159)
LYMPHOCYTES # BLD AUTO: 2.8 K/UL (ref 1–4.8)
LYMPHOCYTES NFR BLD: 61.5 % (ref 18–48)
MCH RBC QN AUTO: 28.1 PG (ref 27–31)
MCHC RBC AUTO-ENTMCNC: 33.2 G/DL (ref 32–36)
MCV RBC AUTO: 85 FL (ref 82–98)
MONOCYTES # BLD AUTO: 0.5 K/UL (ref 0.3–1)
MONOCYTES NFR BLD: 9.9 % (ref 4–15)
NEUTROPHILS # BLD AUTO: 1.2 K/UL (ref 1.8–7.7)
NEUTROPHILS NFR BLD: 25.7 % (ref 38–73)
NONHDLC SERPL-MCNC: 134 MG/DL
NRBC BLD-RTO: 0 /100 WBC
PLATELET # BLD AUTO: 271 K/UL (ref 150–450)
PMV BLD AUTO: 9.5 FL (ref 9.2–12.9)
POTASSIUM SERPL-SCNC: 3.7 MMOL/L (ref 3.5–5.1)
PROT SERPL-MCNC: 8.5 G/DL (ref 6–8.4)
RBC # BLD AUTO: 5.23 M/UL (ref 4.6–6.2)
SODIUM SERPL-SCNC: 140 MMOL/L (ref 136–145)
TRIGL SERPL-MCNC: 206 MG/DL (ref 30–150)
TSH SERPL DL<=0.005 MIU/L-ACNC: 2.03 UIU/ML (ref 0.4–4)
UUN UR-MCNC: 17 MG/DL (ref 2–20)
WBC # BLD AUTO: 4.55 K/UL (ref 3.9–12.7)

## 2021-08-25 PROCEDURE — 84443 ASSAY THYROID STIM HORMONE: CPT | Mod: PO | Performed by: STUDENT IN AN ORGANIZED HEALTH CARE EDUCATION/TRAINING PROGRAM

## 2021-08-25 PROCEDURE — 36415 COLL VENOUS BLD VENIPUNCTURE: CPT | Mod: PO | Performed by: STUDENT IN AN ORGANIZED HEALTH CARE EDUCATION/TRAINING PROGRAM

## 2021-08-25 PROCEDURE — 83036 HEMOGLOBIN GLYCOSYLATED A1C: CPT | Performed by: STUDENT IN AN ORGANIZED HEALTH CARE EDUCATION/TRAINING PROGRAM

## 2021-08-25 PROCEDURE — 97110 THERAPEUTIC EXERCISES: CPT | Mod: PO

## 2021-08-25 PROCEDURE — 80061 LIPID PANEL: CPT | Performed by: STUDENT IN AN ORGANIZED HEALTH CARE EDUCATION/TRAINING PROGRAM

## 2021-08-25 PROCEDURE — 80053 COMPREHEN METABOLIC PANEL: CPT | Mod: PO | Performed by: STUDENT IN AN ORGANIZED HEALTH CARE EDUCATION/TRAINING PROGRAM

## 2021-08-25 PROCEDURE — 85025 COMPLETE CBC W/AUTO DIFF WBC: CPT | Mod: PO | Performed by: STUDENT IN AN ORGANIZED HEALTH CARE EDUCATION/TRAINING PROGRAM

## 2021-08-26 RX ORDER — ATORVASTATIN CALCIUM 40 MG/1
40 TABLET, FILM COATED ORAL DAILY
Qty: 90 TABLET | Refills: 3 | Status: SHIPPED | OUTPATIENT
Start: 2021-08-26 | End: 2022-04-07 | Stop reason: SDUPTHER

## 2021-08-27 ENCOUNTER — TELEPHONE (OUTPATIENT)
Dept: FAMILY MEDICINE | Facility: CLINIC | Age: 56
End: 2021-08-27

## 2021-11-09 ENCOUNTER — IMMUNIZATION (OUTPATIENT)
Dept: INTERNAL MEDICINE | Facility: CLINIC | Age: 56
End: 2021-11-09
Payer: COMMERCIAL

## 2021-11-09 DIAGNOSIS — Z23 NEED FOR VACCINATION: Primary | ICD-10-CM

## 2021-11-09 PROCEDURE — 0004A COVID-19, MRNA, LNP-S, PF, 30 MCG/0.3 ML DOSE VACCINE: CPT | Mod: CV19,PBBFAC | Performed by: INTERNAL MEDICINE

## 2021-11-24 ENCOUNTER — OFFICE VISIT (OUTPATIENT)
Dept: FAMILY MEDICINE | Facility: CLINIC | Age: 56
End: 2021-11-24
Payer: COMMERCIAL

## 2021-11-24 VITALS
BODY MASS INDEX: 40.04 KG/M2 | HEART RATE: 74 BPM | TEMPERATURE: 98 F | DIASTOLIC BLOOD PRESSURE: 68 MMHG | SYSTOLIC BLOOD PRESSURE: 116 MMHG | WEIGHT: 249.13 LBS | HEIGHT: 66 IN | OXYGEN SATURATION: 98 %

## 2021-11-24 DIAGNOSIS — K21.9 GASTROESOPHAGEAL REFLUX DISEASE WITHOUT ESOPHAGITIS: ICD-10-CM

## 2021-11-24 DIAGNOSIS — E11.9 TYPE 2 DIABETES MELLITUS WITHOUT COMPLICATION, WITHOUT LONG-TERM CURRENT USE OF INSULIN: Primary | ICD-10-CM

## 2021-11-24 PROCEDURE — 4010F ACE/ARB THERAPY RXD/TAKEN: CPT | Mod: CPTII,S$GLB,, | Performed by: STUDENT IN AN ORGANIZED HEALTH CARE EDUCATION/TRAINING PROGRAM

## 2021-11-24 PROCEDURE — 99214 PR OFFICE/OUTPT VISIT, EST, LEVL IV, 30-39 MIN: ICD-10-PCS | Mod: S$GLB,,, | Performed by: STUDENT IN AN ORGANIZED HEALTH CARE EDUCATION/TRAINING PROGRAM

## 2021-11-24 PROCEDURE — 99214 OFFICE O/P EST MOD 30 MIN: CPT | Mod: S$GLB,,, | Performed by: STUDENT IN AN ORGANIZED HEALTH CARE EDUCATION/TRAINING PROGRAM

## 2021-11-24 PROCEDURE — 4010F PR ACE/ARB THEARPY RXD/TAKEN: ICD-10-PCS | Mod: CPTII,S$GLB,, | Performed by: STUDENT IN AN ORGANIZED HEALTH CARE EDUCATION/TRAINING PROGRAM

## 2021-11-24 RX ORDER — PANTOPRAZOLE SODIUM 40 MG/1
40 TABLET, DELAYED RELEASE ORAL EVERY MORNING
Qty: 90 TABLET | Refills: 3 | Status: ON HOLD | OUTPATIENT
Start: 2021-11-24 | End: 2022-01-31 | Stop reason: SDUPTHER

## 2021-11-26 ENCOUNTER — PATIENT OUTREACH (OUTPATIENT)
Dept: ADMINISTRATIVE | Facility: HOSPITAL | Age: 56
End: 2021-11-26
Payer: COMMERCIAL

## 2021-12-01 DIAGNOSIS — I10 ESSENTIAL HYPERTENSION: ICD-10-CM

## 2021-12-01 RX ORDER — AMLODIPINE BESYLATE 10 MG/1
10 TABLET ORAL DAILY
Qty: 90 TABLET | Refills: 1 | Status: SHIPPED | OUTPATIENT
Start: 2021-12-01 | End: 2022-01-24 | Stop reason: SDUPTHER

## 2021-12-01 RX ORDER — TRIAMTERENE AND HYDROCHLOROTHIAZIDE 37.5; 25 MG/1; MG/1
1 CAPSULE ORAL EVERY MORNING
Qty: 90 CAPSULE | Refills: 0 | Status: SHIPPED | OUTPATIENT
Start: 2021-12-01 | End: 2022-04-07 | Stop reason: SDUPTHER

## 2021-12-02 ENCOUNTER — OFFICE VISIT (OUTPATIENT)
Dept: PODIATRY | Facility: CLINIC | Age: 56
End: 2021-12-02
Payer: COMMERCIAL

## 2021-12-02 VITALS
BODY MASS INDEX: 40.21 KG/M2 | HEART RATE: 76 BPM | SYSTOLIC BLOOD PRESSURE: 136 MMHG | HEIGHT: 66 IN | DIASTOLIC BLOOD PRESSURE: 80 MMHG

## 2021-12-02 DIAGNOSIS — L60.9 DISEASE OF NAIL: ICD-10-CM

## 2021-12-02 DIAGNOSIS — E11.9 TYPE 2 DIABETES MELLITUS WITHOUT COMPLICATION, WITHOUT LONG-TERM CURRENT USE OF INSULIN: ICD-10-CM

## 2021-12-02 DIAGNOSIS — E11.9 ENCOUNTER FOR DIABETIC FOOT EXAM: Primary | ICD-10-CM

## 2021-12-02 PROCEDURE — 4010F PR ACE/ARB THEARPY RXD/TAKEN: ICD-10-PCS | Mod: CPTII,S$GLB,, | Performed by: STUDENT IN AN ORGANIZED HEALTH CARE EDUCATION/TRAINING PROGRAM

## 2021-12-02 PROCEDURE — 99999 PR PBB SHADOW E&M-EST. PATIENT-LVL III: CPT | Mod: PBBFAC,,, | Performed by: STUDENT IN AN ORGANIZED HEALTH CARE EDUCATION/TRAINING PROGRAM

## 2021-12-02 PROCEDURE — 99203 PR OFFICE/OUTPT VISIT, NEW, LEVL III, 30-44 MIN: ICD-10-PCS | Mod: S$GLB,,, | Performed by: STUDENT IN AN ORGANIZED HEALTH CARE EDUCATION/TRAINING PROGRAM

## 2021-12-02 PROCEDURE — 99203 OFFICE O/P NEW LOW 30 MIN: CPT | Mod: S$GLB,,, | Performed by: STUDENT IN AN ORGANIZED HEALTH CARE EDUCATION/TRAINING PROGRAM

## 2021-12-02 PROCEDURE — 4010F ACE/ARB THERAPY RXD/TAKEN: CPT | Mod: CPTII,S$GLB,, | Performed by: STUDENT IN AN ORGANIZED HEALTH CARE EDUCATION/TRAINING PROGRAM

## 2021-12-02 PROCEDURE — 99999 PR PBB SHADOW E&M-EST. PATIENT-LVL III: ICD-10-PCS | Mod: PBBFAC,,, | Performed by: STUDENT IN AN ORGANIZED HEALTH CARE EDUCATION/TRAINING PROGRAM

## 2021-12-09 ENCOUNTER — PATIENT MESSAGE (OUTPATIENT)
Dept: FAMILY MEDICINE | Facility: CLINIC | Age: 56
End: 2021-12-09
Payer: COMMERCIAL

## 2021-12-09 ENCOUNTER — TELEPHONE (OUTPATIENT)
Dept: BARIATRICS | Facility: CLINIC | Age: 56
End: 2021-12-09
Payer: COMMERCIAL

## 2021-12-09 DIAGNOSIS — K21.9 GASTROESOPHAGEAL REFLUX DISEASE WITHOUT ESOPHAGITIS: Primary | ICD-10-CM

## 2021-12-10 ENCOUNTER — OFFICE VISIT (OUTPATIENT)
Dept: GASTROENTEROLOGY | Facility: CLINIC | Age: 56
End: 2021-12-10
Payer: COMMERCIAL

## 2021-12-10 ENCOUNTER — TELEPHONE (OUTPATIENT)
Dept: ENDOSCOPY | Facility: HOSPITAL | Age: 56
End: 2021-12-10
Payer: COMMERCIAL

## 2021-12-10 ENCOUNTER — PATIENT MESSAGE (OUTPATIENT)
Dept: FAMILY MEDICINE | Facility: CLINIC | Age: 56
End: 2021-12-10
Payer: COMMERCIAL

## 2021-12-10 VITALS
DIASTOLIC BLOOD PRESSURE: 88 MMHG | HEART RATE: 75 BPM | WEIGHT: 252.88 LBS | SYSTOLIC BLOOD PRESSURE: 144 MMHG | BODY MASS INDEX: 40.64 KG/M2 | HEIGHT: 66 IN

## 2021-12-10 DIAGNOSIS — R14.0 BLOATING: ICD-10-CM

## 2021-12-10 DIAGNOSIS — R10.13 EPIGASTRIC ABDOMINAL PAIN: Primary | ICD-10-CM

## 2021-12-10 PROCEDURE — 99204 PR OFFICE/OUTPT VISIT, NEW, LEVL IV, 45-59 MIN: ICD-10-PCS | Mod: S$GLB,,, | Performed by: INTERNAL MEDICINE

## 2021-12-10 PROCEDURE — 4010F PR ACE/ARB THEARPY RXD/TAKEN: ICD-10-PCS | Mod: CPTII,S$GLB,, | Performed by: INTERNAL MEDICINE

## 2021-12-10 PROCEDURE — 99204 OFFICE O/P NEW MOD 45 MIN: CPT | Mod: S$GLB,,, | Performed by: INTERNAL MEDICINE

## 2021-12-10 PROCEDURE — 4010F ACE/ARB THERAPY RXD/TAKEN: CPT | Mod: CPTII,S$GLB,, | Performed by: INTERNAL MEDICINE

## 2021-12-10 PROCEDURE — 99999 PR PBB SHADOW E&M-EST. PATIENT-LVL III: ICD-10-PCS | Mod: PBBFAC,,, | Performed by: INTERNAL MEDICINE

## 2021-12-10 PROCEDURE — 99999 PR PBB SHADOW E&M-EST. PATIENT-LVL III: CPT | Mod: PBBFAC,,, | Performed by: INTERNAL MEDICINE

## 2021-12-13 ENCOUNTER — LAB VISIT (OUTPATIENT)
Dept: LAB | Facility: HOSPITAL | Age: 56
End: 2021-12-13
Attending: INTERNAL MEDICINE
Payer: COMMERCIAL

## 2021-12-13 DIAGNOSIS — R14.0 BLOATING: ICD-10-CM

## 2021-12-13 PROCEDURE — 87329 GIARDIA AG IA: CPT | Mod: PO | Performed by: INTERNAL MEDICINE

## 2021-12-13 PROCEDURE — 87177 OVA AND PARASITES SMEARS: CPT | Mod: PO | Performed by: INTERNAL MEDICINE

## 2021-12-14 LAB
CRYPTOSP AG STL QL IA: NEGATIVE
G LAMBLIA AG STL QL IA: NEGATIVE

## 2021-12-15 ENCOUNTER — TELEPHONE (OUTPATIENT)
Dept: FAMILY MEDICINE | Facility: CLINIC | Age: 56
End: 2021-12-15
Payer: COMMERCIAL

## 2021-12-16 ENCOUNTER — PATIENT MESSAGE (OUTPATIENT)
Dept: GASTROENTEROLOGY | Facility: CLINIC | Age: 56
End: 2021-12-16
Payer: COMMERCIAL

## 2021-12-16 LAB — O+P STL MICRO: NORMAL

## 2022-01-10 DIAGNOSIS — Z01.818 PRE-OP TESTING: ICD-10-CM

## 2022-01-19 ENCOUNTER — PATIENT OUTREACH (OUTPATIENT)
Dept: ADMINISTRATIVE | Facility: OTHER | Age: 57
End: 2022-01-19
Payer: COMMERCIAL

## 2022-01-19 DIAGNOSIS — E11.9 TYPE 2 DIABETES MELLITUS WITHOUT COMPLICATION, UNSPECIFIED WHETHER LONG TERM INSULIN USE: Primary | ICD-10-CM

## 2022-01-19 NOTE — PROGRESS NOTES
LINKS immunization registry updated  Care Everywhere updated  Health Maintenance updated  Chart reviewed for overdue [facility]active [facility] ([facility]ARON) health maintenance testing (CRS, Breast Ca, Diabetic Eye Exam)   Orders entered: diabetic eye screening photo

## 2022-01-24 ENCOUNTER — OFFICE VISIT (OUTPATIENT)
Dept: BARIATRICS | Facility: CLINIC | Age: 57
End: 2022-01-24
Payer: COMMERCIAL

## 2022-01-24 VITALS
HEART RATE: 83 BPM | WEIGHT: 247.38 LBS | OXYGEN SATURATION: 99 % | SYSTOLIC BLOOD PRESSURE: 152 MMHG | DIASTOLIC BLOOD PRESSURE: 80 MMHG | BODY MASS INDEX: 39.92 KG/M2

## 2022-01-24 DIAGNOSIS — E78.5 HYPERLIPIDEMIA, UNSPECIFIED HYPERLIPIDEMIA TYPE: ICD-10-CM

## 2022-01-24 DIAGNOSIS — Z71.3 ENCOUNTER FOR WEIGHT LOSS COUNSELING: ICD-10-CM

## 2022-01-24 DIAGNOSIS — E11.9 TYPE 2 DIABETES MELLITUS WITHOUT COMPLICATION, WITHOUT LONG-TERM CURRENT USE OF INSULIN: ICD-10-CM

## 2022-01-24 DIAGNOSIS — K75.81 NASH (NONALCOHOLIC STEATOHEPATITIS): ICD-10-CM

## 2022-01-24 DIAGNOSIS — I10 PRIMARY HYPERTENSION: ICD-10-CM

## 2022-01-24 DIAGNOSIS — E66.01 CLASS 2 SEVERE OBESITY DUE TO EXCESS CALORIES WITH SERIOUS COMORBIDITY AND BODY MASS INDEX (BMI) OF 39.0 TO 39.9 IN ADULT: Primary | ICD-10-CM

## 2022-01-24 PROCEDURE — 3079F PR MOST RECENT DIASTOLIC BLOOD PRESSURE 80-89 MM HG: ICD-10-PCS | Mod: CPTII,S$GLB,, | Performed by: STUDENT IN AN ORGANIZED HEALTH CARE EDUCATION/TRAINING PROGRAM

## 2022-01-24 PROCEDURE — 1160F RVW MEDS BY RX/DR IN RCRD: CPT | Mod: CPTII,S$GLB,, | Performed by: STUDENT IN AN ORGANIZED HEALTH CARE EDUCATION/TRAINING PROGRAM

## 2022-01-24 PROCEDURE — 99214 OFFICE O/P EST MOD 30 MIN: CPT | Mod: S$GLB,,, | Performed by: STUDENT IN AN ORGANIZED HEALTH CARE EDUCATION/TRAINING PROGRAM

## 2022-01-24 PROCEDURE — 3077F SYST BP >= 140 MM HG: CPT | Mod: CPTII,S$GLB,, | Performed by: STUDENT IN AN ORGANIZED HEALTH CARE EDUCATION/TRAINING PROGRAM

## 2022-01-24 PROCEDURE — 1159F PR MEDICATION LIST DOCUMENTED IN MEDICAL RECORD: ICD-10-PCS | Mod: CPTII,S$GLB,, | Performed by: STUDENT IN AN ORGANIZED HEALTH CARE EDUCATION/TRAINING PROGRAM

## 2022-01-24 PROCEDURE — 3077F PR MOST RECENT SYSTOLIC BLOOD PRESSURE >= 140 MM HG: ICD-10-PCS | Mod: CPTII,S$GLB,, | Performed by: STUDENT IN AN ORGANIZED HEALTH CARE EDUCATION/TRAINING PROGRAM

## 2022-01-24 PROCEDURE — 1160F PR REVIEW ALL MEDS BY PRESCRIBER/CLIN PHARMACIST DOCUMENTED: ICD-10-PCS | Mod: CPTII,S$GLB,, | Performed by: STUDENT IN AN ORGANIZED HEALTH CARE EDUCATION/TRAINING PROGRAM

## 2022-01-24 PROCEDURE — 99999 PR PBB SHADOW E&M-EST. PATIENT-LVL V: CPT | Mod: PBBFAC,,, | Performed by: STUDENT IN AN ORGANIZED HEALTH CARE EDUCATION/TRAINING PROGRAM

## 2022-01-24 PROCEDURE — 3008F PR BODY MASS INDEX (BMI) DOCUMENTED: ICD-10-PCS | Mod: CPTII,S$GLB,, | Performed by: STUDENT IN AN ORGANIZED HEALTH CARE EDUCATION/TRAINING PROGRAM

## 2022-01-24 PROCEDURE — 1159F MED LIST DOCD IN RCRD: CPT | Mod: CPTII,S$GLB,, | Performed by: STUDENT IN AN ORGANIZED HEALTH CARE EDUCATION/TRAINING PROGRAM

## 2022-01-24 PROCEDURE — 99999 PR PBB SHADOW E&M-EST. PATIENT-LVL V: ICD-10-PCS | Mod: PBBFAC,,, | Performed by: STUDENT IN AN ORGANIZED HEALTH CARE EDUCATION/TRAINING PROGRAM

## 2022-01-24 PROCEDURE — 3079F DIAST BP 80-89 MM HG: CPT | Mod: CPTII,S$GLB,, | Performed by: STUDENT IN AN ORGANIZED HEALTH CARE EDUCATION/TRAINING PROGRAM

## 2022-01-24 PROCEDURE — 99214 PR OFFICE/OUTPT VISIT, EST, LEVL IV, 30-39 MIN: ICD-10-PCS | Mod: S$GLB,,, | Performed by: STUDENT IN AN ORGANIZED HEALTH CARE EDUCATION/TRAINING PROGRAM

## 2022-01-24 PROCEDURE — 3008F BODY MASS INDEX DOCD: CPT | Mod: CPTII,S$GLB,, | Performed by: STUDENT IN AN ORGANIZED HEALTH CARE EDUCATION/TRAINING PROGRAM

## 2022-01-24 RX ORDER — SEMAGLUTIDE 1.34 MG/ML
INJECTION, SOLUTION SUBCUTANEOUS
Qty: 1 PEN | Refills: 0 | Status: SHIPPED | OUTPATIENT
Start: 2022-01-24 | End: 2022-02-23 | Stop reason: SDUPTHER

## 2022-01-24 NOTE — PATIENT INSTRUCTIONS
Start Ozempic once a week. Start with 0.25mg once a week x 4 weeks, then 0.5 mg weekly.   Decrease portions as soon as you start Ozempic. Some nausea in the first 2 weeks is not unusual.   If you get pain across the upper abdomen and around to your back, please call the office.         Weekly Weight:  Weigh yourself at least once a week to help keep track of your progress between visits. Tracking your weight will provide you with important feedback about the changes you are making. To measure your weight as accurately as possible, weigh yourself at the same time of day, wearing the same clothes, and using the same scale.         Food and Beverage Log:  Keep a log of all food and beverages that you consume.  Keeping track of calories will help you lose weight, because monitoring will help you become more aware of what you are eating and recognize your eating patterns.  Be mindful of your hunger level before eating and your satiety level after eating.  Just keeping records will help you make healthy changes in your diet and eat fewer calories.    Tips for keeping a calorie count:     ? Each day, aim for the daily calorie goal.     ? Record your food intake immediately after eating. If possible, look up calories before or immediately after eating.     ? Download an miesha like adQuota Fitness Pal, Lose It!, or Ornis (Code: 51221) To keep track of your calories.    ? Measuring foods (using measuring cups or spoons) will help you be more accurate with counting calories.     ? Keep a running calorie count throughout the day.     ? Count daily calories toward a weekly calorie total. If you eat too many calories one day, cut back slightly over the next few days to meet your weekly goal.       Meal Planning & Grocery Shopping    Meal planning builds the foundation for healthy eating. When you have structured ideas for healthy meals and foods available at home to prepare those meals, weight control becomes easier.  If only  healthy foods are available at home, then you will be much more likely to eat healthy foods. And you will be less likely to go to a restaurant or  a fast food meal, which tend to be unhealthy and higher in calories than meals prepared at home.      Take 5-10 minutes each week to plan meals for the next 7 days.  Make a grocery list based on the meal plan.    Grocery Shopping Tips:  ? Shop on a full stomach.  ? Schedule your shopping for times when you are most motivated and able to be disciplined about your purchases. For example, after a stressful day at work it may be difficult to make the healthiest choices. Shopping at other times, such as early in the morning or after dinner, may be easier.  ? Focus your shopping on the outside aisles of the store, which tend to contain more fresh foods and lower calorie foods. The inside aisles tend to have more processed foods.  ? Stick to your list. Avoid buying unhealthy items just because they are on sale.   ? Compare nutrition labels to check the number of calories and percentage of fat.      What to buy:    Vegetables  - Fresh vegetables  - Frozen vegetables with no sauce or added salt  - Canned vegetables with no sauce or added salt    Protein  - Lean meats, such as chicken and turkey  - Limit red meats, such as beef to no more than 1x/week  - Limit processed meats, such as cold cuts, valadez, sausage, and hot dogs. Look for brands that have no nitrites and are minimally processed. Consider turkey sausage or turkey valadez.  - Fish and Shellfish  - Eggs  - Dried beans  - Canned beans (reduced sodium)    Fat  - Use healthy oils, such as olive oil or canola oil, for cooking, salad dressings, etc.  - Unflavored nuts and seeds  - Nut butters (no added sugar)    Dairy  - Yogurt (no sugar added)  - Cheese  - Low-fat milk  - Unsweetened nondairy milks (almond milk, soy milk, etc)    Fruit  - Fresh Fruit  - Frozen fruit with no added sugar  - Canned fruit with no added  sugar  - Dried fruit with no added sugar  - 100% fruit juice    Whole Grains  - Single ingredient grains, such as oats, quinoa, brown rice  - Whole-wheat pasta  - Sprouted whole-grain bread    What to avoid:  - Avoid fried foods  - Avoid foods with added sugar  - Avoid sugar-sweetened beverages  - Avoid ultra-processed foods          Copyright © 2011, [facility]. For more information about The Healthy Eating Plate, please see The Nutrition Source, Department of Nutrition, [employer], www.thenutritionsource.org, and [employer], www.health.[place].[facility].    Sample Menu Plan    DAY 1     Breakfast  1 cup 2% cottage cheese or Greek yogurt, 1/2 cup fruit      Snack  Low-carb protein drink (4 grams sugar or less)    Lunch  Tuna salad sandwich on whole wheat with lettuce and tomato, using light martin    Snack  1oz unsalted nuts and 17 grapes (or a piece of fruit)    Dinner  3-4oz grilled fish   ½ mashed sweet potato with no calorie spray butter  1/2 cup cooked spinach, and1 cup salad with spray dressing      DAY 2    Breakfast  2 eggs with 1oz low-fat cheese, 1 slice whole wheat toast with spray margarine if desired    Snack  Low-carb protein drink (4 grams sugar or less)    Lunch  Turkey and low-fat cheese sandwich on whole wheat toast with lettuce and tomato    Snack  1 cup low-fat cottage cheese or Greek yogurt, ½ cup fruit    Dinner  Baked chicken thigh  ½ cup whole wheat pasta with olive oil and parmesan cheese  ½ cup cooked green beans, and1 cup salad with spray dressing      DAY 3    Breakfast   Low-carb protein drink (4 grams sugar or less)    Snack  Atkins protein bar    Lunch  Grilled Chicken sandwich on whole wheat, with lettuce, tomato and ¼ small avocado    Snack  1/2 cup fruit  1oz unsalted nuts    Dinner  1 cup red, white or black beans, ½ cup brown rice  ½ cup green beans or other cooked vegetable    Snack  Greek yogurt cup      DAY 4    Breakfast  1  tablespoon peanut butter and ½ banana on 1 slice whole wheat toast    Snack  Mozzarella string cheese  1/2 cup fruit    Lunch  1 cup whole wheat pasta, with 3oz chicken breast , and ½ cup steamed broccoli. Toss with 1 tbsp olive oil and ½ cup shredded parmesan cheese  1 cup salad with spray dressing    Dinner  1 cup broth based vegetable and noodle soup  Low-carb protein drink (4 grams sugar or less)      Sample meal plan  Protein drinks and bars: 0-4 grams sugar  Drink lots of water throughout the day and exercise!      MENU # 1  Breakfast: 2 eggs, 1 turkey sausage maria eugenia  Lunch: 2-3 roll-ups (sliced turkey, low-fat slice cheese), baby carrots dipped in 1 Tbsp natural peanut butter  Mid-Day Snack: ¼ cup unsalted almonds, ½ cup fruit  Supper: 1 chicken thigh simmered in tomato sauce + 2 Tbsp mozzarella cheese, ½ cup black beans, 1/2 cup steamed veggies  Evening Snack: 1/2 cup grapes with 4 cubes low-fat cheddar cheese     MENU # 2  Breakfast: protein drink  Mid-morning snack : ¼ cup unsalted nuts  Lunch: 1 cup tuna or chicken salad made with light martin, over salad.   Supper: hamburger maria eugenia, slice of cheese, 1 cup steamed veggies.   Snack: Greek yogurt    Menu #3  Breakfast: 6oz plain Greek yogurt + fruit (½ banana, ½ cup fruit - pineapple, blueberries, strawberries, peach)  Lunch: ½  chicken breast w/ slice pepper vito cheese, 1/2 cup steamed veggies and small salad with Salad Spritzer.    Mid-Day snack: protein drink   Supper: 4oz Grilled fish, grilled veggie kabob ( mushrooms, onion, bell pepper, yellow squash, zucchini, cherry tomatoes)    Menu # 4  Breakfast: vanilla iced coffee: 1 scoop vanilla protein powder + 4oz skim milk + 4oz coffee   Snack: protein bar  Lunch: 2 Lettuce tacos: ¼ cup seasoned ground turkey wrapped in a Oscar lettuce leaf with 1 Tbsp shredded cheese and dollop low-fat sour cream  Dinner: Shrimp omelet: 2 eggs, ½ cup shrimp, green onions, and shredded cheese    Menu #5  Breakfast: 1 cup  low-fat cottage cheese, ½ cup fruit  Lunch: 2 oz baked pork chop, 1 cup okra and tomato stew  Snack: 1 cup black beans + salsa + dollop sour cream  Dinner: Caprese chicken salad: 2 oz chicken, 1oz fresh mozzarella, sliced tomato, 1 Tbsp olive oil, basil    Menu #6  Breakfast: ½ cup part-skim ricotta cheese, 1/2 cup fruit, ¼ cup slivered almonds  Lunch: 2 oz canned chicken, 1oz shredded cheddar cheese, ½ cup black beans, 2 Tbsp salsa  Snack: Protein drink  Dinner: 4 oz grilled salmon steak, over mixed green salad with light dressing      Snacks: (100-200 calories; >5g protein)     - 1 low-fat cheese stick with 8 cherry tomatoes or 1 serving fresh fruit  - 4 thin slices fat-free turkey breast and 1 slice low-fat cheese  - 4 thin slices fat-free honey ham with wedge of melon  - 2 slices of turkey valadez  - Boiled eggs (can buy at [employer] already boiled w/ shell removed)  - 6-8 edamame pods (equivalent to about 1/4 cup edamame without pods).   - 1/4 cup unsalted nuts with ½ cup fruit  - 6-oz container Dannon Light n Fit vanilla yogurt, topped with 1oz unsalted nuts         - apple, celery or baby carrots spread with 2 Tbsp PB2  - apple slices with 1 oz slice low-fat cheese  - Apple slices dipped in 2 Tbsp of PB2  - 2 Tbsp PB2 mixed in light or greek yogurt or sugar-free pudding  - celery, cucumber, bell pepper or baby carrots dipped in ¼ cup hummus bean spread   - celery, cucumber, baby carrots dipped in high protein greek yogurt (Mix 16 oz plain greek yogurt + 1 packet of hidden valley ranch dip mix)  - Delano Links Beef Steak - 14g protein! (similar to beef jerky but very lean)  - 2 wedges Laughing Cow - Light Herb & Garlic Cheese with sliced cucumber or green bell pepper  - 1/2 cup low-fat cottage cheese with ¼ cup fruit or ¼ cup salsa  - 1/2 cup low fat cottage cheese with 10-15 cherry tomatoes  - 8 oz glass of FAIRLIFE fat-free or low-fat milk (13 g protein)      ** Be CREATIVE. You can always snack on bites of  grilled chicken or tuna salad made with low fat martin, if needed!     Lifestyle Activity    Lifestyle activity consists of all the activities that burn calories during the course of a normal day. Using the stairs, washing the dishes, or even getting up to turn off the television are all examples of lifestyle activity. All activities, no matter how small, burn calories. Increasing lifestyle activity can help with weight control, so building physical activity into your everyday routine is important.     A pedometer is a tool that can help you track how much lifestyle activity you are getting. It can help you stay active. A pedometer counts each step you take and displays your total steps on the screen. Many smartphones, including iPhones and Androids, have applications that automatically count your steps. If you decide to use this method, make sure you are holding or wearing your smartphone so it can count all of your steps.     During the next 2 weeks, aim for 5,000 or more steps per day. Each week aim to increase your daily step goal by 250 so that you will reach an average of 10,000 steps per day (equal to walking 4 to 5 miles).     Start making more active choices in your routine in order to increase the amount of walking you do each day.     Strategies to Increase Lifestyle Activity:    ? Take several 5-10-minute walks during the day.   ? Set a reminder to take a 5 minute break from your desk every hour.   ? Choose the farthest entrance to a building that you are entering.   ? Host walking meetings at work.   ? Walk down the gutierrez to contact co-workers face-to-face, instead of emailing or calling.  ? Walk to a restroom, water cooler, or copy machine on a different floor at work.   ? Walk during your lunch break.   ? Park farther away in parking lots.   ? Get off the bus or train earlier and walk farther to your destination.   ? Take the stairs rather than the elevator or the escalator.   ? Start a walking club  with co-workers or neighbors.   ? Walk - don't drive - for trips less than one mile.   ? Take an after-dinner walk with family.   ? Go for a walk while talking on your wireless phone.   ? Walk the dog more often.   ? If you prefer to stay indoors because of the weather, try walking in a shopping mall or doing laps around a large store.   ? Purchase a treadmill to use at home.   ? Schedule time for walking every week and stick to it like any other appointment.   ? Or think of your own strategy: _______________________________       Physical Activity    Physical activity improves your health and helps with weight control, especially weight loss maintenance.      When starting to incorporate an exercise routine into your day, it is helpful to set specific goals.  For example, I will walk at moderate intensity from 12:30-12:45pm on Monday, Wednesday, and Friday.  Schedule your exercise time into your calendar.  Think of any potential barriers that may come up and prevent you from exercising.  Develop solutions or back-up plans for when these barriers may arise.    Walking is an ideal activity to start with if you do not currently have an exercise routine. You can make the activity more fun by doing it with a friend or listening to music or a book on tape while you do it. If you don't enjoy walking, think of other activities you like, such as bike riding or swimming.  Other alternatives include group fitness classes or exercise at home using DVDs, Apps, etc.    If you are new to exercising, then start with 10 minutes 3-4 days per week.  After two weeks, increase to 15 minutes 3-4 days per week.  If you already exercise more than this, continue at your higher level, or increase by 10-15 minutes if able.         Aerobic Activity  Aerobic Activity gets you breathing harder and your heart beating faster.  Eventually, you should work up to 150 - 300 minutes of moderate-intensity aerobic activity every week or 75 - 150  minutes of vigorous-intensity aerobic activity every week.  An easy way to gauge the intensity of your activity is with the Talk Test.  During moderate activity, you should be able to talk, but not sing without having to pause for a breath.  During vigorous activity, you shouldn't be able to say more than a few words without pausing for a breath.  You should not push yourself until you are completely out of breath, tired, or sore.    Examples of Aerobic Activity:  - Walking  - Running  - Swimming  - Biking  - Playing sports like tennis or basketball  - Dancing  - Jumping Rope      Strength Training  It is also recommended that you perform muscle-strengthening activities at least 2 days per week.  Be sure to include activities that work all major muscle groups (legs, arms, abdomen, back, buttocks, chest, and shoulders)    Examples of Strength Training  - Lifting weights  - Working with resistance band  - Using your body weight for resistance (squats, push-ups, sit-ups)  - Pilates  - Yoga      https://health.gov/moveyourway/activity-planner/      Remember to keep a record of your activity to track your progress.
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
## 2022-01-26 DIAGNOSIS — E11.9 TYPE 2 DIABETES MELLITUS WITHOUT COMPLICATION: ICD-10-CM

## 2022-01-31 ENCOUNTER — HOSPITAL ENCOUNTER (OUTPATIENT)
Facility: HOSPITAL | Age: 57
Discharge: HOME | End: 2022-01-31
Attending: INTERNAL MEDICINE | Admitting: INTERNAL MEDICINE
Payer: COMMERCIAL

## 2022-01-31 ENCOUNTER — ANESTHESIA EVENT (OUTPATIENT)
Dept: ENDOSCOPY | Facility: HOSPITAL | Age: 57
End: 2022-01-31
Payer: COMMERCIAL

## 2022-01-31 ENCOUNTER — TELEPHONE (OUTPATIENT)
Dept: GASTROENTEROLOGY | Facility: CLINIC | Age: 57
End: 2022-01-31
Payer: COMMERCIAL

## 2022-01-31 ENCOUNTER — ANESTHESIA (OUTPATIENT)
Dept: ENDOSCOPY | Facility: HOSPITAL | Age: 57
End: 2022-01-31
Payer: COMMERCIAL

## 2022-01-31 VITALS
RESPIRATION RATE: 18 BRPM | SYSTOLIC BLOOD PRESSURE: 136 MMHG | OXYGEN SATURATION: 99 % | BODY MASS INDEX: 40.18 KG/M2 | DIASTOLIC BLOOD PRESSURE: 92 MMHG | WEIGHT: 250 LBS | HEIGHT: 66 IN | TEMPERATURE: 98 F | HEART RATE: 94 BPM

## 2022-01-31 DIAGNOSIS — R10.9 ABDOMINAL PAIN: ICD-10-CM

## 2022-01-31 DIAGNOSIS — K21.9 GASTROESOPHAGEAL REFLUX DISEASE WITHOUT ESOPHAGITIS: ICD-10-CM

## 2022-01-31 LAB — GLUCOSE SERPL-MCNC: 112 MG/DL (ref 70–110)

## 2022-01-31 PROCEDURE — 37000009 HC ANESTHESIA EA ADD 15 MINS: Performed by: INTERNAL MEDICINE

## 2022-01-31 PROCEDURE — 43239 EGD BIOPSY SINGLE/MULTIPLE: CPT | Mod: ,,, | Performed by: INTERNAL MEDICINE

## 2022-01-31 PROCEDURE — 88305 TISSUE EXAM BY PATHOLOGIST: CPT | Mod: 26,,, | Performed by: PATHOLOGY

## 2022-01-31 PROCEDURE — 43239 PR EGD, FLEX, W/BIOPSY, SGL/MULTI: ICD-10-PCS | Mod: ,,, | Performed by: INTERNAL MEDICINE

## 2022-01-31 PROCEDURE — 88342 IMHCHEM/IMCYTCHM 1ST ANTB: CPT | Mod: 26,,, | Performed by: PATHOLOGY

## 2022-01-31 PROCEDURE — 37000008 HC ANESTHESIA 1ST 15 MINUTES: Performed by: INTERNAL MEDICINE

## 2022-01-31 PROCEDURE — 25000003 PHARM REV CODE 250: Performed by: NURSE ANESTHETIST, CERTIFIED REGISTERED

## 2022-01-31 PROCEDURE — 88342 CHG IMMUNOCYTOCHEMISTRY: ICD-10-PCS | Mod: 26,,, | Performed by: PATHOLOGY

## 2022-01-31 PROCEDURE — E9220 PRA ENDO ANESTHESIA: HCPCS | Mod: ,,, | Performed by: NURSE ANESTHETIST, CERTIFIED REGISTERED

## 2022-01-31 PROCEDURE — 25000003 PHARM REV CODE 250: Performed by: INTERNAL MEDICINE

## 2022-01-31 PROCEDURE — 43239 EGD BIOPSY SINGLE/MULTIPLE: CPT | Performed by: INTERNAL MEDICINE

## 2022-01-31 PROCEDURE — 27201012 HC FORCEPS, HOT/COLD, DISP: Performed by: INTERNAL MEDICINE

## 2022-01-31 PROCEDURE — 88305 TISSUE EXAM BY PATHOLOGIST: CPT | Performed by: PATHOLOGY

## 2022-01-31 PROCEDURE — 63600175 PHARM REV CODE 636 W HCPCS: Performed by: NURSE ANESTHETIST, CERTIFIED REGISTERED

## 2022-01-31 PROCEDURE — E9220 PRA ENDO ANESTHESIA: ICD-10-PCS | Mod: ,,, | Performed by: NURSE ANESTHETIST, CERTIFIED REGISTERED

## 2022-01-31 PROCEDURE — 88342 IMHCHEM/IMCYTCHM 1ST ANTB: CPT | Performed by: PATHOLOGY

## 2022-01-31 PROCEDURE — 88305 TISSUE EXAM BY PATHOLOGIST: ICD-10-PCS | Mod: 26,,, | Performed by: PATHOLOGY

## 2022-01-31 RX ORDER — SODIUM CHLORIDE 9 MG/ML
INJECTION, SOLUTION INTRAVENOUS CONTINUOUS
Status: DISCONTINUED | OUTPATIENT
Start: 2022-01-31 | End: 2022-01-31 | Stop reason: HOSPADM

## 2022-01-31 RX ORDER — PANTOPRAZOLE SODIUM 40 MG/1
40 TABLET, DELAYED RELEASE ORAL EVERY MORNING
Qty: 90 TABLET | Refills: 3 | Status: SHIPPED | OUTPATIENT
Start: 2022-01-31 | End: 2022-12-23 | Stop reason: SDUPTHER

## 2022-01-31 RX ORDER — LIDOCAINE HYDROCHLORIDE 20 MG/ML
INJECTION INTRAVENOUS
Status: DISCONTINUED | OUTPATIENT
Start: 2022-01-31 | End: 2022-01-31

## 2022-01-31 RX ORDER — PROPOFOL 10 MG/ML
VIAL (ML) INTRAVENOUS CONTINUOUS PRN
Status: DISCONTINUED | OUTPATIENT
Start: 2022-01-31 | End: 2022-01-31

## 2022-01-31 RX ORDER — PROPOFOL 10 MG/ML
VIAL (ML) INTRAVENOUS
Status: DISCONTINUED | OUTPATIENT
Start: 2022-01-31 | End: 2022-01-31

## 2022-01-31 RX ADMIN — Medication 150 MCG/KG/MIN: at 01:01

## 2022-01-31 RX ADMIN — SODIUM CHLORIDE: 0.9 INJECTION, SOLUTION INTRAVENOUS at 12:01

## 2022-01-31 RX ADMIN — PROPOFOL 50 MG: 10 INJECTION, EMULSION INTRAVENOUS at 01:01

## 2022-01-31 RX ADMIN — GLYCOPYRROLATE 0.2 MG: 0.2 INJECTION, SOLUTION INTRAMUSCULAR; INTRAVITREAL at 01:01

## 2022-01-31 RX ADMIN — PROPOFOL 25 MG: 10 INJECTION, EMULSION INTRAVENOUS at 01:01

## 2022-01-31 RX ADMIN — LIDOCAINE HYDROCHLORIDE 100 MG: 20 INJECTION, SOLUTION INTRAVENOUS at 01:01

## 2022-01-31 NOTE — TRANSFER OF CARE
"Anesthesia Transfer of Care Note    Patient: Андрей Wooten    Procedure(s) Performed: Procedure(s) (LRB):  ESOPHAGOGASTRODUODENOSCOPY (EGD) (N/A)    Patient location: GI    Anesthesia Type: general    Transport from OR: Transported from OR on room air with adequate spontaneous ventilation    Post pain: adequate analgesia    Post assessment: no apparent anesthetic complications and tolerated procedure well    Post vital signs: stable    Level of consciousness: lethargic and responds to stimulation    Nausea/Vomiting: no nausea/vomiting    Complications: none    Transfer of care protocol was followed      Last vitals:   Visit Vitals  BP (!) 140/79 (BP Location: Left arm, Patient Position: Lying)   Pulse 80   Temp 36.3 °C (97.3 °F) (Temporal)   Resp 16   Ht 5' 6" (1.676 m)   Wt 113.4 kg (250 lb)   SpO2 100%   BMI 40.35 kg/m²     "

## 2022-01-31 NOTE — DISCHARGE INSTRUCTIONS
Patient Education       Upper GI Endoscopy   Why is this procedure done?   This procedure is done to view your upper gastrointestinal (GI) tract. This includes your throat and food pipe (esophagus). It also includes your stomach and the first part of the small bowel. Some people have this test for problems like coughing or throwing up blood. Other people may be having bad belly pain or blood in their stool. You may be having trouble swallowing or problems with acid reflux.  Doctors often use this test to look for problems like:  · Ulcers  · Cancer or tumor growths  · Internal bleeding  · Swelling  · Inflammation  · Infection  · Haider's esophagus  · Gastroesophageal reflux disease or GERD  · Swallowing problems     What will the results be?   Your doctor may find the problem inside your body that is causing your signs. The doctor can also treat some problems while doing this procedure. This may include things like stopping bleeding or removing a growth.  What happens before the procedure?   Your doctor will take your history and do an exam. Talk to the doctor about:  · All the drugs you are taking. Be sure to include all prescription, over the counter, vitamins, and herbal supplements. Bring a list of drugs you take with you.  · Tell the doctor if you have any drug allergy.  · Any bleeding problems. Be sure to tell your doctor if you are taking any drugs that may cause bleeding. Some of these are warfarin, rivaroxaban, apixaban, ticagrelor, clopidogrel, ketorolac, ibuprofen, naproxen, or aspirin. Certain vitamins and herbs, such as garlic and fish oil, may also add to the risk for bleeding. You may need to stop these drugs as well. Talk to your doctor about them.  · When you need to stop eating or drinking before your procedure.  You will not be allowed to drive right away after the procedure. Ask a family member or a friend to drive you home.  What happens during the procedure?   · Once you are in the operating  room, the staff will put an IV in your arm to give you fluids and drugs. You will be given a drug to make you sleepy. It will also help you stay pain free during the surgery.  · Your doctor may spray a drug in your throat to numb the area.  · You will be asked to lie on your left side. The staff may put a small tube in your nose to help you breathe. Your doctor may place a tool in your mouth to keep it open during the procedure. The staff may place a suction tool in your mouth to lessen saliva flow.  · The doctor will put a special scope in your mouth and down your food pipe. It is a long, thin tube with lights and a small camera. It sends images to a screen in the operating room where the camera is being used.  · To be able to view the site clearly, gas will be pumped into your belly.  · Your doctor will use the scope to see if there are problems in your upper GI tract. Small tools may be used with the scope to fix any problems that are found. Your doctor may stop an area of bleeding or take out a tumor. The doctor may also remove a growth or take tissue samples for biopsy.  · This procedure takes about 15 to 30 minutes.  What happens after the procedure?   · You will go to the Recovery Room and the staff will watch you closely.  · You will be allowed to go home when you are awake and able to eat and drink.  · You may feel bloated after the procedure. This is from any gas the doctor may have used to help see your GI tract better.  · You may have a sore throat after the procedure. You can drink fluid once the numbing drugs in your throat wear off.  · Ask your doctor when the results will be available. Set up a visit to talk about them.  What drugs may be needed?   The doctor may order drugs to:  · Help with pain  · Decrease the acid in your stomach  What problems could happen?   · Painful swallowing  · Upset stomach  · Injury to food pipe   · Throwing up  · Tear in the esophagus  Where can I learn more?   [employer]  [employer]  https://gi.org/topics/upper-gi-endoscopy-egd/   Last Reviewed Date   2021-10-05  Consumer Information Use and Disclaimer   This information is not specific medical advice and does not replace information you receive from your health care provider. This is only a brief summary of general information. It does NOT include all information about conditions, illnesses, injuries, tests, procedures, treatments, therapies, discharge instructions or life-style choices that may apply to you. You must talk with your health care provider for complete information about your health and treatment options. This information should not be used to decide whether or not to accept your health care providers advice, instructions or recommendations. Only your health care provider has the knowledge and training to provide advice that is right for you.  Copyright   Copyright © 2021 [employer] and its affiliates and/or licensors. All rights reserved.

## 2022-01-31 NOTE — ANESTHESIA POSTPROCEDURE EVALUATION
Anesthesia Post Evaluation    Patient: Андрей Wooten    Procedure(s) Performed: Procedure(s) (LRB):  ESOPHAGOGASTRODUODENOSCOPY (EGD) (N/A)    Final Anesthesia Type: general      Patient location during evaluation: PACU  Patient participation: Yes- Able to Participate  Level of consciousness: awake and alert and oriented  Post-procedure vital signs: reviewed and stable  Pain management: adequate  Airway patency: patent    PONV status at discharge: No PONV  Anesthetic complications: no      Cardiovascular status: blood pressure returned to baseline and hemodynamically stable  Respiratory status: unassisted and spontaneous ventilation  Hydration status: euvolemic  Follow-up not needed.          Vitals Value Taken Time   /92 01/31/22 1344   Temp 36.4 °C (97.5 °F) 01/31/22 1317   Pulse 94 01/31/22 1344   Resp 18 01/31/22 1344   SpO2 99 % 01/31/22 1344         Event Time   Out of Recovery 13:52:49         Pain/Anna Score: Anna Score: 10 (1/31/2022  1:45 PM)

## 2022-01-31 NOTE — ANESTHESIA PREPROCEDURE EVALUATION
01/31/2022  Андрей Wooten is a 57 y.o., male with HTN, diabetes, and VOGEL here for EGD to evaluate epigastric pain.    Past Medical History:   Diagnosis Date    HLD (hyperlipidemia)     Hypertension     Liver fibrosis 6/7/2018    -- liver biopsy 5/28/18 - VOGEL, macrosteatosis 80%, microsteatosis 10%, stage 1 out of 4 fibrosis -- MRI elastography 5/29/17 - F1-F2    VOGEL (nonalcoholic steatohepatitis)     -- liver biopsy 5/28/18 - VOGEL, macrosteatosis 80%, microsteatosis 10%, stage 1 out of 4 fibrosis    Type 2 diabetes mellitus without complication, without long-term current use of insulin 6/7/2018     Lab Results   Component Value Date    WBC 4.55 08/25/2021    HGB 14.7 08/25/2021    HCT 44.3 08/25/2021    MCV 85 08/25/2021     08/25/2021           Anesthesia Evaluation    I have reviewed the Patient Summary Reports.    I have reviewed the Nursing Notes. I have reviewed the NPO Status.      Review of Systems  Anesthesia Hx:  No problems with previous Anesthesia    Social:  Non-Smoker    Cardiovascular:   Exercise tolerance: good Hypertension    Pulmonary:   Denies Shortness of breath.    Hepatic/GI:   Liver Disease,    Neurological:  Neurology Normal    Endocrine:   Diabetes        Physical Exam  General:  Morbid Obesity    Airway/Jaw/Neck:  Airway Findings: Mouth Opening: Normal Tongue: Normal  General Airway Assessment: Adult  Mallampati: III  Improves to III with phonation.  TM Distance: 4 - 6 cm  Jaw/Neck Findings:  Neck ROM: Normal ROM      Dental:  Dental Findings: In tact        Mental Status:  Mental Status Findings:  Cooperative, Alert and Oriented         Anesthesia Plan  Type of Anesthesia, risks & benefits discussed:  Anesthesia Type:  general    Patient's Preference:   Plan Factors:          Intra-op Monitoring Plan: standard ASA monitors  Intra-op Monitoring Plan Comments:   Post Op  Pain Control Plan: multimodal analgesia  Post Op Pain Control Plan Comments:     Induction:   IV  Beta Blocker:  Patient is not currently on a Beta-Blocker (No further documentation required).       Informed Consent: Patient understands risks and agrees with Anesthesia plan.  Questions answered. Anesthesia consent signed with patient.  ASA Score: 3     Day of Surgery Review of History & Physical:  There are no significant changes.  H&P update referred to the provider.         Ready For Surgery From Anesthesia Perspective.

## 2022-01-31 NOTE — H&P
Short Stay Endoscopy History and Physical    PCP - Khushboo Pugh MD    Procedure - EGD  ASA - per anesthesia  Mallampati - per anesthesia  History of Anesthesia problems - no  Family history Anesthesia problems -  no   Plan of anesthesia - General    HPI:  This is a 57 y.o. male here for evaluation of : abdominal pain, dyspepsia    ROS:  Constitutional: No fevers, chills  CV: No chest pain  Pulm: No shortness of breath  GI: see HPI  Derm: No rash    Medical History:  has a past medical history of HLD (hyperlipidemia), Hypertension, Liver fibrosis (6/7/2018), VOGEL (nonalcoholic steatohepatitis), and Type 2 diabetes mellitus without complication, without long-term current use of insulin (6/7/2018).    Surgical History:  has a past surgical history that includes Colonoscopy (N/A, 3/2/2016); Biopsy of liver with ultrasound guidance (N/A, 5/28/2018); Liver biopsy (4/1/15); Liver biopsy (05/2018); and Eye surgery (2004).    Family History: family history includes Diabetes in his maternal grandmother; Esophageal cancer in his maternal uncle; Heart disease in his father; Hypertension in his father and maternal grandmother; Stroke in his maternal aunt.. Otherwise no colon cancer, inflammatory bowel disease, or GI malignancies.    Social History:  reports that he has never smoked. He has never used smokeless tobacco. He reports current alcohol use. He reports that he does not use drugs.    Review of patient's allergies indicates:  No Known Allergies    Medications:   Medications Prior to Admission   Medication Sig Dispense Refill Last Dose    amLODIPine (NORVASC) 10 MG tablet TAKE 1 TABLET(10 MG) BY MOUTH EVERY DAY 90 tablet 1 1/31/2022 at Unknown time    aspirin (ECOTRIN) 81 MG EC tablet Take 81 mg by mouth once daily.   1/30/2022 at Unknown time    atorvastatin (LIPITOR) 40 MG tablet Take 1 tablet (40 mg total) by mouth once daily. 90 tablet 3 1/30/2022 at Unknown time    fluticasone (FLONASE) 50 mcg/actuation  nasal spray 1 spray by Each Nare route once daily. 16 g 0 Past Week at Unknown time    losartan (COZAAR) 50 MG tablet TAKE 1 TABLET(50 MG) BY MOUTH EVERY DAY 90 tablet 1 1/31/2022 at Unknown time    pantoprazole (PROTONIX) 40 MG tablet Take 1 tablet (40 mg total) by mouth every morning. 90 tablet 3 Past Week at Unknown time    triamterene-hydrochlorothiazide 37.5-25 mg (DYAZIDE) 37.5-25 mg per capsule Take 1 capsule by mouth every morning. 90 capsule 0 1/31/2022 at Unknown time    blood sugar diagnostic Strp To check BG 1 times daily, to use with insurance preferred meter 100 strip 11     blood-glucose meter kit To check BG 1 times daily, to use with insurance preferred meter 1 each 0     cetirizine (ZYRTEC) 10 MG tablet Take 10 mg by mouth as needed.    More than a month at Unknown time    lancets Misc To check BG 1 times daily, to use with insurance preferred meter 100 each 11     meloxicam (MOBIC) 7.5 MG tablet Take 1 tablet (7.5 mg total) by mouth daily as needed for Pain. 20 tablet 0 More than a month at Unknown time    metFORMIN (GLUCOPHAGE) 500 MG tablet Take 1 tablet (500 mg total) by mouth daily with breakfast. 90 tablet 1 More than a month at Unknown time    semaglutide (OZEMPIC) 0.25 mg or 0.5 mg(2 mg/1.5 mL) pen injector Inject 0.25 mg into the skin every 7 days for 28 days, THEN 0.5 mg every 7 days for 14 days. 1 pen 0     vitamin D 1000 units Tab Take 185 mg by mouth once daily.   More than a month at Unknown time         Physical Exam:    Vital Signs:   Vitals:    01/31/22 1221   BP: (!) 140/79   Pulse: 80   Resp: 16   Temp: 97.3 °F (36.3 °C)       General Appearance: Well appearing in no acute distress  Eyes:    No scleral icterus  ENT: lips and tongue normal  Lungs: no use of accessory muscles  Heart:  normal rate, regular rhythm  Abdomen: Soft, non tender, non distended   Extremities: no edema  Skin: No rash      Labs:  Lab Results   Component Value Date    WBC 4.55 08/25/2021    HGB  14.7 08/25/2021    HCT 44.3 08/25/2021     08/25/2021    CHOL 162 08/25/2021    TRIG 206 (H) 08/25/2021    HDL 28 (L) 08/25/2021    ALT 88 (H) 08/25/2021    AST 57 (H) 08/25/2021     08/25/2021    K 3.7 08/25/2021     08/25/2021    CREATININE 0.89 08/25/2021    BUN 17 08/25/2021    CO2 28 08/25/2021    TSH 2.030 08/25/2021    PSA 0.65 09/22/2020    INR 1.1 05/14/2018    HGBA1C 6.3 (H) 08/25/2021       I have explained the risks and benefits of endoscopy procedures to the patient including but not limited to bleeding, perforation, infection, and death.  The patient was asked if they understand and allowed to ask any further questions to their satisfaction.    Randi Ferrer MD

## 2022-01-31 NOTE — [employer]ATION PATIENT INSTRUCTIONS
Discharge Summary/Instructions after an Endoscopic Procedure  Patient Name: Андерй Wooten  Patient MRN: [id number]  Patient YOB: 1965 Monday, January 31, 2022  Nithin Gomez MD  Dear patient,  As a result of recent federal legislation (The Federal Cures Act), you may   receive lab or pathology results from your procedure in your [facility]   account before your physician is able to contact you. Your physician or   their representative will relay the results to you with their   recommendations at their soonest availability.  Thank you,  RESTRICTIONS:  During your procedure today, you received medications for sedation.  These   medications may affect your judgment, balance and coordination.  Therefore,   for 24 hours, you have the following restrictions:   - DO NOT drive a car, operate machinery, make legal/financial decisions,   sign important papers or drink alcohol.    ACTIVITY:  Today: no heavy lifting, straining or running due to procedural   sedation/anesthesia.  The following day: return to full activity including work.  DIET:  Eat and drink normally unless instructed otherwise.     TREATMENT FOR COMMON SIDE EFFECTS:  - Mild abdominal pain, nausea, belching, bloating or excessive gas:  rest,   eat lightly and use a heating pad.  - Sore Throat: treat with throat lozenges and/or gargle with warm salt   water.  - Because air was used during the procedure, expelling large amounts of air   from your rectum or belching is normal.  - If a bowel prep was taken, you may not have a bowel movement for 1-3 days.    This is normal.  SYMPTOMS TO WATCH FOR AND REPORT TO YOUR PHYSICIAN:  1. Abdominal pain or bloating, other than gas cramps.  2. Chest pain.  3. Back pain.  4. Signs of infection such as: chills or fever occurring within 24 hours   after the procedure.  5. Rectal bleeding, which would show as bright red, maroon, or black stools.   (A tablespoon of blood from the rectum is not serious, especially if    hemorrhoids are present.)  6. Vomiting.  7. Weakness or dizziness.  GO DIRECTLY TO THE NEAREST EMERGENCY ROOM IF YOU HAVE ANY OF THE FOLLOWING:      Difficulty breathing              Chills and/or fever over 101 F   Persistent vomiting and/or vomiting blood   Severe abdominal pain   Severe chest pain   Black, tarry stools   Bleeding- more than one tablespoon   Any other symptom or condition that you feel may need urgent attention  Your doctor recommends these additional instructions:  If any biopsies were taken, your doctors clinic will contact you in 1 to 2   weeks with any results.  - Patient has a contact number available for emergencies.  The signs and   symptoms of potential delayed complications were discussed with the   patient.  Return to normal activities tomorrow.  Written discharge   instructions were provided to the patient.   - Discharge patient to home.   - Await pathology results.   - The findings and recommendations were discussed with the designated   responsible adult.  For questions, problems or results please call your physician - Nithin Gomez MD at Work:  [phone].  [facility] [place], EMERGENCY ROOM PHONE NUMBER: [phone]  IF A COMPLICATION OR EMERGENCY SITUATION ARISES AND YOU ARE UNABLE TO REACH   YOUR PHYSICIAN - GO DIRECTLY TO THE EMERGENCY ROOM.  Nithin Gomez MD  1/31/2022 1:13:38 PM  This report has been verified and signed electronically.  Dear patient,  As a result of recent federal legislation (The Federal Cures Act), you may   receive lab or pathology results from your procedure in your [facility]   account before your physician is able to contact you. Your physician or   their representative will relay the results to you with their   recommendations at their soonest availability.  Thank you,  [employer]ATION

## 2022-02-02 LAB — POCT GLUCOSE: 112 MG/DL (ref 70–110)

## 2022-02-04 LAB
FINAL PATHOLOGIC DIAGNOSIS: NORMAL
GROSS: NORMAL
Lab: NORMAL

## 2022-02-06 ENCOUNTER — PATIENT MESSAGE (OUTPATIENT)
Dept: GASTROENTEROLOGY | Facility: CLINIC | Age: 57
End: 2022-02-06
Payer: COMMERCIAL

## 2022-02-06 RX ORDER — CLARITHROMYCIN 500 MG/1
500 TABLET, FILM COATED ORAL EVERY 12 HOURS
Qty: 28 TABLET | Refills: 0 | Status: SHIPPED | OUTPATIENT
Start: 2022-02-06 | End: 2022-02-20

## 2022-02-06 RX ORDER — AMOXICILLIN 500 MG/1
1000 TABLET, FILM COATED ORAL EVERY 12 HOURS
Qty: 56 TABLET | Refills: 0 | Status: SHIPPED | OUTPATIENT
Start: 2022-02-06 | End: 2022-02-20

## 2022-02-07 NOTE — TELEPHONE ENCOUNTER
Patient scheduled to see Юлия Morales NP in follow up on 3/28 for 3:40.   Confirmation mailed.   Ashley

## 2022-02-23 ENCOUNTER — OFFICE VISIT (OUTPATIENT)
Dept: BARIATRICS | Facility: CLINIC | Age: 57
End: 2022-02-23
Payer: COMMERCIAL

## 2022-02-23 VITALS
WEIGHT: 245.69 LBS | HEART RATE: 79 BPM | HEIGHT: 66 IN | SYSTOLIC BLOOD PRESSURE: 135 MMHG | OXYGEN SATURATION: 98 % | BODY MASS INDEX: 39.48 KG/M2 | DIASTOLIC BLOOD PRESSURE: 81 MMHG | RESPIRATION RATE: 18 BRPM

## 2022-02-23 DIAGNOSIS — K75.81 NASH (NONALCOHOLIC STEATOHEPATITIS): ICD-10-CM

## 2022-02-23 DIAGNOSIS — E11.9 TYPE 2 DIABETES MELLITUS WITHOUT COMPLICATION, WITHOUT LONG-TERM CURRENT USE OF INSULIN: ICD-10-CM

## 2022-02-23 DIAGNOSIS — I10 PRIMARY HYPERTENSION: ICD-10-CM

## 2022-02-23 DIAGNOSIS — E66.01 CLASS 2 SEVERE OBESITY DUE TO EXCESS CALORIES WITH SERIOUS COMORBIDITY AND BODY MASS INDEX (BMI) OF 39.0 TO 39.9 IN ADULT: Primary | ICD-10-CM

## 2022-02-23 DIAGNOSIS — Z71.3 ENCOUNTER FOR WEIGHT LOSS COUNSELING: ICD-10-CM

## 2022-02-23 DIAGNOSIS — E78.5 HYPERLIPIDEMIA, UNSPECIFIED HYPERLIPIDEMIA TYPE: ICD-10-CM

## 2022-02-23 PROCEDURE — 3008F BODY MASS INDEX DOCD: CPT | Mod: CPTII,S$GLB,, | Performed by: STUDENT IN AN ORGANIZED HEALTH CARE EDUCATION/TRAINING PROGRAM

## 2022-02-23 PROCEDURE — 3075F SYST BP GE 130 - 139MM HG: CPT | Mod: CPTII,S$GLB,, | Performed by: STUDENT IN AN ORGANIZED HEALTH CARE EDUCATION/TRAINING PROGRAM

## 2022-02-23 PROCEDURE — 3075F PR MOST RECENT SYSTOLIC BLOOD PRESS GE 130-139MM HG: ICD-10-PCS | Mod: CPTII,S$GLB,, | Performed by: STUDENT IN AN ORGANIZED HEALTH CARE EDUCATION/TRAINING PROGRAM

## 2022-02-23 PROCEDURE — 1159F PR MEDICATION LIST DOCUMENTED IN MEDICAL RECORD: ICD-10-PCS | Mod: CPTII,S$GLB,, | Performed by: STUDENT IN AN ORGANIZED HEALTH CARE EDUCATION/TRAINING PROGRAM

## 2022-02-23 PROCEDURE — 1160F PR REVIEW ALL MEDS BY PRESCRIBER/CLIN PHARMACIST DOCUMENTED: ICD-10-PCS | Mod: CPTII,S$GLB,, | Performed by: STUDENT IN AN ORGANIZED HEALTH CARE EDUCATION/TRAINING PROGRAM

## 2022-02-23 PROCEDURE — 4010F ACE/ARB THERAPY RXD/TAKEN: CPT | Mod: CPTII,S$GLB,, | Performed by: STUDENT IN AN ORGANIZED HEALTH CARE EDUCATION/TRAINING PROGRAM

## 2022-02-23 PROCEDURE — 1159F MED LIST DOCD IN RCRD: CPT | Mod: CPTII,S$GLB,, | Performed by: STUDENT IN AN ORGANIZED HEALTH CARE EDUCATION/TRAINING PROGRAM

## 2022-02-23 PROCEDURE — 3079F PR MOST RECENT DIASTOLIC BLOOD PRESSURE 80-89 MM HG: ICD-10-PCS | Mod: CPTII,S$GLB,, | Performed by: STUDENT IN AN ORGANIZED HEALTH CARE EDUCATION/TRAINING PROGRAM

## 2022-02-23 PROCEDURE — 99213 OFFICE O/P EST LOW 20 MIN: CPT | Mod: S$GLB,,, | Performed by: STUDENT IN AN ORGANIZED HEALTH CARE EDUCATION/TRAINING PROGRAM

## 2022-02-23 PROCEDURE — 3079F DIAST BP 80-89 MM HG: CPT | Mod: CPTII,S$GLB,, | Performed by: STUDENT IN AN ORGANIZED HEALTH CARE EDUCATION/TRAINING PROGRAM

## 2022-02-23 PROCEDURE — 99999 PR PBB SHADOW E&M-EST. PATIENT-LVL IV: CPT | Mod: PBBFAC,,, | Performed by: STUDENT IN AN ORGANIZED HEALTH CARE EDUCATION/TRAINING PROGRAM

## 2022-02-23 PROCEDURE — 1160F RVW MEDS BY RX/DR IN RCRD: CPT | Mod: CPTII,S$GLB,, | Performed by: STUDENT IN AN ORGANIZED HEALTH CARE EDUCATION/TRAINING PROGRAM

## 2022-02-23 PROCEDURE — 99999 PR PBB SHADOW E&M-EST. PATIENT-LVL IV: ICD-10-PCS | Mod: PBBFAC,,, | Performed by: STUDENT IN AN ORGANIZED HEALTH CARE EDUCATION/TRAINING PROGRAM

## 2022-02-23 PROCEDURE — 4010F PR ACE/ARB THEARPY RXD/TAKEN: ICD-10-PCS | Mod: CPTII,S$GLB,, | Performed by: STUDENT IN AN ORGANIZED HEALTH CARE EDUCATION/TRAINING PROGRAM

## 2022-02-23 PROCEDURE — 3008F PR BODY MASS INDEX (BMI) DOCUMENTED: ICD-10-PCS | Mod: CPTII,S$GLB,, | Performed by: STUDENT IN AN ORGANIZED HEALTH CARE EDUCATION/TRAINING PROGRAM

## 2022-02-23 PROCEDURE — 99213 PR OFFICE/OUTPT VISIT, EST, LEVL III, 20-29 MIN: ICD-10-PCS | Mod: S$GLB,,, | Performed by: STUDENT IN AN ORGANIZED HEALTH CARE EDUCATION/TRAINING PROGRAM

## 2022-02-23 RX ORDER — SEMAGLUTIDE 1.34 MG/ML
0.5 INJECTION, SOLUTION SUBCUTANEOUS
Qty: 1 PEN | Refills: 2 | Status: SHIPPED | OUTPATIENT
Start: 2022-02-23 | End: 2022-03-10 | Stop reason: SDUPTHER
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
## 2022-02-28 ENCOUNTER — PATIENT MESSAGE (OUTPATIENT)
Dept: GASTROENTEROLOGY | Facility: CLINIC | Age: 57
End: 2022-02-28
Payer: COMMERCIAL

## 2022-03-07 NOTE — PROGRESS NOTES
[facility] Consultation Note    Reason for Consult:  The encounter diagnosis was Helicobacter pylori (H. pylori) infection.    PCP:   Khushboo Pugh       Referring MD:  No referring provider defined for this encounter.    Initial History of Present Illness (HPI):  This is a 57 y.o. male here for evaluation of indigestion, belching, bloating.  Pain across chest earlier this summer and had a negative stress test with cardiology  After a cruise started having more belching and gas. Denies getting sick on the cruise    Abdominal pain - across chest  Reflux - tried taking some tums, omeprazole. Started on panto by PCP but no improvement  Dysphagia - no   Bowel habits - normal  GI bleeding - none  NSAID usage - Meloxicam over the summer    Interval History 03/09/2022:  EGD found H pylori on bx, treated  Felt better on antibiotics, but 2 days after stopping symptoms came back  Align does help when he is on days  Burning in his chest, worse with getting up and walking      ROS:  Constitutional: No fevers, chills, + 8 lb weight gain  ENT: No allergies  CV: No chest pain  Pulm: No cough, No shortness of breath  Ophtho: No vision changes  GI: see HPI  Derm: No rash  Heme: No lymphadenopathy, No bruising  MSK: No arthritis  : No dysuria, No hematuria  Endo: No hot or cold intolerance  Neuro: No syncope, No seizure  Psych: No anxiety, No depression    Medical History:  has a past medical history of HLD (hyperlipidemia), Hypertension, Liver fibrosis (6/7/2018), VOGEL (nonalcoholic steatohepatitis), and Type 2 diabetes mellitus without complication, without long-term current use of insulin (6/7/2018).    Surgical History:  has a past surgical history that includes Colonoscopy (N/A, 3/2/2016); Biopsy of liver with ultrasound guidance (N/A, 5/28/2018); Liver biopsy (4/1/15); Liver biopsy (05/2018); Eye surgery (2004); and Esophagogastroduodenoscopy (N/A, 1/31/2022).    Family History: family history  includes Diabetes in his maternal grandmother; Esophageal cancer in his maternal uncle; Heart disease in his father; Hypertension in his father and maternal grandmother; Stroke in his maternal aunt..     Social History:  reports that he has never smoked. He has never used smokeless tobacco. He reports current alcohol use. He reports that he does not use drugs.    Review of patient's allergies indicates:  No Known Allergies    Medication List with Changes/Refills   New Medications    GABAPENTIN (NEURONTIN) 300 MG CAPSULE    Take 1 capsule (300 mg total) by mouth every evening.   Current Medications    AMLODIPINE (NORVASC) 10 MG TABLET    TAKE 1 TABLET(10 MG) BY MOUTH EVERY DAY    ASPIRIN (ECOTRIN) 81 MG EC TABLET    Take 81 mg by mouth once daily.    ATORVASTATIN (LIPITOR) 40 MG TABLET    Take 1 tablet (40 mg total) by mouth once daily.    BLOOD SUGAR DIAGNOSTIC STRP    To check BG 1 times daily, to use with insurance preferred meter    BLOOD-GLUCOSE METER KIT    To check BG 1 times daily, to use with insurance preferred meter    CETIRIZINE (ZYRTEC) 10 MG TABLET    Take 10 mg by mouth as needed.     FLUTICASONE (FLONASE) 50 MCG/ACTUATION NASAL SPRAY    1 spray by Each Nare route once daily.    LANCETS MISC    To check BG 1 times daily, to use with insurance preferred meter    LOSARTAN (COZAAR) 50 MG TABLET    TAKE 1 TABLET(50 MG) BY MOUTH EVERY DAY    MELOXICAM (MOBIC) 7.5 MG TABLET    Take 1 tablet (7.5 mg total) by mouth daily as needed for Pain.    METFORMIN (GLUCOPHAGE) 500 MG TABLET    Take 1 tablet (500 mg total) by mouth daily with breakfast.    PANTOPRAZOLE (PROTONIX) 40 MG TABLET    Take 1 tablet (40 mg total) by mouth every morning.    SEMAGLUTIDE (OZEMPIC) 0.25 MG OR 0.5 MG(2 MG/1.5 ML) PEN INJECTOR    Inject 0.5 mg into the skin every 7 days. for 12 doses    TRIAMTERENE-HYDROCHLOROTHIAZIDE 37.5-25 MG (DYAZIDE) 37.5-25 MG PER CAPSULE    Take 1 capsule by mouth every morning.    VITAMIN D 1000 UNITS TAB     "Take 185 mg by mouth once daily.         Objective Findings:    Vital Signs:  /86   Pulse 70   Ht 5' 6" (1.676 m)   Wt 114.8 kg (253 lb 1.4 oz)   SpO2 97%   BMI 40.85 kg/m²   Body mass index is 40.85 kg/m².    Physical Exam:  General Appearance: Well appearing in no acute distress  Head:   Normocephalic, without obvious abnormality  Eyes:    No scleral icterus, EOMI  ENT: Neck supple, Lips, mucosa, and tongue normal; teeth and gums normal  Lungs: CTA bilaterally in anterior and posterior fields, no wheezes, no crackles.  Heart:  Regular rate and rhythm, S1, S2 normal, no murmurs heard  Abdomen: Soft, non tender, non distended with positive bowel sounds in all four quadrants. No hepatosplenomegaly, ascites, or mass  Extremities: 2+ pulses, no clubbing, cyanosis or edema  Skin: No rash  Neurologic: CN II-XII intact      Labs:  Lab Results   Component Value Date    WBC 4.55 08/25/2021    HGB 14.7 08/25/2021    HCT 44.3 08/25/2021     08/25/2021    CHOL 162 08/25/2021    TRIG 206 (H) 08/25/2021    HDL 28 (L) 08/25/2021    ALT 88 (H) 08/25/2021    AST 57 (H) 08/25/2021     08/25/2021    K 3.7 08/25/2021     08/25/2021    CREATININE 0.89 08/25/2021    BUN 17 08/25/2021    CO2 28 08/25/2021    TSH 2.030 08/25/2021    PSA 0.65 09/22/2020    INR 1.1 05/14/2018    HGBA1C 6.3 (H) 08/25/2021       No results found for: HPYLORINTERP  No results found for: HPYLORIANTIG        Imaging:    Endoscopy:    Colon 2016 - wnl      Assessment:  1. Helicobacter pylori (H. pylori) infection           Recommendations:  1. Stool for H pylori eradication  2. Stool for giardia, parasites - negative  3.Continue Align in addition to PPI  4. Start gabapentin for what sounds like possible neuropathic pain of chest    Follow up in about 3 months (around 6/9/2022).      Order summary:  Orders Placed This Encounter    H. pylori antigen, stool    gabapentin (NEURONTIN) 300 MG capsule         Thank you so much for " allowing me to participate in the care of Андрей Gomez MD

## 2022-03-09 ENCOUNTER — OFFICE VISIT (OUTPATIENT)
Dept: GASTROENTEROLOGY | Facility: CLINIC | Age: 57
End: 2022-03-09
Payer: COMMERCIAL

## 2022-03-09 VITALS
HEART RATE: 70 BPM | OXYGEN SATURATION: 97 % | BODY MASS INDEX: 40.67 KG/M2 | HEIGHT: 66 IN | DIASTOLIC BLOOD PRESSURE: 86 MMHG | WEIGHT: 253.06 LBS | SYSTOLIC BLOOD PRESSURE: 138 MMHG

## 2022-03-09 DIAGNOSIS — A04.8 HELICOBACTER PYLORI (H. PYLORI) INFECTION: Primary | ICD-10-CM

## 2022-03-09 PROCEDURE — 4010F ACE/ARB THERAPY RXD/TAKEN: CPT | Mod: CPTII,S$GLB,, | Performed by: INTERNAL MEDICINE

## 2022-03-09 PROCEDURE — 1159F MED LIST DOCD IN RCRD: CPT | Mod: CPTII,S$GLB,, | Performed by: INTERNAL MEDICINE

## 2022-03-09 PROCEDURE — 3008F BODY MASS INDEX DOCD: CPT | Mod: CPTII,S$GLB,, | Performed by: INTERNAL MEDICINE

## 2022-03-09 PROCEDURE — 1159F PR MEDICATION LIST DOCUMENTED IN MEDICAL RECORD: ICD-10-PCS | Mod: CPTII,S$GLB,, | Performed by: INTERNAL MEDICINE

## 2022-03-09 PROCEDURE — 4010F PR ACE/ARB THEARPY RXD/TAKEN: ICD-10-PCS | Mod: CPTII,S$GLB,, | Performed by: INTERNAL MEDICINE

## 2022-03-09 PROCEDURE — 3008F PR BODY MASS INDEX (BMI) DOCUMENTED: ICD-10-PCS | Mod: CPTII,S$GLB,, | Performed by: INTERNAL MEDICINE

## 2022-03-09 PROCEDURE — 99214 OFFICE O/P EST MOD 30 MIN: CPT | Mod: S$GLB,,, | Performed by: INTERNAL MEDICINE

## 2022-03-09 PROCEDURE — 99999 PR PBB SHADOW E&M-EST. PATIENT-LVL III: ICD-10-PCS | Mod: PBBFAC,,, | Performed by: INTERNAL MEDICINE

## 2022-03-09 PROCEDURE — 3079F DIAST BP 80-89 MM HG: CPT | Mod: CPTII,S$GLB,, | Performed by: INTERNAL MEDICINE

## 2022-03-09 PROCEDURE — 3075F SYST BP GE 130 - 139MM HG: CPT | Mod: CPTII,S$GLB,, | Performed by: INTERNAL MEDICINE

## 2022-03-09 PROCEDURE — 99999 PR PBB SHADOW E&M-EST. PATIENT-LVL III: CPT | Mod: PBBFAC,,, | Performed by: INTERNAL MEDICINE

## 2022-03-09 PROCEDURE — 3079F PR MOST RECENT DIASTOLIC BLOOD PRESSURE 80-89 MM HG: ICD-10-PCS | Mod: CPTII,S$GLB,, | Performed by: INTERNAL MEDICINE

## 2022-03-09 PROCEDURE — 3075F PR MOST RECENT SYSTOLIC BLOOD PRESS GE 130-139MM HG: ICD-10-PCS | Mod: CPTII,S$GLB,, | Performed by: INTERNAL MEDICINE

## 2022-03-09 PROCEDURE — 99214 PR OFFICE/OUTPT VISIT, EST, LEVL IV, 30-39 MIN: ICD-10-PCS | Mod: S$GLB,,, | Performed by: INTERNAL MEDICINE

## 2022-03-09 RX ORDER — GABAPENTIN 300 MG/1
300 CAPSULE ORAL NIGHTLY
Qty: 30 CAPSULE | Refills: 5 | Status: SHIPPED | OUTPATIENT
Start: 2022-03-09 | End: 2022-06-01

## 2022-03-11 ENCOUNTER — DOCUMENTATION ONLY (OUTPATIENT)
Dept: CARDIOTHORACIC SURGERY | Facility: CLINIC | Age: 57
End: 2022-03-11
Payer: COMMERCIAL

## 2022-03-14 ENCOUNTER — TELEPHONE (OUTPATIENT)
Dept: INTERNAL MEDICINE | Facility: CLINIC | Age: 57
End: 2022-03-14
Payer: COMMERCIAL

## 2022-03-14 ENCOUNTER — PATIENT MESSAGE (OUTPATIENT)
Dept: BARIATRICS | Facility: CLINIC | Age: 57
End: 2022-03-14
Payer: COMMERCIAL

## 2022-03-14 DIAGNOSIS — E11.9 TYPE 2 DIABETES MELLITUS WITHOUT COMPLICATION: ICD-10-CM

## 2022-03-15 RX ORDER — SEMAGLUTIDE 1.34 MG/ML
0.5 INJECTION, SOLUTION SUBCUTANEOUS
Qty: 1 PEN | Refills: 2 | OUTPATIENT
Start: 2022-03-15 | End: 2022-05-10 | Stop reason: DRUGHIGH

## 2022-03-15 RX ORDER — SEMAGLUTIDE 1.34 MG/ML
0.5 INJECTION, SOLUTION SUBCUTANEOUS
Qty: 1 PEN | Refills: 2 | OUTPATIENT
Start: 2022-03-15 | End: 2022-03-15 | Stop reason: SDUPTHER

## 2022-03-15 RX ORDER — SEMAGLUTIDE 1.34 MG/ML
0.5 INJECTION, SOLUTION SUBCUTANEOUS
Qty: 1 PEN | Refills: 2 | OUTPATIENT
Start: 2022-03-15 | End: 2022-03-15

## 2022-03-16 ENCOUNTER — PATIENT MESSAGE (OUTPATIENT)
Dept: ADMINISTRATIVE | Facility: HOSPITAL | Age: 57
End: 2022-03-16
Payer: COMMERCIAL

## 2022-03-22 ENCOUNTER — TELEPHONE (OUTPATIENT)
Dept: BARIATRICS | Facility: CLINIC | Age: 57
End: 2022-03-22
Payer: COMMERCIAL

## 2022-03-22 NOTE — TELEPHONE ENCOUNTER
----- Message from Darell Wolf sent at 3/21/2022  4:43 PM CDT -----  Varinder w/ Cover [employer] called to speak w/ someone in regards to obtaining additional info for the prior authorization for Rx semaglutide (OZEMPIC) 0.25 mg or 0.5 mg(2 mg/1.5 mL) pen injector. Callback [phone]  Ref: [id number]

## 2022-03-23 DIAGNOSIS — E11.9 TYPE 2 DIABETES MELLITUS WITHOUT COMPLICATION: ICD-10-CM

## 2022-03-23 RX ORDER — TRIAMTERENE AND HYDROCHLOROTHIAZIDE 37.5; 25 MG/1; MG/1
1 CAPSULE ORAL EVERY MORNING
Qty: 90 CAPSULE | Refills: 0 | OUTPATIENT
Start: 2022-03-23 | End: 2023-03-23

## 2022-03-23 RX ORDER — LOSARTAN POTASSIUM 50 MG/1
TABLET ORAL
Qty: 90 TABLET | Refills: 1 | OUTPATIENT
Start: 2022-03-23

## 2022-03-23 NOTE — TELEPHONE ENCOUNTER
Spoke to pt and stated he had 4/7/22 and 4/8/22 for fluid in leg. Pt stated he would like the appt on 4/8/22 canceled

## 2022-03-23 NOTE — TELEPHONE ENCOUNTER
Care Due:                  Date            Visit Type   Department     Provider  --------------------------------------------------------------------------------                                MYCHART                              FOLLOWUP/OF  [facility] INTERNAL  Last Visit: 04-      FICE VISIT   MEDICINE       Khushboo Pugh  Next Visit: None Scheduled  None         None Found                                                            Last  Test          Frequency    Reason                     Performed    Due Date  --------------------------------------------------------------------------------    Office Visit  12 months..  atorvastatin, losartan,    04- 04-                             metFORMIN,                             triamterene-hydrochloroth                             iazide...................    HBA1C.......  6 months...  metFORMIN................  08- 02-    Powered by [employer] by [employer]. Reference number: [id number].   3/23/2022 9:26:14 AM CDT

## 2022-03-29 ENCOUNTER — LAB VISIT (OUTPATIENT)
Dept: LAB | Facility: HOSPITAL | Age: 57
End: 2022-03-29
Attending: INTERNAL MEDICINE
Payer: COMMERCIAL

## 2022-03-29 DIAGNOSIS — A04.8 HELICOBACTER PYLORI (H. PYLORI) INFECTION: ICD-10-CM

## 2022-03-29 PROCEDURE — 87338 HPYLORI STOOL AG IA: CPT | Mod: PO | Performed by: INTERNAL MEDICINE

## 2022-04-01 ENCOUNTER — OFFICE VISIT (OUTPATIENT)
Dept: BARIATRICS | Facility: CLINIC | Age: 57
End: 2022-04-01
Payer: COMMERCIAL

## 2022-04-01 VITALS
OXYGEN SATURATION: 99 % | BODY MASS INDEX: 39.24 KG/M2 | WEIGHT: 244.13 LBS | DIASTOLIC BLOOD PRESSURE: 79 MMHG | HEIGHT: 66 IN | HEART RATE: 66 BPM | SYSTOLIC BLOOD PRESSURE: 135 MMHG

## 2022-04-01 DIAGNOSIS — E66.01 CLASS 2 SEVERE OBESITY DUE TO EXCESS CALORIES WITH SERIOUS COMORBIDITY AND BODY MASS INDEX (BMI) OF 39.0 TO 39.9 IN ADULT: Primary | ICD-10-CM

## 2022-04-01 DIAGNOSIS — I10 PRIMARY HYPERTENSION: ICD-10-CM

## 2022-04-01 DIAGNOSIS — E11.9 TYPE 2 DIABETES MELLITUS WITHOUT COMPLICATION, WITHOUT LONG-TERM CURRENT USE OF INSULIN: ICD-10-CM

## 2022-04-01 DIAGNOSIS — Z71.3 ENCOUNTER FOR WEIGHT LOSS COUNSELING: ICD-10-CM

## 2022-04-01 DIAGNOSIS — E78.5 HYPERLIPIDEMIA, UNSPECIFIED HYPERLIPIDEMIA TYPE: ICD-10-CM

## 2022-04-01 DIAGNOSIS — K75.81 NASH (NONALCOHOLIC STEATOHEPATITIS): ICD-10-CM

## 2022-04-01 PROCEDURE — 99213 OFFICE O/P EST LOW 20 MIN: CPT | Mod: S$GLB,,, | Performed by: STUDENT IN AN ORGANIZED HEALTH CARE EDUCATION/TRAINING PROGRAM

## 2022-04-01 PROCEDURE — 3078F PR MOST RECENT DIASTOLIC BLOOD PRESSURE < 80 MM HG: ICD-10-PCS | Mod: CPTII,S$GLB,, | Performed by: STUDENT IN AN ORGANIZED HEALTH CARE EDUCATION/TRAINING PROGRAM

## 2022-04-01 PROCEDURE — 3075F PR MOST RECENT SYSTOLIC BLOOD PRESS GE 130-139MM HG: ICD-10-PCS | Mod: CPTII,S$GLB,, | Performed by: STUDENT IN AN ORGANIZED HEALTH CARE EDUCATION/TRAINING PROGRAM

## 2022-04-01 PROCEDURE — 4010F ACE/ARB THERAPY RXD/TAKEN: CPT | Mod: CPTII,S$GLB,, | Performed by: STUDENT IN AN ORGANIZED HEALTH CARE EDUCATION/TRAINING PROGRAM

## 2022-04-01 PROCEDURE — 99999 PR PBB SHADOW E&M-EST. PATIENT-LVL IV: CPT | Mod: PBBFAC,,, | Performed by: STUDENT IN AN ORGANIZED HEALTH CARE EDUCATION/TRAINING PROGRAM

## 2022-04-01 PROCEDURE — 99999 PR PBB SHADOW E&M-EST. PATIENT-LVL IV: ICD-10-PCS | Mod: PBBFAC,,, | Performed by: STUDENT IN AN ORGANIZED HEALTH CARE EDUCATION/TRAINING PROGRAM

## 2022-04-01 PROCEDURE — 3075F SYST BP GE 130 - 139MM HG: CPT | Mod: CPTII,S$GLB,, | Performed by: STUDENT IN AN ORGANIZED HEALTH CARE EDUCATION/TRAINING PROGRAM

## 2022-04-01 PROCEDURE — 3078F DIAST BP <80 MM HG: CPT | Mod: CPTII,S$GLB,, | Performed by: STUDENT IN AN ORGANIZED HEALTH CARE EDUCATION/TRAINING PROGRAM

## 2022-04-01 PROCEDURE — 1159F MED LIST DOCD IN RCRD: CPT | Mod: CPTII,S$GLB,, | Performed by: STUDENT IN AN ORGANIZED HEALTH CARE EDUCATION/TRAINING PROGRAM

## 2022-04-01 PROCEDURE — 4010F PR ACE/ARB THEARPY RXD/TAKEN: ICD-10-PCS | Mod: CPTII,S$GLB,, | Performed by: STUDENT IN AN ORGANIZED HEALTH CARE EDUCATION/TRAINING PROGRAM

## 2022-04-01 PROCEDURE — 1159F PR MEDICATION LIST DOCUMENTED IN MEDICAL RECORD: ICD-10-PCS | Mod: CPTII,S$GLB,, | Performed by: STUDENT IN AN ORGANIZED HEALTH CARE EDUCATION/TRAINING PROGRAM

## 2022-04-01 PROCEDURE — 3008F PR BODY MASS INDEX (BMI) DOCUMENTED: ICD-10-PCS | Mod: CPTII,S$GLB,, | Performed by: STUDENT IN AN ORGANIZED HEALTH CARE EDUCATION/TRAINING PROGRAM

## 2022-04-01 PROCEDURE — 99213 PR OFFICE/OUTPT VISIT, EST, LEVL III, 20-29 MIN: ICD-10-PCS | Mod: S$GLB,,, | Performed by: STUDENT IN AN ORGANIZED HEALTH CARE EDUCATION/TRAINING PROGRAM

## 2022-04-01 PROCEDURE — 3008F BODY MASS INDEX DOCD: CPT | Mod: CPTII,S$GLB,, | Performed by: STUDENT IN AN ORGANIZED HEALTH CARE EDUCATION/TRAINING PROGRAM

## 2022-04-01 PROCEDURE — 1160F PR REVIEW ALL MEDS BY PRESCRIBER/CLIN PHARMACIST DOCUMENTED: ICD-10-PCS | Mod: CPTII,S$GLB,, | Performed by: STUDENT IN AN ORGANIZED HEALTH CARE EDUCATION/TRAINING PROGRAM

## 2022-04-01 PROCEDURE — 1160F RVW MEDS BY RX/DR IN RCRD: CPT | Mod: CPTII,S$GLB,, | Performed by: STUDENT IN AN ORGANIZED HEALTH CARE EDUCATION/TRAINING PROGRAM
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
## 2022-04-03 ENCOUNTER — PATIENT MESSAGE (OUTPATIENT)
Dept: GASTROENTEROLOGY | Facility: CLINIC | Age: 57
End: 2022-04-03
Payer: COMMERCIAL

## 2022-04-04 ENCOUNTER — PATIENT MESSAGE (OUTPATIENT)
Dept: GASTROENTEROLOGY | Facility: CLINIC | Age: 57
End: 2022-04-04
Payer: COMMERCIAL

## 2022-04-04 LAB
H PYLORI AG STL QL IA: NORMAL
SPECIMEN SOURCE: 1

## 2022-04-05 ENCOUNTER — TELEPHONE (OUTPATIENT)
Dept: BARIATRICS | Facility: CLINIC | Age: 57
End: 2022-04-05
Payer: COMMERCIAL

## 2022-04-05 NOTE — TELEPHONE ENCOUNTER
Attempted to completed PA for Ozempic electronically through covermymeds. There was an error with your request ,Patient Inactive         Phoned pt in regard to updating plan information. Pt stated he is already taking Ozempic and has 3 weeks left until finish. Pt requested mail order delivery      Spoke to pharmacy at main [facility] in regard to sending rx Ozempic to [facility] mail delivery. pharmacist stated she will reach out to pharmacy

## 2022-04-07 ENCOUNTER — OFFICE VISIT (OUTPATIENT)
Dept: FAMILY MEDICINE | Facility: CLINIC | Age: 57
End: 2022-04-07
Payer: COMMERCIAL

## 2022-04-07 VITALS
BODY MASS INDEX: 39.06 KG/M2 | WEIGHT: 243.06 LBS | DIASTOLIC BLOOD PRESSURE: 70 MMHG | HEIGHT: 66 IN | HEART RATE: 83 BPM | OXYGEN SATURATION: 98 % | SYSTOLIC BLOOD PRESSURE: 94 MMHG | TEMPERATURE: 98 F

## 2022-04-07 DIAGNOSIS — Z12.5 PROSTATE CANCER SCREENING: ICD-10-CM

## 2022-04-07 DIAGNOSIS — I10 ESSENTIAL HYPERTENSION: ICD-10-CM

## 2022-04-07 DIAGNOSIS — Z13.6 SCREENING FOR CARDIOVASCULAR CONDITION: ICD-10-CM

## 2022-04-07 DIAGNOSIS — Z23 NEED FOR TETANUS BOOSTER: Primary | ICD-10-CM

## 2022-04-07 DIAGNOSIS — E11.9 TYPE 2 DIABETES MELLITUS WITHOUT COMPLICATION, WITHOUT LONG-TERM CURRENT USE OF INSULIN: ICD-10-CM

## 2022-04-07 PROCEDURE — 3078F PR MOST RECENT DIASTOLIC BLOOD PRESSURE < 80 MM HG: ICD-10-PCS | Mod: CPTII,S$GLB,, | Performed by: STUDENT IN AN ORGANIZED HEALTH CARE EDUCATION/TRAINING PROGRAM

## 2022-04-07 PROCEDURE — 3078F DIAST BP <80 MM HG: CPT | Mod: CPTII,S$GLB,, | Performed by: STUDENT IN AN ORGANIZED HEALTH CARE EDUCATION/TRAINING PROGRAM

## 2022-04-07 PROCEDURE — 90715 TDAP VACCINE GREATER THAN OR EQUAL TO 7YO IM: ICD-10-PCS | Mod: S$GLB,,, | Performed by: STUDENT IN AN ORGANIZED HEALTH CARE EDUCATION/TRAINING PROGRAM

## 2022-04-07 PROCEDURE — 3008F BODY MASS INDEX DOCD: CPT | Mod: CPTII,S$GLB,, | Performed by: STUDENT IN AN ORGANIZED HEALTH CARE EDUCATION/TRAINING PROGRAM

## 2022-04-07 PROCEDURE — 90715 TDAP VACCINE 7 YRS/> IM: CPT | Mod: S$GLB,,, | Performed by: STUDENT IN AN ORGANIZED HEALTH CARE EDUCATION/TRAINING PROGRAM

## 2022-04-07 PROCEDURE — 90471 IMMUNIZATION ADMIN: CPT | Mod: S$GLB,,, | Performed by: STUDENT IN AN ORGANIZED HEALTH CARE EDUCATION/TRAINING PROGRAM

## 2022-04-07 PROCEDURE — 90471 TDAP VACCINE GREATER THAN OR EQUAL TO 7YO IM: ICD-10-PCS | Mod: S$GLB,,, | Performed by: STUDENT IN AN ORGANIZED HEALTH CARE EDUCATION/TRAINING PROGRAM

## 2022-04-07 PROCEDURE — 1159F MED LIST DOCD IN RCRD: CPT | Mod: CPTII,S$GLB,, | Performed by: STUDENT IN AN ORGANIZED HEALTH CARE EDUCATION/TRAINING PROGRAM

## 2022-04-07 PROCEDURE — 4010F ACE/ARB THERAPY RXD/TAKEN: CPT | Mod: CPTII,S$GLB,, | Performed by: STUDENT IN AN ORGANIZED HEALTH CARE EDUCATION/TRAINING PROGRAM

## 2022-04-07 PROCEDURE — 1160F PR REVIEW ALL MEDS BY PRESCRIBER/CLIN PHARMACIST DOCUMENTED: ICD-10-PCS | Mod: CPTII,S$GLB,, | Performed by: STUDENT IN AN ORGANIZED HEALTH CARE EDUCATION/TRAINING PROGRAM

## 2022-04-07 PROCEDURE — 3074F PR MOST RECENT SYSTOLIC BLOOD PRESSURE < 130 MM HG: ICD-10-PCS | Mod: CPTII,S$GLB,, | Performed by: STUDENT IN AN ORGANIZED HEALTH CARE EDUCATION/TRAINING PROGRAM

## 2022-04-07 PROCEDURE — 3074F SYST BP LT 130 MM HG: CPT | Mod: CPTII,S$GLB,, | Performed by: STUDENT IN AN ORGANIZED HEALTH CARE EDUCATION/TRAINING PROGRAM

## 2022-04-07 PROCEDURE — 1159F PR MEDICATION LIST DOCUMENTED IN MEDICAL RECORD: ICD-10-PCS | Mod: CPTII,S$GLB,, | Performed by: STUDENT IN AN ORGANIZED HEALTH CARE EDUCATION/TRAINING PROGRAM

## 2022-04-07 PROCEDURE — 1160F RVW MEDS BY RX/DR IN RCRD: CPT | Mod: CPTII,S$GLB,, | Performed by: STUDENT IN AN ORGANIZED HEALTH CARE EDUCATION/TRAINING PROGRAM

## 2022-04-07 PROCEDURE — 4010F PR ACE/ARB THEARPY RXD/TAKEN: ICD-10-PCS | Mod: CPTII,S$GLB,, | Performed by: STUDENT IN AN ORGANIZED HEALTH CARE EDUCATION/TRAINING PROGRAM

## 2022-04-07 PROCEDURE — 99214 OFFICE O/P EST MOD 30 MIN: CPT | Mod: 25,S$GLB,, | Performed by: STUDENT IN AN ORGANIZED HEALTH CARE EDUCATION/TRAINING PROGRAM

## 2022-04-07 PROCEDURE — 3008F PR BODY MASS INDEX (BMI) DOCUMENTED: ICD-10-PCS | Mod: CPTII,S$GLB,, | Performed by: STUDENT IN AN ORGANIZED HEALTH CARE EDUCATION/TRAINING PROGRAM

## 2022-04-07 PROCEDURE — 99214 PR OFFICE/OUTPT VISIT, EST, LEVL IV, 30-39 MIN: ICD-10-PCS | Mod: 25,S$GLB,, | Performed by: STUDENT IN AN ORGANIZED HEALTH CARE EDUCATION/TRAINING PROGRAM

## 2022-04-07 RX ORDER — ATORVASTATIN CALCIUM 40 MG/1
40 TABLET, FILM COATED ORAL DAILY
Qty: 90 TABLET | Refills: 3 | Status: SHIPPED | OUTPATIENT
Start: 2022-04-07 | End: 2022-11-23

## 2022-04-07 RX ORDER — AMLODIPINE BESYLATE 10 MG/1
10 TABLET ORAL DAILY
Qty: 90 TABLET | Refills: 1 | Status: SHIPPED | OUTPATIENT
Start: 2022-04-07 | End: 2022-12-27

## 2022-04-07 RX ORDER — TRIAMTERENE AND HYDROCHLOROTHIAZIDE 37.5; 25 MG/1; MG/1
1 CAPSULE ORAL EVERY MORNING
Qty: 90 CAPSULE | Refills: 0 | Status: SHIPPED | OUTPATIENT
Start: 2022-04-07 | End: 2022-08-15

## 2022-04-07 RX ORDER — LOSARTAN POTASSIUM 50 MG/1
50 TABLET ORAL DAILY
Qty: 90 TABLET | Refills: 1 | Status: SHIPPED | OUTPATIENT
Start: 2022-04-07 | End: 2023-02-08

## 2022-04-07 NOTE — PROGRESS NOTES
Patient ID: Андрей Wooten is a 57 y.o. male.     Chief Complaint: Leg Swelling and Follow-up    Knee Pain   There was no injury mechanism. The pain is present in the right knee. The pain is at a severity of 4/10. The pain is mild. Pain course: resolved. Associated symptoms comments: swelling. The symptoms are aggravated by weight bearing. He has tried non-weight bearing for the symptoms. The treatment provided significant relief.      patient also needs a refill of all medications today.     Review of Systems  Review of Systems   Constitutional: Negative for fever.   HENT: Negative for ear pain, hearing loss and sinus pain.    Eyes: Negative for discharge.   Respiratory: Negative for cough, shortness of breath and wheezing.    Cardiovascular: Positive for chest pain. Negative for palpitations and leg swelling.   Gastrointestinal: Positive for diarrhea. Negative for blood in stool, constipation, nausea and vomiting.   Genitourinary: Negative for hematuria and urgency.   Musculoskeletal: Negative for myalgias and neck pain.   Skin: Negative for rash.   Neurological: Negative for weakness and headaches.   Endo/Heme/Allergies: Positive for polydipsia.   Psychiatric/Behavioral: Negative for depression.   All other systems reviewed and are negative.      Currently Medications  Current Outpatient Medications on File Prior to Visit   Medication Sig Dispense Refill    blood sugar diagnostic Strp To check BG 1 times daily, to use with insurance preferred meter 100 strip 11    blood-glucose meter kit To check BG 1 times daily, to use with insurance preferred meter 1 each 0    cetirizine (ZYRTEC) 10 MG tablet Take 10 mg by mouth as needed.       fluticasone (FLONASE) 50 mcg/actuation nasal spray 1 spray by Each Nare route once daily. (Patient taking differently: 1 spray by Each Nostril route as needed.) 16 g 0    gabapentin (NEURONTIN) 300 MG capsule Take 1 capsule (300 mg total) by mouth every evening. 30 capsule 5     "lancets Misc To check BG 1 times daily, to use with insurance preferred meter 100 each 11    meloxicam (MOBIC) 7.5 MG tablet Take 1 tablet (7.5 mg total) by mouth daily as needed for Pain. 20 tablet 0    metFORMIN (GLUCOPHAGE) 500 MG tablet Take 1 tablet (500 mg total) by mouth daily with breakfast. 90 tablet 1    pantoprazole (PROTONIX) 40 MG tablet Take 1 tablet (40 mg total) by mouth every morning. 90 tablet 3    semaglutide (OZEMPIC) 0.25 mg or 0.5 mg(2 mg/1.5 mL) pen injector Inject 0.5 mg into the skin every 7 days. 1 pen 2    vitamin D 1000 units Tab Take 185 mg by mouth once daily.      [DISCONTINUED] amLODIPine (NORVASC) 10 MG tablet TAKE 1 TABLET(10 MG) BY MOUTH EVERY DAY 90 tablet 1    [DISCONTINUED] atorvastatin (LIPITOR) 40 MG tablet Take 1 tablet (40 mg total) by mouth once daily. 90 tablet 3    [DISCONTINUED] losartan (COZAAR) 50 MG tablet TAKE 1 TABLET(50 MG) BY MOUTH EVERY DAY 90 tablet 1    [DISCONTINUED] triamterene-hydrochlorothiazide 37.5-25 mg (DYAZIDE) 37.5-25 mg per capsule Take 1 capsule by mouth every morning. 90 capsule 0    [DISCONTINUED] aspirin (ECOTRIN) 81 MG EC tablet Take 81 mg by mouth once daily.       No current facility-administered medications on file prior to visit.       Physical  Exam  Vitals:    04/07/22 1454   BP: 94/70   BP Location: Left arm   Patient Position: Sitting   Pulse: 83   Temp: 98.3 °F (36.8 °C)   TempSrc: Oral   SpO2: 98%   Weight: 110.2 kg (243 lb 0.9 oz)   Height: 5' 6" (1.676 m)      Body mass index is 39.23 kg/m².    Physical Exam  Vitals and nursing note reviewed.   Constitutional:       General: He is not in acute distress.     Appearance: He is not ill-appearing.   HENT:      Head: Normocephalic and atraumatic.      Right Ear: External ear normal.      Left Ear: External ear normal.      Nose: Nose normal.      Mouth/Throat:      Mouth: Mucous membranes are moist.   Eyes:      Extraocular Movements: Extraocular movements intact.      " Conjunctiva/sclera: Conjunctivae normal.   Cardiovascular:      Rate and Rhythm: Normal rate and regular rhythm.      Pulses: Normal pulses.      Heart sounds: No murmur heard.  Pulmonary:      Effort: Pulmonary effort is normal. No respiratory distress.      Breath sounds: No wheezing.   Abdominal:      General: There is no distension.      Palpations: Abdomen is soft. There is no mass.      Tenderness: There is no abdominal tenderness.   Musculoskeletal:         General: No swelling.      Cervical back: Normal range of motion.   Skin:     Coloration: Skin is not jaundiced.      Findings: No rash.   Neurological:      General: No focal deficit present.      Mental Status: He is alert and oriented to person, place, and time.   Psychiatric:         Mood and Affect: Mood normal.         Thought Content: Thought content normal.         Labs:    Complete Blood Count  Lab Results   Component Value Date    RBC 5.23 08/25/2021    HGB 14.7 08/25/2021    HCT 44.3 08/25/2021    MCV 85 08/25/2021    MCH 28.1 08/25/2021    MCHC 33.2 08/25/2021    RDW 13.0 08/25/2021     08/25/2021    MPV 9.5 08/25/2021    GRAN 1.2 (L) 08/25/2021    GRAN 25.7 (L) 08/25/2021    LYMPH 2.8 08/25/2021    LYMPH 61.5 (H) 08/25/2021    MONO 0.5 08/25/2021    MONO 9.9 08/25/2021    EOS 0.1 08/25/2021    BASO 0.04 08/25/2021    EOSINOPHIL 1.8 08/25/2021    BASOPHIL 0.9 08/25/2021    DIFFMETHOD Automated 08/25/2021       Comprehensive Metabolic Panel  No results found for: GLU, BUN, CREATININE, NA, K, CL, PROT, ALBUMIN, BILITOT, AST, ALKPHOS, CO2, ALT, ANIONGAP, EGFRNONAA, ESTGFRAFRICA    TSH  No results found for: TSH    Imaging:  Nuclear Stress Test    The EKG portion of this study is uninterpretable.    The patient reported no chest pain during the stress test.    There were no arrhythmias during stress.    The nuclear portion of this study will be reported separately.  NM Myocardial Perfusion Spect Multi Pharmacologic  Narrative:  EXAMINATION:  NM MYOCARDIAL PERFUSION SPECT MULTI PHARM    CLINICAL HISTORY:  Dyspnea on exertion;  Dyspnea, unspecified    TECHNIQUE:  Technique: 10.8mCi of Tc 99 m Myoview was injected intravenously at rest with 29.7 mCi of Tc 99 m Myoview injected at height of lexiscan stress. Stress and rest emission tomography was performed in a gated fashion.    The clinical stress and ECG portion of the study is to be read separately.    COMPARISON:  None.    FINDINGS:  Stress and rest emission tomography demonstrate normal left ventricular myocardial  perfusion without evidence for reversible ischemia. Stress and rest emission tomography performed in gated fashion with slight hypokinesia of the inferior and septal walls.  Left ventricular ejection fraction measures  approximately  70%.at rest and 79 % with stress. End diastolic volume measure 59 ml with rest and  62 mL with stress.  End systolic volume measures 12 mL with rest and 18 mL with stress.  t.i.d. 1.26    There is hypoattenuation in the visualized liver concerning for fatty infiltration.  Atherosclerotic disease in the distribution of the coronary arteries partially visualized.  Impression: Normal left ventricular myocardial perfusion, no evidence for reversible ischemia.  There is slight hypokinesia of the inferior and lateral walls.  Left ventricular ejection fraction approximately 70-79%.  TID 1.26    Clinical correlation correlation with cardiology portion advised.    Electronically signed by: Rl Mae DO  Date:    08/20/2021  Time:    12:37      Assessment/Plan:    Problem List Items Addressed This Visit        Endocrine    Type 2 diabetes mellitus without complication, without long-term current use of insulin    Relevant Orders    CBC Auto Differential    Hemoglobin A1C    MICROALBUMIN / CREATININE RATIO URINE    Comprehensive Metabolic Panel      Other Visit Diagnoses     Need for tetanus booster    -  Primary    Relevant Orders    (In Office  Administered) Tdap Vaccine (Completed)    Essential hypertension        Relevant Medications    amLODIPine (NORVASC) 10 MG tablet    Prostate cancer screening        Relevant Orders    PSA, Screening    Screening for cardiovascular condition        Relevant Orders    Lipid Panel           Discussed how to stay healthy including: diet, exercise, refraining from smoking and discussed screening exams / tests needed for age, sex and family Hx.    RTC pending blood owrk    Leander Boo MD      Answers for HPI/ROS submitted by the patient on 4/1/2022  activity change: Yes  unexpected weight change: No  rhinorrhea: No  trouble swallowing: No  visual disturbance: Yes  chest tightness: No  polyuria: Yes  difficulty urinating: No  joint swelling: Yes  arthralgias: No  confusion: No  dysphoric mood: No

## 2022-04-12 ENCOUNTER — LAB VISIT (OUTPATIENT)
Dept: LAB | Facility: HOSPITAL | Age: 57
End: 2022-04-12
Attending: STUDENT IN AN ORGANIZED HEALTH CARE EDUCATION/TRAINING PROGRAM
Payer: COMMERCIAL

## 2022-04-12 ENCOUNTER — PATIENT MESSAGE (OUTPATIENT)
Dept: GASTROENTEROLOGY | Facility: CLINIC | Age: 57
End: 2022-04-12
Payer: COMMERCIAL

## 2022-04-12 ENCOUNTER — PATIENT OUTREACH (OUTPATIENT)
Dept: ADMINISTRATIVE | Facility: HOSPITAL | Age: 57
End: 2022-04-12
Payer: COMMERCIAL

## 2022-04-12 DIAGNOSIS — E11.9 TYPE 2 DIABETES MELLITUS WITHOUT COMPLICATION, WITHOUT LONG-TERM CURRENT USE OF INSULIN: ICD-10-CM

## 2022-04-12 DIAGNOSIS — Z13.6 SCREENING FOR CARDIOVASCULAR CONDITION: ICD-10-CM

## 2022-04-12 DIAGNOSIS — Z12.5 PROSTATE CANCER SCREENING: ICD-10-CM

## 2022-04-12 DIAGNOSIS — G62.9 NEUROPATHY: ICD-10-CM

## 2022-04-12 DIAGNOSIS — R10.13 EPIGASTRIC ABDOMINAL PAIN: Primary | ICD-10-CM

## 2022-04-12 LAB
ALBUMIN SERPL BCP-MCNC: 4.5 G/DL (ref 3.5–5.2)
ALP SERPL-CCNC: 54 U/L (ref 38–126)
ALT SERPL W/O P-5'-P-CCNC: 62 U/L (ref 10–44)
ANION GAP SERPL CALC-SCNC: 11 MMOL/L (ref 8–16)
AST SERPL-CCNC: 43 U/L (ref 15–46)
BASOPHILS # BLD AUTO: 0.03 K/UL (ref 0–0.2)
BASOPHILS NFR BLD: 0.6 % (ref 0–1.9)
BILIRUB SERPL-MCNC: 0.5 MG/DL (ref 0.1–1)
CALCIUM SERPL-MCNC: 9.3 MG/DL (ref 8.7–10.5)
CHLORIDE SERPL-SCNC: 101 MMOL/L (ref 95–110)
CHOLEST SERPL-MCNC: 139 MG/DL (ref 120–199)
CHOLEST/HDLC SERPL: 5 {RATIO} (ref 2–5)
CO2 SERPL-SCNC: 29 MMOL/L (ref 23–29)
COMPLEXED PSA SERPL-MCNC: 0.68 NG/ML (ref 0–4)
CREAT SERPL-MCNC: 1.07 MG/DL (ref 0.5–1.4)
DIFFERENTIAL METHOD: ABNORMAL
EOSINOPHIL # BLD AUTO: 0.1 K/UL (ref 0–0.5)
EOSINOPHIL NFR BLD: 1.5 % (ref 0–8)
ERYTHROCYTE [DISTWIDTH] IN BLOOD BY AUTOMATED COUNT: 13.2 % (ref 11.5–14.5)
EST. GFR  (AFRICAN AMERICAN): >60 ML/MIN/1.73 M^2
EST. GFR  (NON AFRICAN AMERICAN): >60 ML/MIN/1.73 M^2
ESTIMATED AVG GLUCOSE: 134 MG/DL (ref 68–131)
GLUCOSE SERPL-MCNC: 92 MG/DL (ref 70–110)
HBA1C MFR BLD: 6.3 % (ref 4–5.6)
HCT VFR BLD AUTO: 44.1 % (ref 40–54)
HDLC SERPL-MCNC: 28 MG/DL (ref 40–75)
HDLC SERPL: 20.1 % (ref 20–50)
HGB BLD-MCNC: 14.3 G/DL (ref 14–18)
IMM GRANULOCYTES # BLD AUTO: 0 K/UL (ref 0–0.04)
IMM GRANULOCYTES NFR BLD AUTO: 0 % (ref 0–0.5)
LDLC SERPL CALC-MCNC: 81.4 MG/DL (ref 63–159)
LYMPHOCYTES # BLD AUTO: 2.5 K/UL (ref 1–4.8)
LYMPHOCYTES NFR BLD: 55 % (ref 18–48)
MCH RBC QN AUTO: 27.6 PG (ref 27–31)
MCHC RBC AUTO-ENTMCNC: 32.4 G/DL (ref 32–36)
MCV RBC AUTO: 85 FL (ref 82–98)
MONOCYTES # BLD AUTO: 0.5 K/UL (ref 0.3–1)
MONOCYTES NFR BLD: 9.7 % (ref 4–15)
NEUTROPHILS # BLD AUTO: 1.5 K/UL (ref 1.8–7.7)
NEUTROPHILS NFR BLD: 33.2 % (ref 38–73)
NONHDLC SERPL-MCNC: 111 MG/DL
NRBC BLD-RTO: 0 /100 WBC
PLATELET # BLD AUTO: 267 K/UL (ref 150–450)
PMV BLD AUTO: 9.5 FL (ref 9.2–12.9)
POTASSIUM SERPL-SCNC: 3.9 MMOL/L (ref 3.5–5.1)
PROT SERPL-MCNC: 8.1 G/DL (ref 6–8.4)
RBC # BLD AUTO: 5.18 M/UL (ref 4.6–6.2)
SODIUM SERPL-SCNC: 141 MMOL/L (ref 136–145)
TRIGL SERPL-MCNC: 148 MG/DL (ref 30–150)
UUN UR-MCNC: 18 MG/DL (ref 2–20)
WBC # BLD AUTO: 4.62 K/UL (ref 3.9–12.7)

## 2022-04-12 PROCEDURE — 80053 COMPREHEN METABOLIC PANEL: CPT | Mod: PO | Performed by: STUDENT IN AN ORGANIZED HEALTH CARE EDUCATION/TRAINING PROGRAM

## 2022-04-12 PROCEDURE — 84153 ASSAY OF PSA TOTAL: CPT | Performed by: STUDENT IN AN ORGANIZED HEALTH CARE EDUCATION/TRAINING PROGRAM

## 2022-04-12 PROCEDURE — 36415 COLL VENOUS BLD VENIPUNCTURE: CPT | Mod: PO | Performed by: STUDENT IN AN ORGANIZED HEALTH CARE EDUCATION/TRAINING PROGRAM

## 2022-04-12 PROCEDURE — 80061 LIPID PANEL: CPT | Performed by: STUDENT IN AN ORGANIZED HEALTH CARE EDUCATION/TRAINING PROGRAM

## 2022-04-12 PROCEDURE — 85025 COMPLETE CBC W/AUTO DIFF WBC: CPT | Mod: PO | Performed by: STUDENT IN AN ORGANIZED HEALTH CARE EDUCATION/TRAINING PROGRAM

## 2022-04-12 PROCEDURE — 83036 HEMOGLOBIN GLYCOSYLATED A1C: CPT | Performed by: STUDENT IN AN ORGANIZED HEALTH CARE EDUCATION/TRAINING PROGRAM
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
## 2022-04-20 ENCOUNTER — PATIENT MESSAGE (OUTPATIENT)
Dept: GASTROENTEROLOGY | Facility: CLINIC | Age: 57
End: 2022-04-20
Payer: COMMERCIAL

## 2022-04-20 ENCOUNTER — HOSPITAL ENCOUNTER (OUTPATIENT)
Dept: RADIOLOGY | Facility: HOSPITAL | Age: 57
Discharge: HOME | End: 2022-04-20
Attending: INTERNAL MEDICINE
Payer: COMMERCIAL

## 2022-04-20 DIAGNOSIS — R10.13 EPIGASTRIC ABDOMINAL PAIN: ICD-10-CM

## 2022-04-20 DIAGNOSIS — G62.9 NEUROPATHY: ICD-10-CM

## 2022-04-20 PROCEDURE — 25500020 PHARM REV CODE 255: Mod: PO | Performed by: INTERNAL MEDICINE

## 2022-04-20 PROCEDURE — 74177 CT ABD & PELVIS W/CONTRAST: CPT | Mod: TC,PO

## 2022-04-20 PROCEDURE — 71260 CT THORAX DX C+: CPT | Mod: TC,PO

## 2022-04-20 RX ADMIN — IOHEXOL 100 ML: 350 INJECTION, SOLUTION INTRAVENOUS at 08:04

## 2022-04-20 RX ADMIN — IOHEXOL 30 ML: 300 INJECTION, SOLUTION INTRAVENOUS at 08:04

## 2022-05-09 ENCOUNTER — PATIENT MESSAGE (OUTPATIENT)
Dept: FAMILY MEDICINE | Facility: CLINIC | Age: 57
End: 2022-05-09
Payer: COMMERCIAL

## 2022-05-10 ENCOUNTER — OFFICE VISIT (OUTPATIENT)
Dept: BARIATRICS | Facility: CLINIC | Age: 57
End: 2022-05-10
Payer: COMMERCIAL

## 2022-05-10 VITALS
BODY MASS INDEX: 39.82 KG/M2 | HEART RATE: 86 BPM | WEIGHT: 247.81 LBS | OXYGEN SATURATION: 97 % | SYSTOLIC BLOOD PRESSURE: 128 MMHG | DIASTOLIC BLOOD PRESSURE: 69 MMHG | HEIGHT: 66 IN

## 2022-05-10 DIAGNOSIS — E11.9 TYPE 2 DIABETES MELLITUS WITHOUT COMPLICATION, WITHOUT LONG-TERM CURRENT USE OF INSULIN: ICD-10-CM

## 2022-05-10 DIAGNOSIS — K75.81 NASH (NONALCOHOLIC STEATOHEPATITIS): ICD-10-CM

## 2022-05-10 DIAGNOSIS — E66.01 CLASS 2 SEVERE OBESITY DUE TO EXCESS CALORIES WITH SERIOUS COMORBIDITY AND BODY MASS INDEX (BMI) OF 39.0 TO 39.9 IN ADULT: Primary | ICD-10-CM

## 2022-05-10 DIAGNOSIS — Z71.3 ENCOUNTER FOR WEIGHT LOSS COUNSELING: ICD-10-CM

## 2022-05-10 DIAGNOSIS — E78.49 OTHER HYPERLIPIDEMIA: ICD-10-CM

## 2022-05-10 DIAGNOSIS — I10 PRIMARY HYPERTENSION: ICD-10-CM

## 2022-05-10 PROCEDURE — 1160F PR REVIEW ALL MEDS BY PRESCRIBER/CLIN PHARMACIST DOCUMENTED: ICD-10-PCS | Mod: CPTII,S$GLB,, | Performed by: STUDENT IN AN ORGANIZED HEALTH CARE EDUCATION/TRAINING PROGRAM

## 2022-05-10 PROCEDURE — 99999 PR PBB SHADOW E&M-EST. PATIENT-LVL IV: ICD-10-PCS | Mod: PBBFAC,,, | Performed by: STUDENT IN AN ORGANIZED HEALTH CARE EDUCATION/TRAINING PROGRAM

## 2022-05-10 PROCEDURE — 3074F SYST BP LT 130 MM HG: CPT | Mod: CPTII,S$GLB,, | Performed by: STUDENT IN AN ORGANIZED HEALTH CARE EDUCATION/TRAINING PROGRAM

## 2022-05-10 PROCEDURE — 3008F PR BODY MASS INDEX (BMI) DOCUMENTED: ICD-10-PCS | Mod: CPTII,S$GLB,, | Performed by: STUDENT IN AN ORGANIZED HEALTH CARE EDUCATION/TRAINING PROGRAM

## 2022-05-10 PROCEDURE — 3044F HG A1C LEVEL LT 7.0%: CPT | Mod: CPTII,S$GLB,, | Performed by: STUDENT IN AN ORGANIZED HEALTH CARE EDUCATION/TRAINING PROGRAM

## 2022-05-10 PROCEDURE — 99213 PR OFFICE/OUTPT VISIT, EST, LEVL III, 20-29 MIN: ICD-10-PCS | Mod: S$GLB,,, | Performed by: STUDENT IN AN ORGANIZED HEALTH CARE EDUCATION/TRAINING PROGRAM

## 2022-05-10 PROCEDURE — 3078F DIAST BP <80 MM HG: CPT | Mod: CPTII,S$GLB,, | Performed by: STUDENT IN AN ORGANIZED HEALTH CARE EDUCATION/TRAINING PROGRAM

## 2022-05-10 PROCEDURE — 99999 PR PBB SHADOW E&M-EST. PATIENT-LVL IV: CPT | Mod: PBBFAC,,, | Performed by: STUDENT IN AN ORGANIZED HEALTH CARE EDUCATION/TRAINING PROGRAM

## 2022-05-10 PROCEDURE — 3044F PR MOST RECENT HEMOGLOBIN A1C LEVEL <7.0%: ICD-10-PCS | Mod: CPTII,S$GLB,, | Performed by: STUDENT IN AN ORGANIZED HEALTH CARE EDUCATION/TRAINING PROGRAM

## 2022-05-10 PROCEDURE — 3008F BODY MASS INDEX DOCD: CPT | Mod: CPTII,S$GLB,, | Performed by: STUDENT IN AN ORGANIZED HEALTH CARE EDUCATION/TRAINING PROGRAM

## 2022-05-10 PROCEDURE — 3078F PR MOST RECENT DIASTOLIC BLOOD PRESSURE < 80 MM HG: ICD-10-PCS | Mod: CPTII,S$GLB,, | Performed by: STUDENT IN AN ORGANIZED HEALTH CARE EDUCATION/TRAINING PROGRAM

## 2022-05-10 PROCEDURE — 3074F PR MOST RECENT SYSTOLIC BLOOD PRESSURE < 130 MM HG: ICD-10-PCS | Mod: CPTII,S$GLB,, | Performed by: STUDENT IN AN ORGANIZED HEALTH CARE EDUCATION/TRAINING PROGRAM

## 2022-05-10 PROCEDURE — 1160F RVW MEDS BY RX/DR IN RCRD: CPT | Mod: CPTII,S$GLB,, | Performed by: STUDENT IN AN ORGANIZED HEALTH CARE EDUCATION/TRAINING PROGRAM

## 2022-05-10 PROCEDURE — 1159F PR MEDICATION LIST DOCUMENTED IN MEDICAL RECORD: ICD-10-PCS | Mod: CPTII,S$GLB,, | Performed by: STUDENT IN AN ORGANIZED HEALTH CARE EDUCATION/TRAINING PROGRAM

## 2022-05-10 PROCEDURE — 4010F ACE/ARB THERAPY RXD/TAKEN: CPT | Mod: CPTII,S$GLB,, | Performed by: STUDENT IN AN ORGANIZED HEALTH CARE EDUCATION/TRAINING PROGRAM

## 2022-05-10 PROCEDURE — 4010F PR ACE/ARB THEARPY RXD/TAKEN: ICD-10-PCS | Mod: CPTII,S$GLB,, | Performed by: STUDENT IN AN ORGANIZED HEALTH CARE EDUCATION/TRAINING PROGRAM

## 2022-05-10 PROCEDURE — 99213 OFFICE O/P EST LOW 20 MIN: CPT | Mod: S$GLB,,, | Performed by: STUDENT IN AN ORGANIZED HEALTH CARE EDUCATION/TRAINING PROGRAM

## 2022-05-10 PROCEDURE — 1159F MED LIST DOCD IN RCRD: CPT | Mod: CPTII,S$GLB,, | Performed by: STUDENT IN AN ORGANIZED HEALTH CARE EDUCATION/TRAINING PROGRAM

## 2022-05-10 RX ORDER — SEMAGLUTIDE 1.34 MG/ML
1 INJECTION, SOLUTION SUBCUTANEOUS
Qty: 1 PEN | Refills: 2 | Status: SHIPPED | OUTPATIENT
Start: 2022-05-10 | End: 2022-06-07 | Stop reason: DRUGHIGH

## 2022-05-10 NOTE — PROGRESS NOTES
Subjective:       Patient ID: Miller Shannon is a 57 y.o. male.    Chief Complaint: Follow-up, Obesity, and Weight Check    Patient presents for treatment of obesity.   Follow-up, appointment #4    Co-morbidities:   HTN  HLD  NAFLD  DM2  GERD    Negative for thyroid cancer  Negative for pancreatitis     History of Weight Loss Efforts  Dr. Bernal - prescribed phentermine, diethylpropion; 255 lbs when first seen, got down to 240 lbs  Intermittent Fasting - got down to about 230 lbs    Current Physical Activity  No regular exercise routine  Was doing cardio workout at [facility], walking 4-5 miles prior to [place]    Current Eating Habits  Breakfast - grits, ham, biscuits, cereal (Frosted Flakes)  Lunch - fast food  Dinner - southern food  Snacks - chips, cookies, candy; about 4/day  Beverages - water, Hawaiian Punch, sweet tea    Shift work  House damaged from [place], doesn't have kitchen appliances     Drinking less sugar sweetened beverages  Eating more salads    Just returned from 7 day cruise    Medical Weight Loss  1/24/2022 ([facility] out for repair): 247 lbs 5.7 oz, BMI 39.92  2/23/2022: 245.7 lbs, BMI 39.7, BFP 38.2%, BFM 93.8 lbs, SMM 87.7 lbs, BMR 1858 kcal; Ozempic  4/1/2022: 244.1 lbs, BMI 39.4, BFP 38.7%, BFM 94.4 lbs, SMM 86.6 lbs, BMR 1836 kcal; Ozempic  5/10/2022: 247.8 lbs, BMI 40, BFP 38.1%, BFM 94.4 lbs, SMM 88.6 lbs, BMR 1874 kcal; Ozempic      Follow-up  Pertinent negatives include no abdominal pain, chest pain, chills, fever, nausea or vomiting.     Review of Systems   Constitutional: Negative for chills and fever.   Respiratory: Negative for shortness of breath.    Cardiovascular: Negative for chest pain, palpitations and leg swelling.   Gastrointestinal: Positive for reflux. Negative for abdominal pain, nausea and vomiting.   Neurological: Negative for dizziness and light-headedness.         Objective:     Results for MILLER SHANNON (MRN [id number]) as of 1/24/2022 10:31   Ref. Range  8/25/2021 10:24   WBC Latest Ref Range: 3.90 - 12.70 K/uL 4.55   RBC Latest Ref Range: 4.60 - 6.20 M/uL 5.23   Hemoglobin Latest Ref Range: 14.0 - 18.0 g/dL 14.7   Hematocrit Latest Ref Range: 40.0 - 54.0 % 44.3   MCV Latest Ref Range: 82 - 98 fL 85   MCH Latest Ref Range: 27.0 - 31.0 pg 28.1   MCHC Latest Ref Range: 32.0 - 36.0 g/dL 33.2   RDW Latest Ref Range: 11.5 - 14.5 % 13.0   Platelets Latest Ref Range: 150 - 450 K/uL 271   MPV Latest Ref Range: 9.2 - 12.9 fL 9.5   Gran % Latest Ref Range: 38.0 - 73.0 % 25.7 (L)   Lymph % Latest Ref Range: 18.0 - 48.0 % 61.5 (H)   Mono % Latest Ref Range: 4.0 - 15.0 % 9.9   Eosinophil % Latest Ref Range: 0.0 - 8.0 % 1.8   Basophil % Latest Ref Range: 0.0 - 1.9 % 0.9   Immature Granulocytes Latest Ref Range: 0.0 - 0.5 % 0.2   Gran # (ANC) Latest Ref Range: 1.8 - 7.7 K/uL 1.2 (L)   Lymph # Latest Ref Range: 1.0 - 4.8 K/uL 2.8   Mono # Latest Ref Range: 0.3 - 1.0 K/uL 0.5   Eos # Latest Ref Range: 0.0 - 0.5 K/uL 0.1   Baso # Latest Ref Range: 0.00 - 0.20 K/uL 0.04   Immature Grans (Abs) Latest Ref Range: 0.00 - 0.04 K/uL 0.01   nRBC Latest Ref Range: 0 /100 WBC 0   Differential Method Unknown Automated   Sodium Latest Ref Range: 136 - 145 mmol/L 140   Potassium Latest Ref Range: 3.5 - 5.1 mmol/L 3.7   Chloride Latest Ref Range: 95 - 110 mmol/L 101   CO2 Latest Ref Range: 23 - 29 mmol/L 28   Anion Gap Latest Ref Range: 8 - 16 mmol/L 11   BUN Latest Ref Range: 2 - 20 mg/dL 17   Creatinine Latest Ref Range: 0.50 - 1.40 mg/dL 0.89   eGFR if non African American Latest Ref Range: >60 mL/min/1.73 m^2 >60.0   eGFR if African American Latest Ref Range: >60 mL/min/1.73 m^2 >60.0   Glucose Latest Ref Range: 70 - 110 mg/dL 122 (H)   Calcium Latest Ref Range: 8.7 - 10.5 mg/dL 10.1   Alkaline Phosphatase Latest Ref Range: 38 - 126 U/L 54   PROTEIN TOTAL Latest Ref Range: 6.0 - 8.4 g/dL 8.5 (H)   Albumin Latest Ref Range: 3.5 - 5.2 g/dL 4.8   BILIRUBIN TOTAL Latest Ref Range: 0.1 - 1.0 mg/dL  0.7   AST Latest Ref Range: 15 - 46 U/L 57 (H)   ALT Latest Ref Range: 10 - 44 U/L 88 (H)   Triglycerides Latest Ref Range: 30 - 150 mg/dL 206 (H)   Cholesterol Latest Ref Range: 120 - 199 mg/dL 162   HDL Latest Ref Range: 40 - 75 mg/dL 28 (L)   HDL/Cholesterol Ratio Latest Ref Range: 20.0 - 50.0 % 17.3 (L)   LDL Cholesterol External Latest Ref Range: 63.0 - 159.0 mg/dL 92.8   Non-HDL Cholesterol Latest Units: mg/dL 134   Total Cholesterol/HDL Ratio Latest Ref Range: 2.0 - 5.0  5.8 (H)   Hemoglobin A1C External Latest Ref Range: 4.0 - 5.6 % 6.3 (H)   Estimated Avg Glucose Latest Ref Range: 68 - 131 mg/dL 134 (H)   TSH Latest Ref Range: 0.400 - 4.000 uIU/mL 2.030     Vitals:    05/10/22 0912   BP: 128/69   Pulse: 86       Physical Exam  Vitals reviewed.   Constitutional:       General: He is not in acute distress.     Appearance: Normal appearance. He is obese. He is not ill-appearing, toxic-appearing or diaphoretic.   HENT:      Head: Normocephalic and atraumatic.   Cardiovascular:      Rate and Rhythm: Normal rate.   Pulmonary:      Effort: Pulmonary effort is normal. No respiratory distress.   Skin:     General: Skin is warm and dry.   Neurological:      General: No focal deficit present.      Mental Status: He is alert and oriented to person, place, and time.      Gait: Gait normal.   Psychiatric:         Mood and Affect: Mood normal.         Behavior: Behavior normal.         Thought Content: Thought content normal.         Judgment: Judgment normal.         Assessment:       Problem List Items Addressed This Visit     VOGEL (nonalcoholic steatohepatitis)    Class 2 severe obesity due to excess calories with serious comorbidity and body mass index (BMI) of 39.0 to 39.9 in adult - Primary    HLD (hyperlipidemia)    HTN (hypertension)    Type 2 diabetes mellitus without complication, without long-term current use of insulin    Relevant Medications    semaglutide (OZEMPIC) 1 mg/dose (4 mg/3 mL)      Other Visit  Diagnoses     Encounter for weight loss counseling              Plan:   - Ozempic 1 mg weekly.      - Log all food and beverage intake with a daily calorie goal of 1500 calories per day; meal plans given in AVS    - Moderate intensity aerobic exercise for 15-30 minutes 3-5x/week    - Return to clinic in 4 weeks

## 2022-05-12 ENCOUNTER — OFFICE VISIT (OUTPATIENT)
Dept: FAMILY MEDICINE | Facility: CLINIC | Age: 57
End: 2022-05-12
Payer: COMMERCIAL

## 2022-05-12 VITALS
DIASTOLIC BLOOD PRESSURE: 76 MMHG | HEIGHT: 66 IN | OXYGEN SATURATION: 96 % | BODY MASS INDEX: 39.97 KG/M2 | WEIGHT: 248.69 LBS | TEMPERATURE: 98 F | HEART RATE: 96 BPM | SYSTOLIC BLOOD PRESSURE: 122 MMHG

## 2022-05-12 DIAGNOSIS — M79.10 MYALGIA: ICD-10-CM

## 2022-05-12 DIAGNOSIS — R09.81 SINUS CONGESTION: ICD-10-CM

## 2022-05-12 DIAGNOSIS — U07.1 COVID-19: Primary | ICD-10-CM

## 2022-05-12 LAB
CTP QC/QA: YES
CTP QC/QA: YES
POC MOLECULAR INFLUENZA A AGN: NEGATIVE
POC MOLECULAR INFLUENZA B AGN: NEGATIVE
SARS-COV-2 RDRP RESP QL NAA+PROBE: POSITIVE

## 2022-05-12 PROCEDURE — 4010F ACE/ARB THERAPY RXD/TAKEN: CPT | Mod: CPTII,S$GLB,, | Performed by: STUDENT IN AN ORGANIZED HEALTH CARE EDUCATION/TRAINING PROGRAM

## 2022-05-12 PROCEDURE — 1159F PR MEDICATION LIST DOCUMENTED IN MEDICAL RECORD: ICD-10-PCS | Mod: CPTII,S$GLB,, | Performed by: STUDENT IN AN ORGANIZED HEALTH CARE EDUCATION/TRAINING PROGRAM

## 2022-05-12 PROCEDURE — 3044F HG A1C LEVEL LT 7.0%: CPT | Mod: CPTII,S$GLB,, | Performed by: STUDENT IN AN ORGANIZED HEALTH CARE EDUCATION/TRAINING PROGRAM

## 2022-05-12 PROCEDURE — 1160F RVW MEDS BY RX/DR IN RCRD: CPT | Mod: CPTII,S$GLB,, | Performed by: STUDENT IN AN ORGANIZED HEALTH CARE EDUCATION/TRAINING PROGRAM

## 2022-05-12 PROCEDURE — 3074F SYST BP LT 130 MM HG: CPT | Mod: CPTII,S$GLB,, | Performed by: STUDENT IN AN ORGANIZED HEALTH CARE EDUCATION/TRAINING PROGRAM

## 2022-05-12 PROCEDURE — 3008F PR BODY MASS INDEX (BMI) DOCUMENTED: ICD-10-PCS | Mod: CPTII,S$GLB,, | Performed by: STUDENT IN AN ORGANIZED HEALTH CARE EDUCATION/TRAINING PROGRAM

## 2022-05-12 PROCEDURE — U0002: ICD-10-PCS | Mod: QW,S$GLB,, | Performed by: STUDENT IN AN ORGANIZED HEALTH CARE EDUCATION/TRAINING PROGRAM

## 2022-05-12 PROCEDURE — 87502 POCT INFLUENZA A/B MOLECULAR: ICD-10-PCS | Mod: QW,,, | Performed by: STUDENT IN AN ORGANIZED HEALTH CARE EDUCATION/TRAINING PROGRAM

## 2022-05-12 PROCEDURE — 3008F BODY MASS INDEX DOCD: CPT | Mod: CPTII,S$GLB,, | Performed by: STUDENT IN AN ORGANIZED HEALTH CARE EDUCATION/TRAINING PROGRAM

## 2022-05-12 PROCEDURE — 99214 PR OFFICE/OUTPT VISIT, EST, LEVL IV, 30-39 MIN: ICD-10-PCS | Mod: S$GLB,,, | Performed by: STUDENT IN AN ORGANIZED HEALTH CARE EDUCATION/TRAINING PROGRAM

## 2022-05-12 PROCEDURE — 3044F PR MOST RECENT HEMOGLOBIN A1C LEVEL <7.0%: ICD-10-PCS | Mod: CPTII,S$GLB,, | Performed by: STUDENT IN AN ORGANIZED HEALTH CARE EDUCATION/TRAINING PROGRAM

## 2022-05-12 PROCEDURE — 3078F DIAST BP <80 MM HG: CPT | Mod: CPTII,S$GLB,, | Performed by: STUDENT IN AN ORGANIZED HEALTH CARE EDUCATION/TRAINING PROGRAM

## 2022-05-12 PROCEDURE — 87502 INFLUENZA DNA AMP PROBE: CPT | Mod: QW,,, | Performed by: STUDENT IN AN ORGANIZED HEALTH CARE EDUCATION/TRAINING PROGRAM

## 2022-05-12 PROCEDURE — U0002 COVID-19 LAB TEST NON-CDC: HCPCS | Mod: QW,S$GLB,, | Performed by: STUDENT IN AN ORGANIZED HEALTH CARE EDUCATION/TRAINING PROGRAM

## 2022-05-12 PROCEDURE — 1160F PR REVIEW ALL MEDS BY PRESCRIBER/CLIN PHARMACIST DOCUMENTED: ICD-10-PCS | Mod: CPTII,S$GLB,, | Performed by: STUDENT IN AN ORGANIZED HEALTH CARE EDUCATION/TRAINING PROGRAM

## 2022-05-12 PROCEDURE — 3078F PR MOST RECENT DIASTOLIC BLOOD PRESSURE < 80 MM HG: ICD-10-PCS | Mod: CPTII,S$GLB,, | Performed by: STUDENT IN AN ORGANIZED HEALTH CARE EDUCATION/TRAINING PROGRAM

## 2022-05-12 PROCEDURE — 1159F MED LIST DOCD IN RCRD: CPT | Mod: CPTII,S$GLB,, | Performed by: STUDENT IN AN ORGANIZED HEALTH CARE EDUCATION/TRAINING PROGRAM

## 2022-05-12 PROCEDURE — 99214 OFFICE O/P EST MOD 30 MIN: CPT | Mod: S$GLB,,, | Performed by: STUDENT IN AN ORGANIZED HEALTH CARE EDUCATION/TRAINING PROGRAM

## 2022-05-12 PROCEDURE — 3074F PR MOST RECENT SYSTOLIC BLOOD PRESSURE < 130 MM HG: ICD-10-PCS | Mod: CPTII,S$GLB,, | Performed by: STUDENT IN AN ORGANIZED HEALTH CARE EDUCATION/TRAINING PROGRAM

## 2022-05-12 PROCEDURE — 4010F PR ACE/ARB THEARPY RXD/TAKEN: ICD-10-PCS | Mod: CPTII,S$GLB,, | Performed by: STUDENT IN AN ORGANIZED HEALTH CARE EDUCATION/TRAINING PROGRAM

## 2022-05-12 NOTE — LETTER
03/21/18 1400   Activity/Group Therapy Checklist   Group Goals/Reflection  (perception of self )   Attendance Attended   Follows Direction Followed directions   Group Interactions/Observations Interacted appropriately   Affect/Mood Range Normal range   Affect/Mood Display Appropriate   Goal Progression Progressing      05/12/2022                 [address] [address]  Phone: [phone]  Fax: [phone]   05/12/2022    Patient: Андрей Wooten   YOB: 1965   Date of Visit: 5/12/2022       To Whom it May Concern:    Андрей Wooten was seen in my clinic on 5/12/2022. He was tested and is positive for COVID.  He may return to work on 5/17/22.    If you have any questions or concerns, please don't hesitate to call.    Sincerely,         Leander Boo MD

## 2022-05-12 NOTE — PROGRESS NOTES
Patient ID: Андрей Wooten is a 57 y.o. male.     Chief Complaint: Nasal Congestion    Sinus Problem  This is a new problem. Episode onset: 3 days ago. The problem is unchanged. There has been no fever. His pain is at a severity of 3/10. The pain is mild. Associated symptoms include congestion, coughing and sinus pressure. Pertinent negatives include no ear pain, headaches, shortness of breath, sneezing, sore throat or swollen glands. Past treatments include oral decongestants and spray decongestants (anithistamine). The treatment provided no relief.          Review of Systems  Review of Systems   Constitutional: Negative for fever.   HENT: Positive for congestion and sinus pressure. Negative for ear pain, sinus pain, sneezing and sore throat.    Eyes: Negative for discharge.   Respiratory: Positive for cough. Negative for shortness of breath.    Cardiovascular: Negative for chest pain and leg swelling.   Gastrointestinal: Negative for diarrhea, nausea and vomiting.   Genitourinary: Negative for urgency.   Musculoskeletal: Negative for myalgias.   Skin: Negative for rash.   Neurological: Negative for weakness and headaches.   Psychiatric/Behavioral: Negative for depression.   All other systems reviewed and are negative.      Currently Medications  Current Outpatient Medications on File Prior to Visit   Medication Sig Dispense Refill    amLODIPine (NORVASC) 10 MG tablet Take 1 tablet (10 mg total) by mouth once daily. 90 tablet 1    atorvastatin (LIPITOR) 40 MG tablet Take 1 tablet (40 mg total) by mouth once daily. 90 tablet 3    blood sugar diagnostic Strp To check BG 1 times daily, to use with insurance preferred meter 100 strip 11    blood-glucose meter kit To check BG 1 times daily, to use with insurance preferred meter 1 each 0    cetirizine (ZYRTEC) 10 MG tablet Take 10 mg by mouth as needed.       fluticasone (FLONASE) 50 mcg/actuation nasal spray 1 spray by Each Nare route once daily. (Patient taking  "differently: 1 spray by Each Nostril route as needed.) 16 g 0    gabapentin (NEURONTIN) 300 MG capsule Take 1 capsule (300 mg total) by mouth every evening. 30 capsule 5    lancets Misc To check BG 1 times daily, to use with insurance preferred meter 100 each 11    losartan (COZAAR) 50 MG tablet Take 1 tablet (50 mg total) by mouth once daily. 90 tablet 1    meloxicam (MOBIC) 7.5 MG tablet Take 1 tablet (7.5 mg total) by mouth daily as needed for Pain. 20 tablet 0    pantoprazole (PROTONIX) 40 MG tablet Take 1 tablet (40 mg total) by mouth every morning. 90 tablet 3    semaglutide (OZEMPIC) 1 mg/dose (4 mg/3 mL) Inject 1 mg into the skin every 7 days. for 12 doses 1 pen 2    triamterene-hydrochlorothiazide 37.5-25 mg (DYAZIDE) 37.5-25 mg per capsule Take 1 capsule by mouth every morning. 90 capsule 0    vitamin D 1000 units Tab Take 185 mg by mouth once daily.      metFORMIN (GLUCOPHAGE) 500 MG tablet Take 1 tablet (500 mg total) by mouth daily with breakfast. (Patient not taking: Reported on 5/12/2022) 90 tablet 1     No current facility-administered medications on file prior to visit.       Physical  Exam  Vitals:    05/12/22 1313   BP: 122/76   BP Location: Right arm   Patient Position: Sitting   Pulse: 96   Temp: 98.1 °F (36.7 °C)   SpO2: 96%   Weight: 112.8 kg (248 lb 10.9 oz)   Height: 5' 6" (1.676 m)      Body mass index is 40.14 kg/m².    Physical Exam  Vitals and nursing note reviewed.   Constitutional:       General: He is not in acute distress.     Appearance: He is not ill-appearing.   HENT:      Head: Normocephalic and atraumatic.      Right Ear: External ear normal.      Left Ear: External ear normal.      Nose: Nose normal.      Mouth/Throat:      Mouth: Mucous membranes are moist.   Eyes:      Extraocular Movements: Extraocular movements intact.      Conjunctiva/sclera: Conjunctivae normal.   Cardiovascular:      Rate and Rhythm: Normal rate and regular rhythm.      Pulses: Normal pulses.    "   Heart sounds: No murmur heard.  Pulmonary:      Effort: Pulmonary effort is normal. No respiratory distress.      Breath sounds: No wheezing.   Abdominal:      General: There is no distension.      Palpations: Abdomen is soft. There is no mass.      Tenderness: There is no abdominal tenderness.   Musculoskeletal:         General: No swelling.      Cervical back: Normal range of motion.   Skin:     Coloration: Skin is not jaundiced.      Findings: No rash.   Neurological:      General: No focal deficit present.      Mental Status: He is alert and oriented to person, place, and time.   Psychiatric:         Mood and Affect: Mood normal.         Thought Content: Thought content normal.         Labs:    Complete Blood Count  Lab Results   Component Value Date    RBC 5.18 04/12/2022    HGB 14.3 04/12/2022    HCT 44.1 04/12/2022    MCV 85 04/12/2022    MCH 27.6 04/12/2022    MCHC 32.4 04/12/2022    RDW 13.2 04/12/2022     04/12/2022    MPV 9.5 04/12/2022    GRAN 1.5 (L) 04/12/2022    GRAN 33.2 (L) 04/12/2022    LYMPH 2.5 04/12/2022    LYMPH 55.0 (H) 04/12/2022    MONO 0.5 04/12/2022    MONO 9.7 04/12/2022    EOS 0.1 04/12/2022    BASO 0.03 04/12/2022    EOSINOPHIL 1.5 04/12/2022    BASOPHIL 0.6 04/12/2022    DIFFMETHOD Automated 04/12/2022       Comprehensive Metabolic Panel  Lab Results   Component Value Date    GLU 92 04/12/2022    BUN 18 04/12/2022    CREATININE 1.07 04/12/2022     04/12/2022    K 3.9 04/12/2022     04/12/2022    PROT 8.1 04/12/2022    ALBUMIN 4.5 04/12/2022    BILITOT 0.5 04/12/2022    AST 43 04/12/2022    ALKPHOS 54 04/12/2022    CO2 29 04/12/2022    ALT 62 (H) 04/12/2022    ANIONGAP 11 04/12/2022    EGFRNONAA >60.0 04/12/2022    ESTGFRAFRICA >60.0 04/12/2022       TSH  No results found for: TSH    Imaging:  CT Chest Abdomen Pelvis With Contrast  Narrative: EXAMINATION:  CT CHEST ABDOMEN PELVIS WITH CONTRAST (XPD)    CLINICAL HISTORY:  Epigastric painabdominal pain and chest pain  with radiation;    TECHNIQUE:  Standard chest, abdomen, and pelvis CT protocol with oral and IV contrast was performed.  100 mL of Omnipaque 350 contrast material was used for this examination.    COMPARISON:  None    FINDINGS:  Finding: The size of the heart is within normal limits.  The right lung is clear.  There is a 4 mm noncalcified pulmonary nodule in the left lower lobe.  There are streaky opacities in the lingula and right lower lobe.  There is no pneumothorax or pleural effusion.    There is a 27 mm oval shaped area of hypodensity in the dome of the left lobe of the liver.  This area has a Hounsfield measurement of 8.  The gallbladder, pancreas, spleen, and adrenals are normal in appearance.  There are hypodense masses associated with both kidneys.  One of the larger ones measures 8 mm and is located in the medial aspect of the inferior pole of the right kidney.  These are characteristic of incidental findings.  The ureters and the urinary bladder are normal in appearance.  There is a mild amount of calcification within the prostate.  The appendix and the rest of the gastrointestinal system are normal in appearance. There is no free fluid within the abdomen or pelvis. There is no pneumoperitoneum.  There is a mild amount of atherosclerosis.  There are mild osteoarthritic changes in the sacroiliac joints bilaterally.  There is no abnormal lymphadenopathy based on CT size criteria.  Impression: 1. There is a 27 mm oval shaped area of hypodensity in the dome of the left lobe of the liver. This area has a Hounsfield measurement of 8.  This is characteristic of a cyst.  2. There is no abnormal lymphadenopathy based on CT size criteria.  3. There are mild osteoarthritic changes in the sacroiliac joints bilaterally.  4. There are streaky opacities in the lingula and right lower lobe.  This is characteristic of subsegmental atelectasis and/or scarring.  5. There is a 4 mm noncalcified pulmonary nodule in the left  lower lobe.  All CT scans at this facility use dose modulation, iterative reconstruction, and/or weight base dosing when appropriate to reduce radiation dose when appropriate to reduce radiation dose to as low as reasonably achievable.    Electronically signed by: Josue Mckeon MD  Date:    04/20/2022  Time:    09:34      Assessment/Plan:    Problem List Items Addressed This Visit    None     Visit Diagnoses     COVID-19    -  Primary    Sinus congestion        Relevant Orders    POCT COVID-19 Rapid Screening (Completed)    POCT Influenza A/B Molecular (Completed)    Myalgia               Discussed how to stay healthy including: diet, exercise, refraining from smoking and discussed screening exams / tests needed for age, sex and family Hx.      Leander Boo MD

## 2022-05-13 ENCOUNTER — PATIENT OUTREACH (OUTPATIENT)
Dept: ADMINISTRATIVE | Facility: HOSPITAL | Age: 57
End: 2022-05-13
Payer: COMMERCIAL

## 2022-05-13 NOTE — LETTER
AUTHORIZATION FOR RELEASE OF   CONFIDENTIAL INFORMATION    [facility],    We are seeing Андрей Wooten, date of birth 1965, in the clinic at [facility]. Leander Boo MD is the patient's PCP. Андрей Wooten has an outstanding lab/procedure at the time we reviewed his chart. In order to help keep his health information updated, he has authorized us to request the following medical record(s):                                   ( X )  EYE EXAM                  Please fax records to [facility], Addy N Reine, MD, [phone]    If you have any questions, please contact Dilia Armenta at [phone].           Patient Name: Андрей Wooten  : 1965  Patient Phone #: [phone]

## 2022-05-31 ENCOUNTER — PATIENT MESSAGE (OUTPATIENT)
Dept: ADMINISTRATIVE | Facility: HOSPITAL | Age: 57
End: 2022-05-31
Payer: COMMERCIAL

## 2022-06-01 ENCOUNTER — PATIENT MESSAGE (OUTPATIENT)
Dept: GASTROENTEROLOGY | Facility: CLINIC | Age: 57
End: 2022-06-01
Payer: COMMERCIAL

## 2022-06-01 RX ORDER — GABAPENTIN 300 MG/1
600 CAPSULE ORAL NIGHTLY
Qty: 60 CAPSULE | Refills: 5 | Status: SHIPPED | OUTPATIENT
Start: 2022-06-01 | End: 2022-06-16 | Stop reason: ALTCHOICE

## 2022-06-07 ENCOUNTER — LAB VISIT (OUTPATIENT)
Dept: LAB | Facility: HOSPITAL | Age: 57
End: 2022-06-07
Attending: INTERNAL MEDICINE
Payer: COMMERCIAL

## 2022-06-07 ENCOUNTER — OFFICE VISIT (OUTPATIENT)
Dept: BARIATRICS | Facility: CLINIC | Age: 57
End: 2022-06-07
Payer: COMMERCIAL

## 2022-06-07 VITALS
WEIGHT: 241.81 LBS | DIASTOLIC BLOOD PRESSURE: 77 MMHG | SYSTOLIC BLOOD PRESSURE: 138 MMHG | HEART RATE: 65 BPM | BODY MASS INDEX: 38.86 KG/M2 | HEIGHT: 66 IN | OXYGEN SATURATION: 97 %

## 2022-06-07 DIAGNOSIS — E66.01 CLASS 2 SEVERE OBESITY DUE TO EXCESS CALORIES WITH SERIOUS COMORBIDITY AND BODY MASS INDEX (BMI) OF 39.0 TO 39.9 IN ADULT: Primary | ICD-10-CM

## 2022-06-07 DIAGNOSIS — E11.9 TYPE 2 DIABETES MELLITUS WITHOUT COMPLICATION, WITHOUT LONG-TERM CURRENT USE OF INSULIN: ICD-10-CM

## 2022-06-07 DIAGNOSIS — K75.81 NASH (NONALCOHOLIC STEATOHEPATITIS): ICD-10-CM

## 2022-06-07 DIAGNOSIS — E78.49 OTHER HYPERLIPIDEMIA: ICD-10-CM

## 2022-06-07 DIAGNOSIS — E11.9 TYPE 2 DIABETES MELLITUS WITHOUT COMPLICATION: ICD-10-CM

## 2022-06-07 DIAGNOSIS — Z71.3 ENCOUNTER FOR WEIGHT LOSS COUNSELING: ICD-10-CM

## 2022-06-07 DIAGNOSIS — I10 PRIMARY HYPERTENSION: ICD-10-CM

## 2022-06-07 LAB
ALBUMIN/CREAT UR: 6 UG/MG (ref 0–30)
CREAT UR-MCNC: 151 MG/DL (ref 23–375)
MICROALBUMIN UR DL<=1MG/L-MCNC: 9 UG/ML

## 2022-06-07 PROCEDURE — 1160F PR REVIEW ALL MEDS BY PRESCRIBER/CLIN PHARMACIST DOCUMENTED: ICD-10-PCS | Mod: CPTII,S$GLB,, | Performed by: STUDENT IN AN ORGANIZED HEALTH CARE EDUCATION/TRAINING PROGRAM

## 2022-06-07 PROCEDURE — 3008F BODY MASS INDEX DOCD: CPT | Mod: CPTII,S$GLB,, | Performed by: STUDENT IN AN ORGANIZED HEALTH CARE EDUCATION/TRAINING PROGRAM

## 2022-06-07 PROCEDURE — 3044F PR MOST RECENT HEMOGLOBIN A1C LEVEL <7.0%: ICD-10-PCS | Mod: CPTII,S$GLB,, | Performed by: STUDENT IN AN ORGANIZED HEALTH CARE EDUCATION/TRAINING PROGRAM

## 2022-06-07 PROCEDURE — 1159F PR MEDICATION LIST DOCUMENTED IN MEDICAL RECORD: ICD-10-PCS | Mod: CPTII,S$GLB,, | Performed by: STUDENT IN AN ORGANIZED HEALTH CARE EDUCATION/TRAINING PROGRAM

## 2022-06-07 PROCEDURE — 3075F PR MOST RECENT SYSTOLIC BLOOD PRESS GE 130-139MM HG: ICD-10-PCS | Mod: CPTII,S$GLB,, | Performed by: STUDENT IN AN ORGANIZED HEALTH CARE EDUCATION/TRAINING PROGRAM

## 2022-06-07 PROCEDURE — 82043 UR ALBUMIN QUANTITATIVE: CPT | Performed by: INTERNAL MEDICINE

## 2022-06-07 PROCEDURE — 99999 PR PBB SHADOW E&M-EST. PATIENT-LVL IV: CPT | Mod: PBBFAC,,, | Performed by: STUDENT IN AN ORGANIZED HEALTH CARE EDUCATION/TRAINING PROGRAM

## 2022-06-07 PROCEDURE — 3078F PR MOST RECENT DIASTOLIC BLOOD PRESSURE < 80 MM HG: ICD-10-PCS | Mod: CPTII,S$GLB,, | Performed by: STUDENT IN AN ORGANIZED HEALTH CARE EDUCATION/TRAINING PROGRAM

## 2022-06-07 PROCEDURE — 4010F ACE/ARB THERAPY RXD/TAKEN: CPT | Mod: CPTII,S$GLB,, | Performed by: STUDENT IN AN ORGANIZED HEALTH CARE EDUCATION/TRAINING PROGRAM

## 2022-06-07 PROCEDURE — 99213 PR OFFICE/OUTPT VISIT, EST, LEVL III, 20-29 MIN: ICD-10-PCS | Mod: S$GLB,,, | Performed by: STUDENT IN AN ORGANIZED HEALTH CARE EDUCATION/TRAINING PROGRAM

## 2022-06-07 PROCEDURE — 3078F DIAST BP <80 MM HG: CPT | Mod: CPTII,S$GLB,, | Performed by: STUDENT IN AN ORGANIZED HEALTH CARE EDUCATION/TRAINING PROGRAM

## 2022-06-07 PROCEDURE — 3075F SYST BP GE 130 - 139MM HG: CPT | Mod: CPTII,S$GLB,, | Performed by: STUDENT IN AN ORGANIZED HEALTH CARE EDUCATION/TRAINING PROGRAM

## 2022-06-07 PROCEDURE — 1160F RVW MEDS BY RX/DR IN RCRD: CPT | Mod: CPTII,S$GLB,, | Performed by: STUDENT IN AN ORGANIZED HEALTH CARE EDUCATION/TRAINING PROGRAM

## 2022-06-07 PROCEDURE — 3008F PR BODY MASS INDEX (BMI) DOCUMENTED: ICD-10-PCS | Mod: CPTII,S$GLB,, | Performed by: STUDENT IN AN ORGANIZED HEALTH CARE EDUCATION/TRAINING PROGRAM

## 2022-06-07 PROCEDURE — 99999 PR PBB SHADOW E&M-EST. PATIENT-LVL IV: ICD-10-PCS | Mod: PBBFAC,,, | Performed by: STUDENT IN AN ORGANIZED HEALTH CARE EDUCATION/TRAINING PROGRAM

## 2022-06-07 PROCEDURE — 82570 ASSAY OF URINE CREATININE: CPT | Performed by: INTERNAL MEDICINE

## 2022-06-07 PROCEDURE — 4010F PR ACE/ARB THEARPY RXD/TAKEN: ICD-10-PCS | Mod: CPTII,S$GLB,, | Performed by: STUDENT IN AN ORGANIZED HEALTH CARE EDUCATION/TRAINING PROGRAM

## 2022-06-07 PROCEDURE — 3044F HG A1C LEVEL LT 7.0%: CPT | Mod: CPTII,S$GLB,, | Performed by: STUDENT IN AN ORGANIZED HEALTH CARE EDUCATION/TRAINING PROGRAM

## 2022-06-07 PROCEDURE — 99213 OFFICE O/P EST LOW 20 MIN: CPT | Mod: S$GLB,,, | Performed by: STUDENT IN AN ORGANIZED HEALTH CARE EDUCATION/TRAINING PROGRAM

## 2022-06-07 PROCEDURE — 1159F MED LIST DOCD IN RCRD: CPT | Mod: CPTII,S$GLB,, | Performed by: STUDENT IN AN ORGANIZED HEALTH CARE EDUCATION/TRAINING PROGRAM

## 2022-06-07 RX ORDER — SEMAGLUTIDE 2.68 MG/ML
2 INJECTION, SOLUTION SUBCUTANEOUS
Qty: 3 ML | Refills: 2 | Status: SHIPPED | OUTPATIENT
Start: 2022-06-07 | End: 2022-08-16 | Stop reason: SDUPTHER

## 2022-06-07 NOTE — PROGRESS NOTES
Subjective:       Patient ID: Андрей Wooten is a 57 y.o. male.    Chief Complaint: Obesity, Follow-up, and Weight Check    Patient presents for treatment of obesity.   Follow-up, appointment #5    Co-morbidities:   HTN  HLD  NAFLD  DM2  GERD    Negative for thyroid cancer  Negative for pancreatitis     History of Weight Loss Efforts  Dr. Bernal - prescribed phentermine, diethylpropion; 255 lbs when first seen, got down to 240 lbs  Intermittent Fasting - got down to about 230 lbs    Current Physical Activity  No regular exercise routine  Was doing cardio workout at [employer], walking 4-5 miles prior to [place]    Current Eating Habits  Breakfast - grits, ham, biscuits, cereal (Frosted Flakes)  Lunch - fast food  Dinner - southern food  Snacks - chips, cookies, candy; about 4/day  Beverages - water, Hawaiian Punch, sweet tea    Shift work  House damaged from [place], doesn't have kitchen appliances     Drinking less sugar sweetened beverages  Eating more salads    Medical Weight Loss  1/24/2022 ([facility] out for repair): 247 lbs 5.7 oz, BMI 39.92  2/23/2022: 245.7 lbs, BMI 39.7, BFP 38.2%, BFM 93.8 lbs, SMM 87.7 lbs, BMR 1858 kcal; Ozempic  4/1/2022: 244.1 lbs, BMI 39.4, BFP 38.7%, BFM 94.4 lbs, SMM 86.6 lbs, BMR 1836 kcal; Ozempic  5/10/2022: 247.8 lbs, BMI 40, BFP 38.1%, BFM 94.4 lbs, SMM 88.6 lbs, BMR 1874 kcal; Ozempic  6/7/2022: 241.8 lbs, BMI 39, BFP 38.8%, BFM 93.9 lbs, SMM 85.3 lbs, BMR 1820 kcal; Ozempic      Follow-up  Pertinent negatives include no abdominal pain, chest pain, chills, fever, nausea or vomiting.     Review of Systems   Constitutional: Negative for chills and fever.   Respiratory: Negative for shortness of breath.    Cardiovascular: Negative for chest pain, palpitations and leg swelling.   Gastrointestinal: Positive for reflux. Negative for abdominal pain, nausea and vomiting.   Neurological: Negative for dizziness and light-headedness.         Objective:     Results for SHIVA  MILLER SÁNCHEZ (MRN [id number]) as of 1/24/2022 10:31   Ref. Range 8/25/2021 10:24   WBC Latest Ref Range: 3.90 - 12.70 K/uL 4.55   RBC Latest Ref Range: 4.60 - 6.20 M/uL 5.23   Hemoglobin Latest Ref Range: 14.0 - 18.0 g/dL 14.7   Hematocrit Latest Ref Range: 40.0 - 54.0 % 44.3   MCV Latest Ref Range: 82 - 98 fL 85   MCH Latest Ref Range: 27.0 - 31.0 pg 28.1   MCHC Latest Ref Range: 32.0 - 36.0 g/dL 33.2   RDW Latest Ref Range: 11.5 - 14.5 % 13.0   Platelets Latest Ref Range: 150 - 450 K/uL 271   MPV Latest Ref Range: 9.2 - 12.9 fL 9.5   Gran % Latest Ref Range: 38.0 - 73.0 % 25.7 (L)   Lymph % Latest Ref Range: 18.0 - 48.0 % 61.5 (H)   Mono % Latest Ref Range: 4.0 - 15.0 % 9.9   Eosinophil % Latest Ref Range: 0.0 - 8.0 % 1.8   Basophil % Latest Ref Range: 0.0 - 1.9 % 0.9   Immature Granulocytes Latest Ref Range: 0.0 - 0.5 % 0.2   Gran # (ANC) Latest Ref Range: 1.8 - 7.7 K/uL 1.2 (L)   Lymph # Latest Ref Range: 1.0 - 4.8 K/uL 2.8   Mono # Latest Ref Range: 0.3 - 1.0 K/uL 0.5   Eos # Latest Ref Range: 0.0 - 0.5 K/uL 0.1   Baso # Latest Ref Range: 0.00 - 0.20 K/uL 0.04   Immature Grans (Abs) Latest Ref Range: 0.00 - 0.04 K/uL 0.01   nRBC Latest Ref Range: 0 /100 WBC 0   Differential Method Unknown Automated   Sodium Latest Ref Range: 136 - 145 mmol/L 140   Potassium Latest Ref Range: 3.5 - 5.1 mmol/L 3.7   Chloride Latest Ref Range: 95 - 110 mmol/L 101   CO2 Latest Ref Range: 23 - 29 mmol/L 28   Anion Gap Latest Ref Range: 8 - 16 mmol/L 11   BUN Latest Ref Range: 2 - 20 mg/dL 17   Creatinine Latest Ref Range: 0.50 - 1.40 mg/dL 0.89   eGFR if non African American Latest Ref Range: >60 mL/min/1.73 m^2 >60.0   eGFR if African American Latest Ref Range: >60 mL/min/1.73 m^2 >60.0   Glucose Latest Ref Range: 70 - 110 mg/dL 122 (H)   Calcium Latest Ref Range: 8.7 - 10.5 mg/dL 10.1   Alkaline Phosphatase Latest Ref Range: 38 - 126 U/L 54   PROTEIN TOTAL Latest Ref Range: 6.0 - 8.4 g/dL 8.5 (H)   Albumin Latest Ref Range: 3.5 - 5.2  g/dL 4.8   BILIRUBIN TOTAL Latest Ref Range: 0.1 - 1.0 mg/dL 0.7   AST Latest Ref Range: 15 - 46 U/L 57 (H)   ALT Latest Ref Range: 10 - 44 U/L 88 (H)   Triglycerides Latest Ref Range: 30 - 150 mg/dL 206 (H)   Cholesterol Latest Ref Range: 120 - 199 mg/dL 162   HDL Latest Ref Range: 40 - 75 mg/dL 28 (L)   HDL/Cholesterol Ratio Latest Ref Range: 20.0 - 50.0 % 17.3 (L)   LDL Cholesterol External Latest Ref Range: 63.0 - 159.0 mg/dL 92.8   Non-HDL Cholesterol Latest Units: mg/dL 134   Total Cholesterol/HDL Ratio Latest Ref Range: 2.0 - 5.0  5.8 (H)   Hemoglobin A1C External Latest Ref Range: 4.0 - 5.6 % 6.3 (H)   Estimated Avg Glucose Latest Ref Range: 68 - 131 mg/dL 134 (H)   TSH Latest Ref Range: 0.400 - 4.000 uIU/mL 2.030     Vitals:    06/07/22 1056   BP: 138/77   Pulse: 65       Physical Exam  Vitals reviewed.   Constitutional:       General: He is not in acute distress.     Appearance: Normal appearance. He is obese. He is not ill-appearing, toxic-appearing or diaphoretic.   HENT:      Head: Normocephalic and atraumatic.   Cardiovascular:      Rate and Rhythm: Normal rate.   Pulmonary:      Effort: Pulmonary effort is normal. No respiratory distress.   Skin:     General: Skin is warm and dry.   Neurological:      General: No focal deficit present.      Mental Status: He is alert and oriented to person, place, and time.      Gait: Gait normal.   Psychiatric:         Mood and Affect: Mood normal.         Behavior: Behavior normal.         Thought Content: Thought content normal.         Judgment: Judgment normal.         Assessment:       Problem List Items Addressed This Visit     VOGEL (nonalcoholic steatohepatitis)    Class 2 severe obesity due to excess calories with serious comorbidity and body mass index (BMI) of 39.0 to 39.9 in adult - Primary    HLD (hyperlipidemia)    HTN (hypertension)    Type 2 diabetes mellitus without complication, without long-term current use of insulin    Relevant Medications     semaglutide (OZEMPIC) 2 mg/dose (8 mg/3 mL) PnIj      Other Visit Diagnoses     Encounter for weight loss counseling              Plan:   - Ozempic 2 mg weekly.      - Log all food and beverage intake with a daily calorie goal of 1500 calories per day; meal plans given in AVS    - Moderate intensity aerobic exercise for 15-30 minutes 3-5x/week    - Return to clinic in 4 weeks

## 2022-06-11 LAB
LEFT EYE DM RETINOPATHY: NEGATIVE
RIGHT EYE DM RETINOPATHY: NEGATIVE

## 2022-06-15 NOTE — PROGRESS NOTES
[facility] Consultation Note    Reason for Consult:  The primary encounter diagnosis was Gastroesophageal reflux disease without esophagitis. Diagnoses of Bloating and Neuropathy were also pertinent to this visit.    PCP:   Leander Boo       Referring MD:  No referring provider defined for this encounter.    Initial History of Present Illness (HPI):  This is a 57 y.o. male here for evaluation of indigestion, belching, bloating.  Pain across chest earlier this summer and had a negative stress test with cardiology  After a cruise started having more belching and gas. Denies getting sick on the cruise    Abdominal pain - across chest  Reflux - tried taking some tums, omeprazole. Started on panto by PCP but no improvement  Dysphagia - no   Bowel habits - normal  GI bleeding - none  NSAID usage - Meloxicam over the summer    Interval History 06/16/2022:  Started on neurontin 600 mg qAM, was taking it at night. Makes him feel a little tired  Feels a burning in his chest after meals, can't tell if protonix helps  Belching will last for a few minutes as well, less pain with it  Stress test came back normal on heart  Took Align - no change      ROS:  Constitutional: No fevers, chills, Losing weight on ozempic  ENT: No allergies  CV: No chest pain  Pulm: No cough, No shortness of breath  Ophtho: No vision changes  GI: see HPI  Derm: No rash  Heme: No lymphadenopathy, No bruising  MSK: No arthritis  : No dysuria, No hematuria  Endo: No hot or cold intolerance  Neuro: No syncope, No seizure  Psych: No anxiety, No depression    Medical History:  has a past medical history of HLD (hyperlipidemia), Hypertension, Liver fibrosis (6/7/2018), VOGEL (nonalcoholic steatohepatitis), and Type 2 diabetes mellitus without complication, without long-term current use of insulin (6/7/2018).    Surgical History:  has a past surgical history that includes Colonoscopy (N/A, 3/2/2016); Biopsy of liver with ultrasound  guidance (N/A, 5/28/2018); Liver biopsy (4/1/15); Liver biopsy (05/2018); Eye surgery (2004); and Esophagogastroduodenoscopy (N/A, 1/31/2022).    Review of patient's allergies indicates:  No Known Allergies    Medication List with Changes/Refills   New Medications    AMITRIPTYLINE (ELAVIL) 25 MG TABLET    Take 1/2 tablet each night first week, then increase to 1 tablet each night   Current Medications    AMLODIPINE (NORVASC) 10 MG TABLET    Take 1 tablet (10 mg total) by mouth once daily.    ATORVASTATIN (LIPITOR) 40 MG TABLET    Take 1 tablet (40 mg total) by mouth once daily.    BLOOD SUGAR DIAGNOSTIC STRP    To check BG 1 times daily, to use with insurance preferred meter    BLOOD-GLUCOSE METER KIT    To check BG 1 times daily, to use with insurance preferred meter    CETIRIZINE (ZYRTEC) 10 MG TABLET    Take 10 mg by mouth as needed.     FLUTICASONE (FLONASE) 50 MCG/ACTUATION NASAL SPRAY    1 spray by Each Nare route once daily.    LANCETS MISC    To check BG 1 times daily, to use with insurance preferred meter    LOSARTAN (COZAAR) 50 MG TABLET    Take 1 tablet (50 mg total) by mouth once daily.    MELOXICAM (MOBIC) 7.5 MG TABLET    Take 1 tablet (7.5 mg total) by mouth daily as needed for Pain.    METFORMIN (GLUCOPHAGE) 500 MG TABLET    Take 1 tablet (500 mg total) by mouth daily with breakfast.    PANTOPRAZOLE (PROTONIX) 40 MG TABLET    Take 1 tablet (40 mg total) by mouth every morning.    SEMAGLUTIDE (OZEMPIC) 2 MG/DOSE (8 MG/3 ML) PNIJ    Inject 2 mg into the skin every 7 days.    TRIAMTERENE-HYDROCHLOROTHIAZIDE 37.5-25 MG (DYAZIDE) 37.5-25 MG PER CAPSULE    Take 1 capsule by mouth every morning.    VITAMIN D 1000 UNITS TAB    Take 185 mg by mouth once daily.   Discontinued Medications    GABAPENTIN (NEURONTIN) 300 MG CAPSULE    Take 2 capsules (600 mg total) by mouth every evening.         Objective Findings:    Vital Signs:  /74 (BP Location: Left arm, Patient Position: Sitting)   Pulse 67   Ht 5'  "6" (1.676 m)   Wt 110.7 kg (244 lb 2.6 oz)   BMI 39.41 kg/m²   Body mass index is 39.41 kg/m².    Physical Exam:  General Appearance: Well appearing in no acute distress  Head:   Normocephalic, without obvious abnormality  Eyes:    No scleral icterus, EOMI  ENT: Neck supple, Lips, mucosa, and tongue normal; teeth and gums normal  Lungs: CTA bilaterally in anterior and posterior fields, no wheezes, no crackles.  Heart:  Regular rate and rhythm, S1, S2 normal, no murmurs heard  Abdomen: Soft, non tender, non distended with positive bowel sounds in all four quadrants. No hepatosplenomegaly, ascites, or mass  Extremities: 2+ pulses, no clubbing, cyanosis or edema  Skin: No rash  Neurologic: CN II-XII intact      Labs:  Lab Results   Component Value Date    WBC 4.62 04/12/2022    HGB 14.3 04/12/2022    HCT 44.1 04/12/2022     04/12/2022    CHOL 139 04/12/2022    TRIG 148 04/12/2022    HDL 28 (L) 04/12/2022    ALT 62 (H) 04/12/2022    AST 43 04/12/2022     04/12/2022    K 3.9 04/12/2022     04/12/2022    CREATININE 1.07 04/12/2022    BUN 18 04/12/2022    CO2 29 04/12/2022    TSH 2.030 08/25/2021    PSA 0.68 04/12/2022    INR 1.1 05/14/2018    HGBA1C 6.3 (H) 04/12/2022       No results found for: HPYLORINTERP  Lab Results   Component Value Date    HPYLORIANTIG SEE BELOW 03/29/2022           Imaging:  Stress test - wnl  CT - hepatic cysts    Endoscopy:    Colon 2016 - wnl  EGD - wnl    Assessment:  1. Gastroesophageal reflux disease without esophagitis    2. Bloating    3. Neuropathy           Recommendations:  1. Stool for H pylori eradication - negative  2. Stool for giardia, parasites - negative  3. Stop Align - no difference in addition to PPI  4. Switch gabapentin to TCA for chest burning to see if we can get better symptom control. Not much response to PPI and normal EGD  5. Will get breath test for persistent bloating    Follow up in about 3 months (around 9/16/2022).      Order summary:  Orders " Placed This Encounter    amitriptyline (ELAVIL) 25 MG tablet         Thank you so much for allowing me to participate in the care of Андрей Gomez MD

## 2022-06-16 ENCOUNTER — OFFICE VISIT (OUTPATIENT)
Dept: GASTROENTEROLOGY | Facility: CLINIC | Age: 57
End: 2022-06-16
Payer: COMMERCIAL

## 2022-06-16 VITALS
WEIGHT: 244.19 LBS | DIASTOLIC BLOOD PRESSURE: 74 MMHG | SYSTOLIC BLOOD PRESSURE: 119 MMHG | HEART RATE: 67 BPM | BODY MASS INDEX: 39.24 KG/M2 | HEIGHT: 66 IN

## 2022-06-16 DIAGNOSIS — K21.9 GASTROESOPHAGEAL REFLUX DISEASE WITHOUT ESOPHAGITIS: Primary | ICD-10-CM

## 2022-06-16 DIAGNOSIS — G62.9 NEUROPATHY: ICD-10-CM

## 2022-06-16 DIAGNOSIS — R14.0 BLOATING: ICD-10-CM

## 2022-06-16 PROCEDURE — 3066F NEPHROPATHY DOC TX: CPT | Mod: CPTII,S$GLB,, | Performed by: INTERNAL MEDICINE

## 2022-06-16 PROCEDURE — 3044F HG A1C LEVEL LT 7.0%: CPT | Mod: CPTII,S$GLB,, | Performed by: INTERNAL MEDICINE

## 2022-06-16 PROCEDURE — 3008F BODY MASS INDEX DOCD: CPT | Mod: CPTII,S$GLB,, | Performed by: INTERNAL MEDICINE

## 2022-06-16 PROCEDURE — 3008F PR BODY MASS INDEX (BMI) DOCUMENTED: ICD-10-PCS | Mod: CPTII,S$GLB,, | Performed by: INTERNAL MEDICINE

## 2022-06-16 PROCEDURE — 99999 PR PBB SHADOW E&M-EST. PATIENT-LVL III: CPT | Mod: PBBFAC,,, | Performed by: INTERNAL MEDICINE

## 2022-06-16 PROCEDURE — 99214 OFFICE O/P EST MOD 30 MIN: CPT | Mod: S$GLB,,, | Performed by: INTERNAL MEDICINE

## 2022-06-16 PROCEDURE — 3074F SYST BP LT 130 MM HG: CPT | Mod: CPTII,S$GLB,, | Performed by: INTERNAL MEDICINE

## 2022-06-16 PROCEDURE — 3078F PR MOST RECENT DIASTOLIC BLOOD PRESSURE < 80 MM HG: ICD-10-PCS | Mod: CPTII,S$GLB,, | Performed by: INTERNAL MEDICINE

## 2022-06-16 PROCEDURE — 3061F PR NEG MICROALBUMINURIA RESULT DOCUMENTED/REVIEW: ICD-10-PCS | Mod: CPTII,S$GLB,, | Performed by: INTERNAL MEDICINE

## 2022-06-16 PROCEDURE — 3074F PR MOST RECENT SYSTOLIC BLOOD PRESSURE < 130 MM HG: ICD-10-PCS | Mod: CPTII,S$GLB,, | Performed by: INTERNAL MEDICINE

## 2022-06-16 PROCEDURE — 3061F NEG MICROALBUMINURIA REV: CPT | Mod: CPTII,S$GLB,, | Performed by: INTERNAL MEDICINE

## 2022-06-16 PROCEDURE — 99214 PR OFFICE/OUTPT VISIT, EST, LEVL IV, 30-39 MIN: ICD-10-PCS | Mod: S$GLB,,, | Performed by: INTERNAL MEDICINE

## 2022-06-16 PROCEDURE — 4010F PR ACE/ARB THEARPY RXD/TAKEN: ICD-10-PCS | Mod: CPTII,S$GLB,, | Performed by: INTERNAL MEDICINE

## 2022-06-16 PROCEDURE — 1159F MED LIST DOCD IN RCRD: CPT | Mod: CPTII,S$GLB,, | Performed by: INTERNAL MEDICINE

## 2022-06-16 PROCEDURE — 3066F PR DOCUMENTATION OF TREATMENT FOR NEPHROPATHY: ICD-10-PCS | Mod: CPTII,S$GLB,, | Performed by: INTERNAL MEDICINE

## 2022-06-16 PROCEDURE — 1159F PR MEDICATION LIST DOCUMENTED IN MEDICAL RECORD: ICD-10-PCS | Mod: CPTII,S$GLB,, | Performed by: INTERNAL MEDICINE

## 2022-06-16 PROCEDURE — 3044F PR MOST RECENT HEMOGLOBIN A1C LEVEL <7.0%: ICD-10-PCS | Mod: CPTII,S$GLB,, | Performed by: INTERNAL MEDICINE

## 2022-06-16 PROCEDURE — 99999 PR PBB SHADOW E&M-EST. PATIENT-LVL III: ICD-10-PCS | Mod: PBBFAC,,, | Performed by: INTERNAL MEDICINE

## 2022-06-16 PROCEDURE — 4010F ACE/ARB THERAPY RXD/TAKEN: CPT | Mod: CPTII,S$GLB,, | Performed by: INTERNAL MEDICINE

## 2022-06-16 PROCEDURE — 3078F DIAST BP <80 MM HG: CPT | Mod: CPTII,S$GLB,, | Performed by: INTERNAL MEDICINE

## 2022-06-16 RX ORDER — AMITRIPTYLINE HYDROCHLORIDE 25 MG/1
TABLET, FILM COATED ORAL
Qty: 30 TABLET | Refills: 3 | Status: SHIPPED | OUTPATIENT
Start: 2022-06-16 | End: 2022-10-24 | Stop reason: SDUPTHER

## 2022-07-08 DIAGNOSIS — M54.50 ACUTE LEFT-SIDED LOW BACK PAIN WITHOUT SCIATICA: ICD-10-CM

## 2022-07-08 RX ORDER — ACETAMINOPHEN AND CODEINE PHOSPHATE 300; 30 MG/1; MG/1
TABLET ORAL
Qty: 15 TABLET | OUTPATIENT
Start: 2022-07-08

## 2022-07-08 NOTE — TELEPHONE ENCOUNTER
No new care gaps identified.  [facility] Embedded Care Gaps. Reference number: [id number]. 7/08/2022   3:57:18 PM CDT

## 2022-08-03 ENCOUNTER — PATIENT MESSAGE (OUTPATIENT)
Dept: BARIATRICS | Facility: CLINIC | Age: 57
End: 2022-08-03
Payer: COMMERCIAL

## 2022-08-14 ENCOUNTER — TELEPHONE (OUTPATIENT)
Dept: FAMILY MEDICINE | Facility: CLINIC | Age: 57
End: 2022-08-14
Payer: COMMERCIAL

## 2022-08-14 DIAGNOSIS — E11.9 TYPE 2 DIABETES MELLITUS WITHOUT COMPLICATION, WITHOUT LONG-TERM CURRENT USE OF INSULIN: Primary | ICD-10-CM

## 2022-08-14 NOTE — TELEPHONE ENCOUNTER
Care Due:                  Date            Visit Type   Department     Provider  --------------------------------------------------------------------------------                                EP -                              PRIMARY      [facility] FAMILY  Last Visit: 05-      CARE ([facility])   MEDICINE       Leander Boo  Next Visit: None Scheduled  None         None Found                                                            Last  Test          Frequency    Reason                     Performed    Due Date  --------------------------------------------------------------------------------    HBA1C.......  6 months...  metFORMIN................  04-   10-    [facility] Embedded Care Gaps. Reference number: [id number]. 8/14/2022   5:27:46 PM CDT

## 2022-08-15 RX ORDER — TRIAMTERENE AND HYDROCHLOROTHIAZIDE 37.5; 25 MG/1; MG/1
1 CAPSULE ORAL EVERY MORNING
Qty: 90 CAPSULE | Refills: 2 | Status: SHIPPED | OUTPATIENT
Start: 2022-08-15 | End: 2023-04-11 | Stop reason: SDUPTHER

## 2022-08-15 NOTE — TELEPHONE ENCOUNTER
Refill Decision Note   Андрей Wooten  is requesting a refill authorization.  Brief Assessment and Rationale for Refill:  Approve    -Medication-Related Problems Identified: Requires labs  Medication Therapy Plan:  Labs (a1c)    Medication Reconciliation Completed: No   Comments:     Provider Staff:     Action is required for this patient.   Please see care gap opportunities below in Care Due Message.     Thanks!  [facility]     Appointments      Date Provider   Last Visit   5/12/2022 Leander Boo MD   Next Visit   8/14/2022 Leander Boo MD     Note composed:1:17 PM 08/15/2022           Note composed:1:17 PM 08/15/2022

## 2022-08-16 ENCOUNTER — OFFICE VISIT (OUTPATIENT)
Dept: BARIATRICS | Facility: CLINIC | Age: 57
End: 2022-08-16
Payer: COMMERCIAL

## 2022-08-16 VITALS
DIASTOLIC BLOOD PRESSURE: 74 MMHG | HEART RATE: 84 BPM | BODY MASS INDEX: 39.53 KG/M2 | SYSTOLIC BLOOD PRESSURE: 118 MMHG | TEMPERATURE: 98 F | WEIGHT: 244.88 LBS | OXYGEN SATURATION: 99 %

## 2022-08-16 DIAGNOSIS — K75.81 NASH (NONALCOHOLIC STEATOHEPATITIS): ICD-10-CM

## 2022-08-16 DIAGNOSIS — E11.9 TYPE 2 DIABETES MELLITUS WITHOUT COMPLICATION, WITHOUT LONG-TERM CURRENT USE OF INSULIN: ICD-10-CM

## 2022-08-16 DIAGNOSIS — E66.01 CLASS 2 SEVERE OBESITY DUE TO EXCESS CALORIES WITH SERIOUS COMORBIDITY AND BODY MASS INDEX (BMI) OF 39.0 TO 39.9 IN ADULT: Primary | ICD-10-CM

## 2022-08-16 DIAGNOSIS — E78.49 OTHER HYPERLIPIDEMIA: ICD-10-CM

## 2022-08-16 DIAGNOSIS — Z71.3 ENCOUNTER FOR WEIGHT LOSS COUNSELING: ICD-10-CM

## 2022-08-16 DIAGNOSIS — I10 PRIMARY HYPERTENSION: ICD-10-CM

## 2022-08-16 PROCEDURE — 3008F BODY MASS INDEX DOCD: CPT | Mod: CPTII,S$GLB,, | Performed by: STUDENT IN AN ORGANIZED HEALTH CARE EDUCATION/TRAINING PROGRAM

## 2022-08-16 PROCEDURE — 1159F MED LIST DOCD IN RCRD: CPT | Mod: CPTII,S$GLB,, | Performed by: STUDENT IN AN ORGANIZED HEALTH CARE EDUCATION/TRAINING PROGRAM

## 2022-08-16 PROCEDURE — 3044F HG A1C LEVEL LT 7.0%: CPT | Mod: CPTII,S$GLB,, | Performed by: STUDENT IN AN ORGANIZED HEALTH CARE EDUCATION/TRAINING PROGRAM

## 2022-08-16 PROCEDURE — 3061F NEG MICROALBUMINURIA REV: CPT | Mod: CPTII,S$GLB,, | Performed by: STUDENT IN AN ORGANIZED HEALTH CARE EDUCATION/TRAINING PROGRAM

## 2022-08-16 PROCEDURE — 1160F RVW MEDS BY RX/DR IN RCRD: CPT | Mod: CPTII,S$GLB,, | Performed by: STUDENT IN AN ORGANIZED HEALTH CARE EDUCATION/TRAINING PROGRAM

## 2022-08-16 PROCEDURE — 99213 PR OFFICE/OUTPT VISIT, EST, LEVL III, 20-29 MIN: ICD-10-PCS | Mod: S$GLB,,, | Performed by: STUDENT IN AN ORGANIZED HEALTH CARE EDUCATION/TRAINING PROGRAM

## 2022-08-16 PROCEDURE — 1159F PR MEDICATION LIST DOCUMENTED IN MEDICAL RECORD: ICD-10-PCS | Mod: CPTII,S$GLB,, | Performed by: STUDENT IN AN ORGANIZED HEALTH CARE EDUCATION/TRAINING PROGRAM

## 2022-08-16 PROCEDURE — 99999 PR PBB SHADOW E&M-EST. PATIENT-LVL IV: CPT | Mod: PBBFAC,,, | Performed by: STUDENT IN AN ORGANIZED HEALTH CARE EDUCATION/TRAINING PROGRAM

## 2022-08-16 PROCEDURE — 99999 PR PBB SHADOW E&M-EST. PATIENT-LVL IV: ICD-10-PCS | Mod: PBBFAC,,, | Performed by: STUDENT IN AN ORGANIZED HEALTH CARE EDUCATION/TRAINING PROGRAM

## 2022-08-16 PROCEDURE — 3044F PR MOST RECENT HEMOGLOBIN A1C LEVEL <7.0%: ICD-10-PCS | Mod: CPTII,S$GLB,, | Performed by: STUDENT IN AN ORGANIZED HEALTH CARE EDUCATION/TRAINING PROGRAM

## 2022-08-16 PROCEDURE — 4010F PR ACE/ARB THEARPY RXD/TAKEN: ICD-10-PCS | Mod: CPTII,S$GLB,, | Performed by: STUDENT IN AN ORGANIZED HEALTH CARE EDUCATION/TRAINING PROGRAM

## 2022-08-16 PROCEDURE — 3078F DIAST BP <80 MM HG: CPT | Mod: CPTII,S$GLB,, | Performed by: STUDENT IN AN ORGANIZED HEALTH CARE EDUCATION/TRAINING PROGRAM

## 2022-08-16 PROCEDURE — 3074F SYST BP LT 130 MM HG: CPT | Mod: CPTII,S$GLB,, | Performed by: STUDENT IN AN ORGANIZED HEALTH CARE EDUCATION/TRAINING PROGRAM

## 2022-08-16 PROCEDURE — 99213 OFFICE O/P EST LOW 20 MIN: CPT | Mod: S$GLB,,, | Performed by: STUDENT IN AN ORGANIZED HEALTH CARE EDUCATION/TRAINING PROGRAM

## 2022-08-16 PROCEDURE — 3008F PR BODY MASS INDEX (BMI) DOCUMENTED: ICD-10-PCS | Mod: CPTII,S$GLB,, | Performed by: STUDENT IN AN ORGANIZED HEALTH CARE EDUCATION/TRAINING PROGRAM

## 2022-08-16 PROCEDURE — 3078F PR MOST RECENT DIASTOLIC BLOOD PRESSURE < 80 MM HG: ICD-10-PCS | Mod: CPTII,S$GLB,, | Performed by: STUDENT IN AN ORGANIZED HEALTH CARE EDUCATION/TRAINING PROGRAM

## 2022-08-16 PROCEDURE — 3074F PR MOST RECENT SYSTOLIC BLOOD PRESSURE < 130 MM HG: ICD-10-PCS | Mod: CPTII,S$GLB,, | Performed by: STUDENT IN AN ORGANIZED HEALTH CARE EDUCATION/TRAINING PROGRAM

## 2022-08-16 PROCEDURE — 3066F PR DOCUMENTATION OF TREATMENT FOR NEPHROPATHY: ICD-10-PCS | Mod: CPTII,S$GLB,, | Performed by: STUDENT IN AN ORGANIZED HEALTH CARE EDUCATION/TRAINING PROGRAM

## 2022-08-16 PROCEDURE — 3066F NEPHROPATHY DOC TX: CPT | Mod: CPTII,S$GLB,, | Performed by: STUDENT IN AN ORGANIZED HEALTH CARE EDUCATION/TRAINING PROGRAM

## 2022-08-16 PROCEDURE — 1160F PR REVIEW ALL MEDS BY PRESCRIBER/CLIN PHARMACIST DOCUMENTED: ICD-10-PCS | Mod: CPTII,S$GLB,, | Performed by: STUDENT IN AN ORGANIZED HEALTH CARE EDUCATION/TRAINING PROGRAM

## 2022-08-16 PROCEDURE — 3061F PR NEG MICROALBUMINURIA RESULT DOCUMENTED/REVIEW: ICD-10-PCS | Mod: CPTII,S$GLB,, | Performed by: STUDENT IN AN ORGANIZED HEALTH CARE EDUCATION/TRAINING PROGRAM

## 2022-08-16 PROCEDURE — 4010F ACE/ARB THERAPY RXD/TAKEN: CPT | Mod: CPTII,S$GLB,, | Performed by: STUDENT IN AN ORGANIZED HEALTH CARE EDUCATION/TRAINING PROGRAM

## 2022-08-16 RX ORDER — SEMAGLUTIDE 2.68 MG/ML
2 INJECTION, SOLUTION SUBCUTANEOUS
Qty: 3 ML | Refills: 2 | Status: SHIPPED | OUTPATIENT
Start: 2022-08-16 | End: 2022-10-10

## 2022-08-16 NOTE — PROGRESS NOTES
Subjective:       Patient ID: Андрей Wooten is a 57 y.o. male.    Chief Complaint: Follow-up, Obesity, and Weight Check    Patient presents for treatment of obesity.   Follow-up, appointment #6    Co-morbidities:   HTN  HLD  NAFLD  DM2  GERD    Negative for thyroid cancer  Negative for pancreatitis     History of Weight Loss Efforts  Dr. Bernal - prescribed phentermine, diethylpropion; 255 lbs when first seen, got down to 240 lbs  Intermittent Fasting - got down to about 230 lbs    Current Physical Activity  No regular exercise routine  Was doing cardio workout at [employer], walking 4-5 miles prior to [place]    Current Eating Habits  Breakfast - grits, ham, biscuits, cereal (Frosted Flakes)  Lunch - fast food  Dinner - southern food  Snacks - chips, cookies, candy; about 4/day  Beverages - water, Hawaiian Punch, sweet tea    Shift work    Drinking less sugar sweetened beverages  Eating more salads  Protein shakes    Medical Weight Loss  1/24/2022 ([employer] out for repair): 247 lbs 5.7 oz, BMI 39.92  2/23/2022: 245.7 lbs, BMI 39.7, BFP 38.2%, BFM 93.8 lbs, SMM 87.7 lbs, BMR 1858 kcal; Ozempic  4/1/2022: 244.1 lbs, BMI 39.4, BFP 38.7%, BFM 94.4 lbs, SMM 86.6 lbs, BMR 1836 kcal; Ozempic  5/10/2022: 247.8 lbs, BMI 40, BFP 38.1%, BFM 94.4 lbs, SMM 88.6 lbs, BMR 1874 kcal; Ozempic  6/7/2022: 241.8 lbs, BMI 39, BFP 38.8%, BFM 93.9 lbs, SMM 85.3 lbs, BMR 1820 kcal; Ozempic  8/16/2022: 244.9 lbs, BMI 39.5, BFP 38.3%, BFM 93.9 lbs, SMM 86.6 lbs, BMR 1850 kcal      Follow-up  Pertinent negatives include no abdominal pain, chest pain, chills, fever, nausea or vomiting.     Review of Systems   Constitutional: Negative for chills and fever.   Respiratory: Negative for shortness of breath.    Cardiovascular: Negative for chest pain, palpitations and leg swelling.   Gastrointestinal: Positive for reflux. Negative for abdominal pain, nausea and vomiting.   Neurological: Negative for dizziness and light-headedness.          Objective:     Results for MILLER SHANNON (MRN [id number]) as of 1/24/2022 10:31   Ref. Range 8/25/2021 10:24   WBC Latest Ref Range: 3.90 - 12.70 K/uL 4.55   RBC Latest Ref Range: 4.60 - 6.20 M/uL 5.23   Hemoglobin Latest Ref Range: 14.0 - 18.0 g/dL 14.7   Hematocrit Latest Ref Range: 40.0 - 54.0 % 44.3   MCV Latest Ref Range: 82 - 98 fL 85   MCH Latest Ref Range: 27.0 - 31.0 pg 28.1   MCHC Latest Ref Range: 32.0 - 36.0 g/dL 33.2   RDW Latest Ref Range: 11.5 - 14.5 % 13.0   Platelets Latest Ref Range: 150 - 450 K/uL 271   MPV Latest Ref Range: 9.2 - 12.9 fL 9.5   Gran % Latest Ref Range: 38.0 - 73.0 % 25.7 (L)   Lymph % Latest Ref Range: 18.0 - 48.0 % 61.5 (H)   Mono % Latest Ref Range: 4.0 - 15.0 % 9.9   Eosinophil % Latest Ref Range: 0.0 - 8.0 % 1.8   Basophil % Latest Ref Range: 0.0 - 1.9 % 0.9   Immature Granulocytes Latest Ref Range: 0.0 - 0.5 % 0.2   Gran # (ANC) Latest Ref Range: 1.8 - 7.7 K/uL 1.2 (L)   Lymph # Latest Ref Range: 1.0 - 4.8 K/uL 2.8   Mono # Latest Ref Range: 0.3 - 1.0 K/uL 0.5   Eos # Latest Ref Range: 0.0 - 0.5 K/uL 0.1   Baso # Latest Ref Range: 0.00 - 0.20 K/uL 0.04   Immature Grans (Abs) Latest Ref Range: 0.00 - 0.04 K/uL 0.01   nRBC Latest Ref Range: 0 /100 WBC 0   Differential Method Unknown Automated   Sodium Latest Ref Range: 136 - 145 mmol/L 140   Potassium Latest Ref Range: 3.5 - 5.1 mmol/L 3.7   Chloride Latest Ref Range: 95 - 110 mmol/L 101   CO2 Latest Ref Range: 23 - 29 mmol/L 28   Anion Gap Latest Ref Range: 8 - 16 mmol/L 11   BUN Latest Ref Range: 2 - 20 mg/dL 17   Creatinine Latest Ref Range: 0.50 - 1.40 mg/dL 0.89   eGFR if non African American Latest Ref Range: >60 mL/min/1.73 m^2 >60.0   eGFR if African American Latest Ref Range: >60 mL/min/1.73 m^2 >60.0   Glucose Latest Ref Range: 70 - 110 mg/dL 122 (H)   Calcium Latest Ref Range: 8.7 - 10.5 mg/dL 10.1   Alkaline Phosphatase Latest Ref Range: 38 - 126 U/L 54   PROTEIN TOTAL Latest Ref Range: 6.0 - 8.4 g/dL 8.5 (H)    Albumin Latest Ref Range: 3.5 - 5.2 g/dL 4.8   BILIRUBIN TOTAL Latest Ref Range: 0.1 - 1.0 mg/dL 0.7   AST Latest Ref Range: 15 - 46 U/L 57 (H)   ALT Latest Ref Range: 10 - 44 U/L 88 (H)   Triglycerides Latest Ref Range: 30 - 150 mg/dL 206 (H)   Cholesterol Latest Ref Range: 120 - 199 mg/dL 162   HDL Latest Ref Range: 40 - 75 mg/dL 28 (L)   HDL/Cholesterol Ratio Latest Ref Range: 20.0 - 50.0 % 17.3 (L)   LDL Cholesterol External Latest Ref Range: 63.0 - 159.0 mg/dL 92.8   Non-HDL Cholesterol Latest Units: mg/dL 134   Total Cholesterol/HDL Ratio Latest Ref Range: 2.0 - 5.0  5.8 (H)   Hemoglobin A1C External Latest Ref Range: 4.0 - 5.6 % 6.3 (H)   Estimated Avg Glucose Latest Ref Range: 68 - 131 mg/dL 134 (H)   TSH Latest Ref Range: 0.400 - 4.000 uIU/mL 2.030     Vitals:    08/16/22 0946   BP: 118/74   Pulse: 84   Temp: 98.1 °F (36.7 °C)       Physical Exam  Vitals reviewed.   Constitutional:       General: He is not in acute distress.     Appearance: Normal appearance. He is obese. He is not ill-appearing, toxic-appearing or diaphoretic.   HENT:      Head: Normocephalic and atraumatic.   Cardiovascular:      Rate and Rhythm: Normal rate.   Pulmonary:      Effort: Pulmonary effort is normal. No respiratory distress.   Skin:     General: Skin is warm and dry.   Neurological:      General: No focal deficit present.      Mental Status: He is alert and oriented to person, place, and time.      Gait: Gait normal.   Psychiatric:         Mood and Affect: Mood normal.         Behavior: Behavior normal.         Thought Content: Thought content normal.         Judgment: Judgment normal.         Assessment:       Problem List Items Addressed This Visit     VOGEL (nonalcoholic steatohepatitis)    Class 2 severe obesity due to excess calories with serious comorbidity and body mass index (BMI) of 39.0 to 39.9 in adult - Primary    HLD (hyperlipidemia)    HTN (hypertension)    Type 2 diabetes mellitus without complication, without  long-term current use of insulin    Relevant Medications    semaglutide (OZEMPIC) 2 mg/dose (8 mg/3 mL) PnIj      Other Visit Diagnoses     Encounter for weight loss counseling              Plan:   - Ozempic 2 mg weekly.      - Log all food and beverage intake with a daily calorie goal of 1500 calories per day; meal plans given in AVS    - Moderate intensity aerobic exercise for 15-30 minutes 3-5x/week    - Return to clinic in 4 weeks

## 2022-08-19 ENCOUNTER — PATIENT MESSAGE (OUTPATIENT)
Dept: BARIATRICS | Facility: CLINIC | Age: 57
End: 2022-08-19
Payer: COMMERCIAL

## 2022-08-19 RX ORDER — SEMAGLUTIDE 1.34 MG/ML
1 INJECTION, SOLUTION SUBCUTANEOUS
Qty: 1 PEN | Refills: 2 | Status: SHIPPED | OUTPATIENT
Start: 2022-08-19 | End: 2022-11-05

## 2022-10-06 ENCOUNTER — PATIENT MESSAGE (OUTPATIENT)
Dept: BARIATRICS | Facility: CLINIC | Age: 57
End: 2022-10-06
Payer: COMMERCIAL

## 2022-10-10 ENCOUNTER — OFFICE VISIT (OUTPATIENT)
Dept: BARIATRICS | Facility: CLINIC | Age: 57
End: 2022-10-10
Payer: COMMERCIAL

## 2022-10-10 VITALS
BODY MASS INDEX: 39.75 KG/M2 | DIASTOLIC BLOOD PRESSURE: 72 MMHG | HEART RATE: 80 BPM | SYSTOLIC BLOOD PRESSURE: 129 MMHG | WEIGHT: 247.31 LBS | OXYGEN SATURATION: 95 % | HEIGHT: 66 IN

## 2022-10-10 DIAGNOSIS — E66.01 CLASS 2 SEVERE OBESITY DUE TO EXCESS CALORIES WITH SERIOUS COMORBIDITY AND BODY MASS INDEX (BMI) OF 39.0 TO 39.9 IN ADULT: Primary | ICD-10-CM

## 2022-10-10 DIAGNOSIS — E78.49 OTHER HYPERLIPIDEMIA: ICD-10-CM

## 2022-10-10 DIAGNOSIS — K75.81 NASH (NONALCOHOLIC STEATOHEPATITIS): ICD-10-CM

## 2022-10-10 DIAGNOSIS — Z71.3 ENCOUNTER FOR WEIGHT LOSS COUNSELING: ICD-10-CM

## 2022-10-10 DIAGNOSIS — I10 PRIMARY HYPERTENSION: ICD-10-CM

## 2022-10-10 DIAGNOSIS — E11.9 TYPE 2 DIABETES MELLITUS WITHOUT COMPLICATION, WITHOUT LONG-TERM CURRENT USE OF INSULIN: ICD-10-CM

## 2022-10-10 PROCEDURE — 99999 PR PBB SHADOW E&M-EST. PATIENT-LVL IV: ICD-10-PCS | Mod: PBBFAC,,, | Performed by: STUDENT IN AN ORGANIZED HEALTH CARE EDUCATION/TRAINING PROGRAM

## 2022-10-10 PROCEDURE — 3078F PR MOST RECENT DIASTOLIC BLOOD PRESSURE < 80 MM HG: ICD-10-PCS | Mod: CPTII,S$GLB,, | Performed by: STUDENT IN AN ORGANIZED HEALTH CARE EDUCATION/TRAINING PROGRAM

## 2022-10-10 PROCEDURE — 3008F PR BODY MASS INDEX (BMI) DOCUMENTED: ICD-10-PCS | Mod: CPTII,S$GLB,, | Performed by: STUDENT IN AN ORGANIZED HEALTH CARE EDUCATION/TRAINING PROGRAM

## 2022-10-10 PROCEDURE — 99999 PR PBB SHADOW E&M-EST. PATIENT-LVL IV: CPT | Mod: PBBFAC,,, | Performed by: STUDENT IN AN ORGANIZED HEALTH CARE EDUCATION/TRAINING PROGRAM

## 2022-10-10 PROCEDURE — 3078F DIAST BP <80 MM HG: CPT | Mod: CPTII,S$GLB,, | Performed by: STUDENT IN AN ORGANIZED HEALTH CARE EDUCATION/TRAINING PROGRAM

## 2022-10-10 PROCEDURE — 99213 OFFICE O/P EST LOW 20 MIN: CPT | Mod: S$GLB,,, | Performed by: STUDENT IN AN ORGANIZED HEALTH CARE EDUCATION/TRAINING PROGRAM

## 2022-10-10 PROCEDURE — 3061F NEG MICROALBUMINURIA REV: CPT | Mod: CPTII,S$GLB,, | Performed by: STUDENT IN AN ORGANIZED HEALTH CARE EDUCATION/TRAINING PROGRAM

## 2022-10-10 PROCEDURE — 99213 PR OFFICE/OUTPT VISIT, EST, LEVL III, 20-29 MIN: ICD-10-PCS | Mod: S$GLB,,, | Performed by: STUDENT IN AN ORGANIZED HEALTH CARE EDUCATION/TRAINING PROGRAM

## 2022-10-10 PROCEDURE — 3066F PR DOCUMENTATION OF TREATMENT FOR NEPHROPATHY: ICD-10-PCS | Mod: CPTII,S$GLB,, | Performed by: STUDENT IN AN ORGANIZED HEALTH CARE EDUCATION/TRAINING PROGRAM

## 2022-10-10 PROCEDURE — 3074F PR MOST RECENT SYSTOLIC BLOOD PRESSURE < 130 MM HG: ICD-10-PCS | Mod: CPTII,S$GLB,, | Performed by: STUDENT IN AN ORGANIZED HEALTH CARE EDUCATION/TRAINING PROGRAM

## 2022-10-10 PROCEDURE — 3066F NEPHROPATHY DOC TX: CPT | Mod: CPTII,S$GLB,, | Performed by: STUDENT IN AN ORGANIZED HEALTH CARE EDUCATION/TRAINING PROGRAM

## 2022-10-10 PROCEDURE — 1160F RVW MEDS BY RX/DR IN RCRD: CPT | Mod: CPTII,S$GLB,, | Performed by: STUDENT IN AN ORGANIZED HEALTH CARE EDUCATION/TRAINING PROGRAM

## 2022-10-10 PROCEDURE — 3074F SYST BP LT 130 MM HG: CPT | Mod: CPTII,S$GLB,, | Performed by: STUDENT IN AN ORGANIZED HEALTH CARE EDUCATION/TRAINING PROGRAM

## 2022-10-10 PROCEDURE — 1159F PR MEDICATION LIST DOCUMENTED IN MEDICAL RECORD: ICD-10-PCS | Mod: CPTII,S$GLB,, | Performed by: STUDENT IN AN ORGANIZED HEALTH CARE EDUCATION/TRAINING PROGRAM

## 2022-10-10 PROCEDURE — 3061F PR NEG MICROALBUMINURIA RESULT DOCUMENTED/REVIEW: ICD-10-PCS | Mod: CPTII,S$GLB,, | Performed by: STUDENT IN AN ORGANIZED HEALTH CARE EDUCATION/TRAINING PROGRAM

## 2022-10-10 PROCEDURE — 3044F HG A1C LEVEL LT 7.0%: CPT | Mod: CPTII,S$GLB,, | Performed by: STUDENT IN AN ORGANIZED HEALTH CARE EDUCATION/TRAINING PROGRAM

## 2022-10-10 PROCEDURE — 4010F ACE/ARB THERAPY RXD/TAKEN: CPT | Mod: CPTII,S$GLB,, | Performed by: STUDENT IN AN ORGANIZED HEALTH CARE EDUCATION/TRAINING PROGRAM

## 2022-10-10 PROCEDURE — 3044F PR MOST RECENT HEMOGLOBIN A1C LEVEL <7.0%: ICD-10-PCS | Mod: CPTII,S$GLB,, | Performed by: STUDENT IN AN ORGANIZED HEALTH CARE EDUCATION/TRAINING PROGRAM

## 2022-10-10 PROCEDURE — 3008F BODY MASS INDEX DOCD: CPT | Mod: CPTII,S$GLB,, | Performed by: STUDENT IN AN ORGANIZED HEALTH CARE EDUCATION/TRAINING PROGRAM

## 2022-10-10 PROCEDURE — 1160F PR REVIEW ALL MEDS BY PRESCRIBER/CLIN PHARMACIST DOCUMENTED: ICD-10-PCS | Mod: CPTII,S$GLB,, | Performed by: STUDENT IN AN ORGANIZED HEALTH CARE EDUCATION/TRAINING PROGRAM

## 2022-10-10 PROCEDURE — 1159F MED LIST DOCD IN RCRD: CPT | Mod: CPTII,S$GLB,, | Performed by: STUDENT IN AN ORGANIZED HEALTH CARE EDUCATION/TRAINING PROGRAM

## 2022-10-10 PROCEDURE — 4010F PR ACE/ARB THEARPY RXD/TAKEN: ICD-10-PCS | Mod: CPTII,S$GLB,, | Performed by: STUDENT IN AN ORGANIZED HEALTH CARE EDUCATION/TRAINING PROGRAM

## 2022-10-10 RX ORDER — TIRZEPATIDE 7.5 MG/.5ML
7.5 INJECTION, SOLUTION SUBCUTANEOUS
Qty: 4 PEN | Refills: 0 | Status: SHIPPED | OUTPATIENT
Start: 2022-12-05 | End: 2022-12-14

## 2022-10-10 RX ORDER — TIRZEPATIDE 5 MG/.5ML
5 INJECTION, SOLUTION SUBCUTANEOUS
Qty: 4 PEN | Refills: 0 | Status: SHIPPED | OUTPATIENT
Start: 2022-11-07 | End: 2022-11-29

## 2022-10-10 RX ORDER — TIRZEPATIDE 2.5 MG/.5ML
2.5 INJECTION, SOLUTION SUBCUTANEOUS
Qty: 4 PEN | Refills: 0 | Status: SHIPPED | OUTPATIENT
Start: 2022-10-10 | End: 2022-11-01

## 2022-10-10 NOTE — PROGRESS NOTES
Subjective:       Patient ID: Андрей Wooten is a 57 y.o. male.    Chief Complaint: Follow-up, Obesity, and Weight Check    Patient presents for treatment of obesity.       Co-morbidities:   HTN  HLD  NAFLD  DM2  GERD    Negative for thyroid cancer  Negative for pancreatitis     History of Weight Loss Efforts  Dr. Bernal - prescribed phentermine, diethylpropion; 255 lbs when first seen, got down to 240 lbs  Intermittent Fasting - got down to about 230 lbs    Current Physical Activity  No regular exercise routine  Was doing cardio workout at [employer], walking 4-5 miles prior to [place]    Current Eating Habits  Breakfast - grits, ham, biscuits, cereal (Frosted Flakes)  Lunch - fast food  Dinner - southern food  Snacks - chips, cookies, candy; about 4/day  Beverages - water, Hawaiian Punch, sweet tea    Shift work    Drinking less sugar sweetened beverages  Eating more salads  Protein shakes  V8    Medical Weight Loss  1/24/2022 ([employer] out for repair): 247 lbs 5.7 oz, BMI 39.92  2/23/2022: 245.7 lbs, BMI 39.7, BFP 38.2%, BFM 93.8 lbs, SMM 87.7 lbs, BMR 1858 kcal; Ozempic  4/1/2022: 244.1 lbs, BMI 39.4, BFP 38.7%, BFM 94.4 lbs, SMM 86.6 lbs, BMR 1836 kcal; Ozempic  5/10/2022: 247.8 lbs, BMI 40, BFP 38.1%, BFM 94.4 lbs, SMM 88.6 lbs, BMR 1874 kcal; Ozempic  6/7/2022: 241.8 lbs, BMI 39, BFP 38.8%, BFM 93.9 lbs, SMM 85.3 lbs, BMR 1820 kcal; Ozempic  8/16/2022: 244.9 lbs, BMI 39.5, BFP 38.3%, BFM 93.9 lbs, SMM 86.6 lbs, BMR 1850 kcal  10/10/2022: 247.3 lbs, BMI 39.9, BFP 39.6%, BFM 98.1 lbs, SMM 85.8 lbs, BMR 1833 kcal      Follow-up  Pertinent negatives include no abdominal pain, chest pain, chills, fever, nausea or vomiting.   Review of Systems   Constitutional:  Negative for chills and fever.   Respiratory:  Negative for shortness of breath.    Cardiovascular:  Negative for chest pain, palpitations and leg swelling.   Gastrointestinal:  Positive for reflux. Negative for abdominal pain, nausea and vomiting.    Neurological:  Negative for dizziness and light-headedness.       Objective:        Latest Reference Range & Units 04/12/22 08:17   WBC 3.90 - 12.70 K/uL 4.62   RBC 4.60 - 6.20 M/uL 5.18   Hemoglobin 14.0 - 18.0 g/dL 14.3   Hematocrit 40.0 - 54.0 % 44.1   MCV 82 - 98 fL 85   MCH 27.0 - 31.0 pg 27.6   MCHC 32.0 - 36.0 g/dL 32.4   RDW 11.5 - 14.5 % 13.2   Platelets 150 - 450 K/uL 267   MPV 9.2 - 12.9 fL 9.5   Gran % 38.0 - 73.0 % 33.2 (L)   Lymph % 18.0 - 48.0 % 55.0 (H)   Mono % 4.0 - 15.0 % 9.7   Eosinophil % 0.0 - 8.0 % 1.5   Basophil % 0.0 - 1.9 % 0.6   Immature Granulocytes 0.0 - 0.5 % 0.0   Gran # (ANC) 1.8 - 7.7 K/uL 1.5 (L)   Lymph # 1.0 - 4.8 K/uL 2.5   Mono # 0.3 - 1.0 K/uL 0.5   Eos # 0.0 - 0.5 K/uL 0.1   Baso # 0.00 - 0.20 K/uL 0.03   Immature Grans (Abs) 0.00 - 0.04 K/uL 0.00   nRBC 0 /100 WBC 0   Differential Method  Automated   Sodium 136 - 145 mmol/L 141   Potassium 3.5 - 5.1 mmol/L 3.9   Chloride 95 - 110 mmol/L 101   CO2 23 - 29 mmol/L 29   Anion Gap 8 - 16 mmol/L 11   BUN 2 - 20 mg/dL 18   Creatinine 0.50 - 1.40 mg/dL 1.07   eGFR if non African American >60 mL/min/1.73 m^2 >60.0   eGFR if African American >60 mL/min/1.73 m^2 >60.0   Glucose 70 - 110 mg/dL 92   Calcium 8.7 - 10.5 mg/dL 9.3   Alkaline Phosphatase 38 - 126 U/L 54   PROTEIN TOTAL 6.0 - 8.4 g/dL 8.1   Albumin 3.5 - 5.2 g/dL 4.5   BILIRUBIN TOTAL 0.1 - 1.0 mg/dL 0.5   AST 15 - 46 U/L 43   ALT 10 - 44 U/L 62 (H)   Cholesterol 120 - 199 mg/dL 139   HDL 40 - 75 mg/dL 28 (L)   HDL/Cholesterol Ratio 20.0 - 50.0 % 20.1   LDL Cholesterol External 63.0 - 159.0 mg/dL 81.4   Non-HDL Cholesterol mg/dL 111   Total Cholesterol/HDL Ratio 2.0 - 5.0  5.0   Triglycerides 30 - 150 mg/dL 148   Hemoglobin A1C External 4.0 - 5.6 % 6.3 (H)   Estimated Avg Glucose 68 - 131 mg/dL 134 (H)   PSA, Screen 0.00 - 4.00 ng/mL 0.68   (L): Data is abnormally low  (H): Data is abnormally high    Vitals:    10/10/22 0909   BP: 129/72   Pulse: 80       Physical  Exam  Vitals reviewed.   Constitutional:       General: He is not in acute distress.     Appearance: Normal appearance. He is obese. He is not ill-appearing, toxic-appearing or diaphoretic.   HENT:      Head: Normocephalic and atraumatic.   Cardiovascular:      Rate and Rhythm: Normal rate.   Pulmonary:      Effort: Pulmonary effort is normal. No respiratory distress.   Skin:     General: Skin is warm and dry.   Neurological:      General: No focal deficit present.      Mental Status: He is alert and oriented to person, place, and time.      Gait: Gait normal.   Psychiatric:         Mood and Affect: Mood normal.         Behavior: Behavior normal.         Thought Content: Thought content normal.         Judgment: Judgment normal.       Assessment:       Problem List Items Addressed This Visit       VOGEL (nonalcoholic steatohepatitis)    Relevant Medications    tirzepatide (MOUNJARO) 2.5 mg/0.5 mL PnIj    tirzepatide (MOUNJARO) 5 mg/0.5 mL PnIj (Start on 11/7/2022)    tirzepatide (MOUNJARO) 7.5 mg/0.5 mL PnIj (Start on 12/5/2022)    Class 2 severe obesity due to excess calories with serious comorbidity and body mass index (BMI) of 39.0 to 39.9 in adult - Primary    Relevant Medications    tirzepatide (MOUNJARO) 2.5 mg/0.5 mL PnIj    tirzepatide (MOUNJARO) 5 mg/0.5 mL PnIj (Start on 11/7/2022)    tirzepatide (MOUNJARO) 7.5 mg/0.5 mL PnIj (Start on 12/5/2022)    HLD (hyperlipidemia)    HTN (hypertension)    Type 2 diabetes mellitus without complication, without long-term current use of insulin    Relevant Medications    tirzepatide (MOUNJARO) 2.5 mg/0.5 mL PnIj    tirzepatide (MOUNJARO) 5 mg/0.5 mL PnIj (Start on 11/7/2022)    tirzepatide (MOUNJARO) 7.5 mg/0.5 mL PnIj (Start on 12/5/2022)     Other Visit Diagnoses       Encounter for weight loss counseling                Plan:   - Mounjaro weekly injections     - Log all food and beverage intake with a daily calorie goal of 1500 calories per day; meal plans given in  AVS    - Moderate intensity aerobic exercise for 15-30 minutes 3-5x/week

## 2022-10-10 NOTE — PATIENT INSTRUCTIONS
Start Mounjaro once a week. Start with 2.5 mg once a week x 4 weeks, then 5 mg once a week x 4 weeks, then 7.5 mg once a week x 4 weeks.    Decrease portions as soon as you start Mounjaro Avoid fried, greasy, fatty foods.     Some nausea in the first 2 weeks is not unusual.     If you get pain across the upper abdomen and around to your back, please call the office.     Go to [email or web address] for a savings card and bring to the pharmacy.    Discontinue Ozempic once you start Mounjaro.

## 2022-10-26 RX ORDER — AMITRIPTYLINE HYDROCHLORIDE 25 MG/1
25 TABLET, FILM COATED ORAL NIGHTLY
Qty: 30 TABLET | Refills: 5 | Status: SHIPPED | OUTPATIENT
Start: 2022-10-26 | End: 2022-11-02 | Stop reason: SDUPTHER

## 2022-11-02 ENCOUNTER — PATIENT MESSAGE (OUTPATIENT)
Dept: GASTROENTEROLOGY | Facility: CLINIC | Age: 57
End: 2022-11-02
Payer: COMMERCIAL

## 2022-11-02 ENCOUNTER — PATIENT MESSAGE (OUTPATIENT)
Dept: FAMILY MEDICINE | Facility: CLINIC | Age: 57
End: 2022-11-02
Payer: COMMERCIAL

## 2022-11-02 DIAGNOSIS — R10.9 ABDOMINAL PAIN, UNSPECIFIED ABDOMINAL LOCATION: Primary | ICD-10-CM

## 2022-11-02 RX ORDER — AMITRIPTYLINE HYDROCHLORIDE 25 MG/1
25 TABLET, FILM COATED ORAL NIGHTLY
Qty: 30 TABLET | Refills: 5 | Status: SHIPPED | OUTPATIENT
Start: 2022-11-02 | End: 2022-12-16 | Stop reason: DRUGHIGH

## 2022-11-03 ENCOUNTER — TELEPHONE (OUTPATIENT)
Dept: FAMILY MEDICINE | Facility: CLINIC | Age: 57
End: 2022-11-03
Payer: COMMERCIAL

## 2022-11-03 ENCOUNTER — PATIENT OUTREACH (OUTPATIENT)
Dept: ADMINISTRATIVE | Facility: HOSPITAL | Age: 57
End: 2022-11-03
Payer: COMMERCIAL

## 2022-11-03 NOTE — LETTER
AUTHORIZATION FOR RELEASE OF   CONFIDENTIAL INFORMATION    [facility],    We are seeing Андрей Wooten, date of birth 1965, in the clinic at [facility]. Leander Boo MD is the patient's PCP. Андрей Wooten has an outstanding lab/procedure at the time we reviewed his chart. In order to help keep his health information updated, he has authorized us to request the following medical record(s):                                  ( X )  EYE EXAM                 Please fax records to [facility], Addy N Reine, MD, [phone]    If you have any questions, please contact Dilia Armenta at [phone].   .           Patient Name: Андрей Wooten  : 1965  Patient Phone #: [phone]

## 2022-11-10 ENCOUNTER — PATIENT OUTREACH (OUTPATIENT)
Dept: ADMINISTRATIVE | Facility: HOSPITAL | Age: 57
End: 2022-11-10
Payer: COMMERCIAL

## 2022-12-06 ENCOUNTER — OFFICE VISIT (OUTPATIENT)
Dept: GASTROENTEROLOGY | Facility: CLINIC | Age: 57
End: 2022-12-06
Payer: COMMERCIAL

## 2022-12-06 VITALS
DIASTOLIC BLOOD PRESSURE: 94 MMHG | BODY MASS INDEX: 40.51 KG/M2 | HEART RATE: 98 BPM | SYSTOLIC BLOOD PRESSURE: 156 MMHG | WEIGHT: 251 LBS

## 2022-12-06 DIAGNOSIS — E66.01 CLASS 3 SEVERE OBESITY DUE TO EXCESS CALORIES WITH SERIOUS COMORBIDITY AND BODY MASS INDEX (BMI) OF 40.0 TO 44.9 IN ADULT: ICD-10-CM

## 2022-12-06 DIAGNOSIS — K21.9 GASTROESOPHAGEAL REFLUX DISEASE WITHOUT ESOPHAGITIS: Primary | ICD-10-CM

## 2022-12-06 PROCEDURE — 4010F PR ACE/ARB THEARPY RXD/TAKEN: ICD-10-PCS | Mod: CPTII,S$GLB,, | Performed by: NURSE PRACTITIONER

## 2022-12-06 PROCEDURE — 3044F PR MOST RECENT HEMOGLOBIN A1C LEVEL <7.0%: ICD-10-PCS | Mod: CPTII,S$GLB,, | Performed by: NURSE PRACTITIONER

## 2022-12-06 PROCEDURE — 99214 PR OFFICE/OUTPT VISIT, EST, LEVL IV, 30-39 MIN: ICD-10-PCS | Mod: S$GLB,,, | Performed by: NURSE PRACTITIONER

## 2022-12-06 PROCEDURE — 3061F PR NEG MICROALBUMINURIA RESULT DOCUMENTED/REVIEW: ICD-10-PCS | Mod: CPTII,S$GLB,, | Performed by: NURSE PRACTITIONER

## 2022-12-06 PROCEDURE — 3066F NEPHROPATHY DOC TX: CPT | Mod: CPTII,S$GLB,, | Performed by: NURSE PRACTITIONER

## 2022-12-06 PROCEDURE — 3061F NEG MICROALBUMINURIA REV: CPT | Mod: CPTII,S$GLB,, | Performed by: NURSE PRACTITIONER

## 2022-12-06 PROCEDURE — 3008F BODY MASS INDEX DOCD: CPT | Mod: CPTII,S$GLB,, | Performed by: NURSE PRACTITIONER

## 2022-12-06 PROCEDURE — 3080F DIAST BP >= 90 MM HG: CPT | Mod: CPTII,S$GLB,, | Performed by: NURSE PRACTITIONER

## 2022-12-06 PROCEDURE — 3080F PR MOST RECENT DIASTOLIC BLOOD PRESSURE >= 90 MM HG: ICD-10-PCS | Mod: CPTII,S$GLB,, | Performed by: NURSE PRACTITIONER

## 2022-12-06 PROCEDURE — 99214 OFFICE O/P EST MOD 30 MIN: CPT | Mod: S$GLB,,, | Performed by: NURSE PRACTITIONER

## 2022-12-06 PROCEDURE — 99999 PR PBB SHADOW E&M-EST. PATIENT-LVL V: CPT | Mod: PBBFAC,,, | Performed by: NURSE PRACTITIONER

## 2022-12-06 PROCEDURE — 1160F RVW MEDS BY RX/DR IN RCRD: CPT | Mod: CPTII,S$GLB,, | Performed by: NURSE PRACTITIONER

## 2022-12-06 PROCEDURE — 3077F SYST BP >= 140 MM HG: CPT | Mod: CPTII,S$GLB,, | Performed by: NURSE PRACTITIONER

## 2022-12-06 PROCEDURE — 3066F PR DOCUMENTATION OF TREATMENT FOR NEPHROPATHY: ICD-10-PCS | Mod: CPTII,S$GLB,, | Performed by: NURSE PRACTITIONER

## 2022-12-06 PROCEDURE — 3008F PR BODY MASS INDEX (BMI) DOCUMENTED: ICD-10-PCS | Mod: CPTII,S$GLB,, | Performed by: NURSE PRACTITIONER

## 2022-12-06 PROCEDURE — 1159F MED LIST DOCD IN RCRD: CPT | Mod: CPTII,S$GLB,, | Performed by: NURSE PRACTITIONER

## 2022-12-06 PROCEDURE — 1160F PR REVIEW ALL MEDS BY PRESCRIBER/CLIN PHARMACIST DOCUMENTED: ICD-10-PCS | Mod: CPTII,S$GLB,, | Performed by: NURSE PRACTITIONER

## 2022-12-06 PROCEDURE — 99999 PR PBB SHADOW E&M-EST. PATIENT-LVL V: ICD-10-PCS | Mod: PBBFAC,,, | Performed by: NURSE PRACTITIONER

## 2022-12-06 PROCEDURE — 4010F ACE/ARB THERAPY RXD/TAKEN: CPT | Mod: CPTII,S$GLB,, | Performed by: NURSE PRACTITIONER

## 2022-12-06 PROCEDURE — 1159F PR MEDICATION LIST DOCUMENTED IN MEDICAL RECORD: ICD-10-PCS | Mod: CPTII,S$GLB,, | Performed by: NURSE PRACTITIONER

## 2022-12-06 PROCEDURE — 3044F HG A1C LEVEL LT 7.0%: CPT | Mod: CPTII,S$GLB,, | Performed by: NURSE PRACTITIONER

## 2022-12-06 PROCEDURE — 3077F PR MOST RECENT SYSTOLIC BLOOD PRESSURE >= 140 MM HG: ICD-10-PCS | Mod: CPTII,S$GLB,, | Performed by: NURSE PRACTITIONER

## 2022-12-06 RX ORDER — FAMOTIDINE 40 MG/1
40 TABLET, FILM COATED ORAL DAILY
Qty: 30 TABLET | Refills: 11 | Status: SHIPPED | OUTPATIENT
Start: 2022-12-06 | End: 2023-04-11 | Stop reason: SDUPTHER
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
## 2022-12-06 NOTE — PATIENT INSTRUCTIONS
- Continue pantoprazole 40 mg every morning  - Increase amitriptyline from 25 to 50 mg every evening  - Start famotidine 40 mg every night

## 2022-12-14 ENCOUNTER — OFFICE VISIT (OUTPATIENT)
Dept: BARIATRICS | Facility: CLINIC | Age: 57
End: 2022-12-14
Payer: COMMERCIAL

## 2022-12-14 VITALS
OXYGEN SATURATION: 97 % | SYSTOLIC BLOOD PRESSURE: 142 MMHG | DIASTOLIC BLOOD PRESSURE: 77 MMHG | WEIGHT: 244.31 LBS | HEIGHT: 66 IN | BODY MASS INDEX: 39.26 KG/M2 | HEART RATE: 84 BPM

## 2022-12-14 DIAGNOSIS — E78.49 OTHER HYPERLIPIDEMIA: ICD-10-CM

## 2022-12-14 DIAGNOSIS — E11.9 TYPE 2 DIABETES MELLITUS WITHOUT COMPLICATION, WITHOUT LONG-TERM CURRENT USE OF INSULIN: ICD-10-CM

## 2022-12-14 DIAGNOSIS — K75.81 NASH (NONALCOHOLIC STEATOHEPATITIS): ICD-10-CM

## 2022-12-14 DIAGNOSIS — I10 PRIMARY HYPERTENSION: ICD-10-CM

## 2022-12-14 DIAGNOSIS — E66.01 CLASS 2 SEVERE OBESITY DUE TO EXCESS CALORIES WITH SERIOUS COMORBIDITY AND BODY MASS INDEX (BMI) OF 39.0 TO 39.9 IN ADULT: Primary | ICD-10-CM

## 2022-12-14 DIAGNOSIS — Z71.3 ENCOUNTER FOR WEIGHT LOSS COUNSELING: ICD-10-CM

## 2022-12-14 PROCEDURE — 99213 OFFICE O/P EST LOW 20 MIN: CPT | Mod: S$GLB,,, | Performed by: STUDENT IN AN ORGANIZED HEALTH CARE EDUCATION/TRAINING PROGRAM

## 2022-12-14 PROCEDURE — 99213 PR OFFICE/OUTPT VISIT, EST, LEVL III, 20-29 MIN: ICD-10-PCS | Mod: S$GLB,,, | Performed by: STUDENT IN AN ORGANIZED HEALTH CARE EDUCATION/TRAINING PROGRAM

## 2022-12-14 PROCEDURE — 3008F BODY MASS INDEX DOCD: CPT | Mod: CPTII,S$GLB,, | Performed by: STUDENT IN AN ORGANIZED HEALTH CARE EDUCATION/TRAINING PROGRAM

## 2022-12-14 PROCEDURE — 3077F SYST BP >= 140 MM HG: CPT | Mod: CPTII,S$GLB,, | Performed by: STUDENT IN AN ORGANIZED HEALTH CARE EDUCATION/TRAINING PROGRAM

## 2022-12-14 PROCEDURE — 3044F PR MOST RECENT HEMOGLOBIN A1C LEVEL <7.0%: ICD-10-PCS | Mod: CPTII,S$GLB,, | Performed by: STUDENT IN AN ORGANIZED HEALTH CARE EDUCATION/TRAINING PROGRAM

## 2022-12-14 PROCEDURE — 1159F MED LIST DOCD IN RCRD: CPT | Mod: CPTII,S$GLB,, | Performed by: STUDENT IN AN ORGANIZED HEALTH CARE EDUCATION/TRAINING PROGRAM

## 2022-12-14 PROCEDURE — 3077F PR MOST RECENT SYSTOLIC BLOOD PRESSURE >= 140 MM HG: ICD-10-PCS | Mod: CPTII,S$GLB,, | Performed by: STUDENT IN AN ORGANIZED HEALTH CARE EDUCATION/TRAINING PROGRAM

## 2022-12-14 PROCEDURE — 4010F PR ACE/ARB THEARPY RXD/TAKEN: ICD-10-PCS | Mod: CPTII,S$GLB,, | Performed by: STUDENT IN AN ORGANIZED HEALTH CARE EDUCATION/TRAINING PROGRAM

## 2022-12-14 PROCEDURE — 3066F PR DOCUMENTATION OF TREATMENT FOR NEPHROPATHY: ICD-10-PCS | Mod: CPTII,S$GLB,, | Performed by: STUDENT IN AN ORGANIZED HEALTH CARE EDUCATION/TRAINING PROGRAM

## 2022-12-14 PROCEDURE — 1160F PR REVIEW ALL MEDS BY PRESCRIBER/CLIN PHARMACIST DOCUMENTED: ICD-10-PCS | Mod: CPTII,S$GLB,, | Performed by: STUDENT IN AN ORGANIZED HEALTH CARE EDUCATION/TRAINING PROGRAM

## 2022-12-14 PROCEDURE — 3078F PR MOST RECENT DIASTOLIC BLOOD PRESSURE < 80 MM HG: ICD-10-PCS | Mod: CPTII,S$GLB,, | Performed by: STUDENT IN AN ORGANIZED HEALTH CARE EDUCATION/TRAINING PROGRAM

## 2022-12-14 PROCEDURE — 3008F PR BODY MASS INDEX (BMI) DOCUMENTED: ICD-10-PCS | Mod: CPTII,S$GLB,, | Performed by: STUDENT IN AN ORGANIZED HEALTH CARE EDUCATION/TRAINING PROGRAM

## 2022-12-14 PROCEDURE — 99999 PR PBB SHADOW E&M-EST. PATIENT-LVL IV: ICD-10-PCS | Mod: PBBFAC,,, | Performed by: STUDENT IN AN ORGANIZED HEALTH CARE EDUCATION/TRAINING PROGRAM

## 2022-12-14 PROCEDURE — 4010F ACE/ARB THERAPY RXD/TAKEN: CPT | Mod: CPTII,S$GLB,, | Performed by: STUDENT IN AN ORGANIZED HEALTH CARE EDUCATION/TRAINING PROGRAM

## 2022-12-14 PROCEDURE — 1160F RVW MEDS BY RX/DR IN RCRD: CPT | Mod: CPTII,S$GLB,, | Performed by: STUDENT IN AN ORGANIZED HEALTH CARE EDUCATION/TRAINING PROGRAM

## 2022-12-14 PROCEDURE — 1159F PR MEDICATION LIST DOCUMENTED IN MEDICAL RECORD: ICD-10-PCS | Mod: CPTII,S$GLB,, | Performed by: STUDENT IN AN ORGANIZED HEALTH CARE EDUCATION/TRAINING PROGRAM

## 2022-12-14 PROCEDURE — 3078F DIAST BP <80 MM HG: CPT | Mod: CPTII,S$GLB,, | Performed by: STUDENT IN AN ORGANIZED HEALTH CARE EDUCATION/TRAINING PROGRAM

## 2022-12-14 PROCEDURE — 3066F NEPHROPATHY DOC TX: CPT | Mod: CPTII,S$GLB,, | Performed by: STUDENT IN AN ORGANIZED HEALTH CARE EDUCATION/TRAINING PROGRAM

## 2022-12-14 PROCEDURE — 3044F HG A1C LEVEL LT 7.0%: CPT | Mod: CPTII,S$GLB,, | Performed by: STUDENT IN AN ORGANIZED HEALTH CARE EDUCATION/TRAINING PROGRAM

## 2022-12-14 PROCEDURE — 99999 PR PBB SHADOW E&M-EST. PATIENT-LVL IV: CPT | Mod: PBBFAC,,, | Performed by: STUDENT IN AN ORGANIZED HEALTH CARE EDUCATION/TRAINING PROGRAM

## 2022-12-14 PROCEDURE — 3061F PR NEG MICROALBUMINURIA RESULT DOCUMENTED/REVIEW: ICD-10-PCS | Mod: CPTII,S$GLB,, | Performed by: STUDENT IN AN ORGANIZED HEALTH CARE EDUCATION/TRAINING PROGRAM

## 2022-12-14 PROCEDURE — 3061F NEG MICROALBUMINURIA REV: CPT | Mod: CPTII,S$GLB,, | Performed by: STUDENT IN AN ORGANIZED HEALTH CARE EDUCATION/TRAINING PROGRAM

## 2022-12-14 RX ORDER — TIRZEPATIDE 7.5 MG/.5ML
7.5 INJECTION, SOLUTION SUBCUTANEOUS
Qty: 4 PEN | Refills: 0 | Status: SHIPPED | OUTPATIENT
Start: 2023-01-08 | End: 2023-01-31
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
## 2022-12-16 ENCOUNTER — PATIENT MESSAGE (OUTPATIENT)
Dept: GASTROENTEROLOGY | Facility: CLINIC | Age: 57
End: 2022-12-16
Payer: COMMERCIAL

## 2022-12-16 DIAGNOSIS — K21.9 GASTROESOPHAGEAL REFLUX DISEASE WITHOUT ESOPHAGITIS: Primary | ICD-10-CM

## 2022-12-16 RX ORDER — AMITRIPTYLINE HYDROCHLORIDE 50 MG/1
50 TABLET, FILM COATED ORAL NIGHTLY
Qty: 30 TABLET | Refills: 11 | Status: SHIPPED | OUTPATIENT
Start: 2022-12-16 | End: 2023-04-11 | Stop reason: SDUPTHER

## 2022-12-23 DIAGNOSIS — K21.9 GASTROESOPHAGEAL REFLUX DISEASE WITHOUT ESOPHAGITIS: ICD-10-CM

## 2022-12-23 RX ORDER — PANTOPRAZOLE SODIUM 40 MG/1
40 TABLET, DELAYED RELEASE ORAL EVERY MORNING
Qty: 90 TABLET | Refills: 3 | Status: CANCELLED | OUTPATIENT
Start: 2022-12-23

## 2023-01-02 DIAGNOSIS — I10 ESSENTIAL HYPERTENSION: ICD-10-CM

## 2023-01-02 NOTE — TELEPHONE ENCOUNTER
No new care gaps identified.  [facility] Embedded Care Gaps. Reference number: [id number]. 1/02/2023   9:58:38 AM CST

## 2023-01-04 RX ORDER — AMLODIPINE BESYLATE 10 MG/1
10 TABLET ORAL DAILY
Qty: 90 TABLET | Refills: 1 | Status: SHIPPED | OUTPATIENT
Start: 2023-01-04 | End: 2023-04-11 | Stop reason: SDUPTHER

## 2023-01-10 ENCOUNTER — PATIENT MESSAGE (OUTPATIENT)
Dept: ADMINISTRATIVE | Facility: HOSPITAL | Age: 58
End: 2023-01-10
Payer: COMMERCIAL

## 2023-01-17 ENCOUNTER — PATIENT MESSAGE (OUTPATIENT)
Dept: ADMINISTRATIVE | Facility: HOSPITAL | Age: 58
End: 2023-01-17
Payer: COMMERCIAL

## 2023-02-08 RX ORDER — LOSARTAN POTASSIUM 50 MG/1
50 TABLET ORAL DAILY
Qty: 90 TABLET | Refills: 0 | Status: SHIPPED | OUTPATIENT
Start: 2023-02-08 | End: 2023-04-11 | Stop reason: SDUPTHER

## 2023-02-08 NOTE — TELEPHONE ENCOUNTER
Care Due:                  Date            Visit Type   Department     Provider  --------------------------------------------------------------------------------                                EP -                              PRIMARY      [facility]  Last Visit: 05-      CARE ([facility])   MEDICINE       Leander Boo  Next Visit: None Scheduled  None         None Found                                                            Last  Test          Frequency    Reason                     Performed    Due Date  --------------------------------------------------------------------------------    Office Visit  12 months..  amLODIPine, atorvastatin,   05- 05-                             losartan,                             triamterene-hydrochloroth                             iazide...................    CMP.........  12 months..  atorvastatin, losartan,    04- 04-                             triamterene-hydrochloroth                             iazide...................    Lipid Panel.  12 months..  atorvastatin.............  04- 04-    [facility] Embedded Care Gaps. Reference number: [id number]. 2/08/2023   2:17:09 PM CST

## 2023-02-08 NOTE — TELEPHONE ENCOUNTER
Refill Routing Note   Medication(s) are not appropriate for processing by [facility] for the following reason(s):       Required vitals abnormal    [facility] action(s):  Defer Care gaps identified: Yes     Medication Therapy Plan: Due for annual with PCP, Labs (CMP, Lipid panel); Last BP abnormal: 12/14/22 (!) 142/77    Appointments  past 12m or future 3m with PCP    Date Provider   Last Visit   5/12/2022 Leander Boo MD   Next Visit   Visit date not found Leander Boo MD   ED visits in past 90 days: 0        Note composed:2:37 PM 02/08/2023

## 2023-02-22 ENCOUNTER — PATIENT MESSAGE (OUTPATIENT)
Dept: BARIATRICS | Facility: CLINIC | Age: 58
End: 2023-02-22
Payer: COMMERCIAL

## 2023-03-09 ENCOUNTER — PATIENT MESSAGE (OUTPATIENT)
Dept: BARIATRICS | Facility: CLINIC | Age: 58
End: 2023-03-09
Payer: COMMERCIAL

## 2023-03-16 ENCOUNTER — OFFICE VISIT (OUTPATIENT)
Dept: BARIATRICS | Facility: CLINIC | Age: 58
End: 2023-03-16
Payer: COMMERCIAL

## 2023-03-16 VITALS
SYSTOLIC BLOOD PRESSURE: 128 MMHG | DIASTOLIC BLOOD PRESSURE: 86 MMHG | BODY MASS INDEX: 41.3 KG/M2 | HEIGHT: 66 IN | HEART RATE: 84 BPM | WEIGHT: 257 LBS | OXYGEN SATURATION: 96 %

## 2023-03-16 DIAGNOSIS — E78.49 OTHER HYPERLIPIDEMIA: ICD-10-CM

## 2023-03-16 DIAGNOSIS — I10 PRIMARY HYPERTENSION: ICD-10-CM

## 2023-03-16 DIAGNOSIS — E11.9 TYPE 2 DIABETES MELLITUS WITHOUT COMPLICATION, WITHOUT LONG-TERM CURRENT USE OF INSULIN: ICD-10-CM

## 2023-03-16 DIAGNOSIS — Z71.3 ENCOUNTER FOR WEIGHT LOSS COUNSELING: ICD-10-CM

## 2023-03-16 DIAGNOSIS — E66.01 CLASS 3 SEVERE OBESITY DUE TO EXCESS CALORIES WITH SERIOUS COMORBIDITY AND BODY MASS INDEX (BMI) OF 40.0 TO 44.9 IN ADULT: Primary | ICD-10-CM

## 2023-03-16 DIAGNOSIS — K75.81 NASH (NONALCOHOLIC STEATOHEPATITIS): ICD-10-CM

## 2023-03-16 PROCEDURE — 99999 PR PBB SHADOW E&M-EST. PATIENT-LVL IV: CPT | Mod: PBBFAC,,, | Performed by: STUDENT IN AN ORGANIZED HEALTH CARE EDUCATION/TRAINING PROGRAM

## 2023-03-16 PROCEDURE — 3008F PR BODY MASS INDEX (BMI) DOCUMENTED: ICD-10-PCS | Mod: CPTII,S$GLB,, | Performed by: STUDENT IN AN ORGANIZED HEALTH CARE EDUCATION/TRAINING PROGRAM

## 2023-03-16 PROCEDURE — 99213 OFFICE O/P EST LOW 20 MIN: CPT | Mod: S$GLB,,, | Performed by: STUDENT IN AN ORGANIZED HEALTH CARE EDUCATION/TRAINING PROGRAM

## 2023-03-16 PROCEDURE — 1160F PR REVIEW ALL MEDS BY PRESCRIBER/CLIN PHARMACIST DOCUMENTED: ICD-10-PCS | Mod: CPTII,S$GLB,, | Performed by: STUDENT IN AN ORGANIZED HEALTH CARE EDUCATION/TRAINING PROGRAM

## 2023-03-16 PROCEDURE — 3074F SYST BP LT 130 MM HG: CPT | Mod: CPTII,S$GLB,, | Performed by: STUDENT IN AN ORGANIZED HEALTH CARE EDUCATION/TRAINING PROGRAM

## 2023-03-16 PROCEDURE — 1159F PR MEDICATION LIST DOCUMENTED IN MEDICAL RECORD: ICD-10-PCS | Mod: CPTII,S$GLB,, | Performed by: STUDENT IN AN ORGANIZED HEALTH CARE EDUCATION/TRAINING PROGRAM

## 2023-03-16 PROCEDURE — 3079F DIAST BP 80-89 MM HG: CPT | Mod: CPTII,S$GLB,, | Performed by: STUDENT IN AN ORGANIZED HEALTH CARE EDUCATION/TRAINING PROGRAM

## 2023-03-16 PROCEDURE — 3008F BODY MASS INDEX DOCD: CPT | Mod: CPTII,S$GLB,, | Performed by: STUDENT IN AN ORGANIZED HEALTH CARE EDUCATION/TRAINING PROGRAM

## 2023-03-16 PROCEDURE — 99999 PR PBB SHADOW E&M-EST. PATIENT-LVL IV: ICD-10-PCS | Mod: PBBFAC,,, | Performed by: STUDENT IN AN ORGANIZED HEALTH CARE EDUCATION/TRAINING PROGRAM

## 2023-03-16 PROCEDURE — 1160F RVW MEDS BY RX/DR IN RCRD: CPT | Mod: CPTII,S$GLB,, | Performed by: STUDENT IN AN ORGANIZED HEALTH CARE EDUCATION/TRAINING PROGRAM

## 2023-03-16 PROCEDURE — 3079F PR MOST RECENT DIASTOLIC BLOOD PRESSURE 80-89 MM HG: ICD-10-PCS | Mod: CPTII,S$GLB,, | Performed by: STUDENT IN AN ORGANIZED HEALTH CARE EDUCATION/TRAINING PROGRAM

## 2023-03-16 PROCEDURE — 1159F MED LIST DOCD IN RCRD: CPT | Mod: CPTII,S$GLB,, | Performed by: STUDENT IN AN ORGANIZED HEALTH CARE EDUCATION/TRAINING PROGRAM

## 2023-03-16 PROCEDURE — 4010F PR ACE/ARB THEARPY RXD/TAKEN: ICD-10-PCS | Mod: CPTII,S$GLB,, | Performed by: STUDENT IN AN ORGANIZED HEALTH CARE EDUCATION/TRAINING PROGRAM

## 2023-03-16 PROCEDURE — 3074F PR MOST RECENT SYSTOLIC BLOOD PRESSURE < 130 MM HG: ICD-10-PCS | Mod: CPTII,S$GLB,, | Performed by: STUDENT IN AN ORGANIZED HEALTH CARE EDUCATION/TRAINING PROGRAM

## 2023-03-16 PROCEDURE — 99213 PR OFFICE/OUTPT VISIT, EST, LEVL III, 20-29 MIN: ICD-10-PCS | Mod: S$GLB,,, | Performed by: STUDENT IN AN ORGANIZED HEALTH CARE EDUCATION/TRAINING PROGRAM

## 2023-03-16 PROCEDURE — 4010F ACE/ARB THERAPY RXD/TAKEN: CPT | Mod: CPTII,S$GLB,, | Performed by: STUDENT IN AN ORGANIZED HEALTH CARE EDUCATION/TRAINING PROGRAM

## 2023-03-16 RX ORDER — TIRZEPATIDE 15 MG/.5ML
15 INJECTION, SOLUTION SUBCUTANEOUS
Qty: 4 PEN | Refills: 2 | Status: SHIPPED | OUTPATIENT
Start: 2023-03-16 | End: 2023-06-23

## 2023-03-16 NOTE — PROGRESS NOTES
Subjective:       Patient ID: Андрей Wooten is a 58 y.o. male.    Chief Complaint: Follow-up, Obesity, and Weight Check      Patient presents for treatment of obesity.     Co-morbidities:   HTN  HLD  NAFLD  DM2  GERD    Negative for thyroid cancer  Negative for pancreatitis     History of Weight Loss Efforts  Dr. Bernal - prescribed phentermine, diethylpropion; 255 lbs when first seen, got down to 240 lbs  Intermittent Fasting - got down to about 230 lbs    Current Physical Activity  No regular exercise routine  Was doing cardio workout at [employer], walking 4-5 miles prior to [place]    Current Eating Habits  Breakfast - grits, ham, biscuits, cereal (Frosted Flakes)  Lunch - fast food  Dinner - southern food  Snacks - chips, cookies, candy; about 4/day  Beverages - water, Hawaiian Punch, sweet tea    Shift work    Drinking less sugar sweetened beverages  Eating more salads  Protein shakes  V8  Beans with brown rice  Cut back on fried foods    Medical Weight Loss  1/24/2022 ([employer] out for repair): 247 lbs 5.7 oz, BMI 39.92  2/23/2022: 245.7 lbs, BMI 39.7, BFP 38.2%, BFM 93.8 lbs, SMM 87.7 lbs, BMR 1858 kcal; Ozempic  4/1/2022: 244.1 lbs, BMI 39.4, BFP 38.7%, BFM 94.4 lbs, SMM 86.6 lbs, BMR 1836 kcal; Ozempic  5/10/2022: 247.8 lbs, BMI 40, BFP 38.1%, BFM 94.4 lbs, SMM 88.6 lbs, BMR 1874 kcal; Ozempic  6/7/2022: 241.8 lbs, BMI 39, BFP 38.8%, BFM 93.9 lbs, SMM 85.3 lbs, BMR 1820 kcal; Ozempic  8/16/2022: 244.9 lbs, BMI 39.5, BFP 38.3%, BFM 93.9 lbs, SMM 86.6 lbs, BMR 1850 kcal  10/10/2022: 247.3 lbs, BMI 39.9, BFP 39.6%, BFM 98.1 lbs, SMM 85.8 lbs, BMR 1833 kcal  12/14/2022: 244.3 lbs, BMI 39.4, BFP 39.1%, BFM 95.5 lbs, SMM 85.8 lbs, BMR 1828 kcal  3/16/2023: 257 lbs, BMI 41.5, BFP 39%, .2 lbs, SMM 90 lbs, BMR 1905 kcal      Follow-up  Pertinent negatives include no abdominal pain, chest pain, chills, fever, nausea or vomiting.   Review of Systems   Constitutional:  Negative for chills and fever.    Respiratory:  Negative for shortness of breath.    Cardiovascular:  Negative for chest pain, palpitations and leg swelling.   Gastrointestinal:  Positive for reflux. Negative for abdominal pain, nausea and vomiting.   Neurological:  Negative for dizziness and light-headedness.       Objective:        Latest Reference Range & Units 04/12/22 08:17   WBC 3.90 - 12.70 K/uL 4.62   RBC 4.60 - 6.20 M/uL 5.18   Hemoglobin 14.0 - 18.0 g/dL 14.3   Hematocrit 40.0 - 54.0 % 44.1   MCV 82 - 98 fL 85   MCH 27.0 - 31.0 pg 27.6   MCHC 32.0 - 36.0 g/dL 32.4   RDW 11.5 - 14.5 % 13.2   Platelets 150 - 450 K/uL 267   MPV 9.2 - 12.9 fL 9.5   Gran % 38.0 - 73.0 % 33.2 (L)   Lymph % 18.0 - 48.0 % 55.0 (H)   Mono % 4.0 - 15.0 % 9.7   Eosinophil % 0.0 - 8.0 % 1.5   Basophil % 0.0 - 1.9 % 0.6   Immature Granulocytes 0.0 - 0.5 % 0.0   Gran # (ANC) 1.8 - 7.7 K/uL 1.5 (L)   Lymph # 1.0 - 4.8 K/uL 2.5   Mono # 0.3 - 1.0 K/uL 0.5   Eos # 0.0 - 0.5 K/uL 0.1   Baso # 0.00 - 0.20 K/uL 0.03   Immature Grans (Abs) 0.00 - 0.04 K/uL 0.00   nRBC 0 /100 WBC 0   Differential Method  Automated   Sodium 136 - 145 mmol/L 141   Potassium 3.5 - 5.1 mmol/L 3.9   Chloride 95 - 110 mmol/L 101   CO2 23 - 29 mmol/L 29   Anion Gap 8 - 16 mmol/L 11   BUN 2 - 20 mg/dL 18   Creatinine 0.50 - 1.40 mg/dL 1.07   eGFR if non African American >60 mL/min/1.73 m^2 >60.0   eGFR if African American >60 mL/min/1.73 m^2 >60.0   Glucose 70 - 110 mg/dL 92   Calcium 8.7 - 10.5 mg/dL 9.3   Alkaline Phosphatase 38 - 126 U/L 54   PROTEIN TOTAL 6.0 - 8.4 g/dL 8.1   Albumin 3.5 - 5.2 g/dL 4.5   BILIRUBIN TOTAL 0.1 - 1.0 mg/dL 0.5   AST 15 - 46 U/L 43   ALT 10 - 44 U/L 62 (H)   Cholesterol 120 - 199 mg/dL 139   HDL 40 - 75 mg/dL 28 (L)   HDL/Cholesterol Ratio 20.0 - 50.0 % 20.1   LDL Cholesterol External 63.0 - 159.0 mg/dL 81.4   Non-HDL Cholesterol mg/dL 111   Total Cholesterol/HDL Ratio 2.0 - 5.0  5.0   Triglycerides 30 - 150 mg/dL 148   Hemoglobin A1C External 4.0 - 5.6 % 6.3 (H)    Estimated Avg Glucose 68 - 131 mg/dL 134 (H)   PSA, Screen 0.00 - 4.00 ng/mL 0.68   (L): Data is abnormally low  (H): Data is abnormally high    Vitals:    03/16/23 0956   BP: 128/86   Pulse: 84       Physical Exam  Vitals reviewed.   Constitutional:       General: He is not in acute distress.     Appearance: Normal appearance. He is obese. He is not ill-appearing, toxic-appearing or diaphoretic.   HENT:      Head: Normocephalic and atraumatic.   Cardiovascular:      Rate and Rhythm: Normal rate.   Pulmonary:      Effort: Pulmonary effort is normal. No respiratory distress.   Skin:     General: Skin is warm and dry.   Neurological:      General: No focal deficit present.      Mental Status: He is alert and oriented to person, place, and time.      Gait: Gait normal.   Psychiatric:         Mood and Affect: Mood normal.         Behavior: Behavior normal.         Thought Content: Thought content normal.         Judgment: Judgment normal.       Assessment:       Problem List Items Addressed This Visit       VOGEL (nonalcoholic steatohepatitis)    HLD (hyperlipidemia)    HTN (hypertension)    Type 2 diabetes mellitus without complication, without long-term current use of insulin    Relevant Medications    tirzepatide 12.5 mg/0.5 mL PnIj    tirzepatide (MOUNJARO) 15 mg/0.5 mL PnIj     Other Visit Diagnoses       Class 3 severe obesity due to excess calories with serious comorbidity and body mass index (BMI) of 40.0 to 44.9 in adult    -  Primary    Encounter for weight loss counseling                    Plan:   - Mounjaro weekly injections     - Log all food and beverage intake with a daily calorie goal of 1500 calories per day; meal plans given in AVS    - Moderate intensity aerobic exercise for 15-30 minutes 3-5x/week

## 2023-04-03 ENCOUNTER — PATIENT MESSAGE (OUTPATIENT)
Dept: BARIATRICS | Facility: CLINIC | Age: 58
End: 2023-04-03
Payer: COMMERCIAL

## 2023-04-03 DIAGNOSIS — E66.01 CLASS 2 SEVERE OBESITY DUE TO EXCESS CALORIES WITH SERIOUS COMORBIDITY AND BODY MASS INDEX (BMI) OF 39.0 TO 39.9 IN ADULT: ICD-10-CM

## 2023-04-03 DIAGNOSIS — K75.81 NASH (NONALCOHOLIC STEATOHEPATITIS): ICD-10-CM

## 2023-04-03 DIAGNOSIS — E11.9 TYPE 2 DIABETES MELLITUS WITHOUT COMPLICATION, WITHOUT LONG-TERM CURRENT USE OF INSULIN: ICD-10-CM

## 2023-04-06 ENCOUNTER — TELEPHONE (OUTPATIENT)
Dept: BARIATRICS | Facility: CLINIC | Age: 58
End: 2023-04-06
Payer: COMMERCIAL

## 2023-04-11 ENCOUNTER — PATIENT MESSAGE (OUTPATIENT)
Dept: ADMINISTRATIVE | Facility: HOSPITAL | Age: 58
End: 2023-04-11
Payer: COMMERCIAL

## 2023-04-11 ENCOUNTER — LAB VISIT (OUTPATIENT)
Dept: LAB | Facility: HOSPITAL | Age: 58
End: 2023-04-11
Attending: STUDENT IN AN ORGANIZED HEALTH CARE EDUCATION/TRAINING PROGRAM
Payer: COMMERCIAL

## 2023-04-11 ENCOUNTER — OFFICE VISIT (OUTPATIENT)
Dept: FAMILY MEDICINE | Facility: CLINIC | Age: 58
End: 2023-04-11
Payer: COMMERCIAL

## 2023-04-11 VITALS
TEMPERATURE: 98 F | DIASTOLIC BLOOD PRESSURE: 78 MMHG | HEIGHT: 66 IN | OXYGEN SATURATION: 97 % | HEART RATE: 100 BPM | WEIGHT: 249.88 LBS | SYSTOLIC BLOOD PRESSURE: 126 MMHG | BODY MASS INDEX: 40.16 KG/M2

## 2023-04-11 DIAGNOSIS — Z00.00 WELLNESS EXAMINATION: Primary | ICD-10-CM

## 2023-04-11 DIAGNOSIS — Z12.5 PROSTATE CANCER SCREENING: ICD-10-CM

## 2023-04-11 DIAGNOSIS — M54.50 ACUTE LEFT-SIDED LOW BACK PAIN WITHOUT SCIATICA: ICD-10-CM

## 2023-04-11 DIAGNOSIS — E66.01 CLASS 3 SEVERE OBESITY DUE TO EXCESS CALORIES WITH SERIOUS COMORBIDITY AND BODY MASS INDEX (BMI) OF 40.0 TO 44.9 IN ADULT: ICD-10-CM

## 2023-04-11 DIAGNOSIS — K21.9 GASTROESOPHAGEAL REFLUX DISEASE WITHOUT ESOPHAGITIS: ICD-10-CM

## 2023-04-11 DIAGNOSIS — I10 PRIMARY HYPERTENSION: ICD-10-CM

## 2023-04-11 DIAGNOSIS — I10 ESSENTIAL HYPERTENSION: ICD-10-CM

## 2023-04-11 DIAGNOSIS — E78.2 MIXED HYPERLIPIDEMIA: ICD-10-CM

## 2023-04-11 DIAGNOSIS — E11.9 TYPE 2 DIABETES MELLITUS WITHOUT COMPLICATION, WITHOUT LONG-TERM CURRENT USE OF INSULIN: ICD-10-CM

## 2023-04-11 DIAGNOSIS — Z23 NEED FOR STREPTOCOCCUS PNEUMONIAE VACCINATION: ICD-10-CM

## 2023-04-11 LAB
ALBUMIN SERPL BCP-MCNC: 4.5 G/DL (ref 3.5–5.2)
ALP SERPL-CCNC: 62 U/L (ref 38–126)
ALT SERPL W/O P-5'-P-CCNC: 92 U/L (ref 10–44)
ANION GAP SERPL CALC-SCNC: 7 MMOL/L (ref 8–16)
AST SERPL-CCNC: 57 U/L (ref 15–46)
BASOPHILS # BLD AUTO: 0.03 K/UL (ref 0–0.2)
BASOPHILS NFR BLD: 0.6 % (ref 0–1.9)
BILIRUB SERPL-MCNC: 0.5 MG/DL (ref 0.1–1)
CALCIUM SERPL-MCNC: 9.3 MG/DL (ref 8.7–10.5)
CHLORIDE SERPL-SCNC: 99 MMOL/L (ref 95–110)
CHOLEST SERPL-MCNC: 132 MG/DL (ref 120–199)
CHOLEST/HDLC SERPL: 5.3 {RATIO} (ref 2–5)
CO2 SERPL-SCNC: 32 MMOL/L (ref 23–29)
COMPLEXED PSA SERPL-MCNC: 0.72 NG/ML (ref 0–4)
CREAT SERPL-MCNC: 0.95 MG/DL (ref 0.5–1.4)
DIFFERENTIAL METHOD: ABNORMAL
EOSINOPHIL # BLD AUTO: 0 K/UL (ref 0–0.5)
EOSINOPHIL NFR BLD: 0.8 % (ref 0–8)
ERYTHROCYTE [DISTWIDTH] IN BLOOD BY AUTOMATED COUNT: 13.5 % (ref 11.5–14.5)
EST. GFR  (NO RACE VARIABLE): >60 ML/MIN/1.73 M^2
ESTIMATED AVG GLUCOSE: 126 MG/DL (ref 68–131)
GLUCOSE SERPL-MCNC: 95 MG/DL (ref 70–110)
HBA1C MFR BLD: 6 % (ref 4–5.6)
HCT VFR BLD AUTO: 45.4 % (ref 40–54)
HDLC SERPL-MCNC: 25 MG/DL (ref 40–75)
HDLC SERPL: 18.9 % (ref 20–50)
HGB BLD-MCNC: 14.8 G/DL (ref 14–18)
IMM GRANULOCYTES # BLD AUTO: 0.01 K/UL (ref 0–0.04)
IMM GRANULOCYTES NFR BLD AUTO: 0.2 % (ref 0–0.5)
LDLC SERPL CALC-MCNC: 73 MG/DL (ref 63–159)
LYMPHOCYTES # BLD AUTO: 2.9 K/UL (ref 1–4.8)
LYMPHOCYTES NFR BLD: 56.9 % (ref 18–48)
MCH RBC QN AUTO: 27.6 PG (ref 27–31)
MCHC RBC AUTO-ENTMCNC: 32.6 G/DL (ref 32–36)
MCV RBC AUTO: 85 FL (ref 82–98)
MONOCYTES # BLD AUTO: 0.5 K/UL (ref 0.3–1)
MONOCYTES NFR BLD: 9 % (ref 4–15)
NEUTROPHILS # BLD AUTO: 1.6 K/UL (ref 1.8–7.7)
NEUTROPHILS NFR BLD: 32.5 % (ref 38–73)
NONHDLC SERPL-MCNC: 107 MG/DL
NRBC BLD-RTO: 0 /100 WBC
PLATELET # BLD AUTO: 308 K/UL (ref 150–450)
PMV BLD AUTO: 9.8 FL (ref 9.2–12.9)
POTASSIUM SERPL-SCNC: 3.6 MMOL/L (ref 3.5–5.1)
PROT SERPL-MCNC: 8.4 G/DL (ref 6–8.4)
RBC # BLD AUTO: 5.37 M/UL (ref 4.6–6.2)
SODIUM SERPL-SCNC: 138 MMOL/L (ref 136–145)
TRIGL SERPL-MCNC: 170 MG/DL (ref 30–150)
TSH SERPL DL<=0.005 MIU/L-ACNC: 1.61 UIU/ML (ref 0.4–4)
UUN UR-MCNC: 17 MG/DL (ref 2–20)
WBC # BLD AUTO: 5.01 K/UL (ref 3.9–12.7)

## 2023-04-11 PROCEDURE — 80053 COMPREHEN METABOLIC PANEL: CPT | Mod: PO | Performed by: STUDENT IN AN ORGANIZED HEALTH CARE EDUCATION/TRAINING PROGRAM

## 2023-04-11 PROCEDURE — 84153 ASSAY OF PSA TOTAL: CPT | Performed by: STUDENT IN AN ORGANIZED HEALTH CARE EDUCATION/TRAINING PROGRAM

## 2023-04-11 PROCEDURE — 90677 PCV20 VACCINE IM: CPT | Mod: S$GLB,,, | Performed by: STUDENT IN AN ORGANIZED HEALTH CARE EDUCATION/TRAINING PROGRAM

## 2023-04-11 PROCEDURE — 3078F PR MOST RECENT DIASTOLIC BLOOD PRESSURE < 80 MM HG: ICD-10-PCS | Mod: CPTII,S$GLB,, | Performed by: STUDENT IN AN ORGANIZED HEALTH CARE EDUCATION/TRAINING PROGRAM

## 2023-04-11 PROCEDURE — 84443 ASSAY THYROID STIM HORMONE: CPT | Mod: PO | Performed by: STUDENT IN AN ORGANIZED HEALTH CARE EDUCATION/TRAINING PROGRAM

## 2023-04-11 PROCEDURE — 1159F PR MEDICATION LIST DOCUMENTED IN MEDICAL RECORD: ICD-10-PCS | Mod: CPTII,S$GLB,, | Performed by: STUDENT IN AN ORGANIZED HEALTH CARE EDUCATION/TRAINING PROGRAM

## 2023-04-11 PROCEDURE — 1160F RVW MEDS BY RX/DR IN RCRD: CPT | Mod: CPTII,S$GLB,, | Performed by: STUDENT IN AN ORGANIZED HEALTH CARE EDUCATION/TRAINING PROGRAM

## 2023-04-11 PROCEDURE — 3008F BODY MASS INDEX DOCD: CPT | Mod: CPTII,S$GLB,, | Performed by: STUDENT IN AN ORGANIZED HEALTH CARE EDUCATION/TRAINING PROGRAM

## 2023-04-11 PROCEDURE — 3074F SYST BP LT 130 MM HG: CPT | Mod: CPTII,S$GLB,, | Performed by: STUDENT IN AN ORGANIZED HEALTH CARE EDUCATION/TRAINING PROGRAM

## 2023-04-11 PROCEDURE — 83036 HEMOGLOBIN GLYCOSYLATED A1C: CPT | Performed by: STUDENT IN AN ORGANIZED HEALTH CARE EDUCATION/TRAINING PROGRAM

## 2023-04-11 PROCEDURE — 1160F PR REVIEW ALL MEDS BY PRESCRIBER/CLIN PHARMACIST DOCUMENTED: ICD-10-PCS | Mod: CPTII,S$GLB,, | Performed by: STUDENT IN AN ORGANIZED HEALTH CARE EDUCATION/TRAINING PROGRAM

## 2023-04-11 PROCEDURE — 90677 PNEUMOCOCCAL CONJUGATE VACCINE 20-VALENT: ICD-10-PCS | Mod: S$GLB,,, | Performed by: STUDENT IN AN ORGANIZED HEALTH CARE EDUCATION/TRAINING PROGRAM

## 2023-04-11 PROCEDURE — 3074F PR MOST RECENT SYSTOLIC BLOOD PRESSURE < 130 MM HG: ICD-10-PCS | Mod: CPTII,S$GLB,, | Performed by: STUDENT IN AN ORGANIZED HEALTH CARE EDUCATION/TRAINING PROGRAM

## 2023-04-11 PROCEDURE — 99396 PR PREVENTIVE VISIT,EST,40-64: ICD-10-PCS | Mod: 25,S$GLB,, | Performed by: STUDENT IN AN ORGANIZED HEALTH CARE EDUCATION/TRAINING PROGRAM

## 2023-04-11 PROCEDURE — 4010F PR ACE/ARB THEARPY RXD/TAKEN: ICD-10-PCS | Mod: CPTII,S$GLB,, | Performed by: STUDENT IN AN ORGANIZED HEALTH CARE EDUCATION/TRAINING PROGRAM

## 2023-04-11 PROCEDURE — 85025 COMPLETE CBC W/AUTO DIFF WBC: CPT | Mod: PO | Performed by: STUDENT IN AN ORGANIZED HEALTH CARE EDUCATION/TRAINING PROGRAM

## 2023-04-11 PROCEDURE — 3008F PR BODY MASS INDEX (BMI) DOCUMENTED: ICD-10-PCS | Mod: CPTII,S$GLB,, | Performed by: STUDENT IN AN ORGANIZED HEALTH CARE EDUCATION/TRAINING PROGRAM

## 2023-04-11 PROCEDURE — 1159F MED LIST DOCD IN RCRD: CPT | Mod: CPTII,S$GLB,, | Performed by: STUDENT IN AN ORGANIZED HEALTH CARE EDUCATION/TRAINING PROGRAM

## 2023-04-11 PROCEDURE — 4010F ACE/ARB THERAPY RXD/TAKEN: CPT | Mod: CPTII,S$GLB,, | Performed by: STUDENT IN AN ORGANIZED HEALTH CARE EDUCATION/TRAINING PROGRAM

## 2023-04-11 PROCEDURE — 90471 PNEUMOCOCCAL CONJUGATE VACCINE 20-VALENT: ICD-10-PCS | Mod: S$GLB,,, | Performed by: STUDENT IN AN ORGANIZED HEALTH CARE EDUCATION/TRAINING PROGRAM

## 2023-04-11 PROCEDURE — 90471 IMMUNIZATION ADMIN: CPT | Mod: S$GLB,,, | Performed by: STUDENT IN AN ORGANIZED HEALTH CARE EDUCATION/TRAINING PROGRAM

## 2023-04-11 PROCEDURE — 36415 COLL VENOUS BLD VENIPUNCTURE: CPT | Mod: PO | Performed by: STUDENT IN AN ORGANIZED HEALTH CARE EDUCATION/TRAINING PROGRAM

## 2023-04-11 PROCEDURE — 80061 LIPID PANEL: CPT | Performed by: STUDENT IN AN ORGANIZED HEALTH CARE EDUCATION/TRAINING PROGRAM

## 2023-04-11 PROCEDURE — 99396 PREV VISIT EST AGE 40-64: CPT | Mod: 25,S$GLB,, | Performed by: STUDENT IN AN ORGANIZED HEALTH CARE EDUCATION/TRAINING PROGRAM

## 2023-04-11 PROCEDURE — 3078F DIAST BP <80 MM HG: CPT | Mod: CPTII,S$GLB,, | Performed by: STUDENT IN AN ORGANIZED HEALTH CARE EDUCATION/TRAINING PROGRAM

## 2023-04-11 RX ORDER — AMITRIPTYLINE HYDROCHLORIDE 50 MG/1
50 TABLET, FILM COATED ORAL NIGHTLY
Qty: 30 TABLET | Refills: 11 | Status: SHIPPED | OUTPATIENT
Start: 2023-04-11 | End: 2024-04-10

## 2023-04-11 RX ORDER — MELOXICAM 7.5 MG/1
7.5 TABLET ORAL DAILY PRN
Qty: 20 TABLET | Refills: 5 | Status: SHIPPED | OUTPATIENT
Start: 2023-04-11

## 2023-04-11 RX ORDER — TRIAMTERENE AND HYDROCHLOROTHIAZIDE 37.5; 25 MG/1; MG/1
1 CAPSULE ORAL EVERY MORNING
Qty: 90 CAPSULE | Refills: 2 | Status: SHIPPED | OUTPATIENT
Start: 2023-04-11 | End: 2023-10-05 | Stop reason: SDUPTHER

## 2023-04-11 RX ORDER — LOSARTAN POTASSIUM 50 MG/1
50 TABLET ORAL DAILY
Qty: 90 TABLET | Refills: 3 | Status: SHIPPED | OUTPATIENT
Start: 2023-04-11

## 2023-04-11 RX ORDER — AMLODIPINE BESYLATE 10 MG/1
10 TABLET ORAL DAILY
Qty: 90 TABLET | Refills: 1 | Status: SHIPPED | OUTPATIENT
Start: 2023-04-11 | End: 2023-09-12

## 2023-04-11 RX ORDER — PANTOPRAZOLE SODIUM 40 MG/1
40 TABLET, DELAYED RELEASE ORAL EVERY MORNING
Qty: 90 TABLET | Refills: 3 | Status: SHIPPED | OUTPATIENT
Start: 2023-04-11

## 2023-04-11 RX ORDER — ATORVASTATIN CALCIUM 40 MG/1
40 TABLET, FILM COATED ORAL DAILY
Qty: 90 TABLET | Refills: 1 | Status: SHIPPED | OUTPATIENT
Start: 2023-04-11 | End: 2023-09-12

## 2023-04-11 RX ORDER — FAMOTIDINE 40 MG/1
40 TABLET, FILM COATED ORAL DAILY
Qty: 30 TABLET | Refills: 11 | Status: SHIPPED | OUTPATIENT
Start: 2023-04-11 | End: 2024-04-10

## 2023-04-11 NOTE — PROGRESS NOTES
Patient ID: Андрей Wooten is a 58 y.o. male.     Chief Complaint: Annual Exam    HPI   Patient here for annual wellness exam. He has no complaints today. He denies chest pain and shortness of breath. He denies fevers and chills. He would like to have blood work done today. He needs refills of medications.     Review of Systems  Review of Systems   Constitutional:  Negative for fever.   HENT:  Negative for ear pain, hearing loss and sinus pain.    Eyes:  Negative for discharge.   Respiratory:  Negative for cough, shortness of breath and wheezing.    Cardiovascular:  Negative for chest pain, palpitations and leg swelling.   Gastrointestinal:  Negative for blood in stool, constipation, diarrhea, nausea and vomiting.   Genitourinary:  Negative for hematuria and urgency.   Musculoskeletal:  Negative for myalgias and neck pain.   Skin:  Negative for rash.   Neurological:  Positive for weakness. Negative for headaches.   Endo/Heme/Allergies:  Negative for polydipsia.   Psychiatric/Behavioral:  Negative for depression.    All other systems reviewed and are negative.    Currently Medications  Current Outpatient Medications on File Prior to Visit   Medication Sig Dispense Refill    blood sugar diagnostic Strp To check BG 1 times daily, to use with insurance preferred meter 100 strip 11    blood-glucose meter kit To check BG 1 times daily, to use with insurance preferred meter 1 each 0    cetirizine (ZYRTEC) 10 MG tablet Take 10 mg by mouth as needed.       fluticasone (FLONASE) 50 mcg/actuation nasal spray 1 spray by Each Nare route once daily. (Patient taking differently: 1 spray by Each Nostril route as needed.) 16 g 0    lancets Misc To check BG 1 times daily, to use with insurance preferred meter 100 each 11    tirzepatide (MOUNJARO) 15 mg/0.5 mL PnIj Inject 15 mg into the skin every 7 days. 4 pen 2    tirzepatide 10 mg/0.5 mL PnIj Inject 10 mg into the skin every 7 days. 4 pen 0    tirzepatide 12.5 mg/0.5 mL PnIj  "Inject 12.5 mg into the skin every 7 days. 4 pen 0    vitamin D 1000 units Tab Take 185 mg by mouth once daily.      [DISCONTINUED] amitriptyline (ELAVIL) 50 MG tablet Take 1 tablet (50 mg total) by mouth every evening. 30 tablet 11    [DISCONTINUED] amLODIPine (NORVASC) 10 MG tablet Take 1 tablet (10 mg total) by mouth once daily. 90 tablet 1    [DISCONTINUED] atorvastatin (LIPITOR) 40 MG tablet TAKE 1 TABLET(40 MG) BY MOUTH EVERY DAY 90 tablet 1    [DISCONTINUED] famotidine (PEPCID) 40 MG tablet Take 1 tablet (40 mg total) by mouth once daily. 30 tablet 11    [DISCONTINUED] losartan (COZAAR) 50 MG tablet Take 1 tablet (50 mg total) by mouth once daily. Due for annual wellness with PCP, Labs 90 tablet 0    [DISCONTINUED] meloxicam (MOBIC) 7.5 MG tablet TAKE 1 TABLET(7.5 MG) BY MOUTH DAILY AS NEEDED FOR PAIN 20 tablet 0    [DISCONTINUED] metFORMIN (GLUCOPHAGE) 500 MG tablet Take 1 tablet (500 mg total) by mouth daily with breakfast. 90 tablet 1    [DISCONTINUED] pantoprazole (PROTONIX) 40 MG tablet Take 1 tablet (40 mg total) by mouth every morning. 90 tablet 3    [DISCONTINUED] triamterene-hydrochlorothiazide 37.5-25 mg (DYAZIDE) 37.5-25 mg per capsule Take 1 capsule by mouth every morning. 90 capsule 2     No current facility-administered medications on file prior to visit.       Physical  Exam  Vitals:    04/11/23 1021   BP: 126/78   BP Location: Right arm   Patient Position: Sitting   Pulse: 100   Temp: 98.1 °F (36.7 °C)   SpO2: 97%   Weight: 113.3 kg (249 lb 14.3 oz)   Height: 5' 6" (1.676 m)      Body mass index is 40.33 kg/m².  Wt Readings from Last 3 Encounters:   04/11/23 113.3 kg (249 lb 14.3 oz)   03/16/23 116.6 kg (257 lb)   12/14/22 110.8 kg (244 lb 4.8 oz)       Physical Exam  Vitals and nursing note reviewed.   Constitutional:       General: He is not in acute distress.     Appearance: He is obese. He is not ill-appearing.   HENT:      Head: Normocephalic and atraumatic.      Right Ear: External ear " normal.      Left Ear: External ear normal.      Nose: Nose normal.      Mouth/Throat:      Mouth: Mucous membranes are moist.   Eyes:      Extraocular Movements: Extraocular movements intact.      Conjunctiva/sclera: Conjunctivae normal.   Cardiovascular:      Rate and Rhythm: Normal rate and regular rhythm.      Pulses: Normal pulses.      Heart sounds: No murmur heard.  Pulmonary:      Effort: Pulmonary effort is normal. No respiratory distress.      Breath sounds: No wheezing.   Abdominal:      General: There is no distension.      Palpations: Abdomen is soft. There is no mass.      Tenderness: There is no abdominal tenderness.   Musculoskeletal:         General: No swelling.      Cervical back: Normal range of motion.   Skin:     Coloration: Skin is not jaundiced.      Findings: No rash.   Neurological:      General: No focal deficit present.      Mental Status: He is alert and oriented to person, place, and time.   Psychiatric:         Mood and Affect: Mood normal.         Thought Content: Thought content normal.       Labs:    Complete Blood Count  Lab Results   Component Value Date    RBC 5.18 04/12/2022    HGB 14.3 04/12/2022    HCT 44.1 04/12/2022    MCV 85 04/12/2022    MCH 27.6 04/12/2022    MCHC 32.4 04/12/2022    RDW 13.2 04/12/2022     04/12/2022    MPV 9.5 04/12/2022    GRAN 1.5 (L) 04/12/2022    GRAN 33.2 (L) 04/12/2022    LYMPH 2.5 04/12/2022    LYMPH 55.0 (H) 04/12/2022    MONO 0.5 04/12/2022    MONO 9.7 04/12/2022    EOS 0.1 04/12/2022    BASO 0.03 04/12/2022    EOSINOPHIL 1.5 04/12/2022    BASOPHIL 0.6 04/12/2022    DIFFMETHOD Automated 04/12/2022       Comprehensive Metabolic Panel  No results found for: GLU, BUN, CREATININE, NA, K, CL, PROT, ALBUMIN, BILITOT, AST, ALKPHOS, CO2, ALT, ANIONGAP, EGFRNONAA, ESTGFRAFRICA    TSH  No results found for: TSH    Imaging:  CT Chest Abdomen Pelvis With Contrast  Narrative: EXAMINATION:  CT CHEST ABDOMEN PELVIS WITH CONTRAST (XPD)    CLINICAL  HISTORY:  Epigastric painabdominal pain and chest pain with radiation;    TECHNIQUE:  Standard chest, abdomen, and pelvis CT protocol with oral and IV contrast was performed.  100 mL of Omnipaque 350 contrast material was used for this examination.    COMPARISON:  None    FINDINGS:  Finding: The size of the heart is within normal limits.  The right lung is clear.  There is a 4 mm noncalcified pulmonary nodule in the left lower lobe.  There are streaky opacities in the lingula and right lower lobe.  There is no pneumothorax or pleural effusion.    There is a 27 mm oval shaped area of hypodensity in the dome of the left lobe of the liver.  This area has a Hounsfield measurement of 8.  The gallbladder, pancreas, spleen, and adrenals are normal in appearance.  There are hypodense masses associated with both kidneys.  One of the larger ones measures 8 mm and is located in the medial aspect of the inferior pole of the right kidney.  These are characteristic of incidental findings.  The ureters and the urinary bladder are normal in appearance.  There is a mild amount of calcification within the prostate.  The appendix and the rest of the gastrointestinal system are normal in appearance. There is no free fluid within the abdomen or pelvis. There is no pneumoperitoneum.  There is a mild amount of atherosclerosis.  There are mild osteoarthritic changes in the sacroiliac joints bilaterally.  There is no abnormal lymphadenopathy based on CT size criteria.  Impression: 1. There is a 27 mm oval shaped area of hypodensity in the dome of the left lobe of the liver. This area has a Hounsfield measurement of 8.  This is characteristic of a cyst.  2. There is no abnormal lymphadenopathy based on CT size criteria.  3. There are mild osteoarthritic changes in the sacroiliac joints bilaterally.  4. There are streaky opacities in the lingula and right lower lobe.  This is characteristic of subsegmental atelectasis and/or scarring.  5.  There is a 4 mm noncalcified pulmonary nodule in the left lower lobe.  All CT scans at this facility use dose modulation, iterative reconstruction, and/or weight base dosing when appropriate to reduce radiation dose when appropriate to reduce radiation dose to as low as reasonably achievable.    Electronically signed by: Josue Mckeon MD  Date:    04/20/2022  Time:    09:34      Assessment/Plan:    1. Wellness examination    2. Prostate cancer screening  -     PSA, SCREENING; Future; Expected date: 04/11/2023    3. Mixed hyperlipidemia  -     LIPID PANEL; Future; Expected date: 04/11/2023  -     atorvastatin (LIPITOR) 40 MG tablet; Take 1 tablet (40 mg total) by mouth once daily.  Dispense: 90 tablet; Refill: 1    4. Primary hypertension  -     TSH; Future; Expected date: 04/11/2023  -     triamterene-hydrochlorothiazide 37.5-25 mg (DYAZIDE) 37.5-25 mg per capsule; Take 1 capsule by mouth every morning.  Dispense: 90 capsule; Refill: 2  -     losartan (COZAAR) 50 MG tablet; Take 1 tablet (50 mg total) by mouth once daily.  Dispense: 90 tablet; Refill: 3    5. Type 2 diabetes mellitus without complication, without long-term current use of insulin  -     HEMOGLOBIN A1C; Future; Expected date: 04/11/2023  -     CBC Auto Differential; Future  -     Comprehensive metabolic panel; Future; Expected date: 04/11/2023    6. Acute left-sided low back pain without sciatica  -     meloxicam (MOBIC) 7.5 MG tablet; Take 1 tablet (7.5 mg total) by mouth daily as needed for Pain.  Dispense: 20 tablet; Refill: 5    7. Essential hypertension  -     amLODIPine (NORVASC) 10 MG tablet; Take 1 tablet (10 mg total) by mouth once daily.  Dispense: 90 tablet; Refill: 1    8. Gastroesophageal reflux disease without esophagitis  -     famotidine (PEPCID) 40 MG tablet; Take 1 tablet (40 mg total) by mouth once daily.  Dispense: 30 tablet; Refill: 11  -     pantoprazole (PROTONIX) 40 MG tablet; Take 1 tablet (40 mg total) by mouth every  morning.  Dispense: 90 tablet; Refill: 3  -     amitriptyline (ELAVIL) 50 MG tablet; Take 1 tablet (50 mg total) by mouth every evening.  Dispense: 30 tablet; Refill: 11    9. Need for Streptococcus pneumoniae vaccination  -     (In Office Administered) Pneumococcal Conjugate Vaccine (20 Valent) (IM)    10. Class 3 severe obesity due to excess calories with serious comorbidity and body mass index (BMI) of 40.0 to 44.9 in adult         Discussed how to stay healthy including: diet, exercise, refraining from smoking and discussed screening exams / tests needed for age, sex and family Hx.    RTC pending labs    Leander Boo MD    Answers submitted by the patient for this visit:  Review of Systems Questionnaire (Submitted on 4/10/2023)  activity change: No  unexpected weight change: No  rhinorrhea: No  trouble swallowing: No  visual disturbance: No  chest tightness: No  polyuria: Yes  difficulty urinating: No  joint swelling: Yes  arthralgias: Yes  confusion: No  dysphoric mood: No

## 2023-06-12 ENCOUNTER — PATIENT MESSAGE (OUTPATIENT)
Dept: FAMILY MEDICINE | Facility: CLINIC | Age: 58
End: 2023-06-12
Payer: COMMERCIAL

## 2023-06-23 ENCOUNTER — OFFICE VISIT (OUTPATIENT)
Dept: BARIATRICS | Facility: CLINIC | Age: 58
End: 2023-06-23
Payer: COMMERCIAL

## 2023-06-23 VITALS
BODY MASS INDEX: 38.88 KG/M2 | HEART RATE: 89 BPM | SYSTOLIC BLOOD PRESSURE: 127 MMHG | DIASTOLIC BLOOD PRESSURE: 80 MMHG | OXYGEN SATURATION: 95 % | WEIGHT: 240.88 LBS

## 2023-06-23 DIAGNOSIS — E66.01 CLASS 2 SEVERE OBESITY DUE TO EXCESS CALORIES WITH SERIOUS COMORBIDITY AND BODY MASS INDEX (BMI) OF 38.0 TO 38.9 IN ADULT: Primary | ICD-10-CM

## 2023-06-23 DIAGNOSIS — E78.49 OTHER HYPERLIPIDEMIA: ICD-10-CM

## 2023-06-23 DIAGNOSIS — I10 ESSENTIAL HYPERTENSION: ICD-10-CM

## 2023-06-23 DIAGNOSIS — K75.81 NASH (NONALCOHOLIC STEATOHEPATITIS): ICD-10-CM

## 2023-06-23 DIAGNOSIS — E11.9 TYPE 2 DIABETES MELLITUS WITHOUT COMPLICATION, WITHOUT LONG-TERM CURRENT USE OF INSULIN: ICD-10-CM

## 2023-06-23 DIAGNOSIS — Z71.3 ENCOUNTER FOR WEIGHT LOSS COUNSELING: ICD-10-CM

## 2023-06-23 PROCEDURE — 3044F PR MOST RECENT HEMOGLOBIN A1C LEVEL <7.0%: ICD-10-PCS | Mod: CPTII,S$GLB,, | Performed by: STUDENT IN AN ORGANIZED HEALTH CARE EDUCATION/TRAINING PROGRAM

## 2023-06-23 PROCEDURE — 99999 PR PBB SHADOW E&M-EST. PATIENT-LVL IV: CPT | Mod: PBBFAC,,, | Performed by: STUDENT IN AN ORGANIZED HEALTH CARE EDUCATION/TRAINING PROGRAM

## 2023-06-23 PROCEDURE — 99213 PR OFFICE/OUTPT VISIT, EST, LEVL III, 20-29 MIN: ICD-10-PCS | Mod: S$GLB,,, | Performed by: STUDENT IN AN ORGANIZED HEALTH CARE EDUCATION/TRAINING PROGRAM

## 2023-06-23 PROCEDURE — 4010F PR ACE/ARB THEARPY RXD/TAKEN: ICD-10-PCS | Mod: CPTII,S$GLB,, | Performed by: STUDENT IN AN ORGANIZED HEALTH CARE EDUCATION/TRAINING PROGRAM

## 2023-06-23 PROCEDURE — 3044F HG A1C LEVEL LT 7.0%: CPT | Mod: CPTII,S$GLB,, | Performed by: STUDENT IN AN ORGANIZED HEALTH CARE EDUCATION/TRAINING PROGRAM

## 2023-06-23 PROCEDURE — 3074F SYST BP LT 130 MM HG: CPT | Mod: CPTII,S$GLB,, | Performed by: STUDENT IN AN ORGANIZED HEALTH CARE EDUCATION/TRAINING PROGRAM

## 2023-06-23 PROCEDURE — 3074F PR MOST RECENT SYSTOLIC BLOOD PRESSURE < 130 MM HG: ICD-10-PCS | Mod: CPTII,S$GLB,, | Performed by: STUDENT IN AN ORGANIZED HEALTH CARE EDUCATION/TRAINING PROGRAM

## 2023-06-23 PROCEDURE — 1159F MED LIST DOCD IN RCRD: CPT | Mod: CPTII,S$GLB,, | Performed by: STUDENT IN AN ORGANIZED HEALTH CARE EDUCATION/TRAINING PROGRAM

## 2023-06-23 PROCEDURE — 99213 OFFICE O/P EST LOW 20 MIN: CPT | Mod: S$GLB,,, | Performed by: STUDENT IN AN ORGANIZED HEALTH CARE EDUCATION/TRAINING PROGRAM

## 2023-06-23 PROCEDURE — 1159F PR MEDICATION LIST DOCUMENTED IN MEDICAL RECORD: ICD-10-PCS | Mod: CPTII,S$GLB,, | Performed by: STUDENT IN AN ORGANIZED HEALTH CARE EDUCATION/TRAINING PROGRAM

## 2023-06-23 PROCEDURE — 3008F BODY MASS INDEX DOCD: CPT | Mod: CPTII,S$GLB,, | Performed by: STUDENT IN AN ORGANIZED HEALTH CARE EDUCATION/TRAINING PROGRAM

## 2023-06-23 PROCEDURE — 99999 PR PBB SHADOW E&M-EST. PATIENT-LVL IV: ICD-10-PCS | Mod: PBBFAC,,, | Performed by: STUDENT IN AN ORGANIZED HEALTH CARE EDUCATION/TRAINING PROGRAM

## 2023-06-23 PROCEDURE — 1160F PR REVIEW ALL MEDS BY PRESCRIBER/CLIN PHARMACIST DOCUMENTED: ICD-10-PCS | Mod: CPTII,S$GLB,, | Performed by: STUDENT IN AN ORGANIZED HEALTH CARE EDUCATION/TRAINING PROGRAM

## 2023-06-23 PROCEDURE — 4010F ACE/ARB THERAPY RXD/TAKEN: CPT | Mod: CPTII,S$GLB,, | Performed by: STUDENT IN AN ORGANIZED HEALTH CARE EDUCATION/TRAINING PROGRAM

## 2023-06-23 PROCEDURE — 3079F DIAST BP 80-89 MM HG: CPT | Mod: CPTII,S$GLB,, | Performed by: STUDENT IN AN ORGANIZED HEALTH CARE EDUCATION/TRAINING PROGRAM

## 2023-06-23 PROCEDURE — 1160F RVW MEDS BY RX/DR IN RCRD: CPT | Mod: CPTII,S$GLB,, | Performed by: STUDENT IN AN ORGANIZED HEALTH CARE EDUCATION/TRAINING PROGRAM

## 2023-06-23 PROCEDURE — 3079F PR MOST RECENT DIASTOLIC BLOOD PRESSURE 80-89 MM HG: ICD-10-PCS | Mod: CPTII,S$GLB,, | Performed by: STUDENT IN AN ORGANIZED HEALTH CARE EDUCATION/TRAINING PROGRAM

## 2023-06-23 PROCEDURE — 3008F PR BODY MASS INDEX (BMI) DOCUMENTED: ICD-10-PCS | Mod: CPTII,S$GLB,, | Performed by: STUDENT IN AN ORGANIZED HEALTH CARE EDUCATION/TRAINING PROGRAM

## 2023-06-23 RX ORDER — TIRZEPATIDE 15 MG/.5ML
15 INJECTION, SOLUTION SUBCUTANEOUS
Qty: 4 PEN | Refills: 2 | Status: SHIPPED | OUTPATIENT
Start: 2023-06-23 | End: 2023-09-26 | Stop reason: SDUPTHER

## 2023-06-23 NOTE — PROGRESS NOTES
Subjective:       Patient ID: Андрей Wooten is a 58 y.o. male.    Chief Complaint: Follow-up, Obesity, and Weight Check      Patient presents for treatment of obesity.     Co-morbidities:   HTN  HLD  NAFLD  DM2  GERD    Negative for thyroid cancer  Negative for pancreatitis     History of Weight Loss Efforts  Dr. Bernal - prescribed phentermine, diethylpropion; 255 lbs when first seen, got down to 240 lbs  Intermittent Fasting - got down to about 230 lbs    Current Physical Activity  No regular exercise routine  Was doing cardio workout at [employer], walking 4-5 miles prior to [place]    Current Eating Habits  Breakfast - grits, ham, biscuits, cereal (Frosted Flakes)  Lunch - fast food  Dinner - southern food  Snacks - chips, cookies, candy; about 4/day  Beverages - water, Hawaiian Punch, sweet tea    Shift work    Drinking less sugar sweetened beverages  Eating more salads  Protein shakes  V8  Beans with brown rice  Cut back on fried foods    Medical Weight Loss  1/24/2022 ([employer] out for repair): 247 lbs 5.7 oz, BMI 39.92  2/23/2022: 245.7 lbs, BMI 39.7, BFP 38.2%, BFM 93.8 lbs, SMM 87.7 lbs, BMR 1858 kcal; Ozempic  4/1/2022: 244.1 lbs, BMI 39.4, BFP 38.7%, BFM 94.4 lbs, SMM 86.6 lbs, BMR 1836 kcal; Ozempic  5/10/2022: 247.8 lbs, BMI 40, BFP 38.1%, BFM 94.4 lbs, SMM 88.6 lbs, BMR 1874 kcal; Ozempic  6/7/2022: 241.8 lbs, BMI 39, BFP 38.8%, BFM 93.9 lbs, SMM 85.3 lbs, BMR 1820 kcal; Ozempic  8/16/2022: 244.9 lbs, BMI 39.5, BFP 38.3%, BFM 93.9 lbs, SMM 86.6 lbs, BMR 1850 kcal  10/10/2022: 247.3 lbs, BMI 39.9, BFP 39.6%, BFM 98.1 lbs, SMM 85.8 lbs, BMR 1833 kcal  12/14/2022: 244.3 lbs, BMI 39.4, BFP 39.1%, BFM 95.5 lbs, SMM 85.8 lbs, BMR 1828 kcal  3/16/2023: 257 lbs, BMI 41.5, BFP 39%, .2 lbs, SMM 90 lbs, BMR 1905 kcal  6/23/2023: 240.9 lbs, BMI 38.9, BFP 38.4%, BFM 92.5 lbs, SMM 85.5 lbs, BMR 1823 kcal      Follow-up  Pertinent negatives include no abdominal pain, chest pain, chills, fever, nausea  or vomiting.   Review of Systems   Constitutional:  Negative for chills and fever.   Respiratory:  Negative for shortness of breath.    Cardiovascular:  Negative for chest pain, palpitations and leg swelling.   Gastrointestinal:  Positive for reflux. Negative for abdominal pain, nausea and vomiting.   Neurological:  Negative for dizziness and light-headedness.       Objective:        Latest Reference Range & Units 04/12/22 08:17   WBC 3.90 - 12.70 K/uL 4.62   RBC 4.60 - 6.20 M/uL 5.18   Hemoglobin 14.0 - 18.0 g/dL 14.3   Hematocrit 40.0 - 54.0 % 44.1   MCV 82 - 98 fL 85   MCH 27.0 - 31.0 pg 27.6   MCHC 32.0 - 36.0 g/dL 32.4   RDW 11.5 - 14.5 % 13.2   Platelets 150 - 450 K/uL 267   MPV 9.2 - 12.9 fL 9.5   Gran % 38.0 - 73.0 % 33.2 (L)   Lymph % 18.0 - 48.0 % 55.0 (H)   Mono % 4.0 - 15.0 % 9.7   Eosinophil % 0.0 - 8.0 % 1.5   Basophil % 0.0 - 1.9 % 0.6   Immature Granulocytes 0.0 - 0.5 % 0.0   Gran # (ANC) 1.8 - 7.7 K/uL 1.5 (L)   Lymph # 1.0 - 4.8 K/uL 2.5   Mono # 0.3 - 1.0 K/uL 0.5   Eos # 0.0 - 0.5 K/uL 0.1   Baso # 0.00 - 0.20 K/uL 0.03   Immature Grans (Abs) 0.00 - 0.04 K/uL 0.00   nRBC 0 /100 WBC 0   Differential Method  Automated   Sodium 136 - 145 mmol/L 141   Potassium 3.5 - 5.1 mmol/L 3.9   Chloride 95 - 110 mmol/L 101   CO2 23 - 29 mmol/L 29   Anion Gap 8 - 16 mmol/L 11   BUN 2 - 20 mg/dL 18   Creatinine 0.50 - 1.40 mg/dL 1.07   eGFR if non African American >60 mL/min/1.73 m^2 >60.0   eGFR if African American >60 mL/min/1.73 m^2 >60.0   Glucose 70 - 110 mg/dL 92   Calcium 8.7 - 10.5 mg/dL 9.3   Alkaline Phosphatase 38 - 126 U/L 54   PROTEIN TOTAL 6.0 - 8.4 g/dL 8.1   Albumin 3.5 - 5.2 g/dL 4.5   BILIRUBIN TOTAL 0.1 - 1.0 mg/dL 0.5   AST 15 - 46 U/L 43   ALT 10 - 44 U/L 62 (H)   Cholesterol 120 - 199 mg/dL 139   HDL 40 - 75 mg/dL 28 (L)   HDL/Cholesterol Ratio 20.0 - 50.0 % 20.1   LDL Cholesterol External 63.0 - 159.0 mg/dL 81.4   Non-HDL Cholesterol mg/dL 111   Total Cholesterol/HDL Ratio 2.0 - 5.0  5.0    Triglycerides 30 - 150 mg/dL 148   Hemoglobin A1C External 4.0 - 5.6 % 6.3 (H)   Estimated Avg Glucose 68 - 131 mg/dL 134 (H)   PSA, Screen 0.00 - 4.00 ng/mL 0.68   (L): Data is abnormally low  (H): Data is abnormally high    Vitals:    06/23/23 1112   BP: 127/80   Pulse: 89       Physical Exam  Vitals reviewed.   Constitutional:       General: He is not in acute distress.     Appearance: Normal appearance. He is obese. He is not ill-appearing, toxic-appearing or diaphoretic.   HENT:      Head: Normocephalic and atraumatic.   Cardiovascular:      Rate and Rhythm: Normal rate.   Pulmonary:      Effort: Pulmonary effort is normal. No respiratory distress.   Skin:     General: Skin is warm and dry.   Neurological:      General: No focal deficit present.      Mental Status: He is alert and oriented to person, place, and time.      Gait: Gait normal.   Psychiatric:         Mood and Affect: Mood normal.         Behavior: Behavior normal.         Thought Content: Thought content normal.         Judgment: Judgment normal.       Assessment:       Problem List Items Addressed This Visit       VOGEL (nonalcoholic steatohepatitis)    Class 2 severe obesity due to excess calories with serious comorbidity and body mass index (BMI) of 38.0 to 38.9 in adult - Primary    HLD (hyperlipidemia)    Essential hypertension    Type 2 diabetes mellitus without complication, without long-term current use of insulin    Relevant Medications    tirzepatide (MOUNJARO) 15 mg/0.5 mL PnIj     Other Visit Diagnoses       Encounter for weight loss counseling                      Plan:   - Mounjaro 15 mg weekly injections     - Log all food and beverage intake with a daily calorie goal of 1500 calories per day; meal plans given in AVS    - Moderate intensity aerobic exercise for 15-30 minutes 3-5x/week

## 2023-06-28 ENCOUNTER — PATIENT MESSAGE (OUTPATIENT)
Dept: BARIATRICS | Facility: CLINIC | Age: 58
End: 2023-06-28
Payer: COMMERCIAL

## 2023-06-28 DIAGNOSIS — E11.9 TYPE 2 DIABETES MELLITUS WITHOUT COMPLICATION, WITHOUT LONG-TERM CURRENT USE OF INSULIN: Primary | ICD-10-CM

## 2023-06-28 RX ORDER — TIRZEPATIDE 12.5 MG/.5ML
12.5 INJECTION, SOLUTION SUBCUTANEOUS
Qty: 4 PEN | Refills: 2 | Status: SHIPPED | OUTPATIENT
Start: 2023-06-28 | End: 2024-01-05

## 2023-09-12 DIAGNOSIS — E78.2 MIXED HYPERLIPIDEMIA: ICD-10-CM

## 2023-09-12 DIAGNOSIS — I10 ESSENTIAL HYPERTENSION: ICD-10-CM

## 2023-09-12 RX ORDER — ATORVASTATIN CALCIUM 40 MG/1
40 TABLET, FILM COATED ORAL DAILY
Qty: 90 TABLET | Refills: 1 | Status: SHIPPED | OUTPATIENT
Start: 2023-09-12 | End: 2023-11-22

## 2023-09-12 RX ORDER — AMLODIPINE BESYLATE 10 MG/1
10 TABLET ORAL DAILY
Qty: 90 TABLET | Refills: 1 | Status: SHIPPED | OUTPATIENT
Start: 2023-09-12 | End: 2023-11-22

## 2023-09-12 NOTE — TELEPHONE ENCOUNTER
No care due was identified.  Health [facility] Embedded Care Due Messages. Reference number: [id number].   9/12/2023 11:52:08 AM CDT

## 2023-09-12 NOTE — TELEPHONE ENCOUNTER
Refill Routing Note   Medication(s) are not appropriate for processing by [facility] for the following reason(s):      Drug-disease interaction: amLODIPine and VOGEL (nonalcoholic steatohepatitis)    [facility] action(s):  Defer  Approve Care Due:  None identified   Medication Therapy Plan:       Pharmacist review requested: Yes     Appointments  past 12m or future 3m with PCP    Date Provider   Last Visit   4/11/2023 Leander Boo MD   Next Visit   Visit date not found Leander Boo MD   ED visits in past 90 days: 0        Note composed:3:00 PM 09/12/2023

## 2023-09-26 ENCOUNTER — OFFICE VISIT (OUTPATIENT)
Dept: BARIATRICS | Facility: CLINIC | Age: 58
End: 2023-09-26
Payer: COMMERCIAL

## 2023-09-26 VITALS
HEART RATE: 79 BPM | DIASTOLIC BLOOD PRESSURE: 72 MMHG | BODY MASS INDEX: 36.99 KG/M2 | OXYGEN SATURATION: 98 % | SYSTOLIC BLOOD PRESSURE: 120 MMHG | HEIGHT: 66 IN | WEIGHT: 230.19 LBS

## 2023-09-26 DIAGNOSIS — E66.01 CLASS 2 SEVERE OBESITY DUE TO EXCESS CALORIES WITH SERIOUS COMORBIDITY AND BODY MASS INDEX (BMI) OF 37.0 TO 37.9 IN ADULT: Primary | ICD-10-CM

## 2023-09-26 DIAGNOSIS — Z71.3 ENCOUNTER FOR WEIGHT LOSS COUNSELING: ICD-10-CM

## 2023-09-26 DIAGNOSIS — E11.9 TYPE 2 DIABETES MELLITUS WITHOUT COMPLICATION, WITHOUT LONG-TERM CURRENT USE OF INSULIN: ICD-10-CM

## 2023-09-26 DIAGNOSIS — I10 ESSENTIAL HYPERTENSION: ICD-10-CM

## 2023-09-26 DIAGNOSIS — K75.81 NASH (NONALCOHOLIC STEATOHEPATITIS): ICD-10-CM

## 2023-09-26 DIAGNOSIS — E78.49 OTHER HYPERLIPIDEMIA: ICD-10-CM

## 2023-09-26 PROCEDURE — 4010F ACE/ARB THERAPY RXD/TAKEN: CPT | Mod: CPTII,S$GLB,, | Performed by: STUDENT IN AN ORGANIZED HEALTH CARE EDUCATION/TRAINING PROGRAM

## 2023-09-26 PROCEDURE — 3008F PR BODY MASS INDEX (BMI) DOCUMENTED: ICD-10-PCS | Mod: CPTII,S$GLB,, | Performed by: STUDENT IN AN ORGANIZED HEALTH CARE EDUCATION/TRAINING PROGRAM

## 2023-09-26 PROCEDURE — 3008F BODY MASS INDEX DOCD: CPT | Mod: CPTII,S$GLB,, | Performed by: STUDENT IN AN ORGANIZED HEALTH CARE EDUCATION/TRAINING PROGRAM

## 2023-09-26 PROCEDURE — 99213 PR OFFICE/OUTPT VISIT, EST, LEVL III, 20-29 MIN: ICD-10-PCS | Mod: S$GLB,,, | Performed by: STUDENT IN AN ORGANIZED HEALTH CARE EDUCATION/TRAINING PROGRAM

## 2023-09-26 PROCEDURE — 1159F PR MEDICATION LIST DOCUMENTED IN MEDICAL RECORD: ICD-10-PCS | Mod: CPTII,S$GLB,, | Performed by: STUDENT IN AN ORGANIZED HEALTH CARE EDUCATION/TRAINING PROGRAM

## 2023-09-26 PROCEDURE — 3044F HG A1C LEVEL LT 7.0%: CPT | Mod: CPTII,S$GLB,, | Performed by: STUDENT IN AN ORGANIZED HEALTH CARE EDUCATION/TRAINING PROGRAM

## 2023-09-26 PROCEDURE — 99213 OFFICE O/P EST LOW 20 MIN: CPT | Mod: S$GLB,,, | Performed by: STUDENT IN AN ORGANIZED HEALTH CARE EDUCATION/TRAINING PROGRAM

## 2023-09-26 PROCEDURE — 3078F PR MOST RECENT DIASTOLIC BLOOD PRESSURE < 80 MM HG: ICD-10-PCS | Mod: CPTII,S$GLB,, | Performed by: STUDENT IN AN ORGANIZED HEALTH CARE EDUCATION/TRAINING PROGRAM

## 2023-09-26 PROCEDURE — 3074F PR MOST RECENT SYSTOLIC BLOOD PRESSURE < 130 MM HG: ICD-10-PCS | Mod: CPTII,S$GLB,, | Performed by: STUDENT IN AN ORGANIZED HEALTH CARE EDUCATION/TRAINING PROGRAM

## 2023-09-26 PROCEDURE — 99999 PR PBB SHADOW E&M-EST. PATIENT-LVL IV: ICD-10-PCS | Mod: PBBFAC,,, | Performed by: STUDENT IN AN ORGANIZED HEALTH CARE EDUCATION/TRAINING PROGRAM

## 2023-09-26 PROCEDURE — 1159F MED LIST DOCD IN RCRD: CPT | Mod: CPTII,S$GLB,, | Performed by: STUDENT IN AN ORGANIZED HEALTH CARE EDUCATION/TRAINING PROGRAM

## 2023-09-26 PROCEDURE — 99999 PR PBB SHADOW E&M-EST. PATIENT-LVL IV: CPT | Mod: PBBFAC,,, | Performed by: STUDENT IN AN ORGANIZED HEALTH CARE EDUCATION/TRAINING PROGRAM

## 2023-09-26 PROCEDURE — 3078F DIAST BP <80 MM HG: CPT | Mod: CPTII,S$GLB,, | Performed by: STUDENT IN AN ORGANIZED HEALTH CARE EDUCATION/TRAINING PROGRAM

## 2023-09-26 PROCEDURE — 3074F SYST BP LT 130 MM HG: CPT | Mod: CPTII,S$GLB,, | Performed by: STUDENT IN AN ORGANIZED HEALTH CARE EDUCATION/TRAINING PROGRAM

## 2023-09-26 PROCEDURE — 1160F RVW MEDS BY RX/DR IN RCRD: CPT | Mod: CPTII,S$GLB,, | Performed by: STUDENT IN AN ORGANIZED HEALTH CARE EDUCATION/TRAINING PROGRAM

## 2023-09-26 PROCEDURE — 4010F PR ACE/ARB THEARPY RXD/TAKEN: ICD-10-PCS | Mod: CPTII,S$GLB,, | Performed by: STUDENT IN AN ORGANIZED HEALTH CARE EDUCATION/TRAINING PROGRAM

## 2023-09-26 PROCEDURE — 1160F PR REVIEW ALL MEDS BY PRESCRIBER/CLIN PHARMACIST DOCUMENTED: ICD-10-PCS | Mod: CPTII,S$GLB,, | Performed by: STUDENT IN AN ORGANIZED HEALTH CARE EDUCATION/TRAINING PROGRAM

## 2023-09-26 PROCEDURE — 3044F PR MOST RECENT HEMOGLOBIN A1C LEVEL <7.0%: ICD-10-PCS | Mod: CPTII,S$GLB,, | Performed by: STUDENT IN AN ORGANIZED HEALTH CARE EDUCATION/TRAINING PROGRAM

## 2023-09-26 RX ORDER — TIRZEPATIDE 15 MG/.5ML
15 INJECTION, SOLUTION SUBCUTANEOUS
Qty: 4 PEN | Refills: 2 | Status: SHIPPED | OUTPATIENT
Start: 2023-09-26 | End: 2024-01-05 | Stop reason: SDUPTHER

## 2023-09-26 NOTE — PROGRESS NOTES
Subjective:       Patient ID: Андрей Wooten is a 58 y.o. male.    Chief Complaint: Follow-up, Obesity, and Weight Check      Patient presents for treatment of obesity.     Co-morbidities:   HTN  HLD  NAFLD  DM2  GERD    Negative for thyroid cancer  Negative for pancreatitis     History of Weight Loss Efforts  Dr. Bernal - prescribed phentermine, diethylpropion; 255 lbs when first seen, got down to 240 lbs  Intermittent Fasting - got down to about 230 lbs    Current Physical Activity  No regular exercise routine  Was doing cardio workout at [employer], walking 4-5 miles prior to [place]    Current Eating Habits  Breakfast - grits, ham, biscuits, cereal (Frosted Flakes)  Lunch - fast food  Dinner - southern food  Snacks - chips, cookies, candy; about 4/day  Beverages - water, Hawaiian Punch, sweet tea    Shift work    Drinking less sugar sweetened beverages  Eating more salads  Protein shakes  V8  Beans with brown rice  Cut back on fried foods    Medical Weight Loss  1/24/2022 ([employer] out for repair): 247 lbs 5.7 oz, BMI 39.92  2/23/2022: 245.7 lbs, BMI 39.7, BFP 38.2%, BFM 93.8 lbs, SMM 87.7 lbs, BMR 1858 kcal; Ozempic  4/1/2022: 244.1 lbs, BMI 39.4, BFP 38.7%, BFM 94.4 lbs, SMM 86.6 lbs, BMR 1836 kcal; Ozempic  5/10/2022: 247.8 lbs, BMI 40, BFP 38.1%, BFM 94.4 lbs, SMM 88.6 lbs, BMR 1874 kcal; Ozempic  6/7/2022: 241.8 lbs, BMI 39, BFP 38.8%, BFM 93.9 lbs, SMM 85.3 lbs, BMR 1820 kcal; Ozempic  8/16/2022: 244.9 lbs, BMI 39.5, BFP 38.3%, BFM 93.9 lbs, SMM 86.6 lbs, BMR 1850 kcal  10/10/2022: 247.3 lbs, BMI 39.9, BFP 39.6%, BFM 98.1 lbs, SMM 85.8 lbs, BMR 1833 kcal  12/14/2022: 244.3 lbs, BMI 39.4, BFP 39.1%, BFM 95.5 lbs, SMM 85.8 lbs, BMR 1828 kcal  3/16/2023: 257 lbs, BMI 41.5, BFP 39%, .2 lbs, SMM 90 lbs, BMR 1905 kcal  6/23/2023: 240.9 lbs, BMI 38.9, BFP 38.4%, BFM 92.5 lbs, SMM 85.5 lbs, BMR 1823 kcal  9/26/2023: 230.2 lbs, BMI 37.2, BFP 36.9%, BFM 84.9 lbs, SMM 83.6 lbs, BMR 1793  kcal      Follow-up  Pertinent negatives include no abdominal pain, chest pain, chills, fever, nausea or vomiting.     Review of Systems   Constitutional:  Negative for chills and fever.   Respiratory:  Negative for shortness of breath.    Cardiovascular:  Negative for chest pain.   Gastrointestinal:  Negative for abdominal pain, nausea and vomiting.   Neurological:  Negative for dizziness and light-headedness.         Objective:        Latest Reference Range & Units 04/12/22 08:17   WBC 3.90 - 12.70 K/uL 4.62   RBC 4.60 - 6.20 M/uL 5.18   Hemoglobin 14.0 - 18.0 g/dL 14.3   Hematocrit 40.0 - 54.0 % 44.1   MCV 82 - 98 fL 85   MCH 27.0 - 31.0 pg 27.6   MCHC 32.0 - 36.0 g/dL 32.4   RDW 11.5 - 14.5 % 13.2   Platelets 150 - 450 K/uL 267   MPV 9.2 - 12.9 fL 9.5   Gran % 38.0 - 73.0 % 33.2 (L)   Lymph % 18.0 - 48.0 % 55.0 (H)   Mono % 4.0 - 15.0 % 9.7   Eosinophil % 0.0 - 8.0 % 1.5   Basophil % 0.0 - 1.9 % 0.6   Immature Granulocytes 0.0 - 0.5 % 0.0   Gran # (ANC) 1.8 - 7.7 K/uL 1.5 (L)   Lymph # 1.0 - 4.8 K/uL 2.5   Mono # 0.3 - 1.0 K/uL 0.5   Eos # 0.0 - 0.5 K/uL 0.1   Baso # 0.00 - 0.20 K/uL 0.03   Immature Grans (Abs) 0.00 - 0.04 K/uL 0.00   nRBC 0 /100 WBC 0   Differential Method  Automated   Sodium 136 - 145 mmol/L 141   Potassium 3.5 - 5.1 mmol/L 3.9   Chloride 95 - 110 mmol/L 101   CO2 23 - 29 mmol/L 29   Anion Gap 8 - 16 mmol/L 11   BUN 2 - 20 mg/dL 18   Creatinine 0.50 - 1.40 mg/dL 1.07   eGFR if non African American >60 mL/min/1.73 m^2 >60.0   eGFR if African American >60 mL/min/1.73 m^2 >60.0   Glucose 70 - 110 mg/dL 92   Calcium 8.7 - 10.5 mg/dL 9.3   Alkaline Phosphatase 38 - 126 U/L 54   PROTEIN TOTAL 6.0 - 8.4 g/dL 8.1   Albumin 3.5 - 5.2 g/dL 4.5   BILIRUBIN TOTAL 0.1 - 1.0 mg/dL 0.5   AST 15 - 46 U/L 43   ALT 10 - 44 U/L 62 (H)   Cholesterol 120 - 199 mg/dL 139   HDL 40 - 75 mg/dL 28 (L)   HDL/Cholesterol Ratio 20.0 - 50.0 % 20.1   LDL Cholesterol External 63.0 - 159.0 mg/dL 81.4   Non-HDL Cholesterol  mg/dL 111   Total Cholesterol/HDL Ratio 2.0 - 5.0  5.0   Triglycerides 30 - 150 mg/dL 148   Hemoglobin A1C External 4.0 - 5.6 % 6.3 (H)   Estimated Avg Glucose 68 - 131 mg/dL 134 (H)   PSA, Screen 0.00 - 4.00 ng/mL 0.68   (L): Data is abnormally low  (H): Data is abnormally high    Vitals:    09/26/23 0934   BP: 120/72   Pulse: 79       Physical Exam  Vitals reviewed.   Constitutional:       General: He is not in acute distress.     Appearance: Normal appearance. He is obese. He is not ill-appearing, toxic-appearing or diaphoretic.   HENT:      Head: Normocephalic and atraumatic.   Cardiovascular:      Rate and Rhythm: Normal rate.   Pulmonary:      Effort: Pulmonary effort is normal. No respiratory distress.   Skin:     General: Skin is warm and dry.   Neurological:      Mental Status: He is alert and oriented to person, place, and time.         Assessment:       Problem List Items Addressed This Visit       VOGEL (nonalcoholic steatohepatitis)    Class 2 severe obesity due to excess calories with serious comorbidity and body mass index (BMI) of 38.0 to 38.9 in adult - Primary    Relevant Medications    tirzepatide (MOUNJARO) 15 mg/0.5 mL PnIj    HLD (hyperlipidemia)    Essential hypertension    Type 2 diabetes mellitus without complication, without long-term current use of insulin    Relevant Medications    tirzepatide (MOUNJARO) 15 mg/0.5 mL PnIj     Other Visit Diagnoses       Encounter for weight loss counseling                Plan:   - Mounjaro 15 mg weekly injections     - Log all food and beverage intake with a daily calorie goal of 1500 calories per day; meal plans given in AVS    - Moderate intensity aerobic exercise for 15-30 minutes 3-5x/week

## 2023-10-05 ENCOUNTER — PATIENT MESSAGE (OUTPATIENT)
Dept: FAMILY MEDICINE | Facility: CLINIC | Age: 58
End: 2023-10-05
Payer: COMMERCIAL

## 2023-10-05 DIAGNOSIS — I10 PRIMARY HYPERTENSION: ICD-10-CM

## 2023-10-05 RX ORDER — TRIAMTERENE AND HYDROCHLOROTHIAZIDE 37.5; 25 MG/1; MG/1
1 CAPSULE ORAL EVERY MORNING
Qty: 90 CAPSULE | Refills: 2 | Status: SHIPPED | OUTPATIENT
Start: 2023-10-05 | End: 2023-12-19

## 2023-10-05 RX ORDER — TRIAMTERENE AND HYDROCHLOROTHIAZIDE 37.5; 25 MG/1; MG/1
1 CAPSULE ORAL EVERY MORNING
Qty: 90 CAPSULE | Refills: 2 | OUTPATIENT
Start: 2023-10-05

## 2023-10-05 NOTE — TELEPHONE ENCOUNTER
No care due was identified.  [facility] Embedded Care Due Messages. Reference number: [id number].   10/05/2023 10:56:39 AM CDT

## 2023-10-05 NOTE — TELEPHONE ENCOUNTER
No care due was identified.  Health [facility] Embedded Care Due Messages. Reference number: [id number].   10/05/2023 11:22:39 AM CDT

## 2023-10-25 DIAGNOSIS — E11.9 TYPE 2 DIABETES MELLITUS WITHOUT COMPLICATION: ICD-10-CM

## 2023-11-06 ENCOUNTER — OFFICE VISIT (OUTPATIENT)
Dept: FAMILY MEDICINE | Facility: CLINIC | Age: 58
End: 2023-11-06
Payer: COMMERCIAL

## 2023-11-06 VITALS
HEIGHT: 66 IN | OXYGEN SATURATION: 96 % | TEMPERATURE: 98 F | BODY MASS INDEX: 38.94 KG/M2 | HEART RATE: 94 BPM | DIASTOLIC BLOOD PRESSURE: 72 MMHG | WEIGHT: 242.31 LBS | SYSTOLIC BLOOD PRESSURE: 102 MMHG

## 2023-11-06 DIAGNOSIS — L98.9 SKIN LESION: Primary | ICD-10-CM

## 2023-11-06 DIAGNOSIS — I10 ESSENTIAL HYPERTENSION: ICD-10-CM

## 2023-11-06 DIAGNOSIS — Z12.5 PROSTATE CANCER SCREENING: ICD-10-CM

## 2023-11-06 DIAGNOSIS — E11.9 TYPE 2 DIABETES MELLITUS WITHOUT COMPLICATION, WITHOUT LONG-TERM CURRENT USE OF INSULIN: ICD-10-CM

## 2023-11-06 DIAGNOSIS — M54.50 ACUTE LEFT-SIDED LOW BACK PAIN WITHOUT SCIATICA: ICD-10-CM

## 2023-11-06 DIAGNOSIS — Z23 NEED FOR SHINGLES VACCINE: ICD-10-CM

## 2023-11-06 DIAGNOSIS — E78.2 MIXED HYPERLIPIDEMIA: ICD-10-CM

## 2023-11-06 DIAGNOSIS — R94.31 ABNORMAL EKG: ICD-10-CM

## 2023-11-06 DIAGNOSIS — Z23 NEED FOR INFLUENZA VACCINATION: ICD-10-CM

## 2023-11-06 PROCEDURE — 90686 IIV4 VACC NO PRSV 0.5 ML IM: CPT | Mod: S$GLB,,, | Performed by: STUDENT IN AN ORGANIZED HEALTH CARE EDUCATION/TRAINING PROGRAM

## 2023-11-06 PROCEDURE — 90472 ZOSTER RECOMBINANT VACCINE: ICD-10-PCS | Mod: S$GLB,,, | Performed by: STUDENT IN AN ORGANIZED HEALTH CARE EDUCATION/TRAINING PROGRAM

## 2023-11-06 PROCEDURE — 1159F MED LIST DOCD IN RCRD: CPT | Mod: CPTII,S$GLB,, | Performed by: STUDENT IN AN ORGANIZED HEALTH CARE EDUCATION/TRAINING PROGRAM

## 2023-11-06 PROCEDURE — 90686 FLU VACCINE (QUAD) GREATER THAN OR EQUAL TO 3YO PRESERVATIVE FREE IM: ICD-10-PCS | Mod: S$GLB,,, | Performed by: STUDENT IN AN ORGANIZED HEALTH CARE EDUCATION/TRAINING PROGRAM

## 2023-11-06 PROCEDURE — 90750 ZOSTER RECOMBINANT VACCINE: ICD-10-PCS | Mod: S$GLB,,, | Performed by: STUDENT IN AN ORGANIZED HEALTH CARE EDUCATION/TRAINING PROGRAM

## 2023-11-06 PROCEDURE — 4010F ACE/ARB THERAPY RXD/TAKEN: CPT | Mod: CPTII,S$GLB,, | Performed by: STUDENT IN AN ORGANIZED HEALTH CARE EDUCATION/TRAINING PROGRAM

## 2023-11-06 PROCEDURE — 3044F HG A1C LEVEL LT 7.0%: CPT | Mod: CPTII,S$GLB,, | Performed by: STUDENT IN AN ORGANIZED HEALTH CARE EDUCATION/TRAINING PROGRAM

## 2023-11-06 PROCEDURE — 90471 IMMUNIZATION ADMIN: CPT | Mod: S$GLB,,, | Performed by: STUDENT IN AN ORGANIZED HEALTH CARE EDUCATION/TRAINING PROGRAM

## 2023-11-06 PROCEDURE — 3074F PR MOST RECENT SYSTOLIC BLOOD PRESSURE < 130 MM HG: ICD-10-PCS | Mod: CPTII,S$GLB,, | Performed by: STUDENT IN AN ORGANIZED HEALTH CARE EDUCATION/TRAINING PROGRAM

## 2023-11-06 PROCEDURE — 3074F SYST BP LT 130 MM HG: CPT | Mod: CPTII,S$GLB,, | Performed by: STUDENT IN AN ORGANIZED HEALTH CARE EDUCATION/TRAINING PROGRAM

## 2023-11-06 PROCEDURE — 90471 FLU VACCINE (QUAD) GREATER THAN OR EQUAL TO 3YO PRESERVATIVE FREE IM: ICD-10-PCS | Mod: S$GLB,,, | Performed by: STUDENT IN AN ORGANIZED HEALTH CARE EDUCATION/TRAINING PROGRAM

## 2023-11-06 PROCEDURE — 3078F DIAST BP <80 MM HG: CPT | Mod: CPTII,S$GLB,, | Performed by: STUDENT IN AN ORGANIZED HEALTH CARE EDUCATION/TRAINING PROGRAM

## 2023-11-06 PROCEDURE — 3008F PR BODY MASS INDEX (BMI) DOCUMENTED: ICD-10-PCS | Mod: CPTII,S$GLB,, | Performed by: STUDENT IN AN ORGANIZED HEALTH CARE EDUCATION/TRAINING PROGRAM

## 2023-11-06 PROCEDURE — 1160F RVW MEDS BY RX/DR IN RCRD: CPT | Mod: CPTII,S$GLB,, | Performed by: STUDENT IN AN ORGANIZED HEALTH CARE EDUCATION/TRAINING PROGRAM

## 2023-11-06 PROCEDURE — 1160F PR REVIEW ALL MEDS BY PRESCRIBER/CLIN PHARMACIST DOCUMENTED: ICD-10-PCS | Mod: CPTII,S$GLB,, | Performed by: STUDENT IN AN ORGANIZED HEALTH CARE EDUCATION/TRAINING PROGRAM

## 2023-11-06 PROCEDURE — 4010F PR ACE/ARB THEARPY RXD/TAKEN: ICD-10-PCS | Mod: CPTII,S$GLB,, | Performed by: STUDENT IN AN ORGANIZED HEALTH CARE EDUCATION/TRAINING PROGRAM

## 2023-11-06 PROCEDURE — 90472 IMMUNIZATION ADMIN EACH ADD: CPT | Mod: S$GLB,,, | Performed by: STUDENT IN AN ORGANIZED HEALTH CARE EDUCATION/TRAINING PROGRAM

## 2023-11-06 PROCEDURE — 99214 PR OFFICE/OUTPT VISIT, EST, LEVL IV, 30-39 MIN: ICD-10-PCS | Mod: 25,S$GLB,, | Performed by: STUDENT IN AN ORGANIZED HEALTH CARE EDUCATION/TRAINING PROGRAM

## 2023-11-06 PROCEDURE — 1159F PR MEDICATION LIST DOCUMENTED IN MEDICAL RECORD: ICD-10-PCS | Mod: CPTII,S$GLB,, | Performed by: STUDENT IN AN ORGANIZED HEALTH CARE EDUCATION/TRAINING PROGRAM

## 2023-11-06 PROCEDURE — 90750 HZV VACC RECOMBINANT IM: CPT | Mod: S$GLB,,, | Performed by: STUDENT IN AN ORGANIZED HEALTH CARE EDUCATION/TRAINING PROGRAM

## 2023-11-06 PROCEDURE — 3044F PR MOST RECENT HEMOGLOBIN A1C LEVEL <7.0%: ICD-10-PCS | Mod: CPTII,S$GLB,, | Performed by: STUDENT IN AN ORGANIZED HEALTH CARE EDUCATION/TRAINING PROGRAM

## 2023-11-06 PROCEDURE — 3078F PR MOST RECENT DIASTOLIC BLOOD PRESSURE < 80 MM HG: ICD-10-PCS | Mod: CPTII,S$GLB,, | Performed by: STUDENT IN AN ORGANIZED HEALTH CARE EDUCATION/TRAINING PROGRAM

## 2023-11-06 PROCEDURE — 3008F BODY MASS INDEX DOCD: CPT | Mod: CPTII,S$GLB,, | Performed by: STUDENT IN AN ORGANIZED HEALTH CARE EDUCATION/TRAINING PROGRAM

## 2023-11-06 PROCEDURE — 99214 OFFICE O/P EST MOD 30 MIN: CPT | Mod: 25,S$GLB,, | Performed by: STUDENT IN AN ORGANIZED HEALTH CARE EDUCATION/TRAINING PROGRAM

## 2023-11-06 RX ORDER — IBUPROFEN 800 MG/1
800 TABLET ORAL 3 TIMES DAILY PRN
Qty: 60 TABLET | Refills: 1 | Status: SHIPPED | OUTPATIENT
Start: 2023-11-06

## 2023-11-06 NOTE — PROGRESS NOTES
Patient ID: Андрей Wooten is a 58 y.o. male.     Chief Complaint: f/u chronic medical conditions      Follow-up  Associated symptoms include headaches. Pertinent negatives include no chest pain, coughing, fever, myalgias, nausea, neck pain, rash, vomiting or weakness.      DMII- doping well on mounjaro, following diabetic diet. He is now more active.     Obesity- down 15 pounds    Low back pain- this has been recurrent since he is more active. This is similar to his previous back pain. Does not radiate to the legs. He would like a refill of ibuprofen    He was also told at his work physical that there ar changes on his ekg from previous years. He would like to see a cardiologist. He denies chest pain and shortness of breath.     Review of Systems  Review of Systems   Constitutional:  Negative for fever.   HENT:  Negative for ear pain, hearing loss and sinus pain.    Eyes:  Negative for discharge.   Respiratory:  Negative for cough, shortness of breath and wheezing.    Cardiovascular:  Negative for chest pain, palpitations and leg swelling.   Gastrointestinal:  Negative for blood in stool, constipation, diarrhea, nausea and vomiting.   Genitourinary:  Positive for urgency. Negative for hematuria.   Musculoskeletal:  Negative for myalgias and neck pain.   Skin:  Negative for rash.   Neurological:  Positive for headaches. Negative for weakness.   Endo/Heme/Allergies:  Negative for polydipsia.   Psychiatric/Behavioral:  Negative for depression.    All other systems reviewed and are negative.      Currently Medications  Current Outpatient Medications on File Prior to Visit   Medication Sig Dispense Refill    amitriptyline (ELAVIL) 50 MG tablet Take 1 tablet (50 mg total) by mouth every evening. 30 tablet 11    amLODIPine (NORVASC) 10 MG tablet Take 1 tablet (10 mg total) by mouth once daily. 90 tablet 1    atorvastatin (LIPITOR) 40 MG tablet Take 1 tablet (40 mg total) by mouth once daily. 90 tablet 1    blood sugar  "diagnostic Strp To check BG 1 times daily, to use with insurance preferred meter 100 strip 11    blood-glucose meter kit To check BG 1 times daily, to use with insurance preferred meter 1 each 0    cetirizine (ZYRTEC) 10 MG tablet Take 10 mg by mouth as needed.       famotidine (PEPCID) 40 MG tablet Take 1 tablet (40 mg total) by mouth once daily. 30 tablet 11    fluticasone (FLONASE) 50 mcg/actuation nasal spray 1 spray by Each Nare route once daily. (Patient taking differently: 1 spray by Each Nostril route as needed.) 16 g 0    lancets Misc To check BG 1 times daily, to use with insurance preferred meter 100 each 11    losartan (COZAAR) 50 MG tablet Take 1 tablet (50 mg total) by mouth once daily. 90 tablet 3    meloxicam (MOBIC) 7.5 MG tablet Take 1 tablet (7.5 mg total) by mouth daily as needed for Pain. 20 tablet 5    pantoprazole (PROTONIX) 40 MG tablet Take 1 tablet (40 mg total) by mouth every morning. 90 tablet 3    tirzepatide (MOUNJARO) 12.5 mg/0.5 mL PnIj Inject 12.5 mg into the skin every 7 days. 4 pen 2    tirzepatide (MOUNJARO) 15 mg/0.5 mL PnIj Inject 15 mg into the skin every 7 days. 4 pen 2    triamterene-hydrochlorothiazide 37.5-25 mg (DYAZIDE) 37.5-25 mg per capsule Take 1 capsule by mouth every morning. 90 capsule 2    vitamin D 1000 units Tab Take 185 mg by mouth once daily.       No current facility-administered medications on file prior to visit.       Physical  Exam  Vitals:    11/06/23 0810   BP: 102/72   BP Location: Right arm   Patient Position: Sitting   Pulse: 94   Temp: 98.3 °F (36.8 °C)   SpO2: 96%   Weight: 109.9 kg (242 lb 4.6 oz)   Height: 5' 6" (1.676 m)      Body mass index is 39.11 kg/m².  Wt Readings from Last 3 Encounters:   11/06/23 109.9 kg (242 lb 4.6 oz)   09/26/23 104.4 kg (230 lb 3.2 oz)   06/23/23 109.3 kg (240 lb 14.4 oz)       Physical Exam  Vitals and nursing note reviewed.   Constitutional:       General: He is not in acute distress.     Appearance: He is not " "ill-appearing.   HENT:      Head: Normocephalic and atraumatic.      Right Ear: External ear normal.      Left Ear: External ear normal.      Nose: Nose normal.      Mouth/Throat:      Mouth: Mucous membranes are moist.   Eyes:      Extraocular Movements: Extraocular movements intact.      Conjunctiva/sclera: Conjunctivae normal.   Cardiovascular:      Rate and Rhythm: Normal rate and regular rhythm.      Pulses: Normal pulses.      Heart sounds: No murmur heard.  Pulmonary:      Effort: Pulmonary effort is normal. No respiratory distress.      Breath sounds: No wheezing.   Abdominal:      General: There is no distension.      Palpations: Abdomen is soft. There is no mass.      Tenderness: There is no abdominal tenderness.   Musculoskeletal:         General: No swelling.      Cervical back: Normal range of motion.   Skin:     Coloration: Skin is not jaundiced.      Comments: Subcutaneous flattened nodule present on left neck, mobile, nontender to palpation.    Neurological:      General: No focal deficit present.      Mental Status: He is alert and oriented to person, place, and time.   Psychiatric:         Mood and Affect: Mood normal.         Thought Content: Thought content normal.         Labs:    Complete Blood Count  Lab Results   Component Value Date    RBC 5.37 04/11/2023    HGB 14.8 04/11/2023    HCT 45.4 04/11/2023    MCV 85 04/11/2023    MCH 27.6 04/11/2023    MCHC 32.6 04/11/2023    RDW 13.5 04/11/2023     04/11/2023    MPV 9.8 04/11/2023    GRAN 1.6 (L) 04/11/2023    GRAN 32.5 (L) 04/11/2023    LYMPH 2.9 04/11/2023    LYMPH 56.9 (H) 04/11/2023    MONO 0.5 04/11/2023    MONO 9.0 04/11/2023    EOS 0.0 04/11/2023    BASO 0.03 04/11/2023    EOSINOPHIL 0.8 04/11/2023    BASOPHIL 0.6 04/11/2023    DIFFMETHOD Automated 04/11/2023       Comprehensive Metabolic Panel  No results found for: "GLU", "BUN", "CREATININE", "NA", "K", "CL", "PROT", "ALBUMIN", "BILITOT", "AST", "ALKPHOS", "CO2", "ALT", " ""ANIONGAP", "EGFRNONAA", "ESTGFRAFRICA"    TSH  No results found for: "TSH"    Imaging:  CT Chest Abdomen Pelvis With Contrast  Narrative: EXAMINATION:  CT CHEST ABDOMEN PELVIS WITH CONTRAST (XPD)    CLINICAL HISTORY:  Epigastric painabdominal pain and chest pain with radiation;    TECHNIQUE:  Standard chest, abdomen, and pelvis CT protocol with oral and IV contrast was performed.  100 mL of Omnipaque 350 contrast material was used for this examination.    COMPARISON:  None    FINDINGS:  Finding: The size of the heart is within normal limits.  The right lung is clear.  There is a 4 mm noncalcified pulmonary nodule in the left lower lobe.  There are streaky opacities in the lingula and right lower lobe.  There is no pneumothorax or pleural effusion.    There is a 27 mm oval shaped area of hypodensity in the dome of the left lobe of the liver.  This area has a Hounsfield measurement of 8.  The gallbladder, pancreas, spleen, and adrenals are normal in appearance.  There are hypodense masses associated with both kidneys.  One of the larger ones measures 8 mm and is located in the medial aspect of the inferior pole of the right kidney.  These are characteristic of incidental findings.  The ureters and the urinary bladder are normal in appearance.  There is a mild amount of calcification within the prostate.  The appendix and the rest of the gastrointestinal system are normal in appearance. There is no free fluid within the abdomen or pelvis. There is no pneumoperitoneum.  There is a mild amount of atherosclerosis.  There are mild osteoarthritic changes in the sacroiliac joints bilaterally.  There is no abnormal lymphadenopathy based on CT size criteria.  Impression: 1. There is a 27 mm oval shaped area of hypodensity in the dome of the left lobe of the liver. This area has a Hounsfield measurement of 8.  This is characteristic of a cyst.  2. There is no abnormal lymphadenopathy based on CT size criteria.  3. There are " mild osteoarthritic changes in the sacroiliac joints bilaterally.  4. There are streaky opacities in the lingula and right lower lobe.  This is characteristic of subsegmental atelectasis and/or scarring.  5. There is a 4 mm noncalcified pulmonary nodule in the left lower lobe.  All CT scans at this facility use dose modulation, iterative reconstruction, and/or weight base dosing when appropriate to reduce radiation dose when appropriate to reduce radiation dose to as low as reasonably achievable.    Electronically signed by: Josue Mckeon MD  Date:    04/20/2022  Time:    09:34      Assessment/Plan:    1. Skin lesion  -     Ambulatory referral/consult to Dermatology; Future; Expected date: 11/13/2023    2. Abnormal EKG  -     Ambulatory referral/consult to Cardiology; Future; Expected date: 11/13/2023    3. Essential hypertension  -     CBC Auto Differential; Future  -     Comprehensive metabolic panel; Future; Expected date: 11/06/2023  -     TSH; Future; Expected date: 11/06/2023    4. Type 2 diabetes mellitus without complication, without long-term current use of insulin  -     CBC Auto Differential; Future  -     Comprehensive metabolic panel; Future; Expected date: 11/06/2023  -     HEMOGLOBIN A1C; Future; Expected date: 11/06/2023  -     Microalbumin/Creatinine Ratio, Urine; Future; Expected date: 11/06/2023    5. Mixed hyperlipidemia  -     LIPID PANEL; Future; Expected date: 11/06/2023    6. Prostate cancer screening  -     PSA, SCREENING; Future; Expected date: 11/06/2023    7. Acute left-sided low back pain without sciatica  -     ibuprofen (ADVIL,MOTRIN) 800 MG tablet; Take 1 tablet (800 mg total) by mouth 3 (three) times daily as needed for Pain.  Dispense: 60 tablet; Refill: 1    8. Need for influenza vaccination  -     Influenza - Quadrivalent *Preferred* (6 months+) (PF)    9. Need for shingles vaccine  -     (In Office Administered) Zoster Recombinant Vaccine         Discussed how to stay healthy  including: diet, exercise, refraining from smoking and discussed screening exams / tests needed for age, sex and family Hx.    RTC 6 mo    Leander Boo MD      Answers submitted by the patient for this visit:  Review of Systems Questionnaire (Submitted on 11/6/2023)  activity change: Yes  unexpected weight change: No  rhinorrhea: No  trouble swallowing: No  visual disturbance: Yes  chest tightness: No  polyuria: No  difficulty urinating: No  joint swelling: No  arthralgias: Yes  confusion: Yes  dysphoric mood: No

## 2023-11-07 ENCOUNTER — PATIENT OUTREACH (OUTPATIENT)
Dept: ADMINISTRATIVE | Facility: HOSPITAL | Age: 58
End: 2023-11-07
Payer: COMMERCIAL

## 2023-11-15 ENCOUNTER — OFFICE VISIT (OUTPATIENT)
Dept: CARDIOLOGY | Facility: CLINIC | Age: 58
End: 2023-11-15
Payer: COMMERCIAL

## 2023-11-15 VITALS
SYSTOLIC BLOOD PRESSURE: 144 MMHG | DIASTOLIC BLOOD PRESSURE: 92 MMHG | HEIGHT: 66 IN | HEART RATE: 82 BPM | BODY MASS INDEX: 39.72 KG/M2 | WEIGHT: 247.13 LBS

## 2023-11-15 DIAGNOSIS — E78.2 MIXED HYPERLIPIDEMIA: ICD-10-CM

## 2023-11-15 DIAGNOSIS — I10 ESSENTIAL HYPERTENSION: ICD-10-CM

## 2023-11-15 DIAGNOSIS — R94.31 ABNORMAL EKG: Primary | ICD-10-CM

## 2023-11-15 PROCEDURE — 4010F ACE/ARB THERAPY RXD/TAKEN: CPT | Mod: CPTII,S$GLB,, | Performed by: INTERNAL MEDICINE

## 2023-11-15 PROCEDURE — 3008F PR BODY MASS INDEX (BMI) DOCUMENTED: ICD-10-PCS | Mod: CPTII,S$GLB,, | Performed by: INTERNAL MEDICINE

## 2023-11-15 PROCEDURE — 99204 OFFICE O/P NEW MOD 45 MIN: CPT | Mod: 25,S$GLB,, | Performed by: INTERNAL MEDICINE

## 2023-11-15 PROCEDURE — 1159F PR MEDICATION LIST DOCUMENTED IN MEDICAL RECORD: ICD-10-PCS | Mod: CPTII,S$GLB,, | Performed by: INTERNAL MEDICINE

## 2023-11-15 PROCEDURE — 99204 PR OFFICE/OUTPT VISIT, NEW, LEVL IV, 45-59 MIN: ICD-10-PCS | Mod: 25,S$GLB,, | Performed by: INTERNAL MEDICINE

## 2023-11-15 PROCEDURE — 3044F HG A1C LEVEL LT 7.0%: CPT | Mod: CPTII,S$GLB,, | Performed by: INTERNAL MEDICINE

## 2023-11-15 PROCEDURE — 93000 EKG 12-LEAD: ICD-10-PCS | Mod: S$GLB,,, | Performed by: INTERNAL MEDICINE

## 2023-11-15 PROCEDURE — 3077F PR MOST RECENT SYSTOLIC BLOOD PRESSURE >= 140 MM HG: ICD-10-PCS | Mod: CPTII,S$GLB,, | Performed by: INTERNAL MEDICINE

## 2023-11-15 PROCEDURE — 3008F BODY MASS INDEX DOCD: CPT | Mod: CPTII,S$GLB,, | Performed by: INTERNAL MEDICINE

## 2023-11-15 PROCEDURE — 1159F MED LIST DOCD IN RCRD: CPT | Mod: CPTII,S$GLB,, | Performed by: INTERNAL MEDICINE

## 2023-11-15 PROCEDURE — 1160F PR REVIEW ALL MEDS BY PRESCRIBER/CLIN PHARMACIST DOCUMENTED: ICD-10-PCS | Mod: CPTII,S$GLB,, | Performed by: INTERNAL MEDICINE

## 2023-11-15 PROCEDURE — 99999 PR PBB SHADOW E&M-EST. PATIENT-LVL IV: ICD-10-PCS | Mod: PBBFAC,,, | Performed by: INTERNAL MEDICINE

## 2023-11-15 PROCEDURE — 3080F DIAST BP >= 90 MM HG: CPT | Mod: CPTII,S$GLB,, | Performed by: INTERNAL MEDICINE

## 2023-11-15 PROCEDURE — 3044F PR MOST RECENT HEMOGLOBIN A1C LEVEL <7.0%: ICD-10-PCS | Mod: CPTII,S$GLB,, | Performed by: INTERNAL MEDICINE

## 2023-11-15 PROCEDURE — 3077F SYST BP >= 140 MM HG: CPT | Mod: CPTII,S$GLB,, | Performed by: INTERNAL MEDICINE

## 2023-11-15 PROCEDURE — 99999 PR PBB SHADOW E&M-EST. PATIENT-LVL IV: CPT | Mod: PBBFAC,,, | Performed by: INTERNAL MEDICINE

## 2023-11-15 PROCEDURE — 93000 ELECTROCARDIOGRAM COMPLETE: CPT | Mod: S$GLB,,, | Performed by: INTERNAL MEDICINE

## 2023-11-15 PROCEDURE — 4010F PR ACE/ARB THEARPY RXD/TAKEN: ICD-10-PCS | Mod: CPTII,S$GLB,, | Performed by: INTERNAL MEDICINE

## 2023-11-15 PROCEDURE — 3080F PR MOST RECENT DIASTOLIC BLOOD PRESSURE >= 90 MM HG: ICD-10-PCS | Mod: CPTII,S$GLB,, | Performed by: INTERNAL MEDICINE

## 2023-11-15 PROCEDURE — 1160F RVW MEDS BY RX/DR IN RCRD: CPT | Mod: CPTII,S$GLB,, | Performed by: INTERNAL MEDICINE

## 2023-11-15 NOTE — PROGRESS NOTES
Subjective:   11/15/2023     Patient ID:  Андрей Wooten is a 58 y.o. male who presents for evaulation of Abnormal ECG      Patient with long history of hypertension, diabetes, obesity, nonalcoholic steatorrhea hepatitis comes in today for review of his EKGs.  EKGs from several years ago showed fairly marked LVH with ST abnormality.  His EKGs actually appear to be improved compared to those although they still show LVH with less prominent ST abnormality.  He has undergone stress testing in the past, no evidence for ischemia was present.  He has lost 40 lb on G LP 1 receptor agonist therapy.  He is not having chest pains or tightness, shortness breath, PND orthopnea.      Hyperlipidemia is mixed in treated with atorvastatin 40 mg nightly.  His LDL is 73 and has improved over last several years.              Past Medical History:   Diagnosis Date    HLD (hyperlipidemia)     Hypertension     Liver fibrosis 6/7/2018    -- liver biopsy 5/28/18 - VOGEL, macrosteatosis 80%, microsteatosis 10%, stage 1 out of 4 fibrosis -- MRI elastography 5/29/17 - F1-F2    VOGEL (nonalcoholic steatohepatitis)     -- liver biopsy 5/28/18 - VOGEL, macrosteatosis 80%, microsteatosis 10%, stage 1 out of 4 fibrosis    Type 2 diabetes mellitus without complication, without long-term current use of insulin 6/7/2018       Review of patient's allergies indicates:  No Known Allergies      Current Outpatient Medications:     amitriptyline (ELAVIL) 50 MG tablet, Take 1 tablet (50 mg total) by mouth every evening., Disp: 30 tablet, Rfl: 11    amLODIPine (NORVASC) 10 MG tablet, Take 1 tablet (10 mg total) by mouth once daily., Disp: 90 tablet, Rfl: 1    atorvastatin (LIPITOR) 40 MG tablet, Take 1 tablet (40 mg total) by mouth once daily., Disp: 90 tablet, Rfl: 1    blood sugar diagnostic Strp, To check BG 1 times daily, to use with insurance preferred meter, Disp: 100 strip, Rfl: 11    blood-glucose meter kit, To check BG 1 times daily, to use with  insurance preferred meter, Disp: 1 each, Rfl: 0    cetirizine (ZYRTEC) 10 MG tablet, Take 10 mg by mouth as needed. , Disp: , Rfl:     famotidine (PEPCID) 40 MG tablet, Take 1 tablet (40 mg total) by mouth once daily., Disp: 30 tablet, Rfl: 11    fluticasone (FLONASE) 50 mcg/actuation nasal spray, 1 spray by Each Nare route once daily. (Patient taking differently: 1 spray by Each Nostril route as needed.), Disp: 16 g, Rfl: 0    ibuprofen (ADVIL,MOTRIN) 800 MG tablet, Take 1 tablet (800 mg total) by mouth 3 (three) times daily as needed for Pain., Disp: 60 tablet, Rfl: 1    lancets Misc, To check BG 1 times daily, to use with insurance preferred meter, Disp: 100 each, Rfl: 11    losartan (COZAAR) 50 MG tablet, Take 1 tablet (50 mg total) by mouth once daily., Disp: 90 tablet, Rfl: 3    meloxicam (MOBIC) 7.5 MG tablet, Take 1 tablet (7.5 mg total) by mouth daily as needed for Pain., Disp: 20 tablet, Rfl: 5    pantoprazole (PROTONIX) 40 MG tablet, Take 1 tablet (40 mg total) by mouth every morning., Disp: 90 tablet, Rfl: 3    tirzepatide (MOUNJARO) 15 mg/0.5 mL PnIj, Inject 15 mg into the skin every 7 days., Disp: 4 pen , Rfl: 2    triamterene-hydrochlorothiazide 37.5-25 mg (DYAZIDE) 37.5-25 mg per capsule, Take 1 capsule by mouth every morning., Disp: 90 capsule, Rfl: 2    vitamin D 1000 units Tab, Take 185 mg by mouth once daily., Disp: , Rfl:     tirzepatide (MOUNJARO) 12.5 mg/0.5 mL PnIj, Inject 12.5 mg into the skin every 7 days., Disp: 4 pen, Rfl: 2     Objective:   Review of Systems   Cardiovascular:  Negative for chest pain, claudication, cyanosis, dyspnea on exertion, irregular heartbeat, leg swelling, near-syncope, orthopnea, palpitations, paroxysmal nocturnal dyspnea and syncope.         Vitals:    11/15/23 1400   BP: (!) 144/92   Pulse: 82     Wt Readings from Last 3 Encounters:   11/15/23 112.1 kg (247 lb 2.2 oz)   11/06/23 109.9 kg (242 lb 4.6 oz)   09/26/23 104.4 kg (230 lb 3.2 oz)     Temp Readings from  Last 3 Encounters:   11/06/23 98.3 °F (36.8 °C)   04/11/23 98.1 °F (36.7 °C)   08/16/22 98.1 °F (36.7 °C)     BP Readings from Last 3 Encounters:   11/15/23 (!) 144/92   11/06/23 102/72   09/26/23 120/72     Pulse Readings from Last 3 Encounters:   11/15/23 82   11/06/23 94   09/26/23 79             Physical Exam  Vitals reviewed.   Constitutional:       General: He is not in acute distress.     Appearance: He is well-developed.   HENT:      Head: Normocephalic and atraumatic.      Nose: Nose normal.   Eyes:      Conjunctiva/sclera: Conjunctivae normal.      Pupils: Pupils are equal, round, and reactive to light.   Neck:      Vascular: No carotid bruit or JVD.   Cardiovascular:      Rate and Rhythm: Normal rate and regular rhythm.      Pulses: Intact distal pulses.      Heart sounds: Normal heart sounds. No murmur heard.     No friction rub. No gallop.   Pulmonary:      Effort: Pulmonary effort is normal. No respiratory distress.      Breath sounds: Normal breath sounds. No wheezing or rales.   Chest:      Chest wall: No tenderness.   Abdominal:      General: Bowel sounds are normal. There is no distension.      Palpations: Abdomen is soft.      Tenderness: There is no abdominal tenderness.   Musculoskeletal:         General: No tenderness or deformity. Normal range of motion.      Cervical back: Normal range of motion and neck supple.      Right lower leg: No edema.      Left lower leg: No edema.   Skin:     General: Skin is warm and dry.      Findings: No erythema or rash.   Neurological:      Mental Status: He is alert and oriented to person, place, and time.      Cranial Nerves: No cranial nerve deficit.      Motor: No abnormal muscle tone.      Coordination: Coordination normal.   Psychiatric:         Behavior: Behavior normal.         Thought Content: Thought content normal.         Judgment: Judgment normal.           Lab Results   Component Value Date    CHOL 132 04/11/2023    CHOL 139 04/12/2022    CHOL 162  08/25/2021     Lab Results   Component Value Date    HDL 25 (L) 04/11/2023    HDL 28 (L) 04/12/2022    HDL 28 (L) 08/25/2021     Lab Results   Component Value Date    LDLCALC 73.0 04/11/2023    LDLCALC 81.4 04/12/2022    LDLCALC 92.8 08/25/2021     Lab Results   Component Value Date    ALT 92 (H) 04/11/2023    AST 57 (H) 04/11/2023    AST 43 04/12/2022    AST 57 (H) 08/25/2021     Lab Results   Component Value Date    CREATININE 0.95 04/11/2023    BUN 17 04/11/2023     04/11/2023    K 3.6 04/11/2023    CO2 32 (H) 04/11/2023    CO2 29 04/12/2022    CO2 28 08/25/2021     Lab Results   Component Value Date    HGB 14.8 04/11/2023    HCT 45.4 04/11/2023    HCT 44.1 04/12/2022    HCT 44.3 08/25/2021                         Assessment and Plan:     Abnormal EKG  Comments:  EKGs over the years have improved probably due to optimize blood pressure control  Orders:  -     Ambulatory referral/consult to Cardiology  -     IN OFFICE EKG 12-LEAD (to [facility])    Essential hypertension  Comments:  Blood pressure has been running low after weight loss, would continue same medications at present, very well controlled at home    Mixed hyperlipidemia  Comments:  On high-dose statin therapy, should improve with continued weight loss       Patient reassured, EKG appears to be improving over time with good blood pressure control and weight loss.  Patient otherwise asymptomatic.  No indication for further cardiac testing at present.  He will let me know if he has any questions.    No follow-ups on file.          Future Appointments   Date Time Provider Department Center   1/5/2024  8:30 AM Lo Durán MD [facility]   2/2/2024  8:30 AM NURSE SCHEDULE, [facility]   5/2/2024  7:10 AM SAME DAY LAB, [facility]   5/2/2024  7:20 AM SAME DAY LAB, [facility]   5/6/2024  1:40 PM Leander Boo MD [facility]

## 2023-11-22 DIAGNOSIS — E78.2 MIXED HYPERLIPIDEMIA: ICD-10-CM

## 2023-11-22 DIAGNOSIS — I10 ESSENTIAL HYPERTENSION: ICD-10-CM

## 2023-11-22 RX ORDER — ATORVASTATIN CALCIUM 40 MG/1
40 TABLET, FILM COATED ORAL
Qty: 90 TABLET | Refills: 1 | Status: SHIPPED | OUTPATIENT
Start: 2023-11-22

## 2023-11-22 RX ORDER — AMLODIPINE BESYLATE 10 MG/1
10 TABLET ORAL
Qty: 90 TABLET | Refills: 1 | Status: SHIPPED | OUTPATIENT
Start: 2023-11-22

## 2023-11-22 NOTE — TELEPHONE ENCOUNTER
Refill Routing Note   Medication(s) are not appropriate for processing by [facility] for the following reason(s):        Required vitals abnormal    [facility] action(s):  Approve  Defer        Medication Therapy Plan: BP 11/15/23 (!) 144/92      Appointments  past 12m or future 3m with PCP    Date Provider   Last Visit   11/6/2023 Leander Boo MD   Next Visit   5/6/2024 Leander Boo MD   ED visits in past 90 days: 0        Note composed:12:20 PM 11/22/2023

## 2023-11-22 NOTE — TELEPHONE ENCOUNTER
No care due was identified.  Health [facility] Embedded Care Due Messages. Reference number: [id number].   11/22/2023 12:13:20 PM CST

## 2023-12-18 DIAGNOSIS — I10 PRIMARY HYPERTENSION: ICD-10-CM

## 2023-12-19 RX ORDER — TRIAMTERENE AND HYDROCHLOROTHIAZIDE 37.5; 25 MG/1; MG/1
1 CAPSULE ORAL EVERY MORNING
Qty: 90 CAPSULE | Refills: 2 | Status: SHIPPED | OUTPATIENT
Start: 2023-12-19

## 2023-12-19 NOTE — TELEPHONE ENCOUNTER
Refill Routing Note   Medication(s) are not appropriate for processing by [facility] for the following reason(s):        Required vitals abnormal    [facility] action(s):  Defer               Appointments  past 12m or future 3m with PCP    Date Provider   Last Visit   11/6/2023 Leander Boo MD   Next Visit   5/6/2024 Leander Boo MD   ED visits in past 90 days: 0        Note composed:3:00 AM 12/19/2023

## 2024-01-05 ENCOUNTER — OFFICE VISIT (OUTPATIENT)
Dept: BARIATRICS | Facility: CLINIC | Age: 59
End: 2024-01-05
Payer: COMMERCIAL

## 2024-01-05 VITALS
HEART RATE: 80 BPM | SYSTOLIC BLOOD PRESSURE: 119 MMHG | WEIGHT: 233.69 LBS | OXYGEN SATURATION: 98 % | DIASTOLIC BLOOD PRESSURE: 76 MMHG | BODY MASS INDEX: 37.56 KG/M2 | HEIGHT: 66 IN

## 2024-01-05 DIAGNOSIS — K75.81 NASH (NONALCOHOLIC STEATOHEPATITIS): ICD-10-CM

## 2024-01-05 DIAGNOSIS — E78.2 MIXED HYPERLIPIDEMIA: ICD-10-CM

## 2024-01-05 DIAGNOSIS — E11.9 TYPE 2 DIABETES MELLITUS WITHOUT COMPLICATION, WITHOUT LONG-TERM CURRENT USE OF INSULIN: ICD-10-CM

## 2024-01-05 DIAGNOSIS — Z76.89 ENCOUNTER FOR WEIGHT MANAGEMENT: ICD-10-CM

## 2024-01-05 DIAGNOSIS — I10 ESSENTIAL HYPERTENSION: ICD-10-CM

## 2024-01-05 DIAGNOSIS — E66.01 CLASS 2 SEVERE OBESITY DUE TO EXCESS CALORIES WITH SERIOUS COMORBIDITY AND BODY MASS INDEX (BMI) OF 37.0 TO 37.9 IN ADULT: Primary | ICD-10-CM

## 2024-01-05 PROCEDURE — 3008F BODY MASS INDEX DOCD: CPT | Mod: CPTII,S$GLB,, | Performed by: STUDENT IN AN ORGANIZED HEALTH CARE EDUCATION/TRAINING PROGRAM

## 2024-01-05 PROCEDURE — 99999 PR PBB SHADOW E&M-EST. PATIENT-LVL IV: CPT | Mod: PBBFAC,,, | Performed by: STUDENT IN AN ORGANIZED HEALTH CARE EDUCATION/TRAINING PROGRAM

## 2024-01-05 PROCEDURE — 99213 OFFICE O/P EST LOW 20 MIN: CPT | Mod: S$GLB,,, | Performed by: STUDENT IN AN ORGANIZED HEALTH CARE EDUCATION/TRAINING PROGRAM

## 2024-01-05 PROCEDURE — 3074F SYST BP LT 130 MM HG: CPT | Mod: CPTII,S$GLB,, | Performed by: STUDENT IN AN ORGANIZED HEALTH CARE EDUCATION/TRAINING PROGRAM

## 2024-01-05 PROCEDURE — 1160F RVW MEDS BY RX/DR IN RCRD: CPT | Mod: CPTII,S$GLB,, | Performed by: STUDENT IN AN ORGANIZED HEALTH CARE EDUCATION/TRAINING PROGRAM

## 2024-01-05 PROCEDURE — 3078F DIAST BP <80 MM HG: CPT | Mod: CPTII,S$GLB,, | Performed by: STUDENT IN AN ORGANIZED HEALTH CARE EDUCATION/TRAINING PROGRAM

## 2024-01-05 PROCEDURE — 1159F MED LIST DOCD IN RCRD: CPT | Mod: CPTII,S$GLB,, | Performed by: STUDENT IN AN ORGANIZED HEALTH CARE EDUCATION/TRAINING PROGRAM

## 2024-01-05 RX ORDER — TIRZEPATIDE 15 MG/.5ML
15 INJECTION, SOLUTION SUBCUTANEOUS
Qty: 4 PEN | Refills: 2 | Status: SHIPPED | OUTPATIENT
Start: 2024-01-05
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
## 2024-02-02 ENCOUNTER — CLINICAL SUPPORT (OUTPATIENT)
Dept: FAMILY MEDICINE | Facility: CLINIC | Age: 59
End: 2024-02-02
Payer: COMMERCIAL

## 2024-02-02 DIAGNOSIS — Z23 NEED FOR SHINGLES VACCINE: Primary | ICD-10-CM

## 2024-02-02 PROCEDURE — 90471 IMMUNIZATION ADMIN: CPT | Mod: S$GLB,,, | Performed by: STUDENT IN AN ORGANIZED HEALTH CARE EDUCATION/TRAINING PROGRAM

## 2024-02-02 PROCEDURE — 90750 HZV VACC RECOMBINANT IM: CPT | Mod: S$GLB,,, | Performed by: STUDENT IN AN ORGANIZED HEALTH CARE EDUCATION/TRAINING PROGRAM

## 2024-03-26 ENCOUNTER — PATIENT MESSAGE (OUTPATIENT)
Dept: FAMILY MEDICINE | Facility: CLINIC | Age: 59
End: 2024-03-26
Payer: COMMERCIAL

## 2024-04-15 ENCOUNTER — OFFICE VISIT (OUTPATIENT)
Dept: BARIATRICS | Facility: CLINIC | Age: 59
End: 2024-04-15
Payer: COMMERCIAL

## 2024-04-15 VITALS
SYSTOLIC BLOOD PRESSURE: 118 MMHG | HEART RATE: 86 BPM | DIASTOLIC BLOOD PRESSURE: 80 MMHG | OXYGEN SATURATION: 98 % | HEIGHT: 66 IN | BODY MASS INDEX: 37.95 KG/M2 | WEIGHT: 236.13 LBS

## 2024-04-15 DIAGNOSIS — Z71.3 ENCOUNTER FOR WEIGHT LOSS COUNSELING: ICD-10-CM

## 2024-04-15 DIAGNOSIS — I10 ESSENTIAL HYPERTENSION: ICD-10-CM

## 2024-04-15 DIAGNOSIS — K75.81 NASH (NONALCOHOLIC STEATOHEPATITIS): ICD-10-CM

## 2024-04-15 DIAGNOSIS — E66.01 CLASS 2 SEVERE OBESITY DUE TO EXCESS CALORIES WITH SERIOUS COMORBIDITY AND BODY MASS INDEX (BMI) OF 38.0 TO 38.9 IN ADULT: Primary | ICD-10-CM

## 2024-04-15 DIAGNOSIS — E78.2 MIXED HYPERLIPIDEMIA: ICD-10-CM

## 2024-04-15 DIAGNOSIS — E11.9 TYPE 2 DIABETES MELLITUS WITHOUT COMPLICATION, WITHOUT LONG-TERM CURRENT USE OF INSULIN: ICD-10-CM

## 2024-04-15 PROCEDURE — 3074F SYST BP LT 130 MM HG: CPT | Mod: CPTII,S$GLB,, | Performed by: STUDENT IN AN ORGANIZED HEALTH CARE EDUCATION/TRAINING PROGRAM

## 2024-04-15 PROCEDURE — 1159F MED LIST DOCD IN RCRD: CPT | Mod: CPTII,S$GLB,, | Performed by: STUDENT IN AN ORGANIZED HEALTH CARE EDUCATION/TRAINING PROGRAM

## 2024-04-15 PROCEDURE — 1160F RVW MEDS BY RX/DR IN RCRD: CPT | Mod: CPTII,S$GLB,, | Performed by: STUDENT IN AN ORGANIZED HEALTH CARE EDUCATION/TRAINING PROGRAM

## 2024-04-15 PROCEDURE — 3008F BODY MASS INDEX DOCD: CPT | Mod: CPTII,S$GLB,, | Performed by: STUDENT IN AN ORGANIZED HEALTH CARE EDUCATION/TRAINING PROGRAM

## 2024-04-15 PROCEDURE — 99999 PR PBB SHADOW E&M-EST. PATIENT-LVL IV: CPT | Mod: PBBFAC,,, | Performed by: STUDENT IN AN ORGANIZED HEALTH CARE EDUCATION/TRAINING PROGRAM

## 2024-04-15 PROCEDURE — 99213 OFFICE O/P EST LOW 20 MIN: CPT | Mod: S$GLB,,, | Performed by: STUDENT IN AN ORGANIZED HEALTH CARE EDUCATION/TRAINING PROGRAM

## 2024-04-15 PROCEDURE — 3079F DIAST BP 80-89 MM HG: CPT | Mod: CPTII,S$GLB,, | Performed by: STUDENT IN AN ORGANIZED HEALTH CARE EDUCATION/TRAINING PROGRAM

## 2024-04-15 RX ORDER — TIRZEPATIDE 15 MG/.5ML
15 INJECTION, SOLUTION SUBCUTANEOUS
Qty: 4 PEN | Refills: 2 | Status: SHIPPED | OUTPATIENT
Start: 2024-04-15

## 2024-04-17 DIAGNOSIS — K21.9 GASTROESOPHAGEAL REFLUX DISEASE WITHOUT ESOPHAGITIS: ICD-10-CM

## 2024-04-17 NOTE — TELEPHONE ENCOUNTER
Care Due:                  Date            Visit Type   Department     Provider  --------------------------------------------------------------------------------                                MYCHART                              ANNUAL                              CHECKUP/PHY  [facility]  Last Visit: 11-      S            MEDICINE       Leander Boo                              EP -                              PRIMARY      [facility]  Next Visit: 05-      [facility] ([facility])   MEDICINE       Leander Boo                                                            Last  Test          Frequency    Reason                     Performed    Due Date  --------------------------------------------------------------------------------    CBC.........  12 months..  ibuprofen, meloxicam.....  04- 04-    CMP.........  12 months..  atorvastatin, famotidine,   04- 04-                             ibuprofen, losartan,                             meloxicam,                             triamterene-hydrochloroth                             iazide...................    Lipid Panel.  12 months..  atorvastatin.............  04- 04-    [facility] Embedded Care Due Messages. Reference number: [id number].   4/17/2024 1:14:08 PM CDT

## 2024-04-18 RX ORDER — AMITRIPTYLINE HYDROCHLORIDE 50 MG/1
50 TABLET, FILM COATED ORAL NIGHTLY
Qty: 90 TABLET | Refills: 1 | Status: SHIPPED | OUTPATIENT
Start: 2024-04-18

## 2024-04-18 RX ORDER — FAMOTIDINE 40 MG/1
40 TABLET, FILM COATED ORAL
Qty: 90 TABLET | Refills: 0 | Status: SHIPPED | OUTPATIENT
Start: 2024-04-18

## 2024-04-18 RX ORDER — PANTOPRAZOLE SODIUM 40 MG/1
40 TABLET, DELAYED RELEASE ORAL EVERY MORNING
Qty: 90 TABLET | Refills: 1 | Status: SHIPPED | OUTPATIENT
Start: 2024-04-18

## 2024-04-18 NOTE — TELEPHONE ENCOUNTER
Refill Routing Note   Medication(s) are not appropriate for processing by [facility] for the following reason(s):        No active prescription written by provider    [facility] action(s):  Defer  Approve        Medication Therapy Plan: FLOS; Famotidine and Amitriptyline- on the med list 4/10/24.    Alert overridden per protocol: Yes     Appointments  past 12m or future 3m with PCP    Date Provider   Last Visit   2023 Leander Boo MD   Next Visit   2024 Leander Boo MD   ED visits in past 90 days: 0        Note composed:11:43 AM 2024

## 2024-04-26 ENCOUNTER — PATIENT MESSAGE (OUTPATIENT)
Dept: BARIATRICS | Facility: CLINIC | Age: 59
End: 2024-04-26
Payer: COMMERCIAL

## 2024-04-26 NOTE — TELEPHONE ENCOUNTER
PA for medication Mounjaro has been completed and submitted to [employer] for review. Waiting for a determination of coverage. Will notify pt via portal msg.

## 2024-05-02 ENCOUNTER — LAB VISIT (OUTPATIENT)
Dept: LAB | Facility: HOSPITAL | Age: 59
End: 2024-05-02
Attending: STUDENT IN AN ORGANIZED HEALTH CARE EDUCATION/TRAINING PROGRAM
Payer: COMMERCIAL

## 2024-05-02 DIAGNOSIS — E11.9 TYPE 2 DIABETES MELLITUS WITHOUT COMPLICATION, WITHOUT LONG-TERM CURRENT USE OF INSULIN: ICD-10-CM

## 2024-05-02 DIAGNOSIS — I10 PRIMARY HYPERTENSION: ICD-10-CM

## 2024-05-02 LAB
ALBUMIN/CREAT UR: 4.6 UG/MG (ref 0–30)
CREAT UR-MCNC: 261 MG/DL (ref 23–375)
MICROALBUMIN UR DL<=1MG/L-MCNC: 12 UG/ML

## 2024-05-02 PROCEDURE — 82043 UR ALBUMIN QUANTITATIVE: CPT | Mod: PN | Performed by: STUDENT IN AN ORGANIZED HEALTH CARE EDUCATION/TRAINING PROGRAM

## 2024-05-02 RX ORDER — LOSARTAN POTASSIUM 50 MG/1
50 TABLET ORAL DAILY
Qty: 90 TABLET | Refills: 1 | Status: SHIPPED | OUTPATIENT
Start: 2024-05-02 | End: 2024-05-04 | Stop reason: SDUPTHER

## 2024-05-02 NOTE — TELEPHONE ENCOUNTER
Refill Decision Note   Андрей Wooten  is requesting a refill authorization.  Brief Assessment and Rationale for Refill:  Approve     Medication Therapy Plan:         Comments:     Note composed:3:12 PM 05/02/2024

## 2024-05-02 NOTE — TELEPHONE ENCOUNTER
No care due was identified.  [facility] Embedded Care Due Messages. Reference number: [id number].   5/02/2024 10:24:17 AM CDT

## 2024-05-04 DIAGNOSIS — I10 PRIMARY HYPERTENSION: ICD-10-CM

## 2024-05-04 NOTE — TELEPHONE ENCOUNTER
No care due was identified.  [facility] Embedded Care Due Messages. Reference number: [id number].   5/04/2024 10:45:10 AM CDT

## 2024-05-05 RX ORDER — LOSARTAN POTASSIUM 50 MG/1
50 TABLET ORAL DAILY
Qty: 90 TABLET | Refills: 1 | Status: SHIPPED | OUTPATIENT
Start: 2024-05-05 | End: 2024-05-06 | Stop reason: SDUPTHER

## 2024-05-06 ENCOUNTER — OFFICE VISIT (OUTPATIENT)
Dept: FAMILY MEDICINE | Facility: CLINIC | Age: 59
End: 2024-05-06
Payer: COMMERCIAL

## 2024-05-06 VITALS
SYSTOLIC BLOOD PRESSURE: 120 MMHG | HEIGHT: 66 IN | OXYGEN SATURATION: 97 % | BODY MASS INDEX: 38.39 KG/M2 | DIASTOLIC BLOOD PRESSURE: 72 MMHG | HEART RATE: 93 BPM | WEIGHT: 238.88 LBS

## 2024-05-06 DIAGNOSIS — I10 ESSENTIAL HYPERTENSION: ICD-10-CM

## 2024-05-06 DIAGNOSIS — M54.50 CHRONIC LEFT-SIDED LOW BACK PAIN WITHOUT SCIATICA: ICD-10-CM

## 2024-05-06 DIAGNOSIS — E66.01 CLASS 2 SEVERE OBESITY DUE TO EXCESS CALORIES WITH SERIOUS COMORBIDITY AND BODY MASS INDEX (BMI) OF 38.0 TO 38.9 IN ADULT: ICD-10-CM

## 2024-05-06 DIAGNOSIS — E11.9 TYPE 2 DIABETES MELLITUS WITHOUT COMPLICATION, WITHOUT LONG-TERM CURRENT USE OF INSULIN: Primary | ICD-10-CM

## 2024-05-06 DIAGNOSIS — E78.2 MIXED HYPERLIPIDEMIA: ICD-10-CM

## 2024-05-06 DIAGNOSIS — G89.29 CHRONIC LEFT-SIDED LOW BACK PAIN WITHOUT SCIATICA: ICD-10-CM

## 2024-05-06 DIAGNOSIS — I10 PRIMARY HYPERTENSION: ICD-10-CM

## 2024-05-06 DIAGNOSIS — K76.0 NAFLD (NONALCOHOLIC FATTY LIVER DISEASE): ICD-10-CM

## 2024-05-06 PROCEDURE — 99214 OFFICE O/P EST MOD 30 MIN: CPT | Mod: S$GLB,,, | Performed by: STUDENT IN AN ORGANIZED HEALTH CARE EDUCATION/TRAINING PROGRAM

## 2024-05-06 PROCEDURE — 3074F SYST BP LT 130 MM HG: CPT | Mod: CPTII,S$GLB,, | Performed by: STUDENT IN AN ORGANIZED HEALTH CARE EDUCATION/TRAINING PROGRAM

## 2024-05-06 PROCEDURE — 3044F HG A1C LEVEL LT 7.0%: CPT | Mod: CPTII,S$GLB,, | Performed by: STUDENT IN AN ORGANIZED HEALTH CARE EDUCATION/TRAINING PROGRAM

## 2024-05-06 PROCEDURE — 3061F NEG MICROALBUMINURIA REV: CPT | Mod: CPTII,S$GLB,, | Performed by: STUDENT IN AN ORGANIZED HEALTH CARE EDUCATION/TRAINING PROGRAM

## 2024-05-06 PROCEDURE — 3078F DIAST BP <80 MM HG: CPT | Mod: CPTII,S$GLB,, | Performed by: STUDENT IN AN ORGANIZED HEALTH CARE EDUCATION/TRAINING PROGRAM

## 2024-05-06 PROCEDURE — 1160F RVW MEDS BY RX/DR IN RCRD: CPT | Mod: CPTII,S$GLB,, | Performed by: STUDENT IN AN ORGANIZED HEALTH CARE EDUCATION/TRAINING PROGRAM

## 2024-05-06 PROCEDURE — 3008F BODY MASS INDEX DOCD: CPT | Mod: CPTII,S$GLB,, | Performed by: STUDENT IN AN ORGANIZED HEALTH CARE EDUCATION/TRAINING PROGRAM

## 2024-05-06 PROCEDURE — 3066F NEPHROPATHY DOC TX: CPT | Mod: CPTII,S$GLB,, | Performed by: STUDENT IN AN ORGANIZED HEALTH CARE EDUCATION/TRAINING PROGRAM

## 2024-05-06 PROCEDURE — 4010F ACE/ARB THERAPY RXD/TAKEN: CPT | Mod: CPTII,S$GLB,, | Performed by: STUDENT IN AN ORGANIZED HEALTH CARE EDUCATION/TRAINING PROGRAM

## 2024-05-06 PROCEDURE — 1159F MED LIST DOCD IN RCRD: CPT | Mod: CPTII,S$GLB,, | Performed by: STUDENT IN AN ORGANIZED HEALTH CARE EDUCATION/TRAINING PROGRAM

## 2024-05-06 RX ORDER — TIZANIDINE 4 MG/1
4 TABLET ORAL EVERY 6 HOURS PRN
Qty: 45 TABLET | Refills: 2 | Status: SHIPPED | OUTPATIENT
Start: 2024-05-06 | End: 2024-05-16

## 2024-05-06 RX ORDER — ACETAMINOPHEN AND CODEINE PHOSPHATE 300; 60 MG/1; MG/1
1 TABLET ORAL EVERY 6 HOURS PRN
Qty: 30 TABLET | Refills: 0 | Status: SHIPPED | OUTPATIENT
Start: 2024-05-06 | End: 2024-05-16

## 2024-05-06 RX ORDER — DAPAGLIFLOZIN 5 MG/1
5 TABLET, FILM COATED ORAL DAILY
Qty: 90 TABLET | Refills: 1 | Status: SHIPPED | OUTPATIENT
Start: 2024-05-06

## 2024-05-06 RX ORDER — LOSARTAN POTASSIUM 50 MG/1
50 TABLET ORAL DAILY
Qty: 90 TABLET | Refills: 3 | Status: SHIPPED | OUTPATIENT
Start: 2024-05-06

## 2024-05-11 NOTE — PROGRESS NOTES
Patient ID: Андрей Wooten is a 59 y.o. male.     Chief Complaint: f/u chronic medical conditions    Follow-up  Pertinent negatives include no chest pain, coughing, fever, headaches, myalgias, nausea, rash, vomiting or weakness.     DMII- patient has been following diabetic diet, he is compliant with all medications    HLD- denies recent chest pain and shortness of breath     Chronic back pain- needs refills of medications. He uses tylenol with codeine very sparingly. Confirmed with     Review of Systems  Review of Systems   Constitutional:  Negative for fever.   HENT:  Negative for ear pain and sinus pain.    Eyes:  Negative for discharge.   Respiratory:  Negative for cough and shortness of breath.    Cardiovascular:  Negative for chest pain and leg swelling.   Gastrointestinal:  Negative for diarrhea, nausea and vomiting.   Genitourinary:  Negative for urgency.   Musculoskeletal:  Negative for myalgias.   Skin:  Negative for rash.   Neurological:  Negative for weakness and headaches.   Psychiatric/Behavioral:  Negative for depression.    All other systems reviewed and are negative.      Currently Medications  Current Outpatient Medications on File Prior to Visit   Medication Sig Dispense Refill    amitriptyline (ELAVIL) 50 MG tablet TAKE 1 TABLET BY MOUTH EVERY EVENING 90 tablet 1    amLODIPine (NORVASC) 10 MG tablet TAKE 1 TABLET BY MOUTH ONCE A DAY 90 tablet 1    atorvastatin (LIPITOR) 40 MG tablet TAKE 1 TABLET BY MOUTH ONCE A DAY 90 tablet 1    blood sugar diagnostic Strp To check BG 1 times daily, to use with insurance preferred meter 100 strip 11    blood-glucose meter kit To check BG 1 times daily, to use with insurance preferred meter 1 each 0    cetirizine (ZYRTEC) 10 MG tablet Take 10 mg by mouth as needed.       famotidine (PEPCID) 40 MG tablet TAKE 1 TABLET BY MOUTH ONCE A DAY 90 tablet 0    fluticasone (FLONASE) 50 mcg/actuation nasal spray 1 spray by Each Nare route once daily. (Patient taking  "differently: 1 spray by Each Nostril route as needed.) 16 g 0    ibuprofen (ADVIL,MOTRIN) 800 MG tablet Take 1 tablet (800 mg total) by mouth 3 (three) times daily as needed for Pain. 60 tablet 1    lancets Misc To check BG 1 times daily, to use with insurance preferred meter 100 each 11    pantoprazole (PROTONIX) 40 MG tablet TAKE 1 TABLET BY MOUTH EVERY MORNING 90 tablet 1    tirzepatide (MOUNJARO) 15 mg/0.5 mL PnIj Inject 15 mg into the skin every 7 days. 4 Pen 2    triamterene-hydrochlorothiazide 37.5-25 mg (DYAZIDE) 37.5-25 mg per capsule TAKE 1 CAPSULE BY MOUTH EVERY MORNING 90 capsule 2    vitamin D 1000 units Tab Take 185 mg by mouth once daily.       No current facility-administered medications on file prior to visit.       Physical  Exam  Vitals:    05/06/24 1350   BP: 120/72   BP Location: Right arm   Patient Position: Sitting   Pulse: 93   SpO2: 97%   Weight: 108.3 kg (238 lb 13.9 oz)   Height: 5' 6" (1.676 m)      Body mass index is 38.55 kg/m².  Wt Readings from Last 3 Encounters:   05/06/24 108.3 kg (238 lb 13.9 oz)   04/15/24 107.1 kg (236 lb 1.6 oz)   01/05/24 106 kg (233 lb 11.2 oz)       Physical Exam  Vitals and nursing note reviewed.   Constitutional:       General: He is not in acute distress.     Appearance: He is obese. He is not ill-appearing.   HENT:      Head: Normocephalic and atraumatic.      Right Ear: External ear normal.      Left Ear: External ear normal.      Nose: Nose normal.      Mouth/Throat:      Mouth: Mucous membranes are moist.   Eyes:      Extraocular Movements: Extraocular movements intact.      Conjunctiva/sclera: Conjunctivae normal.   Cardiovascular:      Rate and Rhythm: Normal rate and regular rhythm.      Pulses: Normal pulses.      Heart sounds: No murmur heard.  Pulmonary:      Effort: Pulmonary effort is normal. No respiratory distress.      Breath sounds: No wheezing.   Abdominal:      General: There is no distension.      Palpations: Abdomen is soft. There is no " mass.      Tenderness: There is no abdominal tenderness.   Musculoskeletal:         General: No swelling.      Cervical back: Normal range of motion.   Skin:     Coloration: Skin is not jaundiced.      Findings: No rash.   Neurological:      General: No focal deficit present.      Mental Status: He is alert and oriented to person, place, and time.   Psychiatric:         Mood and Affect: Mood normal.         Thought Content: Thought content normal.         Labs:    Complete Blood Count  Lab Results   Component Value Date    RBC 5.17 05/02/2024    HGB 14.3 05/02/2024    HCT 43.7 05/02/2024    MCV 85 05/02/2024    MCH 27.7 05/02/2024    MCHC 32.7 05/02/2024    RDW 13.1 05/02/2024     05/02/2024    MPV 9.5 05/02/2024    GRAN 1.5 (L) 05/02/2024    GRAN 30.6 (L) 05/02/2024    LYMPH 2.9 05/02/2024    LYMPH 59.0 (H) 05/02/2024    MONO 0.4 05/02/2024    MONO 8.6 05/02/2024    EOS 0.1 05/02/2024    BASO 0.02 05/02/2024    EOSINOPHIL 1.2 05/02/2024    BASOPHIL 0.4 05/02/2024    DIFFMETHOD Automated 05/02/2024       Comprehensive Metabolic Panel  Lab Results   Component Value Date    GLU 92 05/02/2024    BUN 20 05/02/2024    CREATININE 1.08 05/02/2024     05/02/2024    K 3.6 05/02/2024    CL 97 05/02/2024    PROT 7.8 05/02/2024    ALBUMIN 4.4 05/02/2024    BILITOT 0.6 05/02/2024    AST 72 (H) 05/02/2024    ALKPHOS 61 05/02/2024    CO2 31 (H) 05/02/2024     (H) 05/02/2024    ANIONGAP 11 05/02/2024       TSH  Lab Results   Component Value Date    TSH 1.460 05/02/2024       Imaging:  CT Chest Abdomen Pelvis With Contrast  Narrative: EXAMINATION:  CT CHEST ABDOMEN PELVIS WITH CONTRAST (XPD)    CLINICAL HISTORY:  Epigastric painabdominal pain and chest pain with radiation;    TECHNIQUE:  Standard chest, abdomen, and pelvis CT protocol with oral and IV contrast was performed.  100 mL of Omnipaque 350 contrast material was used for this examination.    COMPARISON:  None    FINDINGS:  Finding: The size of the heart  is within normal limits.  The right lung is clear.  There is a 4 mm noncalcified pulmonary nodule in the left lower lobe.  There are streaky opacities in the lingula and right lower lobe.  There is no pneumothorax or pleural effusion.    There is a 27 mm oval shaped area of hypodensity in the dome of the left lobe of the liver.  This area has a Hounsfield measurement of 8.  The gallbladder, pancreas, spleen, and adrenals are normal in appearance.  There are hypodense masses associated with both kidneys.  One of the larger ones measures 8 mm and is located in the medial aspect of the inferior pole of the right kidney.  These are characteristic of incidental findings.  The ureters and the urinary bladder are normal in appearance.  There is a mild amount of calcification within the prostate.  The appendix and the rest of the gastrointestinal system are normal in appearance. There is no free fluid within the abdomen or pelvis. There is no pneumoperitoneum.  There is a mild amount of atherosclerosis.  There are mild osteoarthritic changes in the sacroiliac joints bilaterally.  There is no abnormal lymphadenopathy based on CT size criteria.  Impression: 1. There is a 27 mm oval shaped area of hypodensity in the dome of the left lobe of the liver. This area has a Hounsfield measurement of 8.  This is characteristic of a cyst.  2. There is no abnormal lymphadenopathy based on CT size criteria.  3. There are mild osteoarthritic changes in the sacroiliac joints bilaterally.  4. There are streaky opacities in the lingula and right lower lobe.  This is characteristic of subsegmental atelectasis and/or scarring.  5. There is a 4 mm noncalcified pulmonary nodule in the left lower lobe.  All CT scans at this facility use dose modulation, iterative reconstruction, and/or weight base dosing when appropriate to reduce radiation dose when appropriate to reduce radiation dose to as low as reasonably achievable.    Electronically signed  by: Josue Mckeon MD  Date:    04/20/2022  Time:    09:34      Assessment/Plan:    1. Type 2 diabetes mellitus without complication, without long-term current use of insulin  -     HEMOGLOBIN A1C; Future; Expected date: 05/06/2024  -     dapagliflozin propanediol (FARXIGA) 5 mg Tab tablet; Take 1 tablet (5 mg total) by mouth once daily.  Dispense: 90 tablet; Refill: 1    2. Primary hypertension  -     losartan (COZAAR) 50 MG tablet; Take 1 tablet (50 mg total) by mouth once daily.  Dispense: 90 tablet; Refill: 3  -     CBC Auto Differential; Future  -     Comprehensive metabolic panel; Future; Expected date: 05/06/2024  -     TSH; Future; Expected date: 05/06/2024    3. NAFLD (nonalcoholic fatty liver disease)  Overview:   -- liver biopsy 5/28/18 - VOGEL, macrosteatosis 80%, microsteatosis 10%, stage 1 out of 4 fibrosis        4. Mixed hyperlipidemia  -     LIPID PANEL; Future; Expected date: 05/06/2024    5. Class 2 severe obesity due to excess calories with serious comorbidity and body mass index (BMI) of 38.0 to 38.9 in adult    6. Essential hypertension    7. Chronic left-sided low back pain without sciatica  -     acetaminophen-codeine 300-60mg (TYLENOL #4) 300-60 mg Tab; Take 1 tablet by mouth every 6 (six) hours as needed (back pain).  Dispense: 30 tablet; Refill: 0  -     tiZANidine (ZANAFLEX) 4 MG tablet; Take 1 tablet (4 mg total) by mouth every 6 (six) hours as needed (muscle pain).  Dispense: 45 tablet; Refill: 2         Discussed how to stay healthy including: diet, exercise, refraining from smoking and discussed screening exams / tests needed for age, sex and family Hx.    RTC 6 mo with labs    Leander Boo MD

## 2024-07-05 DIAGNOSIS — K21.9 GASTROESOPHAGEAL REFLUX DISEASE WITHOUT ESOPHAGITIS: ICD-10-CM

## 2024-07-05 NOTE — TELEPHONE ENCOUNTER
No care due was identified.  Health [facility] Embedded Care Due Messages. Reference number: [id number].   7/05/2024 11:07:46 AM CDT

## 2024-07-06 RX ORDER — FAMOTIDINE 40 MG/1
40 TABLET, FILM COATED ORAL
Qty: 90 TABLET | Refills: 3 | Status: SHIPPED | OUTPATIENT
Start: 2024-07-06

## 2024-07-06 NOTE — TELEPHONE ENCOUNTER
Refill Decision Note   Андрей Wooten  is requesting a refill authorization.  Brief Assessment and Rationale for Refill:  Approve     Medication Therapy Plan:         Alert overridden per protocol: Yes   Comments:     Note composed:11:24 AM 07/06/2024

## 2024-07-15 ENCOUNTER — OFFICE VISIT (OUTPATIENT)
Dept: BARIATRICS | Facility: CLINIC | Age: 59
End: 2024-07-15
Payer: COMMERCIAL

## 2024-07-15 VITALS
HEIGHT: 66 IN | DIASTOLIC BLOOD PRESSURE: 80 MMHG | BODY MASS INDEX: 37.51 KG/M2 | OXYGEN SATURATION: 96 % | WEIGHT: 233.38 LBS | HEART RATE: 86 BPM | SYSTOLIC BLOOD PRESSURE: 122 MMHG

## 2024-07-15 DIAGNOSIS — E66.01 CLASS 2 SEVERE OBESITY DUE TO EXCESS CALORIES WITH SERIOUS COMORBIDITY AND BODY MASS INDEX (BMI) OF 37.0 TO 37.9 IN ADULT: Primary | ICD-10-CM

## 2024-07-15 DIAGNOSIS — I10 ESSENTIAL HYPERTENSION: ICD-10-CM

## 2024-07-15 DIAGNOSIS — Z71.3 ENCOUNTER FOR WEIGHT LOSS COUNSELING: ICD-10-CM

## 2024-07-15 DIAGNOSIS — E11.9 TYPE 2 DIABETES MELLITUS WITHOUT COMPLICATION, WITHOUT LONG-TERM CURRENT USE OF INSULIN: ICD-10-CM

## 2024-07-15 DIAGNOSIS — K75.81 NASH (NONALCOHOLIC STEATOHEPATITIS): ICD-10-CM

## 2024-07-15 DIAGNOSIS — K76.0 NAFLD (NONALCOHOLIC FATTY LIVER DISEASE): ICD-10-CM

## 2024-07-15 DIAGNOSIS — E78.2 MIXED HYPERLIPIDEMIA: ICD-10-CM

## 2024-07-15 PROCEDURE — 3066F NEPHROPATHY DOC TX: CPT | Mod: CPTII,S$GLB,, | Performed by: STUDENT IN AN ORGANIZED HEALTH CARE EDUCATION/TRAINING PROGRAM

## 2024-07-15 PROCEDURE — 3008F BODY MASS INDEX DOCD: CPT | Mod: CPTII,S$GLB,, | Performed by: STUDENT IN AN ORGANIZED HEALTH CARE EDUCATION/TRAINING PROGRAM

## 2024-07-15 PROCEDURE — 3044F HG A1C LEVEL LT 7.0%: CPT | Mod: CPTII,S$GLB,, | Performed by: STUDENT IN AN ORGANIZED HEALTH CARE EDUCATION/TRAINING PROGRAM

## 2024-07-15 PROCEDURE — 3061F NEG MICROALBUMINURIA REV: CPT | Mod: CPTII,S$GLB,, | Performed by: STUDENT IN AN ORGANIZED HEALTH CARE EDUCATION/TRAINING PROGRAM

## 2024-07-15 PROCEDURE — 99213 OFFICE O/P EST LOW 20 MIN: CPT | Mod: S$GLB,,, | Performed by: STUDENT IN AN ORGANIZED HEALTH CARE EDUCATION/TRAINING PROGRAM

## 2024-07-15 PROCEDURE — 4010F ACE/ARB THERAPY RXD/TAKEN: CPT | Mod: CPTII,S$GLB,, | Performed by: STUDENT IN AN ORGANIZED HEALTH CARE EDUCATION/TRAINING PROGRAM

## 2024-07-15 PROCEDURE — 3074F SYST BP LT 130 MM HG: CPT | Mod: CPTII,S$GLB,, | Performed by: STUDENT IN AN ORGANIZED HEALTH CARE EDUCATION/TRAINING PROGRAM

## 2024-07-15 PROCEDURE — 3079F DIAST BP 80-89 MM HG: CPT | Mod: CPTII,S$GLB,, | Performed by: STUDENT IN AN ORGANIZED HEALTH CARE EDUCATION/TRAINING PROGRAM

## 2024-07-15 PROCEDURE — 1159F MED LIST DOCD IN RCRD: CPT | Mod: CPTII,S$GLB,, | Performed by: STUDENT IN AN ORGANIZED HEALTH CARE EDUCATION/TRAINING PROGRAM

## 2024-07-15 PROCEDURE — 99999 PR PBB SHADOW E&M-EST. PATIENT-LVL IV: CPT | Mod: PBBFAC,,, | Performed by: STUDENT IN AN ORGANIZED HEALTH CARE EDUCATION/TRAINING PROGRAM

## 2024-07-15 PROCEDURE — 1160F RVW MEDS BY RX/DR IN RCRD: CPT | Mod: CPTII,S$GLB,, | Performed by: STUDENT IN AN ORGANIZED HEALTH CARE EDUCATION/TRAINING PROGRAM

## 2024-07-15 RX ORDER — TIRZEPATIDE 15 MG/.5ML
15 INJECTION, SOLUTION SUBCUTANEOUS
Qty: 4 PEN | Refills: 2 | Status: SHIPPED | OUTPATIENT
Start: 2024-07-15

## 2024-07-15 RX ORDER — TIZANIDINE 4 MG/1
TABLET ORAL
COMMUNITY
Start: 2024-05-24

## 2024-07-15 NOTE — PROGRESS NOTES
Subjective:       Patient ID: Андрей Wooten is a 59 y.o. male.    Chief Complaint: Follow-up, Obesity, and Weight Check      Patient presents for treatment of obesity.     Co-morbidities:   HTN  HLD  NAFLD  DM2  GERD    Negative for thyroid cancer  Negative for pancreatitis     History of Weight Loss Efforts  Dr. Bernal - prescribed phentermine, diethylpropion; 255 lbs when first seen, got down to 240 lbs  Intermittent Fasting - got down to about 230 lbs    Current Physical Activity  Weights and cardio in gym 3x/week    Current Eating Habits  Breakfast - grits, ham, biscuits, cereal (Frosted Flakes)  Lunch - fast food  Dinner - southern food  Snacks - chips, cookies, candy; about 4/day  Beverages - water, Hawaiian Punch, sweet tea    Shift work    Drinking less sugar sweetened beverages  Eating more salads  Protein shakes  V8  Beans with brown rice  Cut back on fried foods    Medical Weight Loss  1/24/2022 ([employer] out for repair): 247 lbs 5.7 oz, BMI 39.92  2/23/2022: 245.7 lbs, BMI 39.7, BFP 38.2%, BFM 93.8 lbs, SMM 87.7 lbs, BMR 1858 kcal; Ozempic  4/1/2022: 244.1 lbs, BMI 39.4, BFP 38.7%, BFM 94.4 lbs, SMM 86.6 lbs, BMR 1836 kcal; Ozempic  5/10/2022: 247.8 lbs, BMI 40, BFP 38.1%, BFM 94.4 lbs, SMM 88.6 lbs, BMR 1874 kcal; Ozempic  6/7/2022: 241.8 lbs, BMI 39, BFP 38.8%, BFM 93.9 lbs, SMM 85.3 lbs, BMR 1820 kcal; Ozempic  8/16/2022: 244.9 lbs, BMI 39.5, BFP 38.3%, BFM 93.9 lbs, SMM 86.6 lbs, BMR 1850 kcal  10/10/2022: 247.3 lbs, BMI 39.9, BFP 39.6%, BFM 98.1 lbs, SMM 85.8 lbs, BMR 1833 kcal  12/14/2022: 244.3 lbs, BMI 39.4, BFP 39.1%, BFM 95.5 lbs, SMM 85.8 lbs, BMR 1828 kcal  3/16/2023: 257 lbs, BMI 41.5, BFP 39%, .2 lbs, SMM 90 lbs, BMR 1905 kcal  6/23/2023: 240.9 lbs, BMI 38.9, BFP 38.4%, BFM 92.5 lbs, SMM 85.5 lbs, BMR 1823 kcal  9/26/2023: 230.2 lbs, BMI 37.2, BFP 36.9%, BFM 84.9 lbs, SMM 83.6 lbs, BMR 1793 kcal  1/5/2024: 233.7 lbs, BMI 37.7, BFP 35.8%, BFM 83.8 lbs, SMM 86.9 lbs, BMR 1840  kcal  4/15/2024: 236.1 lbs, BMI 38.1, BFP 38.2%, BFM 90.4 lbs, SMM 84 lbs, BMR 1799  kcal  7/15/2024: 233.4 lbs, BMI 37.7, BFP 36.7%, BFM 85.7 lbs, SMM 85.1 lbs, BMR 1818 kcal        Follow-up  Pertinent negatives include no abdominal pain, chest pain, chills, fever, nausea or vomiting.     Review of Systems   Constitutional:  Negative for chills and fever.   Respiratory:  Negative for shortness of breath.    Cardiovascular:  Negative for chest pain.   Gastrointestinal:  Negative for abdominal pain, nausea and vomiting.   Neurological:  Negative for dizziness and light-headedness.         Objective:        Latest Reference Range & Units 05/02/24 07:19 05/02/24 08:26   WBC 3.90 - 12.70 K/uL 4.88    RBC 4.60 - 6.20 M/uL 5.17    Hemoglobin 14.0 - 18.0 g/dL 14.3    Hematocrit 40.0 - 54.0 % 43.7    MCV 82 - 98 fL 85    MCH 27.0 - 31.0 pg 27.7    MCHC 32.0 - 36.0 g/dL 32.7    RDW 11.5 - 14.5 % 13.1    Platelet Count 150 - 450 K/uL 279    MPV 9.2 - 12.9 fL 9.5    Gran % 38.0 - 73.0 % 30.6 (L)    Lymph % 18.0 - 48.0 % 59.0 (H)    Mono % 4.0 - 15.0 % 8.6    Eos % 0.0 - 8.0 % 1.2    Basophil % 0.0 - 1.9 % 0.4    Immature Granulocytes 0.0 - 0.5 % 0.2    Gran # (ANC) 1.8 - 7.7 K/uL 1.5 (L)    Lymph # 1.0 - 4.8 K/uL 2.9    Mono # 0.3 - 1.0 K/uL 0.4    Eos # 0.0 - 0.5 K/uL 0.1    Baso # 0.00 - 0.20 K/uL 0.02    Immature Grans (Abs) 0.00 - 0.04 K/uL 0.01    nRBC 0 /100 WBC 0    Differential Method  Automated    Sodium 136 - 145 mmol/L 139    Potassium 3.5 - 5.1 mmol/L 3.6    Chloride 95 - 110 mmol/L 97    CO2 23 - 29 mmol/L 31 (H)    Anion Gap 8 - 16 mmol/L 11    BUN 2 - 20 mg/dL 20    Creatinine 0.50 - 1.40 mg/dL 1.08    eGFR >60 mL/min/1.73 m^2 >60.0    Glucose 70 - 110 mg/dL 92    Calcium 8.7 - 10.5 mg/dL 9.9    ALP 38 - 126 U/L 61    PROTEIN TOTAL 6.0 - 8.4 g/dL 7.8    Albumin 3.5 - 5.2 g/dL 4.4    BILIRUBIN TOTAL 0.1 - 1.0 mg/dL 0.6    AST 15 - 46 U/L 72 (H)    ALT 10 - 44 U/L 117 (H)    Cholesterol Total 120 - 199 mg/dL 153     HDL 40 - 75 mg/dL 25 (L)    HDL/Cholesterol Ratio 20.0 - 50.0 % 16.3 (L)    Non-HDL Cholesterol mg/dL 128    Total Cholesterol/HDL Ratio 2.0 - 5.0  6.1 (H)    Triglycerides 30 - 150 mg/dL 234 (H)    LDL Cholesterol 63.0 - 159.0 mg/dL 81.2    Hemoglobin A1C External 4.0 - 5.6 % 5.7 (H)    Estimated Avg Glucose 68 - 131 mg/dL 117    TSH 0.400 - 4.000 uIU/mL 1.460    Prostate Specific Antigen 0.00 - 4.00 ng/mL 0.77    Urine Creatinine 23.0 - 375.0 mg/dL  261.0   Urine Microalbumin ug/mL  12.0   MICROALB/CREAT RATIO 0.0 - 30.0 ug/mg  4.6   (L): Data is abnormally low  (H): Data is abnormally high    Vitals:    07/15/24 0904   BP: 122/80   Pulse: 86           Physical Exam  Vitals reviewed.   Constitutional:       General: He is not in acute distress.     Appearance: Normal appearance. He is obese. He is not ill-appearing, toxic-appearing or diaphoretic.   HENT:      Head: Normocephalic and atraumatic.   Cardiovascular:      Rate and Rhythm: Normal rate.   Pulmonary:      Effort: Pulmonary effort is normal. No respiratory distress.   Skin:     General: Skin is warm and dry.   Neurological:      Mental Status: He is alert and oriented to person, place, and time.         Assessment:       Problem List Items Addressed This Visit       NAFLD (nonalcoholic fatty liver disease)    Relevant Medications    tirzepatide (MOUNJARO) 15 mg/0.5 mL PnIj    Class 2 severe obesity due to excess calories with serious comorbidity and body mass index (BMI) of 38.0 to 38.9 in adult - Primary    Relevant Medications    tirzepatide (MOUNJARO) 15 mg/0.5 mL PnIj    HLD (hyperlipidemia)    Relevant Medications    tirzepatide (MOUNJARO) 15 mg/0.5 mL PnIj    Type 2 diabetes mellitus without complication, without long-term current use of insulin    Relevant Medications    tirzepatide (MOUNJARO) 15 mg/0.5 mL PnIj     Other Visit Diagnoses       VOGEL (nonalcoholic steatohepatitis)        Relevant Medications    tirzepatide (MOUNJARO) 15 mg/0.5 mL  PnIj    Essential hypertension        Relevant Medications    tirzepatide (MOUNJARO) 15 mg/0.5 mL PnIj    Encounter for weight loss counseling                      Plan:   - Mounjaro 15 mg weekly injections     - Log all food and beverage intake with a daily calorie goal of 1500 calories per day; meal plans given in AVS    - Moderate intensity aerobic exercise for 15-30 minutes 3-5x/week

## 2024-07-30 ENCOUNTER — LAB VISIT (OUTPATIENT)
Dept: LAB | Facility: HOSPITAL | Age: 59
End: 2024-07-30
Attending: STUDENT IN AN ORGANIZED HEALTH CARE EDUCATION/TRAINING PROGRAM
Payer: COMMERCIAL

## 2024-07-30 DIAGNOSIS — E78.2 MIXED HYPERLIPIDEMIA: ICD-10-CM

## 2024-07-30 DIAGNOSIS — I10 PRIMARY HYPERTENSION: ICD-10-CM

## 2024-07-30 DIAGNOSIS — E11.9 TYPE 2 DIABETES MELLITUS WITHOUT COMPLICATION, WITHOUT LONG-TERM CURRENT USE OF INSULIN: ICD-10-CM

## 2024-07-30 LAB
ALBUMIN SERPL BCP-MCNC: 4.3 G/DL (ref 3.5–5.2)
ALP SERPL-CCNC: 57 U/L (ref 38–126)
ALT SERPL W/O P-5'-P-CCNC: 64 U/L (ref 10–44)
ANION GAP SERPL CALC-SCNC: 13 MMOL/L (ref 8–16)
AST SERPL-CCNC: 48 U/L (ref 15–46)
BASOPHILS # BLD AUTO: 0.02 K/UL (ref 0–0.2)
BASOPHILS NFR BLD: 0.4 % (ref 0–1.9)
BILIRUB SERPL-MCNC: 0.6 MG/DL (ref 0.1–1)
CALCIUM SERPL-MCNC: 9.1 MG/DL (ref 8.7–10.5)
CHLORIDE SERPL-SCNC: 100 MMOL/L (ref 95–110)
CHOLEST SERPL-MCNC: 131 MG/DL (ref 120–199)
CHOLEST/HDLC SERPL: 4.9 {RATIO} (ref 2–5)
CO2 SERPL-SCNC: 28 MMOL/L (ref 23–29)
CREAT SERPL-MCNC: 0.95 MG/DL (ref 0.5–1.4)
DIFFERENTIAL METHOD BLD: ABNORMAL
EOSINOPHIL # BLD AUTO: 0.1 K/UL (ref 0–0.5)
EOSINOPHIL NFR BLD: 1 % (ref 0–8)
ERYTHROCYTE [DISTWIDTH] IN BLOOD BY AUTOMATED COUNT: 13.6 % (ref 11.5–14.5)
EST. GFR  (NO RACE VARIABLE): >60 ML/MIN/1.73 M^2
ESTIMATED AVG GLUCOSE: 120 MG/DL (ref 68–131)
GLUCOSE SERPL-MCNC: 104 MG/DL (ref 70–110)
HBA1C MFR BLD: 5.8 % (ref 4–5.6)
HCT VFR BLD AUTO: 43.4 % (ref 40–54)
HDLC SERPL-MCNC: 27 MG/DL (ref 40–75)
HDLC SERPL: 20.6 % (ref 20–50)
HGB BLD-MCNC: 14.3 G/DL (ref 14–18)
IMM GRANULOCYTES # BLD AUTO: 0 K/UL (ref 0–0.04)
IMM GRANULOCYTES NFR BLD AUTO: 0 % (ref 0–0.5)
LDLC SERPL CALC-MCNC: 76.8 MG/DL (ref 63–159)
LYMPHOCYTES # BLD AUTO: 3 K/UL (ref 1–4.8)
LYMPHOCYTES NFR BLD: 57.6 % (ref 18–48)
MCH RBC QN AUTO: 27.9 PG (ref 27–31)
MCHC RBC AUTO-ENTMCNC: 32.9 G/DL (ref 32–36)
MCV RBC AUTO: 85 FL (ref 82–98)
MONOCYTES # BLD AUTO: 0.5 K/UL (ref 0.3–1)
MONOCYTES NFR BLD: 10.1 % (ref 4–15)
NEUTROPHILS # BLD AUTO: 1.6 K/UL (ref 1.8–7.7)
NEUTROPHILS NFR BLD: 30.9 % (ref 38–73)
NONHDLC SERPL-MCNC: 104 MG/DL
NRBC BLD-RTO: 0 /100 WBC
PLATELET # BLD AUTO: 273 K/UL (ref 150–450)
PMV BLD AUTO: 9.1 FL (ref 9.2–12.9)
POTASSIUM SERPL-SCNC: 3.5 MMOL/L (ref 3.5–5.1)
PROT SERPL-MCNC: 7.7 G/DL (ref 6–8.4)
RBC # BLD AUTO: 5.12 M/UL (ref 4.6–6.2)
SODIUM SERPL-SCNC: 141 MMOL/L (ref 136–145)
TRIGL SERPL-MCNC: 136 MG/DL (ref 30–150)
TSH SERPL DL<=0.005 MIU/L-ACNC: 0.71 UIU/ML (ref 0.4–4)
UUN UR-MCNC: 20 MG/DL (ref 2–20)
WBC # BLD AUTO: 5.23 K/UL (ref 3.9–12.7)

## 2024-07-30 PROCEDURE — 80061 LIPID PANEL: CPT | Performed by: STUDENT IN AN ORGANIZED HEALTH CARE EDUCATION/TRAINING PROGRAM

## 2024-07-30 PROCEDURE — 83036 HEMOGLOBIN GLYCOSYLATED A1C: CPT | Performed by: STUDENT IN AN ORGANIZED HEALTH CARE EDUCATION/TRAINING PROGRAM

## 2024-07-30 PROCEDURE — 36415 COLL VENOUS BLD VENIPUNCTURE: CPT | Mod: PN | Performed by: STUDENT IN AN ORGANIZED HEALTH CARE EDUCATION/TRAINING PROGRAM

## 2024-07-30 PROCEDURE — 85025 COMPLETE CBC W/AUTO DIFF WBC: CPT | Mod: PN | Performed by: STUDENT IN AN ORGANIZED HEALTH CARE EDUCATION/TRAINING PROGRAM

## 2024-07-30 PROCEDURE — 84443 ASSAY THYROID STIM HORMONE: CPT | Mod: PN | Performed by: STUDENT IN AN ORGANIZED HEALTH CARE EDUCATION/TRAINING PROGRAM

## 2024-07-30 PROCEDURE — 80053 COMPREHEN METABOLIC PANEL: CPT | Mod: PN | Performed by: STUDENT IN AN ORGANIZED HEALTH CARE EDUCATION/TRAINING PROGRAM

## 2024-08-01 DIAGNOSIS — K21.9 GASTROESOPHAGEAL REFLUX DISEASE WITHOUT ESOPHAGITIS: ICD-10-CM

## 2024-08-01 RX ORDER — FAMOTIDINE 40 MG/1
40 TABLET, FILM COATED ORAL DAILY
Qty: 90 TABLET | Refills: 3 | Status: SHIPPED | OUTPATIENT
Start: 2024-08-01

## 2024-08-01 NOTE — TELEPHONE ENCOUNTER
No care due was identified.  [facility] Embedded Care Due Messages. Reference number: [id number].   8/01/2024 2:00:42 PM CDT

## 2024-08-01 NOTE — TELEPHONE ENCOUNTER
Refill Decision Note   Андрей Wooten  is requesting a refill authorization.  Brief Assessment and Rationale for Refill:  Approve     Medication Therapy Plan:         Alert overridden per protocol: Yes   Comments:     Note composed:6:12 PM 08/01/2024

## 2024-08-07 ENCOUNTER — OFFICE VISIT (OUTPATIENT)
Dept: FAMILY MEDICINE | Facility: CLINIC | Age: 59
End: 2024-08-07
Payer: COMMERCIAL

## 2024-08-07 VITALS
SYSTOLIC BLOOD PRESSURE: 128 MMHG | HEART RATE: 78 BPM | BODY MASS INDEX: 37.37 KG/M2 | WEIGHT: 232.5 LBS | OXYGEN SATURATION: 96 % | TEMPERATURE: 98 F | DIASTOLIC BLOOD PRESSURE: 84 MMHG | HEIGHT: 66 IN

## 2024-08-07 DIAGNOSIS — K76.0 NAFLD (NONALCOHOLIC FATTY LIVER DISEASE): ICD-10-CM

## 2024-08-07 DIAGNOSIS — E78.2 MIXED HYPERLIPIDEMIA: ICD-10-CM

## 2024-08-07 DIAGNOSIS — E11.9 TYPE 2 DIABETES MELLITUS WITHOUT COMPLICATION, WITHOUT LONG-TERM CURRENT USE OF INSULIN: Primary | ICD-10-CM

## 2024-08-07 DIAGNOSIS — I10 PRIMARY HYPERTENSION: ICD-10-CM

## 2024-08-07 PROCEDURE — 1159F MED LIST DOCD IN RCRD: CPT | Mod: CPTII,,, | Performed by: STUDENT IN AN ORGANIZED HEALTH CARE EDUCATION/TRAINING PROGRAM

## 2024-08-07 PROCEDURE — 3008F BODY MASS INDEX DOCD: CPT | Mod: CPTII,,, | Performed by: STUDENT IN AN ORGANIZED HEALTH CARE EDUCATION/TRAINING PROGRAM

## 2024-08-07 PROCEDURE — 4010F ACE/ARB THERAPY RXD/TAKEN: CPT | Mod: CPTII,,, | Performed by: STUDENT IN AN ORGANIZED HEALTH CARE EDUCATION/TRAINING PROGRAM

## 2024-08-07 PROCEDURE — 1160F RVW MEDS BY RX/DR IN RCRD: CPT | Mod: CPTII,,, | Performed by: STUDENT IN AN ORGANIZED HEALTH CARE EDUCATION/TRAINING PROGRAM

## 2024-08-07 PROCEDURE — 3044F HG A1C LEVEL LT 7.0%: CPT | Mod: CPTII,,, | Performed by: STUDENT IN AN ORGANIZED HEALTH CARE EDUCATION/TRAINING PROGRAM

## 2024-08-07 PROCEDURE — 99214 OFFICE O/P EST MOD 30 MIN: CPT | Mod: ,,, | Performed by: STUDENT IN AN ORGANIZED HEALTH CARE EDUCATION/TRAINING PROGRAM

## 2024-08-07 PROCEDURE — 3061F NEG MICROALBUMINURIA REV: CPT | Mod: CPTII,,, | Performed by: STUDENT IN AN ORGANIZED HEALTH CARE EDUCATION/TRAINING PROGRAM

## 2024-08-07 PROCEDURE — 3066F NEPHROPATHY DOC TX: CPT | Mod: CPTII,,, | Performed by: STUDENT IN AN ORGANIZED HEALTH CARE EDUCATION/TRAINING PROGRAM

## 2024-08-07 PROCEDURE — 3074F SYST BP LT 130 MM HG: CPT | Mod: CPTII,,, | Performed by: STUDENT IN AN ORGANIZED HEALTH CARE EDUCATION/TRAINING PROGRAM

## 2024-08-07 PROCEDURE — 3079F DIAST BP 80-89 MM HG: CPT | Mod: CPTII,,, | Performed by: STUDENT IN AN ORGANIZED HEALTH CARE EDUCATION/TRAINING PROGRAM

## 2024-08-15 ENCOUNTER — PATIENT MESSAGE (OUTPATIENT)
Dept: FAMILY MEDICINE | Facility: CLINIC | Age: 59
End: 2024-08-15
Payer: COMMERCIAL

## 2024-08-15 DIAGNOSIS — K21.9 GASTROESOPHAGEAL REFLUX DISEASE WITHOUT ESOPHAGITIS: ICD-10-CM

## 2024-08-15 RX ORDER — FAMOTIDINE 40 MG/1
40 TABLET, FILM COATED ORAL DAILY
Qty: 90 TABLET | Refills: 3 | Status: SHIPPED | OUTPATIENT
Start: 2024-08-15

## 2024-08-15 RX ORDER — FAMOTIDINE 40 MG/1
40 TABLET, FILM COATED ORAL
Qty: 90 TABLET | Refills: 3 | OUTPATIENT
Start: 2024-08-15

## 2024-08-15 NOTE — TELEPHONE ENCOUNTER
No care due was identified.  [facility] Embedded Care Due Messages. Reference number: [id number].   8/15/2024 12:23:13 PM CDT

## 2024-09-05 ENCOUNTER — PATIENT OUTREACH (OUTPATIENT)
Dept: ADMINISTRATIVE | Facility: HOSPITAL | Age: 59
End: 2024-09-05
Payer: COMMERCIAL

## 2024-09-05 NOTE — PROGRESS NOTES
Health Maintenance Topic(s) Outreach Outcomes & Actions Taken:    Eye Exam - Outreach Outcomes & Actions Taken  : Pt Will Schedule with External Provider / Order Routed & Care Team Updated if Applicable        Additional Notes:    [facility]

## 2024-10-15 DIAGNOSIS — E66.01 CLASS 2 SEVERE OBESITY DUE TO EXCESS CALORIES WITH SERIOUS COMORBIDITY AND BODY MASS INDEX (BMI) OF 37.0 TO 37.9 IN ADULT: ICD-10-CM

## 2024-10-15 DIAGNOSIS — E11.9 TYPE 2 DIABETES MELLITUS WITHOUT COMPLICATION, WITHOUT LONG-TERM CURRENT USE OF INSULIN: ICD-10-CM

## 2024-10-15 DIAGNOSIS — E66.812 CLASS 2 SEVERE OBESITY DUE TO EXCESS CALORIES WITH SERIOUS COMORBIDITY AND BODY MASS INDEX (BMI) OF 37.0 TO 37.9 IN ADULT: ICD-10-CM

## 2024-10-15 DIAGNOSIS — I10 ESSENTIAL HYPERTENSION: ICD-10-CM

## 2024-10-15 DIAGNOSIS — E78.2 MIXED HYPERLIPIDEMIA: ICD-10-CM

## 2024-10-15 DIAGNOSIS — K75.81 NASH (NONALCOHOLIC STEATOHEPATITIS): ICD-10-CM

## 2024-10-15 RX ORDER — TIRZEPATIDE 15 MG/.5ML
INJECTION, SOLUTION SUBCUTANEOUS
Qty: 4 ML | Refills: 0 | Status: SHIPPED | OUTPATIENT
Start: 2024-10-15

## 2024-10-31 DIAGNOSIS — I10 ESSENTIAL HYPERTENSION: ICD-10-CM

## 2024-10-31 DIAGNOSIS — K21.9 GASTROESOPHAGEAL REFLUX DISEASE WITHOUT ESOPHAGITIS: ICD-10-CM

## 2024-10-31 DIAGNOSIS — I10 PRIMARY HYPERTENSION: ICD-10-CM

## 2024-11-01 RX ORDER — TRIAMTERENE AND HYDROCHLOROTHIAZIDE 37.5; 25 MG/1; MG/1
1 CAPSULE ORAL EVERY MORNING
Qty: 90 CAPSULE | Refills: 3 | Status: SHIPPED | OUTPATIENT
Start: 2024-11-01

## 2024-11-01 RX ORDER — AMLODIPINE BESYLATE 10 MG/1
10 TABLET ORAL
Qty: 90 TABLET | Refills: 3 | Status: SHIPPED | OUTPATIENT
Start: 2024-11-01

## 2024-11-01 RX ORDER — AMITRIPTYLINE HYDROCHLORIDE 50 MG/1
50 TABLET, FILM COATED ORAL NIGHTLY
Qty: 90 TABLET | Refills: 3 | Status: SHIPPED | OUTPATIENT
Start: 2024-11-01

## 2024-11-12 ENCOUNTER — PATIENT OUTREACH (OUTPATIENT)
Dept: ADMINISTRATIVE | Facility: HOSPITAL | Age: 59
End: 2024-11-12
Payer: COMMERCIAL

## 2024-11-12 DIAGNOSIS — E11.9 TYPE 2 DIABETES MELLITUS WITHOUT COMPLICATION, WITHOUT LONG-TERM CURRENT USE OF INSULIN: ICD-10-CM

## 2024-11-12 DIAGNOSIS — E66.812 CLASS 2 SEVERE OBESITY DUE TO EXCESS CALORIES WITH SERIOUS COMORBIDITY AND BODY MASS INDEX (BMI) OF 37.0 TO 37.9 IN ADULT: ICD-10-CM

## 2024-11-12 DIAGNOSIS — I10 ESSENTIAL HYPERTENSION: ICD-10-CM

## 2024-11-12 DIAGNOSIS — K75.81 NASH (NONALCOHOLIC STEATOHEPATITIS): ICD-10-CM

## 2024-11-12 DIAGNOSIS — E78.2 MIXED HYPERLIPIDEMIA: ICD-10-CM

## 2024-11-12 DIAGNOSIS — E66.01 CLASS 2 SEVERE OBESITY DUE TO EXCESS CALORIES WITH SERIOUS COMORBIDITY AND BODY MASS INDEX (BMI) OF 37.0 TO 37.9 IN ADULT: ICD-10-CM

## 2024-11-13 LAB
LEFT EYE DM RETINOPATHY: POSITIVE
RIGHT EYE DM RETINOPATHY: POSITIVE

## 2024-11-13 RX ORDER — TIRZEPATIDE 15 MG/.5ML
INJECTION, SOLUTION SUBCUTANEOUS
Qty: 4 ML | Refills: 0 | Status: SHIPPED | OUTPATIENT
Start: 2024-11-13

## 2024-11-14 ENCOUNTER — OFFICE VISIT (OUTPATIENT)
Dept: BARIATRICS | Facility: CLINIC | Age: 59
End: 2024-11-14
Payer: COMMERCIAL

## 2024-11-14 VITALS
WEIGHT: 223.88 LBS | BODY MASS INDEX: 35.98 KG/M2 | OXYGEN SATURATION: 97 % | HEART RATE: 81 BPM | HEIGHT: 66 IN | SYSTOLIC BLOOD PRESSURE: 102 MMHG | DIASTOLIC BLOOD PRESSURE: 78 MMHG

## 2024-11-14 DIAGNOSIS — K76.0 NAFLD (NONALCOHOLIC FATTY LIVER DISEASE): ICD-10-CM

## 2024-11-14 DIAGNOSIS — I10 PRIMARY HYPERTENSION: ICD-10-CM

## 2024-11-14 DIAGNOSIS — E66.01 CLASS 2 SEVERE OBESITY DUE TO EXCESS CALORIES WITH SERIOUS COMORBIDITY AND BODY MASS INDEX (BMI) OF 36.0 TO 36.9 IN ADULT: Primary | ICD-10-CM

## 2024-11-14 DIAGNOSIS — E78.2 MIXED HYPERLIPIDEMIA: ICD-10-CM

## 2024-11-14 DIAGNOSIS — Z71.3 ENCOUNTER FOR WEIGHT LOSS COUNSELING: ICD-10-CM

## 2024-11-14 DIAGNOSIS — E66.812 CLASS 2 SEVERE OBESITY DUE TO EXCESS CALORIES WITH SERIOUS COMORBIDITY AND BODY MASS INDEX (BMI) OF 36.0 TO 36.9 IN ADULT: Primary | ICD-10-CM

## 2024-11-14 DIAGNOSIS — E11.9 TYPE 2 DIABETES MELLITUS WITHOUT COMPLICATION, WITHOUT LONG-TERM CURRENT USE OF INSULIN: ICD-10-CM

## 2024-11-14 PROCEDURE — 3061F NEG MICROALBUMINURIA REV: CPT | Mod: CPTII,S$GLB,, | Performed by: STUDENT IN AN ORGANIZED HEALTH CARE EDUCATION/TRAINING PROGRAM

## 2024-11-14 PROCEDURE — 1160F RVW MEDS BY RX/DR IN RCRD: CPT | Mod: CPTII,S$GLB,, | Performed by: STUDENT IN AN ORGANIZED HEALTH CARE EDUCATION/TRAINING PROGRAM

## 2024-11-14 PROCEDURE — 3078F DIAST BP <80 MM HG: CPT | Mod: CPTII,S$GLB,, | Performed by: STUDENT IN AN ORGANIZED HEALTH CARE EDUCATION/TRAINING PROGRAM

## 2024-11-14 PROCEDURE — 3008F BODY MASS INDEX DOCD: CPT | Mod: CPTII,S$GLB,, | Performed by: STUDENT IN AN ORGANIZED HEALTH CARE EDUCATION/TRAINING PROGRAM

## 2024-11-14 PROCEDURE — 3074F SYST BP LT 130 MM HG: CPT | Mod: CPTII,S$GLB,, | Performed by: STUDENT IN AN ORGANIZED HEALTH CARE EDUCATION/TRAINING PROGRAM

## 2024-11-14 PROCEDURE — 99999 PR PBB SHADOW E&M-EST. PATIENT-LVL IV: CPT | Mod: PBBFAC,,, | Performed by: STUDENT IN AN ORGANIZED HEALTH CARE EDUCATION/TRAINING PROGRAM

## 2024-11-14 PROCEDURE — 3066F NEPHROPATHY DOC TX: CPT | Mod: CPTII,S$GLB,, | Performed by: STUDENT IN AN ORGANIZED HEALTH CARE EDUCATION/TRAINING PROGRAM

## 2024-11-14 PROCEDURE — 4010F ACE/ARB THERAPY RXD/TAKEN: CPT | Mod: CPTII,S$GLB,, | Performed by: STUDENT IN AN ORGANIZED HEALTH CARE EDUCATION/TRAINING PROGRAM

## 2024-11-14 PROCEDURE — 3044F HG A1C LEVEL LT 7.0%: CPT | Mod: CPTII,S$GLB,, | Performed by: STUDENT IN AN ORGANIZED HEALTH CARE EDUCATION/TRAINING PROGRAM

## 2024-11-14 PROCEDURE — 99213 OFFICE O/P EST LOW 20 MIN: CPT | Mod: S$GLB,,, | Performed by: STUDENT IN AN ORGANIZED HEALTH CARE EDUCATION/TRAINING PROGRAM

## 2024-11-14 PROCEDURE — 1159F MED LIST DOCD IN RCRD: CPT | Mod: CPTII,S$GLB,, | Performed by: STUDENT IN AN ORGANIZED HEALTH CARE EDUCATION/TRAINING PROGRAM

## 2024-11-14 NOTE — PROGRESS NOTES
Subjective:       Patient ID: Андрей Wooten is a 59 y.o. male.    Chief Complaint: Follow-up, Obesity, and Weight Check      Patient presents for treatment of obesity.     Co-morbidities:   HTN  HLD  NAFLD  DM2  GERD    Negative for thyroid cancer  Negative for pancreatitis     History of Weight Loss Efforts  Dr. Bernal - prescribed phentermine, diethylpropion; 255 lbs when first seen, got down to 240 lbs  Intermittent Fasting - got down to about 230 lbs    Current Physical Activity  Weights and cardio in gym 3x/week    Current Eating Habits  Breakfast - grits, ham, biscuits, cereal (Frosted Flakes)  Lunch - fast food  Dinner - southern food  Snacks - chips, cookies, candy; about 4/day  Beverages - water, Hawaiian Punch, sweet tea    Shift work    Drinking less sugar sweetened beverages  Eating more salads  Protein shakes  V8  Beans with brown rice  Cut back on fried foods    Medical Weight Loss  1/24/2022 ([employer] out for repair): 247 lbs 5.7 oz, BMI 39.92  2/23/2022: 245.7 lbs, BMI 39.7, BFP 38.2%, BFM 93.8 lbs, SMM 87.7 lbs, BMR 1858 kcal; Ozempic  4/1/2022: 244.1 lbs, BMI 39.4, BFP 38.7%, BFM 94.4 lbs, SMM 86.6 lbs, BMR 1836 kcal; Ozempic  5/10/2022: 247.8 lbs, BMI 40, BFP 38.1%, BFM 94.4 lbs, SMM 88.6 lbs, BMR 1874 kcal; Ozempic  6/7/2022: 241.8 lbs, BMI 39, BFP 38.8%, BFM 93.9 lbs, SMM 85.3 lbs, BMR 1820 kcal; Ozempic  8/16/2022: 244.9 lbs, BMI 39.5, BFP 38.3%, BFM 93.9 lbs, SMM 86.6 lbs, BMR 1850 kcal  10/10/2022: 247.3 lbs, BMI 39.9, BFP 39.6%, BFM 98.1 lbs, SMM 85.8 lbs, BMR 1833 kcal  12/14/2022: 244.3 lbs, BMI 39.4, BFP 39.1%, BFM 95.5 lbs, SMM 85.8 lbs, BMR 1828 kcal  3/16/2023: 257 lbs, BMI 41.5, BFP 39%, .2 lbs, SMM 90 lbs, BMR 1905 kcal  6/23/2023: 240.9 lbs, BMI 38.9, BFP 38.4%, BFM 92.5 lbs, SMM 85.5 lbs, BMR 1823 kcal  9/26/2023: 230.2 lbs, BMI 37.2, BFP 36.9%, BFM 84.9 lbs, SMM 83.6 lbs, BMR 1793 kcal  1/5/2024: 233.7 lbs, BMI 37.7, BFP 35.8%, BFM 83.8 lbs, SMM 86.9 lbs, BMR 1840  kcal  4/15/2024: 236.1 lbs, BMI 38.1, BFP 38.2%, BFM 90.4 lbs, SMM 84 lbs, BMR 1799  kcal  7/15/2024: 233.4 lbs, BMI 37.7, BFP 36.7%, BFM 85.7 lbs, SMM 85.1 lbs, BMR 1818 kcal  11/14/2024: 233.9 lbs, BMI 36.2, BFP 31%, BFM 69.4 lbs, SMM 89.1 lbs, BMR 1884 kcal        Follow-up  Pertinent negatives include no abdominal pain, chest pain, chills, fever, nausea or vomiting.     Review of Systems   Constitutional:  Negative for chills and fever.   Respiratory:  Negative for shortness of breath.    Cardiovascular:  Negative for chest pain.   Gastrointestinal:  Negative for abdominal pain, nausea and vomiting.   Neurological:  Negative for dizziness and light-headedness.         Objective:        Latest Reference Range & Units 05/02/24 07:19 05/02/24 08:26   WBC 3.90 - 12.70 K/uL 4.88    RBC 4.60 - 6.20 M/uL 5.17    Hemoglobin 14.0 - 18.0 g/dL 14.3    Hematocrit 40.0 - 54.0 % 43.7    MCV 82 - 98 fL 85    MCH 27.0 - 31.0 pg 27.7    MCHC 32.0 - 36.0 g/dL 32.7    RDW 11.5 - 14.5 % 13.1    Platelet Count 150 - 450 K/uL 279    MPV 9.2 - 12.9 fL 9.5    Gran % 38.0 - 73.0 % 30.6 (L)    Lymph % 18.0 - 48.0 % 59.0 (H)    Mono % 4.0 - 15.0 % 8.6    Eos % 0.0 - 8.0 % 1.2    Basophil % 0.0 - 1.9 % 0.4    Immature Granulocytes 0.0 - 0.5 % 0.2    Gran # (ANC) 1.8 - 7.7 K/uL 1.5 (L)    Lymph # 1.0 - 4.8 K/uL 2.9    Mono # 0.3 - 1.0 K/uL 0.4    Eos # 0.0 - 0.5 K/uL 0.1    Baso # 0.00 - 0.20 K/uL 0.02    Immature Grans (Abs) 0.00 - 0.04 K/uL 0.01    nRBC 0 /100 WBC 0    Differential Method  Automated    Sodium 136 - 145 mmol/L 139    Potassium 3.5 - 5.1 mmol/L 3.6    Chloride 95 - 110 mmol/L 97    CO2 23 - 29 mmol/L 31 (H)    Anion Gap 8 - 16 mmol/L 11    BUN 2 - 20 mg/dL 20    Creatinine 0.50 - 1.40 mg/dL 1.08    eGFR >60 mL/min/1.73 m^2 >60.0    Glucose 70 - 110 mg/dL 92    Calcium 8.7 - 10.5 mg/dL 9.9    ALP 38 - 126 U/L 61    PROTEIN TOTAL 6.0 - 8.4 g/dL 7.8    Albumin 3.5 - 5.2 g/dL 4.4    BILIRUBIN TOTAL 0.1 - 1.0 mg/dL 0.6    AST 15  - 46 U/L 72 (H)    ALT 10 - 44 U/L 117 (H)    Cholesterol Total 120 - 199 mg/dL 153    HDL 40 - 75 mg/dL 25 (L)    HDL/Cholesterol Ratio 20.0 - 50.0 % 16.3 (L)    Non-HDL Cholesterol mg/dL 128    Total Cholesterol/HDL Ratio 2.0 - 5.0  6.1 (H)    Triglycerides 30 - 150 mg/dL 234 (H)    LDL Cholesterol 63.0 - 159.0 mg/dL 81.2    Hemoglobin A1C External 4.0 - 5.6 % 5.7 (H)    Estimated Avg Glucose 68 - 131 mg/dL 117    TSH 0.400 - 4.000 uIU/mL 1.460    Prostate Specific Antigen 0.00 - 4.00 ng/mL 0.77    Urine Creatinine 23.0 - 375.0 mg/dL  261.0   Urine Microalbumin ug/mL  12.0   MICROALB/CREAT RATIO 0.0 - 30.0 ug/mg  4.6   (L): Data is abnormally low  (H): Data is abnormally high    Vitals:    11/14/24 1439   BP: 102/78   Pulse: 81     Physical Exam  Vitals reviewed.   Constitutional:       General: He is not in acute distress.     Appearance: Normal appearance. He is obese. He is not ill-appearing, toxic-appearing or diaphoretic.   HENT:      Head: Normocephalic and atraumatic.   Cardiovascular:      Rate and Rhythm: Normal rate.   Pulmonary:      Effort: Pulmonary effort is normal. No respiratory distress.   Skin:     General: Skin is warm and dry.   Neurological:      Mental Status: He is alert and oriented to person, place, and time.         Assessment:       Problem List Items Addressed This Visit       NAFLD (nonalcoholic fatty liver disease)    Class 2 severe obesity due to excess calories with serious comorbidity and body mass index (BMI) of 36.0 to 36.9 in adult - Primary    HLD (hyperlipidemia)    Primary hypertension    Type 2 diabetes mellitus without complication, without long-term current use of insulin     Other Visit Diagnoses       Encounter for weight loss counseling                Plan:   - Mounjaro 15 mg weekly injections     - Log all food and beverage intake with a daily calorie goal of 1500 calories per day; meal plans given in AVS    - Moderate intensity aerobic exercise for 15-30 minutes  3-5x/week

## 2024-12-20 ENCOUNTER — PATIENT MESSAGE (OUTPATIENT)
Dept: FAMILY MEDICINE | Facility: CLINIC | Age: 59
End: 2024-12-20
Payer: COMMERCIAL

## 2024-12-20 ENCOUNTER — PATIENT MESSAGE (OUTPATIENT)
Dept: BARIATRICS | Facility: CLINIC | Age: 59
End: 2024-12-20
Payer: COMMERCIAL

## 2025-01-04 DIAGNOSIS — E66.01 CLASS 2 SEVERE OBESITY DUE TO EXCESS CALORIES WITH SERIOUS COMORBIDITY AND BODY MASS INDEX (BMI) OF 37.0 TO 37.9 IN ADULT: ICD-10-CM

## 2025-01-04 DIAGNOSIS — K75.81 NASH (NONALCOHOLIC STEATOHEPATITIS): ICD-10-CM

## 2025-01-04 DIAGNOSIS — E66.812 CLASS 2 SEVERE OBESITY DUE TO EXCESS CALORIES WITH SERIOUS COMORBIDITY AND BODY MASS INDEX (BMI) OF 37.0 TO 37.9 IN ADULT: ICD-10-CM

## 2025-01-04 DIAGNOSIS — I10 ESSENTIAL HYPERTENSION: ICD-10-CM

## 2025-01-04 DIAGNOSIS — E78.2 MIXED HYPERLIPIDEMIA: ICD-10-CM

## 2025-01-04 DIAGNOSIS — E11.9 TYPE 2 DIABETES MELLITUS WITHOUT COMPLICATION, WITHOUT LONG-TERM CURRENT USE OF INSULIN: ICD-10-CM

## 2025-01-06 RX ORDER — TIRZEPATIDE 15 MG/.5ML
INJECTION, SOLUTION SUBCUTANEOUS
Qty: 8 ML | Refills: 0 | Status: SHIPPED | OUTPATIENT
Start: 2025-01-06

## 2025-01-31 ENCOUNTER — LAB VISIT (OUTPATIENT)
Dept: LAB | Facility: HOSPITAL | Age: 60
End: 2025-01-31
Attending: STUDENT IN AN ORGANIZED HEALTH CARE EDUCATION/TRAINING PROGRAM
Payer: COMMERCIAL

## 2025-01-31 DIAGNOSIS — E78.2 MIXED HYPERLIPIDEMIA: ICD-10-CM

## 2025-01-31 DIAGNOSIS — E11.9 TYPE 2 DIABETES MELLITUS WITHOUT COMPLICATION, WITHOUT LONG-TERM CURRENT USE OF INSULIN: ICD-10-CM

## 2025-01-31 DIAGNOSIS — K21.9 GASTROESOPHAGEAL REFLUX DISEASE WITHOUT ESOPHAGITIS: ICD-10-CM

## 2025-01-31 DIAGNOSIS — I10 PRIMARY HYPERTENSION: ICD-10-CM

## 2025-01-31 LAB
ALBUMIN SERPL BCP-MCNC: 4.4 G/DL (ref 3.5–5.2)
ALBUMIN/CREAT UR: 7 UG/MG (ref 0–30)
ALP SERPL-CCNC: 58 U/L (ref 38–126)
ALT SERPL W/O P-5'-P-CCNC: 47 U/L (ref 10–44)
ANION GAP SERPL CALC-SCNC: 8 MMOL/L (ref 8–16)
AST SERPL-CCNC: 49 U/L (ref 15–46)
BASOPHILS # BLD AUTO: 0.03 K/UL (ref 0–0.2)
BASOPHILS NFR BLD: 0.7 % (ref 0–1.9)
BILIRUB SERPL-MCNC: 0.9 MG/DL (ref 0.1–1)
CALCIUM SERPL-MCNC: 9.6 MG/DL (ref 8.7–10.5)
CHLORIDE SERPL-SCNC: 98 MMOL/L (ref 95–110)
CHOLEST SERPL-MCNC: 130 MG/DL (ref 120–199)
CHOLEST/HDLC SERPL: 3.9 {RATIO} (ref 2–5)
CO2 SERPL-SCNC: 29 MMOL/L (ref 23–29)
CREAT SERPL-MCNC: 0.98 MG/DL (ref 0.5–1.4)
CREAT UR-MCNC: 373 MG/DL (ref 23–375)
DIFFERENTIAL METHOD BLD: ABNORMAL
EOSINOPHIL # BLD AUTO: 0.1 K/UL (ref 0–0.5)
EOSINOPHIL NFR BLD: 1.4 % (ref 0–8)
ERYTHROCYTE [DISTWIDTH] IN BLOOD BY AUTOMATED COUNT: 13.2 % (ref 11.5–14.5)
EST. GFR  (NO RACE VARIABLE): >60 ML/MIN/1.73 M^2
ESTIMATED AVG GLUCOSE: 123 MG/DL (ref 68–131)
GLUCOSE SERPL-MCNC: 96 MG/DL (ref 70–110)
HBA1C MFR BLD: 5.9 % (ref 4–5.6)
HCT VFR BLD AUTO: 45.8 % (ref 40–54)
HDLC SERPL-MCNC: 33 MG/DL (ref 40–75)
HDLC SERPL: 25.4 % (ref 20–50)
HGB BLD-MCNC: 15.1 G/DL (ref 14–18)
IMM GRANULOCYTES # BLD AUTO: 0 K/UL (ref 0–0.04)
IMM GRANULOCYTES NFR BLD AUTO: 0 % (ref 0–0.5)
LDLC SERPL CALC-MCNC: 71.6 MG/DL (ref 63–159)
LYMPHOCYTES # BLD AUTO: 2.4 K/UL (ref 1–4.8)
LYMPHOCYTES NFR BLD: 55.7 % (ref 18–48)
MCH RBC QN AUTO: 27.8 PG (ref 27–31)
MCHC RBC AUTO-ENTMCNC: 33 G/DL (ref 32–36)
MCV RBC AUTO: 84 FL (ref 82–98)
MICROALBUMIN UR DL<=1MG/L-MCNC: 26 UG/ML
MONOCYTES # BLD AUTO: 0.4 K/UL (ref 0.3–1)
MONOCYTES NFR BLD: 9.5 % (ref 4–15)
NEUTROPHILS # BLD AUTO: 1.4 K/UL (ref 1.8–7.7)
NEUTROPHILS NFR BLD: 32.7 % (ref 38–73)
NONHDLC SERPL-MCNC: 97 MG/DL
NRBC BLD-RTO: 0 /100 WBC
PLATELET # BLD AUTO: 283 K/UL (ref 150–450)
PMV BLD AUTO: 9.5 FL (ref 9.2–12.9)
POTASSIUM SERPL-SCNC: 3.4 MMOL/L (ref 3.5–5.1)
PROT SERPL-MCNC: 8.3 G/DL (ref 6–8.4)
RBC # BLD AUTO: 5.43 M/UL (ref 4.6–6.2)
SODIUM SERPL-SCNC: 135 MMOL/L (ref 136–145)
TRIGL SERPL-MCNC: 127 MG/DL (ref 30–150)
TSH SERPL DL<=0.005 MIU/L-ACNC: 0.8 UIU/ML (ref 0.4–4)
UUN UR-MCNC: 17 MG/DL (ref 2–20)
WBC # BLD AUTO: 4.31 K/UL (ref 3.9–12.7)

## 2025-01-31 PROCEDURE — 85025 COMPLETE CBC W/AUTO DIFF WBC: CPT | Mod: PN | Performed by: STUDENT IN AN ORGANIZED HEALTH CARE EDUCATION/TRAINING PROGRAM

## 2025-01-31 PROCEDURE — 36415 COLL VENOUS BLD VENIPUNCTURE: CPT | Mod: PN | Performed by: STUDENT IN AN ORGANIZED HEALTH CARE EDUCATION/TRAINING PROGRAM

## 2025-01-31 PROCEDURE — 80053 COMPREHEN METABOLIC PANEL: CPT | Mod: PN | Performed by: STUDENT IN AN ORGANIZED HEALTH CARE EDUCATION/TRAINING PROGRAM

## 2025-01-31 PROCEDURE — 82570 ASSAY OF URINE CREATININE: CPT | Mod: PN | Performed by: STUDENT IN AN ORGANIZED HEALTH CARE EDUCATION/TRAINING PROGRAM

## 2025-01-31 PROCEDURE — 83036 HEMOGLOBIN GLYCOSYLATED A1C: CPT | Performed by: STUDENT IN AN ORGANIZED HEALTH CARE EDUCATION/TRAINING PROGRAM

## 2025-01-31 PROCEDURE — 84443 ASSAY THYROID STIM HORMONE: CPT | Mod: PN | Performed by: STUDENT IN AN ORGANIZED HEALTH CARE EDUCATION/TRAINING PROGRAM

## 2025-01-31 PROCEDURE — 80061 LIPID PANEL: CPT | Performed by: STUDENT IN AN ORGANIZED HEALTH CARE EDUCATION/TRAINING PROGRAM

## 2025-01-31 RX ORDER — ATORVASTATIN CALCIUM 40 MG/1
40 TABLET, FILM COATED ORAL
Qty: 90 TABLET | Refills: 1 | Status: SHIPPED | OUTPATIENT
Start: 2025-01-31

## 2025-01-31 RX ORDER — PANTOPRAZOLE SODIUM 40 MG/1
40 TABLET, DELAYED RELEASE ORAL EVERY MORNING
Qty: 90 TABLET | Refills: 1 | Status: SHIPPED | OUTPATIENT
Start: 2025-01-31

## 2025-01-31 NOTE — TELEPHONE ENCOUNTER
Provider Staff:  Action required for this patient    Requires labs - A1C outdated     Please see care gap opportunities below in Care Due Message.    Thanks!  [facility]     Appointments      Date Provider   Last Visit   8/7/2024 Leander Boo MD   Next Visit   2/20/2025 Leander Boo MD     Refill Decision Note   Андрей Wooten  is requesting a refill authorization.  Brief Assessment and Rationale for Refill:  Approve     Medication Therapy Plan:        Comments:     Note composed:12:20 PM 01/31/2025

## 2025-01-31 NOTE — TELEPHONE ENCOUNTER
Care Due:                  Date            Visit Type   Department     Provider  --------------------------------------------------------------------------------                                EP -                              PRIMARY      [facility] FAM[facility]  Last Visit: 08-      CARE ([facility])   MEDICINE       Leander Boo                              EP -                              PRIMARY      [facility]  Next Visit: 02-      CARE ([facility])   MEDICINE       Leander Boo                                                            Last  Test          Frequency    Reason                     Performed    Due Date  --------------------------------------------------------------------------------    HBA1C.......  6 months...  dapagliflozin............  07- 01-    [facility] Embedded Care Due Messages. Reference number: [id number].   1/31/2025 11:54:22 AM CST

## 2025-02-14 ENCOUNTER — OFFICE VISIT (OUTPATIENT)
Dept: BARIATRICS | Facility: CLINIC | Age: 60
End: 2025-02-14
Payer: COMMERCIAL

## 2025-02-14 VITALS
WEIGHT: 224.5 LBS | SYSTOLIC BLOOD PRESSURE: 123 MMHG | OXYGEN SATURATION: 97 % | HEART RATE: 85 BPM | HEIGHT: 66 IN | DIASTOLIC BLOOD PRESSURE: 81 MMHG | BODY MASS INDEX: 36.08 KG/M2

## 2025-02-14 DIAGNOSIS — K75.81 NASH (NONALCOHOLIC STEATOHEPATITIS): ICD-10-CM

## 2025-02-14 DIAGNOSIS — E66.01 CLASS 2 SEVERE OBESITY DUE TO EXCESS CALORIES WITH SERIOUS COMORBIDITY AND BODY MASS INDEX (BMI) OF 37.0 TO 37.9 IN ADULT: ICD-10-CM

## 2025-02-14 DIAGNOSIS — E78.2 MIXED HYPERLIPIDEMIA: ICD-10-CM

## 2025-02-14 DIAGNOSIS — E66.812 CLASS 2 SEVERE OBESITY DUE TO EXCESS CALORIES WITH SERIOUS COMORBIDITY AND BODY MASS INDEX (BMI) OF 37.0 TO 37.9 IN ADULT: ICD-10-CM

## 2025-02-14 DIAGNOSIS — E11.9 TYPE 2 DIABETES MELLITUS WITHOUT COMPLICATION, WITHOUT LONG-TERM CURRENT USE OF INSULIN: ICD-10-CM

## 2025-02-14 DIAGNOSIS — I10 ESSENTIAL HYPERTENSION: ICD-10-CM

## 2025-02-14 PROCEDURE — 99999 PR PBB SHADOW E&M-EST. PATIENT-LVL IV: CPT | Mod: PBBFAC,,, | Performed by: STUDENT IN AN ORGANIZED HEALTH CARE EDUCATION/TRAINING PROGRAM

## 2025-02-14 RX ORDER — TIRZEPATIDE 15 MG/.5ML
15 INJECTION, SOLUTION SUBCUTANEOUS WEEKLY
Qty: 4 PEN | Refills: 5 | Status: SHIPPED | OUTPATIENT
Start: 2025-02-14

## 2025-02-14 NOTE — PROGRESS NOTES
Subjective:       Patient ID: Андрей Wooten is a 60 y.o. male.    Chief Complaint: Follow-up, Obesity, and Weight Check      Patient presents for treatment of obesity.     Co-morbidities:   HTN  HLD  NAFLD  DM2  GERD    Negative for thyroid cancer  Negative for pancreatitis     History of Weight Loss Efforts  Dr. Bernal - prescribed phentermine, diethylpropion; 255 lbs when first seen, got down to 240 lbs  Intermittent Fasting - got down to about 230 lbs    Current Physical Activity  Weights and cardio in gym 3x/week    Current Eating Habits  Breakfast - grits, ham, biscuits, cereal (Frosted Flakes)  Lunch - fast food  Dinner - southern food  Snacks - chips, cookies, candy; about 4/day  Beverages - water, Hawaiian Punch, sweet tea    Shift work    Drinking less sugar sweetened beverages  Eating more salads  Protein shakes  V8  Beans with brown rice  Cut back on fried foods    Medical Weight Loss  1/24/2022 ([employer] out for repair): 247 lbs 5.7 oz, BMI 39.92  2/23/2022: 245.7 lbs, BMI 39.7, BFP 38.2%, BFM 93.8 lbs, SMM 87.7 lbs, BMR 1858 kcal; Ozempic  4/1/2022: 244.1 lbs, BMI 39.4, BFP 38.7%, BFM 94.4 lbs, SMM 86.6 lbs, BMR 1836 kcal; Ozempic  5/10/2022: 247.8 lbs, BMI 40, BFP 38.1%, BFM 94.4 lbs, SMM 88.6 lbs, BMR 1874 kcal; Ozempic  6/7/2022: 241.8 lbs, BMI 39, BFP 38.8%, BFM 93.9 lbs, SMM 85.3 lbs, BMR 1820 kcal; Ozempic  8/16/2022: 244.9 lbs, BMI 39.5, BFP 38.3%, BFM 93.9 lbs, SMM 86.6 lbs, BMR 1850 kcal  10/10/2022: 247.3 lbs, BMI 39.9, BFP 39.6%, BFM 98.1 lbs, SMM 85.8 lbs, BMR 1833 kcal  12/14/2022: 244.3 lbs, BMI 39.4, BFP 39.1%, BFM 95.5 lbs, SMM 85.8 lbs, BMR 1828 kcal  3/16/2023: 257 lbs, BMI 41.5, BFP 39%, .2 lbs, SMM 90 lbs, BMR 1905 kcal  6/23/2023: 240.9 lbs, BMI 38.9, BFP 38.4%, BFM 92.5 lbs, SMM 85.5 lbs, BMR 1823 kcal  9/26/2023: 230.2 lbs, BMI 37.2, BFP 36.9%, BFM 84.9 lbs, SMM 83.6 lbs, BMR 1793 kcal  1/5/2024: 233.7 lbs, BMI 37.7, BFP 35.8%, BFM 83.8 lbs, SMM 86.9 lbs, BMR 1840  kcal  4/15/2024: 236.1 lbs, BMI 38.1, BFP 38.2%, BFM 90.4 lbs, SMM 84 lbs, BMR 1799  kcal  7/15/2024: 233.4 lbs, BMI 37.7, BFP 36.7%, BFM 85.7 lbs, SMM 85.1 lbs, BMR 1818 kcal  11/14/2024: 233.9 lbs, BMI 36.2, BFP 31%, BFM 69.4 lbs, SMM 89.1 lbs, BMR 1884 kcal  2/14/2025: 224.5 lbs, BMI 36.3, BFP 36.4%, BFM 81.6 lbs, SMM 82 lbs, BMR 1770 kcal        Follow-up  Pertinent negatives include no abdominal pain, chest pain, chills, fever, nausea or vomiting.     Review of Systems   Constitutional:  Negative for chills and fever.   Respiratory:  Negative for shortness of breath.    Cardiovascular:  Negative for chest pain.   Gastrointestinal:  Negative for abdominal pain, nausea and vomiting.   Neurological:  Negative for dizziness and light-headedness.         Objective:        Latest Reference Range & Units 05/02/24 07:19 05/02/24 08:26   WBC 3.90 - 12.70 K/uL 4.88    RBC 4.60 - 6.20 M/uL 5.17    Hemoglobin 14.0 - 18.0 g/dL 14.3    Hematocrit 40.0 - 54.0 % 43.7    MCV 82 - 98 fL 85    MCH 27.0 - 31.0 pg 27.7    MCHC 32.0 - 36.0 g/dL 32.7    RDW 11.5 - 14.5 % 13.1    Platelet Count 150 - 450 K/uL 279    MPV 9.2 - 12.9 fL 9.5    Gran % 38.0 - 73.0 % 30.6 (L)    Lymph % 18.0 - 48.0 % 59.0 (H)    Mono % 4.0 - 15.0 % 8.6    Eos % 0.0 - 8.0 % 1.2    Basophil % 0.0 - 1.9 % 0.4    Immature Granulocytes 0.0 - 0.5 % 0.2    Gran # (ANC) 1.8 - 7.7 K/uL 1.5 (L)    Lymph # 1.0 - 4.8 K/uL 2.9    Mono # 0.3 - 1.0 K/uL 0.4    Eos # 0.0 - 0.5 K/uL 0.1    Baso # 0.00 - 0.20 K/uL 0.02    Immature Grans (Abs) 0.00 - 0.04 K/uL 0.01    nRBC 0 /100 WBC 0    Differential Method  Automated    Sodium 136 - 145 mmol/L 139    Potassium 3.5 - 5.1 mmol/L 3.6    Chloride 95 - 110 mmol/L 97    CO2 23 - 29 mmol/L 31 (H)    Anion Gap 8 - 16 mmol/L 11    BUN 2 - 20 mg/dL 20    Creatinine 0.50 - 1.40 mg/dL 1.08    eGFR >60 mL/min/1.73 m^2 >60.0    Glucose 70 - 110 mg/dL 92    Calcium 8.7 - 10.5 mg/dL 9.9    ALP 38 - 126 U/L 61    PROTEIN TOTAL 6.0 - 8.4  g/dL 7.8    Albumin 3.5 - 5.2 g/dL 4.4    BILIRUBIN TOTAL 0.1 - 1.0 mg/dL 0.6    AST 15 - 46 U/L 72 (H)    ALT 10 - 44 U/L 117 (H)    Cholesterol Total 120 - 199 mg/dL 153    HDL 40 - 75 mg/dL 25 (L)    HDL/Cholesterol Ratio 20.0 - 50.0 % 16.3 (L)    Non-HDL Cholesterol mg/dL 128    Total Cholesterol/HDL Ratio 2.0 - 5.0  6.1 (H)    Triglycerides 30 - 150 mg/dL 234 (H)    LDL Cholesterol 63.0 - 159.0 mg/dL 81.2    Hemoglobin A1C External 4.0 - 5.6 % 5.7 (H)    Estimated Avg Glucose 68 - 131 mg/dL 117    TSH 0.400 - 4.000 uIU/mL 1.460    Prostate Specific Antigen 0.00 - 4.00 ng/mL 0.77    Urine Creatinine 23.0 - 375.0 mg/dL  261.0   Urine Microalbumin ug/mL  12.0   MICROALB/CREAT RATIO 0.0 - 30.0 ug/mg  4.6   (L): Data is abnormally low  (H): Data is abnormally high    Vitals:    02/14/25 1427   BP: 123/81   Pulse: 85       Physical Exam  Vitals reviewed.   Constitutional:       General: He is not in acute distress.     Appearance: Normal appearance. He is obese. He is not ill-appearing, toxic-appearing or diaphoretic.   HENT:      Head: Normocephalic and atraumatic.   Cardiovascular:      Rate and Rhythm: Normal rate.   Pulmonary:      Effort: Pulmonary effort is normal. No respiratory distress.   Skin:     General: Skin is warm and dry.   Neurological:      Mental Status: He is alert and oriented to person, place, and time.         Assessment:       Problem List Items Addressed This Visit       HLD (hyperlipidemia)    Relevant Medications    tirzepatide (MOUNJARO) 15 mg/0.5 mL PnIj    Type 2 diabetes mellitus without complication, without long-term current use of insulin    Relevant Medications    tirzepatide (MOUNJARO) 15 mg/0.5 mL PnIj     Other Visit Diagnoses       VOGEL (nonalcoholic steatohepatitis)        Relevant Medications    tirzepatide (MOUNJARO) 15 mg/0.5 mL PnIj    Class 2 severe obesity due to excess calories with serious comorbidity and body mass index (BMI) of 37.0 to 37.9 in adult        Relevant  Medications    tirzepatide (MOUNJARO) 15 mg/0.5 mL PnIj    Essential hypertension        Relevant Medications    tirzepatide (MOUNJARO) 15 mg/0.5 mL PnIj              Plan:   - Mounjaro 15 mg weekly injections     - Log all food and beverage intake with a daily calorie goal of 1500 calories per day; meal plans given in AVS    - Moderate intensity aerobic exercise for 15-30 minutes 3-5x/week

## 2025-03-11 ENCOUNTER — OFFICE VISIT (OUTPATIENT)
Dept: FAMILY MEDICINE | Facility: CLINIC | Age: 60
End: 2025-03-11
Payer: COMMERCIAL

## 2025-03-11 VITALS
SYSTOLIC BLOOD PRESSURE: 124 MMHG | BODY MASS INDEX: 37 KG/M2 | DIASTOLIC BLOOD PRESSURE: 70 MMHG | WEIGHT: 230.25 LBS | OXYGEN SATURATION: 98 % | HEART RATE: 97 BPM | HEIGHT: 66 IN

## 2025-03-11 DIAGNOSIS — N52.9 ERECTILE DYSFUNCTION, UNSPECIFIED ERECTILE DYSFUNCTION TYPE: ICD-10-CM

## 2025-03-11 DIAGNOSIS — E11.3293 TYPE 2 DIABETES MELLITUS WITH BOTH EYES AFFECTED BY MILD NONPROLIFERATIVE RETINOPATHY WITHOUT MACULAR EDEMA, WITHOUT LONG-TERM CURRENT USE OF INSULIN: ICD-10-CM

## 2025-03-11 DIAGNOSIS — K76.0 NAFLD (NONALCOHOLIC FATTY LIVER DISEASE): ICD-10-CM

## 2025-03-11 DIAGNOSIS — Z12.5 PROSTATE CANCER SCREENING: ICD-10-CM

## 2025-03-11 DIAGNOSIS — I10 PRIMARY HYPERTENSION: ICD-10-CM

## 2025-03-11 DIAGNOSIS — E78.2 MIXED HYPERLIPIDEMIA: ICD-10-CM

## 2025-03-11 DIAGNOSIS — E11.9 TYPE 2 DIABETES MELLITUS WITHOUT COMPLICATION, WITHOUT LONG-TERM CURRENT USE OF INSULIN: Primary | ICD-10-CM

## 2025-03-11 DIAGNOSIS — E01.0 THYROMEGALY: ICD-10-CM

## 2025-03-11 PROCEDURE — 3078F DIAST BP <80 MM HG: CPT | Mod: CPTII,S$GLB,, | Performed by: STUDENT IN AN ORGANIZED HEALTH CARE EDUCATION/TRAINING PROGRAM

## 2025-03-11 PROCEDURE — 99214 OFFICE O/P EST MOD 30 MIN: CPT | Mod: S$GLB,,, | Performed by: STUDENT IN AN ORGANIZED HEALTH CARE EDUCATION/TRAINING PROGRAM

## 2025-03-11 PROCEDURE — 3044F HG A1C LEVEL LT 7.0%: CPT | Mod: CPTII,S$GLB,, | Performed by: STUDENT IN AN ORGANIZED HEALTH CARE EDUCATION/TRAINING PROGRAM

## 2025-03-11 PROCEDURE — 3061F NEG MICROALBUMINURIA REV: CPT | Mod: CPTII,S$GLB,, | Performed by: STUDENT IN AN ORGANIZED HEALTH CARE EDUCATION/TRAINING PROGRAM

## 2025-03-11 PROCEDURE — 3074F SYST BP LT 130 MM HG: CPT | Mod: CPTII,S$GLB,, | Performed by: STUDENT IN AN ORGANIZED HEALTH CARE EDUCATION/TRAINING PROGRAM

## 2025-03-11 PROCEDURE — 4010F ACE/ARB THERAPY RXD/TAKEN: CPT | Mod: CPTII,S$GLB,, | Performed by: STUDENT IN AN ORGANIZED HEALTH CARE EDUCATION/TRAINING PROGRAM

## 2025-03-11 PROCEDURE — 3066F NEPHROPATHY DOC TX: CPT | Mod: CPTII,S$GLB,, | Performed by: STUDENT IN AN ORGANIZED HEALTH CARE EDUCATION/TRAINING PROGRAM

## 2025-03-11 PROCEDURE — 3008F BODY MASS INDEX DOCD: CPT | Mod: CPTII,S$GLB,, | Performed by: STUDENT IN AN ORGANIZED HEALTH CARE EDUCATION/TRAINING PROGRAM

## 2025-03-11 PROCEDURE — 1160F RVW MEDS BY RX/DR IN RCRD: CPT | Mod: CPTII,S$GLB,, | Performed by: STUDENT IN AN ORGANIZED HEALTH CARE EDUCATION/TRAINING PROGRAM

## 2025-03-11 PROCEDURE — G2211 COMPLEX E/M VISIT ADD ON: HCPCS | Mod: S$GLB,,, | Performed by: STUDENT IN AN ORGANIZED HEALTH CARE EDUCATION/TRAINING PROGRAM

## 2025-03-11 PROCEDURE — 1159F MED LIST DOCD IN RCRD: CPT | Mod: CPTII,S$GLB,, | Performed by: STUDENT IN AN ORGANIZED HEALTH CARE EDUCATION/TRAINING PROGRAM

## 2025-03-12 PROBLEM — E11.3293 TYPE 2 DIABETES MELLITUS WITH BOTH EYES AFFECTED BY MILD NONPROLIFERATIVE RETINOPATHY WITHOUT MACULAR EDEMA, WITHOUT LONG-TERM CURRENT USE OF INSULIN: Status: ACTIVE | Noted: 2025-03-12

## 2025-03-12 NOTE — ASSESSMENT & PLAN NOTE
Hemoglobin A1C   Date Value Ref Range Status   01/31/2025 5.9 (H) 4.0 - 5.6 % Final     Comment:     ADA Screening Guidelines:  5.7-6.4%  Consistent with prediabetes  >or=6.5%  Consistent with diabetes    High levels of fetal hemoglobin interfere with the HbA1C  assay. Heterozygous hemoglobin variants (HbS, HgC, etc)do  not significantly interfere with this assay.   However, presence of multiple variants may affect accuracy.     07/30/2024 5.8 (H) 4.0 - 5.6 % Final     Comment:     ADA Screening Guidelines:  5.7-6.4%  Consistent with prediabetes  >or=6.5%  Consistent with diabetes    High levels of fetal hemoglobin interfere with the HbA1C  assay. Heterozygous hemoglobin variants (HbS, HgC, etc)do  not significantly interfere with this assay.   However, presence of multiple variants may affect accuracy.     05/02/2024 5.7 (H) 4.0 - 5.6 % Final     Comment:     ADA Screening Guidelines:  5.7-6.4%  Consistent with prediabetes  >or=6.5%  Consistent with diabetes    High levels of fetal hemoglobin interfere with the HbA1C  assay. Heterozygous hemoglobin variants (HbS, HgC, etc)do  not significantly interfere with this assay.   However, presence of multiple variants may affect accuracy.       - well controlled   liquor/beer

## 2025-03-12 NOTE — PROGRESS NOTES
" Patient ID: Андрей Wooten is a 60 y.o. male.     Chief Complaint: f/u labs    Follow-up  Pertinent negatives include no chest pain, coughing, fever, headaches, myalgias, nausea, rash, vomiting or weakness.     History of Present Illness    Андрей presents today for follow up    He reports previous numbness in his left arm that has since resolved. He also reports nerve pain which is currently under control. He has resumed physical exercise and takes acetaminophen for muscle relaxation.    He reports intermittent erectile dysfunction with inability to achieve erections on demand and absence of morning erections.    A1c is 5.9. Thyroid studies and urine test are normal. Cholesterol panel is within normal limits. Liver function tests show slight elevation with values of 49 and 47 (normal range up to 46). Complete blood count is normal.    He reports taking Majora with good tolerance and no adverse effects. He has not initiated the prescribed diabetes medication.    His wife passed away last year.           Review of Systems  Review of Systems   Constitutional:  Negative for fever.   HENT:  Negative for ear pain and sinus pain.    Eyes:  Negative for discharge.   Respiratory:  Negative for cough and shortness of breath.    Cardiovascular:  Negative for chest pain and leg swelling.   Gastrointestinal:  Negative for diarrhea, nausea and vomiting.   Genitourinary:  Negative for urgency.   Musculoskeletal:  Negative for myalgias.   Skin:  Negative for rash.   Neurological:  Negative for weakness and headaches.   Psychiatric/Behavioral:  Negative for depression.    All other systems reviewed and are negative.      Currently Medications  Medications Ordered Prior to Encounter[1]    Physical  Exam  Vitals:    03/11/25 1327   BP: 124/70   BP Location: Left arm   Patient Position: Sitting   Pulse: 97   SpO2: 98%   Weight: 104.5 kg (230 lb 4.3 oz)   Height: 5' 6" (1.676 m)      Body mass index is 37.17 kg/m².  Wt Readings from " Last 3 Encounters:   03/11/25 104.5 kg (230 lb 4.3 oz)   02/14/25 101.8 kg (224 lb 8 oz)   11/14/24 101.6 kg (223 lb 14.4 oz)       Physical Exam  Vitals and nursing note reviewed.   Constitutional:       General: He is not in acute distress.     Appearance: He is not ill-appearing.   HENT:      Head: Normocephalic and atraumatic.      Right Ear: External ear normal.      Left Ear: External ear normal.      Nose: Nose normal.      Mouth/Throat:      Mouth: Mucous membranes are moist.   Eyes:      Extraocular Movements: Extraocular movements intact.      Conjunctiva/sclera: Conjunctivae normal.   Neck:      Thyroid: Thyromegaly present.   Cardiovascular:      Rate and Rhythm: Normal rate and regular rhythm.      Pulses: Normal pulses.      Heart sounds: No murmur heard.  Pulmonary:      Effort: Pulmonary effort is normal. No respiratory distress.      Breath sounds: No wheezing.   Abdominal:      General: There is no distension.      Palpations: Abdomen is soft. There is no mass.      Tenderness: There is no abdominal tenderness.   Musculoskeletal:         General: No swelling.      Cervical back: Normal range of motion.   Skin:     Coloration: Skin is not jaundiced.      Findings: No rash.   Neurological:      General: No focal deficit present.      Mental Status: He is alert and oriented to person, place, and time.   Psychiatric:         Mood and Affect: Mood normal.         Thought Content: Thought content normal.         Labs:    Complete Blood Count  Lab Results   Component Value Date    RBC 5.43 01/31/2025    HGB 15.1 01/31/2025    HCT 45.8 01/31/2025    MCV 84 01/31/2025    MCH 27.8 01/31/2025    MCHC 33.0 01/31/2025    RDW 13.2 01/31/2025     01/31/2025    MPV 9.5 01/31/2025    GRAN 1.4 (L) 01/31/2025    GRAN 32.7 (L) 01/31/2025    LYMPH 2.4 01/31/2025    LYMPH 55.7 (H) 01/31/2025    MONO 0.4 01/31/2025    MONO 9.5 01/31/2025    EOS 0.1 01/31/2025    BASO 0.03 01/31/2025    EOSINOPHIL 1.4 01/31/2025     BASOPHIL 0.7 01/31/2025    DIFFMETHOD Automated 01/31/2025       Comprehensive Metabolic Panel  Lab Results   Component Value Date    GLU 96 01/31/2025    BUN 17 01/31/2025    CREATININE 0.98 01/31/2025     (L) 01/31/2025    K 3.4 (L) 01/31/2025    CL 98 01/31/2025    PROT 8.3 01/31/2025    ALBUMIN 4.4 01/31/2025    BILITOT 0.9 01/31/2025    AST 49 (H) 01/31/2025    ALKPHOS 58 01/31/2025    CO2 29 01/31/2025    ALT 47 (H) 01/31/2025    ANIONGAP 8 01/31/2025       TSH  Lab Results   Component Value Date    TSH 0.802 01/31/2025       Imaging:  CT Chest Abdomen Pelvis With Contrast  Narrative: EXAMINATION:  CT CHEST ABDOMEN PELVIS WITH CONTRAST (XPD)    CLINICAL HISTORY:  Epigastric painabdominal pain and chest pain with radiation;    TECHNIQUE:  Standard chest, abdomen, and pelvis CT protocol with oral and IV contrast was performed.  100 mL of Omnipaque 350 contrast material was used for this examination.    COMPARISON:  None    FINDINGS:  Finding: The size of the heart is within normal limits.  The right lung is clear.  There is a 4 mm noncalcified pulmonary nodule in the left lower lobe.  There are streaky opacities in the lingula and right lower lobe.  There is no pneumothorax or pleural effusion.    There is a 27 mm oval shaped area of hypodensity in the dome of the left lobe of the liver.  This area has a Hounsfield measurement of 8.  The gallbladder, pancreas, spleen, and adrenals are normal in appearance.  There are hypodense masses associated with both kidneys.  One of the larger ones measures 8 mm and is located in the medial aspect of the inferior pole of the right kidney.  These are characteristic of incidental findings.  The ureters and the urinary bladder are normal in appearance.  There is a mild amount of calcification within the prostate.  The appendix and the rest of the gastrointestinal system are normal in appearance. There is no free fluid within the abdomen or pelvis. There is no  pneumoperitoneum.  There is a mild amount of atherosclerosis.  There are mild osteoarthritic changes in the sacroiliac joints bilaterally.  There is no abnormal lymphadenopathy based on CT size criteria.  Impression: 1. There is a 27 mm oval shaped area of hypodensity in the dome of the left lobe of the liver. This area has a Hounsfield measurement of 8.  This is characteristic of a cyst.  2. There is no abnormal lymphadenopathy based on CT size criteria.  3. There are mild osteoarthritic changes in the sacroiliac joints bilaterally.  4. There are streaky opacities in the lingula and right lower lobe.  This is characteristic of subsegmental atelectasis and/or scarring.  5. There is a 4 mm noncalcified pulmonary nodule in the left lower lobe.  All CT scans at this facility use dose modulation, iterative reconstruction, and/or weight base dosing when appropriate to reduce radiation dose when appropriate to reduce radiation dose to as low as reasonably achievable.    Electronically signed by: Josue Mckeon MD  Date:    04/20/2022  Time:    09:34      Assessment/Plan:  Assessment & Plan    Reviewed lab results: A1c 5.9, normal urine, thyroid perfect, cholesterol great, liver function slightly elevated (49 and 47, normal 46)  Assessed blood pressure, which appears well-controlled without medication  Considered thyroid ultrasound due to patient's concerns about coworker's thyroid cancer diagnosis  Evaluated need for urology referral based on patient's reported erectile dysfunction    TYPE 2 DIABETES MELLITUS WITH MILD NONPROLIFERATIVE DIABETIC RETINOPATHY WITHOUT MACULAR EDEMA, BILATERAL:  - Monitored the patient's A1c level, which is 5.9, within the normal range.  - Evaluated urine test results, which were normal, indicating no signs of diabetic nephropathy.  - Assessed the A1c level as excellent, indicating good glycemic control.  - Evaluated blood pressure as good.  - Reviewed and acknowledged normal thyroid and  cholesterol levels, as well as improved liver function.  - Discussed discontinuing Farxiga (diabetes medication) due to good glycemic control.  - Scheduled follow-up visit in 6 months.    DIABETES:  - Discussed previously prescribed Metformin that patient has not been taking.    ERECTILE DYSFUNCTION:  - Андрей reports intermittent erectile dysfunction, not on demand anymore, and probably no morning erections.  - Referred to urology for evaluation and planned to schedule an appointment.    LIVER FUNCTION:  - Liver function tests show slightly elevated LDH levels at 49 and 47 (normal being 46).  - Noted patient's past visit to a liver specialist.    SCIATIC NERVE PAIN:  - Андрей reports mild sciatic nerve pain, but believes it's under control.  - Currently using acetaminophen (Tylenol) for muscle relaxation.  - Acknowledged patient is consulting with a pain medicine specialist.    MEDICATIONS/SUPPLEMENTS:  - Continued Majora at current dose.  - Acetaminophen (Tylenol) being used for muscle relaxation.    THYROID DISORDER:  - Discussed patient's concern about thyroid cancer due to a coworker's experience.  - Planned to order a thyroid ultrasound.    OTHER INSTRUCTIONS:  - Noted patient mentioned his wife passed away last year.          1. Type 2 diabetes mellitus without complication, without long-term current use of insulin  -     CBC Auto Differential; Future  -     Comprehensive Metabolic Panel; Future; Expected date: 03/11/2025  -     Hemoglobin A1C; Future; Expected date: 03/11/2025    2. Mixed hyperlipidemia  -     CBC Auto Differential; Future  -     Comprehensive Metabolic Panel; Future; Expected date: 03/11/2025  -     Lipid Panel; Future; Expected date: 03/11/2025    3. NAFLD (nonalcoholic fatty liver disease)  Overview:   -- liver biopsy 5/28/18 - VOGEL, macrosteatosis 80%, microsteatosis 10%, stage 1 out of 4 fibrosis      Orders:  -     TSH; Future; Expected date: 03/11/2025    4. Primary hypertension  -      CBC Auto Differential; Future  -     Comprehensive Metabolic Panel; Future; Expected date: 03/11/2025    5. Prostate cancer screening  -     PSA, Screening; Future; Expected date: 03/11/2026    6. Thyromegaly  -     US Soft Tissue Head Neck; Future; Expected date: 03/11/2025    7. Erectile dysfunction, unspecified erectile dysfunction type  -     Ambulatory referral/consult to Urology; Future; Expected date: 03/18/2025    8. 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 Heterozygous hemoglobin variants (HbS, HgC, etc)do  not significantly interfere with this assay.   However, presence of multiple variants may affect accuracy.     07/30/2024 5.8 (H) 4.0 - 5.6 % Final     Comment:     ADA Screening Guidelines:  5.7-6.4%  Consistent with prediabetes  >or=6.5%  Consistent with diabetes    High levels of fetal hemoglobin interfere with the HbA1C  assay. Heterozygous hemoglobin variants (HbS, HgC, etc)do  not significantly interfere with this assay.   However, presence of multiple variants may affect accuracy.     05/02/2024 5.7 (H) 4.0 - 5.6 % Final     Comment:     ADA Screening Guidelines:  5.7-6.4%  Consistent with prediabetes  >or=6.5%  Consistent with diabetes    High levels of fetal hemoglobin interfere with the HbA1C  assay. Heterozygous hemoglobin variants (HbS, HgC, etc)do  not significantly interfere with this assay.   However, presence of multiple variants may affect accuracy.       - well controlled           Discussed how to stay healthy including: diet, exercise, refraining from smoking and discussed screening exams /  tests needed for age, sex and family Hx.    RTC 6 mo    Leander Boo MD    This note was generated with the assistance of ambient listening technology. Verbal consent was obtained by the patient and accompanying visitor(s) for the recording of patient appointment to facilitate this note. I attest to having reviewed and edited the generated note for accuracy, though some syntax or spelling errors may persist. Please contact the author of this note for any clarification.           [1]   Current Outpatient Medications on File Prior to Visit   Medication Sig Dispense Refill    amitriptyline (ELAVIL) 50 MG tablet TAKE 1 TABLET BY MOUTH EVERY EVENING 90 tablet 3    amLODIPine (NORVASC) 10 MG tablet TAKE 1 TABLET BY MOUTH ONCE A DAY 90 tablet 3    atorvastatin (LIPITOR) 40 MG tablet TAKE 1 TABLET BY MOUTH ONCE A DAY 90 tablet 1    blood sugar diagnostic Strp To check BG 1 times daily, to use with insurance preferred meter 100 strip 11    blood-glucose meter kit To check BG 1 times daily, to use with insurance preferred meter 1 each 0    cetirizine (ZYRTEC) 10 MG tablet Take 10 mg by mouth as needed.       famotidine (PEPCID) 40 MG tablet Take 1 tablet (40 mg total) by mouth once daily. 90 tablet 3    fluticasone (FLONASE) 50 mcg/actuation nasal spray 1 spray by Each Nare route once daily. (Patient taking differently: 1 spray by Each Nostril route as needed.) 16 g 0    ibuprofen (ADVIL,MOTRIN) 800 MG tablet Take 1 tablet (800 mg total) by mouth 3 (three) times daily as needed for Pain. 60 tablet 1    lancets Misc To check BG 1 times daily, to use with insurance preferred meter 100 each 11    losartan (COZAAR) 50 MG tablet Take 1 tablet (50 mg total) by mouth once daily. 90 tablet 3    pantoprazole (PROTONIX) 40 MG tablet TAKE 1 TABLET BY MOUTH EVERY MORNING 90 tablet 1    tirzepatide (MOUNJARO) 15 mg/0.5 mL PnIj Inject 15 mg into the skin once a week. 4 Pen 5    triamterene-hydrochlorothiazide 37.5-25 mg (DYAZIDE) 37.5-25 mg  per capsule TAKE 1 CAPSULE BY MOUTH EVERY MORNING 90 capsule 3    vitamin D 1000 units Tab Take 185 mg by mouth once daily.       No current facility-administered medications on file prior to visit.

## 2025-03-14 ENCOUNTER — HOSPITAL ENCOUNTER (OUTPATIENT)
Dept: RADIOLOGY | Facility: HOSPITAL | Age: 60
Discharge: HOME | End: 2025-03-14
Attending: STUDENT IN AN ORGANIZED HEALTH CARE EDUCATION/TRAINING PROGRAM
Payer: COMMERCIAL

## 2025-03-14 DIAGNOSIS — E01.0 THYROMEGALY: ICD-10-CM

## 2025-03-14 PROCEDURE — 76536 US EXAM OF HEAD AND NECK: CPT | Mod: TC,PN

## 2025-03-14 PROCEDURE — 76536 US EXAM OF HEAD AND NECK: CPT | Mod: 26,,, | Performed by: RADIOLOGY

## 2025-03-16 ENCOUNTER — RESULTS FOLLOW-UP (OUTPATIENT)
Dept: FAMILY MEDICINE | Facility: CLINIC | Age: 60
End: 2025-03-16

## 2025-03-16 DIAGNOSIS — E04.1 THYROID NODULE: Primary | ICD-10-CM

## 2025-03-17 ENCOUNTER — TELEPHONE (OUTPATIENT)
Dept: INTERVENTIONAL RADIOLOGY/VASCULAR | Facility: CLINIC | Age: 60
End: 2025-03-17
Payer: COMMERCIAL

## 2025-03-17 DIAGNOSIS — E04.1 THYROID NODULE: Primary | ICD-10-CM

## 2025-03-18 ENCOUNTER — TELEPHONE (OUTPATIENT)
Dept: INTERVENTIONAL RADIOLOGY/VASCULAR | Facility: CLINIC | Age: 60
End: 2025-03-18
Payer: COMMERCIAL

## 2025-03-19 ENCOUNTER — OFFICE VISIT (OUTPATIENT)
Dept: UROLOGY | Facility: CLINIC | Age: 60
End: 2025-03-19
Payer: COMMERCIAL

## 2025-03-19 VITALS
WEIGHT: 225.31 LBS | HEIGHT: 66 IN | OXYGEN SATURATION: 98 % | SYSTOLIC BLOOD PRESSURE: 130 MMHG | DIASTOLIC BLOOD PRESSURE: 90 MMHG | BODY MASS INDEX: 36.21 KG/M2 | HEART RATE: 73 BPM

## 2025-03-19 DIAGNOSIS — Z80.42 FAMILY HISTORY OF PROSTATE CANCER: ICD-10-CM

## 2025-03-19 DIAGNOSIS — N52.9 ERECTILE DYSFUNCTION, UNSPECIFIED ERECTILE DYSFUNCTION TYPE: Primary | ICD-10-CM

## 2025-03-19 PROCEDURE — 3008F BODY MASS INDEX DOCD: CPT | Mod: CPTII,S$GLB,, | Performed by: NURSE PRACTITIONER

## 2025-03-19 PROCEDURE — 3080F DIAST BP >= 90 MM HG: CPT | Mod: CPTII,S$GLB,, | Performed by: NURSE PRACTITIONER

## 2025-03-19 PROCEDURE — 3066F NEPHROPATHY DOC TX: CPT | Mod: CPTII,S$GLB,, | Performed by: NURSE PRACTITIONER

## 2025-03-19 PROCEDURE — 99999 PR PBB SHADOW E&M-EST. PATIENT-LVL V: CPT | Mod: PBBFAC,,, | Performed by: NURSE PRACTITIONER

## 2025-03-19 PROCEDURE — 3044F HG A1C LEVEL LT 7.0%: CPT | Mod: CPTII,S$GLB,, | Performed by: NURSE PRACTITIONER

## 2025-03-19 PROCEDURE — 3075F SYST BP GE 130 - 139MM HG: CPT | Mod: CPTII,S$GLB,, | Performed by: NURSE PRACTITIONER

## 2025-03-19 PROCEDURE — 1159F MED LIST DOCD IN RCRD: CPT | Mod: CPTII,S$GLB,, | Performed by: NURSE PRACTITIONER

## 2025-03-19 PROCEDURE — 99204 OFFICE O/P NEW MOD 45 MIN: CPT | Mod: S$GLB,,, | Performed by: NURSE PRACTITIONER

## 2025-03-19 PROCEDURE — 3061F NEG MICROALBUMINURIA REV: CPT | Mod: CPTII,S$GLB,, | Performed by: NURSE PRACTITIONER

## 2025-03-19 RX ORDER — TADALAFIL 10 MG/1
10 TABLET ORAL DAILY PRN
Qty: 30 TABLET | Refills: 11 | Status: SHIPPED | OUTPATIENT
Start: 2025-03-19 | End: 2026-03-19

## 2025-03-19 NOTE — PROGRESS NOTES
Subjective:       Patient ID: Андрей Wooten is a 60 y.o. male.    Chief Complaint: Erectile Dysfunction    History of Present Illness    CHIEF COMPLAINT:  Patient presents today for erectile dysfunction    SEXUAL HEALTH:  He reports experiencing erectile dysfunction for the past 4-5 years with no change in severity. He achieves semi-erections lasting 1-5 minutes and denies morning erections. He maintains sexual desire but has never tried medications for erectile dysfunction. His wife recently passed away from cancer, but the erectile issues predated and persisted after her passing.    GENITOURINARY:  He reports normal urination pattern and denies nocturia.    FAMILY HISTORY:  He has a family history of prostate cancer in two maternal great uncles.      ROS:  General: -fever, -chills, -fatigue, -weight gain, -weight loss  Eyes: -vision changes, -redness, -discharge  ENT: -ear pain, -nasal congestion, -sore throat  Cardiovascular: -chest pain, -palpitations, -lower extremity edema  Respiratory: -cough, -shortness of breath  Gastrointestinal: -abdominal pain, -nausea, -vomiting, -diarrhea, -constipation, -blood in stool  Genitourinary: -dysuria, -hematuria, -frequency  Musculoskeletal: -joint pain, -muscle pain  Skin: -rash, -lesion  Neurological: -headache, -dizziness, -numbness, -tingling  Psychiatric: -anxiety, -depression, -sleep difficulty  Male Genitourinary: +erectile dysfunction           Objective:      Physical Exam  Vitals and nursing note reviewed.   Constitutional:       General: He is not in acute distress.     Appearance: He is well-developed. He is obese. He is not ill-appearing.   HENT:      Head: Normocephalic and atraumatic.   Eyes:      Pupils: Pupils are equal, round, and reactive to light.   Cardiovascular:      Rate and Rhythm: Normal rate.   Pulmonary:      Effort: Pulmonary effort is normal. No respiratory distress.   Abdominal:      Palpations: Abdomen is soft.      Tenderness: There is no  abdominal tenderness.   Musculoskeletal:         General: Normal range of motion.      Cervical back: Normal range of motion.   Skin:     General: Skin is warm and dry.   Neurological:      Mental Status: He is alert and oriented to person, place, and time.      Coordination: Coordination normal.   Psychiatric:         Mood and Affect: Mood normal.         Behavior: Behavior normal.         Thought Content: Thought content normal.         Judgment: Judgment normal.         Assessment:       Problem List Items Addressed This Visit    None  Visit Diagnoses         Erectile dysfunction, unspecified erectile dysfunction type    -  Primary    Relevant Medications    tadalafiL (CIALIS) 10 MG tablet      Family history of prostate cancer        Relevant Orders    PSA, Total and Free            Plan:           Андрей was seen today for erectile dysfunction.    Diagnoses and all orders for this visit:    Erectile dysfunction, unspecified erectile dysfunction type  -     tadalafiL (CIALIS) 10 MG tablet; Take 1 tablet (10 mg total) by mouth daily as needed for Erectile Dysfunction.    Family history of prostate cancer  -     PSA, Total and Free; Future    Other order  Start trial of tadalafil (Cialis) 10 mg as needed daily for ED. Goodrx coupon provided.    Follow-up in 4 weeks.     This note was generated with the assistance of ambient listening technology. Verbal consent was obtained by the patient and accompanying visitor(s) for the recording of patient appointment to facilitate this note. I attest to having reviewed and edited the generated note for accuracy, though some syntax or spelling errors may persist. Please contact the author of this note for any clarification.      Shadia Razo, DNP

## 2025-03-19 NOTE — PATIENT INSTRUCTIONS
PSA, total and free today  Start trial of tadalafil (Cialis) 10 mg as needed daily for ED. Goodrx coupon provided.  Follow-up in 4 weeks.

## 2025-03-25 ENCOUNTER — PATIENT MESSAGE (OUTPATIENT)
Dept: INTERVENTIONAL RADIOLOGY/VASCULAR | Facility: HOSPITAL | Age: 60
End: 2025-03-25
Payer: COMMERCIAL

## 2025-03-25 DIAGNOSIS — E04.1 THYROID NODULE: Primary | ICD-10-CM

## 2025-03-26 ENCOUNTER — HOSPITAL ENCOUNTER (OUTPATIENT)
Dept: INTERVENTIONAL RADIOLOGY/VASCULAR | Facility: HOSPITAL | Age: 60
Discharge: HOME | End: 2025-03-26
Attending: PHYSICIAN ASSISTANT
Payer: COMMERCIAL

## 2025-03-26 ENCOUNTER — RESULTS FOLLOW-UP (OUTPATIENT)
Dept: UROLOGY | Facility: CLINIC | Age: 60
End: 2025-03-26

## 2025-03-26 VITALS
WEIGHT: 230 LBS | DIASTOLIC BLOOD PRESSURE: 70 MMHG | RESPIRATION RATE: 16 BRPM | OXYGEN SATURATION: 95 % | HEART RATE: 87 BPM | SYSTOLIC BLOOD PRESSURE: 130 MMHG | BODY MASS INDEX: 36.96 KG/M2 | HEIGHT: 66 IN

## 2025-03-26 DIAGNOSIS — E04.1 THYROID NODULE: ICD-10-CM

## 2025-03-26 PROCEDURE — 10005 FNA BX W/US GDN 1ST LES: CPT | Performed by: RADIOLOGY

## 2025-03-26 PROCEDURE — 10006 FNA BX W/US GDN EA ADDL: CPT | Performed by: RADIOLOGY

## 2025-03-26 NOTE — DISCHARGE SUMMARY
Interventional Radiology Short Stay Discharge Summary      Admit Date: 3/26/2025  Discharge Date: 03/26/2025     Hospital Course: Uneventful    Discharge Diagnosis: multinodular thyroid    Discharge Condition: Stable    Discharge Disposition: Home    Diet: Resume prior diet    Activity: activity as tolerated    Follow-up: With referring provider    Cindy Chava, PA-C [facility]

## 2025-03-26 NOTE — H&P
Interventional Radiology Pre-Procedure History & Physical      Chief Complaint/Reason for Referral: thyroid nodule    History of Present Illness:  Андрей Wooten is a 60 y.o. male who presents for FNA    Past Medical History:   Diagnosis Date    HLD (hyperlipidemia)     Hypertension     Liver fibrosis 6/7/2018    -- liver biopsy 5/28/18 - VOGEL, macrosteatosis 80%, microsteatosis 10%, stage 1 out of 4 fibrosis -- MRI elastography 5/29/17 - F1-F2    VOGEL (nonalcoholic steatohepatitis)     -- liver biopsy 5/28/18 - VOGEL, macrosteatosis 80%, microsteatosis 10%, stage 1 out of 4 fibrosis    Type 2 diabetes mellitus without complication, without long-term current use of insulin 6/7/2018     Past Surgical History:   Procedure Laterality Date    BIOPSY OF LIVER WITH ULTRASOUND GUIDANCE N/A 5/28/2018    Procedure: Biopsy liver and ultrasound;  Surgeon: [facility] Diagnostic Provider;  Location: [facility] OR [address] [place] [address];  Service: Endoscopy;  Laterality: N/A;  U/S GUIDED LIVER BIOPSY (99721).  5/28/2018  [facility] 7:30 AM  PROCEDURE 9 AM.  DR RADHA BRADLEY / NOE SLOAN NP.  DB 5/24/18    COLONOSCOPY N/A 3/2/2016    Procedure: COLONOSCOPY;  Surgeon: Angelito Mahoney MD;  Location: [facility] ([address] [place] [address]);  Service: Endoscopy;  Laterality: N/A;    ESOPHAGOGASTRODUODENOSCOPY N/A 1/31/2022    Procedure: ESOPHAGOGASTRODUODENOSCOPY (EGD);  Surgeon: Nithin Gomez MD;  Location: [address]);  Service: Endoscopy;  Laterality: N/A;  fully vaccinated-GT-rapid [id number]    EYE SURGERY  2004    Lasik    LIVER BIOPSY  4/1/15    very minimal lobular steatohepatitis, macrosteatosis 20%, microsteatosis 10% no fibrosis     LIVER BIOPSY  05/2018    VOGEL, macrosteatosis 80%, microsteatosis 10%, stage 1 out of 4 fibrosis       Allergies:   Review of patient's allergies indicates:  No Known Allergies    Home Meds:   Prior to Admission medications    Medication Sig Start Date End Date Taking? Authorizing Provider   amitriptyline (ELAVIL) 50 MG tablet TAKE  1 TABLET BY MOUTH EVERY EVENING 11/1/24   Leander Boo MD   amLODIPine (NORVASC) 10 MG tablet TAKE 1 TABLET BY MOUTH ONCE A DAY 11/1/24   Leander Boo MD   atorvastatin (LIPITOR) 40 MG tablet TAKE 1 TABLET BY MOUTH ONCE A DAY 1/31/25   Leander Boo MD   blood sugar diagnostic Strp To check BG 1 times daily, to use with insurance preferred meter 1/22/21   Khushboo Pugh MD   blood-glucose meter kit To check BG 1 times daily, to use with insurance preferred meter 1/22/21   Khushboo Pugh MD   cetirizine (ZYRTEC) 10 MG tablet Take 10 mg by mouth as needed.     Provider, Historical   famotidine (PEPCID) 40 MG tablet Take 1 tablet (40 mg total) by mouth once daily.  Patient not taking: Reported on 3/19/2025 8/15/24   Leander Boo MD   fluticasone (FLONASE) 50 mcg/actuation nasal spray 1 spray by Each Nare route once daily. 4/27/16   Marcela Knott, NP   ibuprofen (ADVIL,MOTRIN) 800 MG tablet Take 1 tablet (800 mg total) by mouth 3 (three) times daily as needed for Pain. 11/6/23   Leander Boo MD   lancets [facility] To check BG 1 times daily, to use with insurance preferred meter 1/22/21   Khushboo Pugh MD   losartan (COZAAR) 50 MG tablet Take 1 tablet (50 mg total) by mouth once daily. 5/6/24   Leander Boo MD   pantoprazole (PROTONIX) 40 MG tablet TAKE 1 TABLET BY MOUTH EVERY MORNING 1/31/25   Leander Boo MD   tadalafiL (CIALIS) 10 MG tablet Take 1 tablet (10 mg total) by mouth daily as needed for Erectile Dysfunction. 3/19/25 3/19/26  Shadia Razo DNP   tirzepatide (MOUNJARO) 15 mg/0.5 mL PnIj Inject 15 mg into the skin once a week. 2/14/25   Lo Durán MD   triamterene-hydrochlorothiazide 37.5-25 mg (DYAZIDE) 37.5-25 mg per capsule TAKE 1 CAPSULE BY MOUTH EVERY MORNING 11/1/24   Leander Boo MD   vitamin D 1000 units Tab Take 185 mg by mouth once daily.    Provider, Historical       Anticoagulation/Antiplatelet Meds: no anticoagulation    Review of Systems:    Hematological: no known coagulopathies  Respiratory: no shortness of breath  Cardiovascular: no chest pain  Gastrointestinal: no abdominal pain  Genitourinary: no dysuria  Musculoskeletal: negative  Neurological: no TIA or stroke symptoms     Physical Exam:       General: WNWD, NAD  HEENT: Normocephalic, sclera anicteric  Neck: Supple  Heart: RRR by pulse  Lungs: Symmetric excursions, breathing unlabored  Abd: Nondistended  Extremities: MAEW  Neuro: AA x 3    Laboratory:  Lab Results   Component Value Date    INR 1.1 05/14/2018       Lab Results   Component Value Date    WBC 4.31 01/31/2025    HGB 15.1 01/31/2025    HCT 45.8 01/31/2025    MCV 84 01/31/2025     01/31/2025      Lab Results   Component Value Date    GLU 96 01/31/2025     (L) 01/31/2025    K 3.4 (L) 01/31/2025    CL 98 01/31/2025    CO2 29 01/31/2025    BUN 17 01/31/2025    CREATININE 0.98 01/31/2025    CALCIUM 9.6 01/31/2025    ALT 47 (H) 01/31/2025    AST 49 (H) 01/31/2025    ALBUMIN 4.4 01/31/2025    BILITOT 0.9 01/31/2025       Imaging:  US reviewed.    Assessment/Plan:  60 y.o. male with thyroid nodules, isthmus and L inferior. Will undergo FNA today.    Sedation plan: None    Risks (including, but not limited to, pain, bleeding, infection, damage to nearby structures, treatment failure/recurrence, and the need for additional procedures), potential benefits, and alternatives were discussed with the patient. All questions were answered to the best of my abilities. The patient wishes to proceed. Written informed consent was obtained.    Cindy Chava, PA-C [facility]

## 2025-03-26 NOTE — SEDATION DOCUMENTATION
Pt arrived to c arm for left thyroid and isthmus fna. Pt oriented to unit and staff, Pt safely transferred from stretcher to procedural table. Fall risk reviewed and comfort measures utilized with interventions. Safety strap applied, position pillows to minimize pressure points. Blankets applied. Pt prepped and draped utilizing standard sterile technique. Patient placed on continuous monitoring, as required by sedation policy. Timeouts implemented utilizing standard universal time-out per department and facility policy. RN to remain at bedside with continuous monitoring. Pt resting comfortably. Denies pain/discomfort. Will continue to monitor. See flow sheets for monitoring, medication administration, and updates. patient verbalizes understanding.

## 2025-03-26 NOTE — PLAN OF CARE
Pt VSS and in NAD. CRM equipment removed. Discharge instructions and AVS provided. Pt verbalized understanding, questions and concerns addressed. No further needs at this time. Pt discharged from recovery area at 1445.

## 2025-03-26 NOTE — PROCEDURES
Interventional Radiology Immediate Post-Procedure Note    Pre-Op Diagnosis: Thyroid Nodule  Post-Op Diagnosis: Same    Procedure:     Procedure performed by: Yolis Huff PA-C  Assistants: None    Estimated Blood Loss: Minimal  Specimen Removed: Yes    Findings/description of procedure:  25 g FNA x 2 passes made through nodule in the isthmus.  Adequacy of specimen confirmed.    25 g FNA x 2 passes made through nodule in the L inf lobe.  Adequacy of specimen confirmed.    No immediate complications. Patient tolerated procedure well. Please see full dictated procedure report for additional details and recommendations.      Cindy Chava, PA-C [facility]

## 2025-03-26 NOTE — DISCHARGE INSTRUCTIONS
BIOPSY DISCHARGE EDUCATION       Information:   A biopsy is a procedure in which a biopsy needle is used to remove small amounts of tissue to evaluate for organ dysfunction or the presence of cancerous versus benign (non-cancerous) tissue.      Follow-up visit information:   Your ordering physician will typically get your biopsy results in three to five business days. If you do not hear from the ordering physician in 7 business days, please call his/her office to set up an appointment to review the results. Follow up with Interventional Radiology is not usually necessary.       Interventional Radiology Clinic    [phone], choose prompt 3 Monday - Friday, 8:00 am - 4:00 pm    [phone] After hours and on holidays. Ask to speak with the interventional radiologist on call.

## 2025-03-31 ENCOUNTER — RESULTS FOLLOW-UP (OUTPATIENT)
Dept: FAMILY MEDICINE | Facility: CLINIC | Age: 60
End: 2025-03-31

## 2025-03-31 ENCOUNTER — E-CONSULT (OUTPATIENT)
Dept: ENDOCRINOLOGY | Facility: CLINIC | Age: 60
End: 2025-03-31
Payer: COMMERCIAL

## 2025-03-31 DIAGNOSIS — E04.2 MULTIPLE THYROID NODULES: Primary | ICD-10-CM

## 2025-03-31 DIAGNOSIS — E04.1 THYROID NODULE: Primary | ICD-10-CM

## 2025-03-31 NOTE — CONSULTS
[facility]  Response for E-Consult     Patient Name: Андрей Wooten  MRN: [id number]  Primary Care Provider: Leander Boo MD   Requesting Provider: Leander Boo MD  E-Consult to Endocrinology  Consult performed by: Angel Mehta DO  Consult ordered by: Leander Boo MD  Reason for consult: Thyroid Pathology Results  Assessment/Recommendations: See note      I have reviewed the chart, ultrasound imaging, and recent pathology results for your patient. The FNA cytology returned as AUS/FLUS, and the next steps depend on available options:    Repeat FNA - This can be performed after 6 weeks from last FNA to allow for neck tissue recovery. If desired, our endocrine team can be involved for both the biopsy and a same-day visit, but the patient would need to travel to the [facility] in [place]. On day of FNA endocrinologist would plan to hold extra samples from the repeat FNA for molecular testing in case AUS/FLUS results return again.    Molecular Testing on Existing Slides - If the patients insurance covers it and radiology/pathology can send slides, molecular marker testing (e.g., ThyGeNEXT and ThyraMIR) can be performed on the original FNA slides without the need for a repeat biopsy.    Please let me know how you'd like to proceed and if endocrine involvement is needed.    Total time of Consultation: 10 minute    I did not speak to the requesting provider verbally about this.     *This eConsult is based on the clinical data available to me and is furnished without benefit of a physical examination. The eConsult will need to be interpreted in light of any clinical issues or changes in patient status not available to me at the time of filing this eConsults. Significant changes in patient condition or level of acuity should result in immediate formal consultation and reevaluation. Please alert me if you have further questions.    Thank you for this eConsult referral.      DO [facility]

## 2025-04-01 ENCOUNTER — PATIENT MESSAGE (OUTPATIENT)
Dept: FAMILY MEDICINE | Facility: CLINIC | Age: 60
End: 2025-04-01
Payer: COMMERCIAL

## 2025-04-01 DIAGNOSIS — E04.1 THYROID NODULE: Primary | ICD-10-CM

## 2025-04-04 ENCOUNTER — TELEPHONE (OUTPATIENT)
Dept: ENDOCRINOLOGY | Facility: CLINIC | Age: 60
End: 2025-04-04
Payer: COMMERCIAL

## 2025-04-04 DIAGNOSIS — E04.2 MULTIPLE THYROID NODULES: Primary | ICD-10-CM

## 2025-04-04 NOTE — TELEPHONE ENCOUNTER
----- Message from Albino Mora sent at 4/3/2025  2:13 PM CDT -----  Regarding: FW: E-Consult needs visit with biopsy same day  Please schedule [facility] clinic with biopsy thank you  ----- Message -----  From: Angel Mehta DO  Sent: 4/2/2025   1:45 PM CDT  To: Abby Foy MA  Subject: E-Consult needs visit with biopsy same day       Can you please assist to arrange this patient for a same day clinic visit and biopsy in the multi disciplinary clinic.  They had 2 nodules biopsied recently with radiology with their PCP and bother were AUS and needing repeat biopsy of both.  The visit for this patient would need to be after April 7th so that it will be a full 6 weeks from the first biopsy.  Please help arrange visit.  They placed a referral.  Thanks

## 2025-04-08 ENCOUNTER — TELEPHONE (OUTPATIENT)
Dept: ENDOCRINOLOGY | Facility: CLINIC | Age: 60
End: 2025-04-08
Payer: COMMERCIAL

## 2025-05-04 DIAGNOSIS — E11.9 TYPE 2 DIABETES MELLITUS WITHOUT COMPLICATION, WITHOUT LONG-TERM CURRENT USE OF INSULIN: ICD-10-CM

## 2025-05-04 DIAGNOSIS — E66.01 CLASS 2 SEVERE OBESITY DUE TO EXCESS CALORIES WITH SERIOUS COMORBIDITY AND BODY MASS INDEX (BMI) OF 37.0 TO 37.9 IN ADULT: ICD-10-CM

## 2025-05-04 DIAGNOSIS — E78.2 MIXED HYPERLIPIDEMIA: ICD-10-CM

## 2025-05-04 DIAGNOSIS — I10 ESSENTIAL HYPERTENSION: ICD-10-CM

## 2025-05-04 DIAGNOSIS — E66.812 CLASS 2 SEVERE OBESITY DUE TO EXCESS CALORIES WITH SERIOUS COMORBIDITY AND BODY MASS INDEX (BMI) OF 37.0 TO 37.9 IN ADULT: ICD-10-CM

## 2025-05-04 DIAGNOSIS — K75.81 NASH (NONALCOHOLIC STEATOHEPATITIS): ICD-10-CM

## 2025-05-05 RX ORDER — TIRZEPATIDE 15 MG/.5ML
INJECTION, SOLUTION SUBCUTANEOUS
Qty: 16 ML | Refills: 0 | Status: SHIPPED | OUTPATIENT
Start: 2025-05-05

## 2025-05-19 ENCOUNTER — HOSPITAL ENCOUNTER (OUTPATIENT)
Dept: ENDOCRINOLOGY | Facility: CLINIC | Age: 60
Discharge: HOME | End: 2025-05-19
Attending: INTERNAL MEDICINE
Payer: COMMERCIAL

## 2025-05-19 ENCOUNTER — OFFICE VISIT (OUTPATIENT)
Dept: BARIATRICS | Facility: CLINIC | Age: 60
End: 2025-05-19
Payer: COMMERCIAL

## 2025-05-19 ENCOUNTER — OFFICE VISIT (OUTPATIENT)
Dept: ENDOCRINOLOGY | Facility: CLINIC | Age: 60
End: 2025-05-19
Payer: COMMERCIAL

## 2025-05-19 VITALS
HEART RATE: 75 BPM | SYSTOLIC BLOOD PRESSURE: 118 MMHG | WEIGHT: 223.44 LBS | BODY MASS INDEX: 35.91 KG/M2 | DIASTOLIC BLOOD PRESSURE: 79 MMHG | HEIGHT: 66 IN

## 2025-05-19 VITALS
HEIGHT: 66 IN | DIASTOLIC BLOOD PRESSURE: 68 MMHG | BODY MASS INDEX: 35.76 KG/M2 | SYSTOLIC BLOOD PRESSURE: 127 MMHG | WEIGHT: 222.5 LBS | OXYGEN SATURATION: 98 % | HEART RATE: 85 BPM

## 2025-05-19 DIAGNOSIS — K75.81 NASH (NONALCOHOLIC STEATOHEPATITIS): ICD-10-CM

## 2025-05-19 DIAGNOSIS — E11.9 TYPE 2 DIABETES MELLITUS WITHOUT COMPLICATION, WITHOUT LONG-TERM CURRENT USE OF INSULIN: ICD-10-CM

## 2025-05-19 DIAGNOSIS — I10 ESSENTIAL HYPERTENSION: ICD-10-CM

## 2025-05-19 DIAGNOSIS — E04.2 MULTIPLE THYROID NODULES: ICD-10-CM

## 2025-05-19 DIAGNOSIS — E66.01 CLASS 2 SEVERE OBESITY DUE TO EXCESS CALORIES WITH SERIOUS COMORBIDITY AND BODY MASS INDEX (BMI) OF 37.0 TO 37.9 IN ADULT: ICD-10-CM

## 2025-05-19 DIAGNOSIS — E66.812 CLASS 2 SEVERE OBESITY DUE TO EXCESS CALORIES WITH SERIOUS COMORBIDITY AND BODY MASS INDEX (BMI) OF 37.0 TO 37.9 IN ADULT: ICD-10-CM

## 2025-05-19 DIAGNOSIS — E78.2 MIXED HYPERLIPIDEMIA: ICD-10-CM

## 2025-05-19 DIAGNOSIS — E04.2 MULTINODULAR GOITER (NONTOXIC): Primary | ICD-10-CM

## 2025-05-19 PROCEDURE — 3074F SYST BP LT 130 MM HG: CPT | Mod: CPTII,S$GLB,, | Performed by: INTERNAL MEDICINE

## 2025-05-19 PROCEDURE — 3008F BODY MASS INDEX DOCD: CPT | Mod: CPTII,S$GLB,, | Performed by: INTERNAL MEDICINE

## 2025-05-19 PROCEDURE — 99204 OFFICE O/P NEW MOD 45 MIN: CPT | Mod: S$GLB,,, | Performed by: INTERNAL MEDICINE

## 2025-05-19 PROCEDURE — 1159F MED LIST DOCD IN RCRD: CPT | Mod: CPTII,S$GLB,, | Performed by: STUDENT IN AN ORGANIZED HEALTH CARE EDUCATION/TRAINING PROGRAM

## 2025-05-19 PROCEDURE — 3074F SYST BP LT 130 MM HG: CPT | Mod: CPTII,S$GLB,, | Performed by: STUDENT IN AN ORGANIZED HEALTH CARE EDUCATION/TRAINING PROGRAM

## 2025-05-19 PROCEDURE — 4010F ACE/ARB THERAPY RXD/TAKEN: CPT | Mod: CPTII,S$GLB,, | Performed by: INTERNAL MEDICINE

## 2025-05-19 PROCEDURE — G2211 COMPLEX E/M VISIT ADD ON: HCPCS | Mod: S$GLB,,, | Performed by: INTERNAL MEDICINE

## 2025-05-19 PROCEDURE — 88173 CYTOPATH EVAL FNA REPORT: CPT | Mod: TC

## 2025-05-19 PROCEDURE — 1159F MED LIST DOCD IN RCRD: CPT | Mod: CPTII,S$GLB,, | Performed by: INTERNAL MEDICINE

## 2025-05-19 PROCEDURE — 1160F RVW MEDS BY RX/DR IN RCRD: CPT | Mod: CPTII,S$GLB,, | Performed by: INTERNAL MEDICINE

## 2025-05-19 PROCEDURE — 99999 PR PBB SHADOW E&M-EST. PATIENT-LVL IV: CPT | Mod: PBBFAC,,, | Performed by: STUDENT IN AN ORGANIZED HEALTH CARE EDUCATION/TRAINING PROGRAM

## 2025-05-19 PROCEDURE — 99213 OFFICE O/P EST LOW 20 MIN: CPT | Mod: S$GLB,,, | Performed by: STUDENT IN AN ORGANIZED HEALTH CARE EDUCATION/TRAINING PROGRAM

## 2025-05-19 PROCEDURE — 3008F BODY MASS INDEX DOCD: CPT | Mod: CPTII,S$GLB,, | Performed by: STUDENT IN AN ORGANIZED HEALTH CARE EDUCATION/TRAINING PROGRAM

## 2025-05-19 PROCEDURE — 3066F NEPHROPATHY DOC TX: CPT | Mod: CPTII,S$GLB,, | Performed by: INTERNAL MEDICINE

## 2025-05-19 PROCEDURE — 3078F DIAST BP <80 MM HG: CPT | Mod: CPTII,S$GLB,, | Performed by: INTERNAL MEDICINE

## 2025-05-19 PROCEDURE — 3066F NEPHROPATHY DOC TX: CPT | Mod: CPTII,S$GLB,, | Performed by: STUDENT IN AN ORGANIZED HEALTH CARE EDUCATION/TRAINING PROGRAM

## 2025-05-19 PROCEDURE — 10006 FNA BX W/US GDN EA ADDL: CPT | Mod: S$GLB,,, | Performed by: INTERNAL MEDICINE

## 2025-05-19 PROCEDURE — 3061F NEG MICROALBUMINURIA REV: CPT | Mod: CPTII,S$GLB,, | Performed by: STUDENT IN AN ORGANIZED HEALTH CARE EDUCATION/TRAINING PROGRAM

## 2025-05-19 PROCEDURE — 3044F HG A1C LEVEL LT 7.0%: CPT | Mod: CPTII,S$GLB,, | Performed by: STUDENT IN AN ORGANIZED HEALTH CARE EDUCATION/TRAINING PROGRAM

## 2025-05-19 PROCEDURE — 3078F DIAST BP <80 MM HG: CPT | Mod: CPTII,S$GLB,, | Performed by: STUDENT IN AN ORGANIZED HEALTH CARE EDUCATION/TRAINING PROGRAM

## 2025-05-19 PROCEDURE — 99999 PR PBB SHADOW E&M-EST. PATIENT-LVL IV: CPT | Mod: PBBFAC,,, | Performed by: INTERNAL MEDICINE

## 2025-05-19 PROCEDURE — 4010F ACE/ARB THERAPY RXD/TAKEN: CPT | Mod: CPTII,S$GLB,, | Performed by: STUDENT IN AN ORGANIZED HEALTH CARE EDUCATION/TRAINING PROGRAM

## 2025-05-19 PROCEDURE — 3061F NEG MICROALBUMINURIA REV: CPT | Mod: CPTII,S$GLB,, | Performed by: INTERNAL MEDICINE

## 2025-05-19 PROCEDURE — 10005 FNA BX W/US GDN 1ST LES: CPT | Mod: S$GLB,,, | Performed by: INTERNAL MEDICINE

## 2025-05-19 PROCEDURE — 3044F HG A1C LEVEL LT 7.0%: CPT | Mod: CPTII,S$GLB,, | Performed by: INTERNAL MEDICINE

## 2025-05-19 PROCEDURE — 1160F RVW MEDS BY RX/DR IN RCRD: CPT | Mod: CPTII,S$GLB,, | Performed by: STUDENT IN AN ORGANIZED HEALTH CARE EDUCATION/TRAINING PROGRAM

## 2025-05-19 RX ORDER — TIRZEPATIDE 15 MG/.5ML
15 INJECTION, SOLUTION SUBCUTANEOUS WEEKLY
Qty: 6 ML | Refills: 2 | Status: SHIPPED | OUTPATIENT
Start: 2025-05-19

## 2025-05-19 NOTE — PROGRESS NOTES
Subjective:      Patient ID: Андрей Wooten is a 60 y.o. male.    Chief Complaint:  Thyroid Nodule      History of Present Illness   presents for evaluation and management of thyroid nodules.     Has active history of DMII, HTN, HLP and MASH.     First noted to have thyroid nodules on ultrasound in 3/2025.     Denies family history of thyroid cancer. Had great uncle with history of throat cancer.   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      Latest Reference Range & Units 01/31/25 08:42   TSH 0.400 - 4.000 uIU/mL 0.802       Denies dysphagia or orthopnea. Has some intermittent hoarseness.     Previous thyroid FNA results:  Isthmus and left inferior nodules from 3/26/2025: both resulting in AUS    Most recent thyroid USS dated 3/14/2025:  The thyroid gland demonstrates homogenous  echogenicity.  The right lobe measures 4 cm.  The left lobe measures 5.2 cm.  Large isthmus nodule measures up to 1.8 cm, isoechoic, TR 3.     There are 2 left thyroid nodules, largest is inferior measures up to 2.5 cm, largest is heterogeneous with ill-defined margins, not taller than wide, no hyperechoic foci.     Impression:     Fine-needle aspiration is recommended for the isthmus thyroid nodule as well as the 2.5 cm inferior pole left thyroid nodule.    Review of Systems   Constitutional:  Negative for chills and fever.   Gastrointestinal:  Negative for nausea and vomiting.       Objective:   Physical Exam  Vitals and nursing note reviewed.       BP Readings from Last 3 Encounters:   05/19/25 118/79   03/26/25 130/70   03/19/25 (!) 130/90     Wt Readings from Last 1 Encounters:   05/19/25 0900 101.4 kg (223 lb 7 oz)       Body mass index is 36.06 kg/m².    Lab Review:   Lab Results   Component Value Date    HGBA1C 5.9 (H) 01/31/2025     Lab Results   Component Value Date    CHOL 130 01/31/2025    HDL 33 (L) 01/31/2025    LDLCALC 71.6  01/31/2025    TRIG 127 01/31/2025    CHOLHDL 25.4 01/31/2025     Lab Results   Component Value Date     (L) 01/31/2025    K 3.4 (L) 01/31/2025    CL 98 01/31/2025    CO2 29 01/31/2025    GLU 96 01/31/2025    BUN 17 01/31/2025    CREATININE 0.98 01/31/2025    CALCIUM 9.6 01/31/2025    PROT 8.3 01/31/2025    ALBUMIN 4.4 01/31/2025    BILITOT 0.9 01/31/2025    ALKPHOS 58 01/31/2025    AST 49 (H) 01/31/2025    ALT 47 (H) 01/31/2025    ANIONGAP 8 01/31/2025    ESTGFRAFRICA >60.0 04/12/2022    EGFRNONAA >60.0 04/12/2022    TSH 0.802 01/31/2025         Assessment and Plan     Multinodular goiter (nontoxic)  --Patient found to have thyroid nodules on exam in 3/2025  --No risk factors for thyroid cancer  --No compressive symptoms  --TSH WNL  --Had FNA's of the isthmus and left inferior nodules resulting in AUS in 3/2025  --Independently reviewed ultrasound images with the patient  --Has isthmus and left inferior nodules that meet criteria for repeat FNA  --Discussed FNA procedure in detail with the patient and possible cytology outcomes including those that may necessitate repeat FNA (I.e. FLUS, non-diagnostic)  --Discussed indications for surgery  --Will proceed with repeat FNA of isthmus and left inferior nodules and hold for molecular markers    Visit today included increased complexity associated with the care of the episodic problem multinodular goiter addressed and managing the longitudinal care of the patient due to the serious and/or complex managed problem(s).    Follow up in one year with ultrasound day of appointment    [place] M.D. Staff Endocrinology

## 2025-05-19 NOTE — ASSESSMENT & PLAN NOTE
--Patient found to have thyroid nodules on exam in 3/2025  --No risk factors for thyroid cancer  --No compressive symptoms  --TSH WNL  --Had FNA's of the isthmus and left inferior nodules resulting in AUS in 3/2025  --Independently reviewed ultrasound images with the patient  --Has isthmus and left inferior nodules that meet criteria for repeat FNA  --Discussed FNA procedure in detail with the patient and possible cytology outcomes including those that may necessitate repeat FNA (I.e. FLUS, non-diagnostic)  --Discussed indications for surgery  --Will proceed with repeat FNA of isthmus and left inferior nodules and hold for molecular markers

## 2025-05-19 NOTE — PROGRESS NOTES
Subjective:       Patient ID: Андрей Wooten is a 60 y.o. male.    Chief Complaint: Follow-up, Obesity, and Weight Check      Patient presents for treatment of obesity.     Co-morbidities:   HTN  HLD  NAFLD  DM2  GERD    Negative for thyroid cancer  Negative for pancreatitis     History of Weight Loss Efforts  Dr. Bernal - prescribed phentermine, diethylpropion; 255 lbs when first seen, got down to 240 lbs  Intermittent Fasting - got down to about 230 lbs    Current Physical Activity  Weights and cardio in gym 3x/week    Current Eating Habits  Breakfast - grits, ham, biscuits, cereal (Frosted Flakes)  Lunch - fast food  Dinner - southern food  Snacks - chips, cookies, candy; about 4/day  Beverages - water, Hawaiian Punch, sweet tea    Shift work    Drinking less sugar sweetened beverages  Eating more salads  Protein shakes  V8  Beans with brown rice  Cut back on fried foods    Medical Weight Loss  1/24/2022 ([employer] out for repair): 247 lbs 5.7 oz, BMI 39.92  2/23/2022: 245.7 lbs, BMI 39.7, BFP 38.2%, BFM 93.8 lbs, SMM 87.7 lbs, BMR 1858 kcal; Ozempic  4/1/2022: 244.1 lbs, BMI 39.4, BFP 38.7%, BFM 94.4 lbs, SMM 86.6 lbs, BMR 1836 kcal; Ozempic  5/10/2022: 247.8 lbs, BMI 40, BFP 38.1%, BFM 94.4 lbs, SMM 88.6 lbs, BMR 1874 kcal; Ozempic  6/7/2022: 241.8 lbs, BMI 39, BFP 38.8%, BFM 93.9 lbs, SMM 85.3 lbs, BMR 1820 kcal; Ozempic  8/16/2022: 244.9 lbs, BMI 39.5, BFP 38.3%, BFM 93.9 lbs, SMM 86.6 lbs, BMR 1850 kcal  10/10/2022: 247.3 lbs, BMI 39.9, BFP 39.6%, BFM 98.1 lbs, SMM 85.8 lbs, BMR 1833 kcal  12/14/2022: 244.3 lbs, BMI 39.4, BFP 39.1%, BFM 95.5 lbs, SMM 85.8 lbs, BMR 1828 kcal  3/16/2023: 257 lbs, BMI 41.5, BFP 39%, .2 lbs, SMM 90 lbs, BMR 1905 kcal  6/23/2023: 240.9 lbs, BMI 38.9, BFP 38.4%, BFM 92.5 lbs, SMM 85.5 lbs, BMR 1823 kcal  9/26/2023: 230.2 lbs, BMI 37.2, BFP 36.9%, BFM 84.9 lbs, SMM 83.6 lbs, BMR 1793 kcal  1/5/2024: 233.7 lbs, BMI 37.7, BFP 35.8%, BFM 83.8 lbs, SMM 86.9 lbs, BMR 1840  kcal  4/15/2024: 236.1 lbs, BMI 38.1, BFP 38.2%, BFM 90.4 lbs, SMM 84 lbs, BMR 1799  kcal  7/15/2024: 233.4 lbs, BMI 37.7, BFP 36.7%, BFM 85.7 lbs, SMM 85.1 lbs, BMR 1818 kcal  11/14/2024: 233.9 lbs, BMI 36.2, BFP 31%, BFM 69.4 lbs, SMM 89.1 lbs, BMR 1884 kcal  2/14/2025: 224.5 lbs, BMI 36.3, BFP 36.4%, BFM 81.6 lbs, SMM 82 lbs, BMR 1770 kcal  5/19/2025: 222.5 lbs, BMI 35.9, BFP 36%, BFM 80.1 lbs, SMM 81.6 lbs, BMR 1766 kcal        Follow-up  Pertinent negatives include no abdominal pain, chest pain, chills, fever, nausea or vomiting.     Review of Systems   Constitutional:  Negative for chills and fever.   Respiratory:  Negative for shortness of breath.    Cardiovascular:  Negative for chest pain.   Gastrointestinal:  Negative for abdominal pain, nausea and vomiting.   Neurological:  Negative for dizziness and light-headedness.         Objective:        Latest Reference Range & Units 05/02/24 07:19 05/02/24 08:26   WBC 3.90 - 12.70 K/uL 4.88    RBC 4.60 - 6.20 M/uL 5.17    Hemoglobin 14.0 - 18.0 g/dL 14.3    Hematocrit 40.0 - 54.0 % 43.7    MCV 82 - 98 fL 85    MCH 27.0 - 31.0 pg 27.7    MCHC 32.0 - 36.0 g/dL 32.7    RDW 11.5 - 14.5 % 13.1    Platelet Count 150 - 450 K/uL 279    MPV 9.2 - 12.9 fL 9.5    Gran % 38.0 - 73.0 % 30.6 (L)    Lymph % 18.0 - 48.0 % 59.0 (H)    Mono % 4.0 - 15.0 % 8.6    Eos % 0.0 - 8.0 % 1.2    Basophil % 0.0 - 1.9 % 0.4    Immature Granulocytes 0.0 - 0.5 % 0.2    Gran # (ANC) 1.8 - 7.7 K/uL 1.5 (L)    Lymph # 1.0 - 4.8 K/uL 2.9    Mono # 0.3 - 1.0 K/uL 0.4    Eos # 0.0 - 0.5 K/uL 0.1    Baso # 0.00 - 0.20 K/uL 0.02    Immature Grans (Abs) 0.00 - 0.04 K/uL 0.01    nRBC 0 /100 WBC 0    Differential Method  Automated    Sodium 136 - 145 mmol/L 139    Potassium 3.5 - 5.1 mmol/L 3.6    Chloride 95 - 110 mmol/L 97    CO2 23 - 29 mmol/L 31 (H)    Anion Gap 8 - 16 mmol/L 11    BUN 2 - 20 mg/dL 20    Creatinine 0.50 - 1.40 mg/dL 1.08    eGFR >60 mL/min/1.73 m^2 >60.0    Glucose 70 - 110 mg/dL 92     Calcium 8.7 - 10.5 mg/dL 9.9    ALP 38 - 126 U/L 61    PROTEIN TOTAL 6.0 - 8.4 g/dL 7.8    Albumin 3.5 - 5.2 g/dL 4.4    BILIRUBIN TOTAL 0.1 - 1.0 mg/dL 0.6    AST 15 - 46 U/L 72 (H)    ALT 10 - 44 U/L 117 (H)    Cholesterol Total 120 - 199 mg/dL 153    HDL 40 - 75 mg/dL 25 (L)    HDL/Cholesterol Ratio 20.0 - 50.0 % 16.3 (L)    Non-HDL Cholesterol mg/dL 128    Total Cholesterol/HDL Ratio 2.0 - 5.0  6.1 (H)    Triglycerides 30 - 150 mg/dL 234 (H)    LDL Cholesterol 63.0 - 159.0 mg/dL 81.2    Hemoglobin A1C External 4.0 - 5.6 % 5.7 (H)    Estimated Avg Glucose 68 - 131 mg/dL 117    TSH 0.400 - 4.000 uIU/mL 1.460    Prostate Specific Antigen 0.00 - 4.00 ng/mL 0.77    Urine Creatinine 23.0 - 375.0 mg/dL  261.0   Urine Microalbumin ug/mL  12.0   MICROALB/CREAT RATIO 0.0 - 30.0 ug/mg  4.6   (L): Data is abnormally low  (H): Data is abnormally high    There were no vitals filed for this visit.      Physical Exam  Vitals reviewed.   Constitutional:       General: He is not in acute distress.     Appearance: Normal appearance. He is obese. He is not ill-appearing, toxic-appearing or diaphoretic.   HENT:      Head: Normocephalic and atraumatic.   Cardiovascular:      Rate and Rhythm: Normal rate.   Pulmonary:      Effort: Pulmonary effort is normal. No respiratory distress.   Skin:     General: Skin is warm and dry.   Neurological:      Mental Status: He is alert and oriented to person, place, and time.         Assessment:       Problem List Items Addressed This Visit       HLD (hyperlipidemia)    Relevant Medications    tirzepatide (MOUNJARO) 15 mg/0.5 mL PnIj    Type 2 diabetes mellitus without complication, without long-term current use of insulin    Relevant Medications    tirzepatide (MOUNJARO) 15 mg/0.5 mL PnIj     Other Visit Diagnoses         VOGEL (nonalcoholic steatohepatitis)        Relevant Medications    tirzepatide (MOUNJARO) 15 mg/0.5 mL PnIj      Class 2 severe obesity due to excess calories with serious  comorbidity and body mass index (BMI) of 37.0 to 37.9 in adult        Relevant Medications    tirzepatide (MOUNJARO) 15 mg/0.5 mL PnIj      Essential hypertension        Relevant Medications    tirzepatide (MOUNJARO) 15 mg/0.5 mL PnIj                Plan:   - Mounjaro 15 mg weekly injections     - Log all food and beverage intake with a daily calorie goal of 1500 calories per day; meal plans given in AVS    - Moderate intensity aerobic exercise for 15-30 minutes 3-5x/week

## 2025-05-20 LAB
ESTROGEN SERPL-MCNC: ABNORMAL PG/ML
ESTROGEN SERPL-MCNC: NORMAL PG/ML
INSULIN SERPL-ACNC: ABNORMAL U[IU]/ML
INSULIN SERPL-ACNC: NORMAL U[IU]/ML
LAB AP CLINICAL INFORMATION: ABNORMAL
LAB AP CLINICAL INFORMATION: NORMAL
LAB AP GROSS DESCRIPTION: ABNORMAL
LAB AP GROSS DESCRIPTION: NORMAL
LAB AP NON-GYN INTERPRETATION SPECIMEN 1: ABNORMAL
LAB AP NON-GYN INTERPRETATION SPECIMEN 1: NORMAL
LAB AP PERFORMING LOCATION(S): ABNORMAL
LAB AP PERFORMING LOCATION(S): NORMAL

## 2025-05-21 ENCOUNTER — PATIENT MESSAGE (OUTPATIENT)
Dept: ENDOCRINOLOGY | Facility: CLINIC | Age: 60
End: 2025-05-21
Payer: COMMERCIAL

## 2025-06-12 ENCOUNTER — PATIENT MESSAGE (OUTPATIENT)
Dept: ENDOCRINOLOGY | Facility: CLINIC | Age: 60
End: 2025-06-12
Payer: COMMERCIAL

## 2025-06-12 DIAGNOSIS — E04.2 MULTINODULAR GOITER (NONTOXIC): Primary | ICD-10-CM

## 2025-07-10 DIAGNOSIS — I10 PRIMARY HYPERTENSION: ICD-10-CM

## 2025-07-10 DIAGNOSIS — M54.50 CHRONIC LEFT-SIDED LOW BACK PAIN WITHOUT SCIATICA: ICD-10-CM

## 2025-07-10 DIAGNOSIS — M54.50 ACUTE LEFT-SIDED LOW BACK PAIN WITHOUT SCIATICA: ICD-10-CM

## 2025-07-10 DIAGNOSIS — G89.29 CHRONIC LEFT-SIDED LOW BACK PAIN WITHOUT SCIATICA: ICD-10-CM

## 2025-07-10 RX ORDER — IBUPROFEN 800 MG/1
800 TABLET, FILM COATED ORAL
Qty: 60 TABLET | Refills: 1 | Status: SHIPPED | OUTPATIENT
Start: 2025-07-10

## 2025-07-10 RX ORDER — LOSARTAN POTASSIUM 50 MG/1
50 TABLET ORAL
Qty: 90 TABLET | Refills: 1 | Status: SHIPPED | OUTPATIENT
Start: 2025-07-10

## 2025-07-10 RX ORDER — TIZANIDINE 4 MG/1
TABLET ORAL
Qty: 45 TABLET | Refills: 2 | Status: SHIPPED | OUTPATIENT
Start: 2025-07-10

## 2025-07-10 NOTE — TELEPHONE ENCOUNTER
Refill Routing Note   Medication(s) are not appropriate for processing by [facility] for the following reason(s):        Outside of protocol    [facility] action(s):  Approve  Route             Appointments  past 12m or future 3m with PCP    Date Provider   Last Visit   3/11/2025 Leander Boo MD   Next Visit   9/9/2025 Leander Boo MD   ED visits in past 90 days: 0        Note composed:1:23 PM 07/10/2025

## 2025-07-10 NOTE — TELEPHONE ENCOUNTER
No care due was identified.  [facility] Embedded Care Due Messages. Reference number: [id number].   7/10/2025 9:20:54 AM CDT

## 2025-08-21 ENCOUNTER — OFFICE VISIT (OUTPATIENT)
Dept: BARIATRICS | Facility: CLINIC | Age: 60
End: 2025-08-21
Payer: COMMERCIAL

## 2025-08-21 VITALS
SYSTOLIC BLOOD PRESSURE: 125 MMHG | OXYGEN SATURATION: 100 % | DIASTOLIC BLOOD PRESSURE: 76 MMHG | WEIGHT: 219.19 LBS | BODY MASS INDEX: 35.38 KG/M2 | HEART RATE: 100 BPM

## 2025-08-21 DIAGNOSIS — E11.9 TYPE 2 DIABETES MELLITUS WITHOUT COMPLICATION, WITHOUT LONG-TERM CURRENT USE OF INSULIN: ICD-10-CM

## 2025-08-21 DIAGNOSIS — E66.812 CLASS 2 SEVERE OBESITY DUE TO EXCESS CALORIES WITH SERIOUS COMORBIDITY AND BODY MASS INDEX (BMI) OF 35.0 TO 35.9 IN ADULT: Primary | ICD-10-CM

## 2025-08-21 DIAGNOSIS — E66.01 CLASS 2 SEVERE OBESITY DUE TO EXCESS CALORIES WITH SERIOUS COMORBIDITY AND BODY MASS INDEX (BMI) OF 35.0 TO 35.9 IN ADULT: Primary | ICD-10-CM

## 2025-08-21 DIAGNOSIS — I10 ESSENTIAL HYPERTENSION: ICD-10-CM

## 2025-08-21 DIAGNOSIS — I10 PRIMARY HYPERTENSION: ICD-10-CM

## 2025-08-21 DIAGNOSIS — E78.2 MIXED HYPERLIPIDEMIA: ICD-10-CM

## 2025-08-21 DIAGNOSIS — K75.81 NASH (NONALCOHOLIC STEATOHEPATITIS): ICD-10-CM

## 2025-08-21 DIAGNOSIS — Z71.3 ENCOUNTER FOR WEIGHT LOSS COUNSELING: ICD-10-CM

## 2025-08-21 PROCEDURE — 3008F BODY MASS INDEX DOCD: CPT | Mod: CPTII,S$GLB,, | Performed by: STUDENT IN AN ORGANIZED HEALTH CARE EDUCATION/TRAINING PROGRAM

## 2025-08-21 PROCEDURE — 99999 PR PBB SHADOW E&M-EST. PATIENT-LVL IV: CPT | Mod: PBBFAC,,, | Performed by: STUDENT IN AN ORGANIZED HEALTH CARE EDUCATION/TRAINING PROGRAM

## 2025-08-21 PROCEDURE — 4010F ACE/ARB THERAPY RXD/TAKEN: CPT | Mod: CPTII,S$GLB,, | Performed by: STUDENT IN AN ORGANIZED HEALTH CARE EDUCATION/TRAINING PROGRAM

## 2025-08-21 PROCEDURE — 3061F NEG MICROALBUMINURIA REV: CPT | Mod: CPTII,S$GLB,, | Performed by: STUDENT IN AN ORGANIZED HEALTH CARE EDUCATION/TRAINING PROGRAM

## 2025-08-21 PROCEDURE — 3066F NEPHROPATHY DOC TX: CPT | Mod: CPTII,S$GLB,, | Performed by: STUDENT IN AN ORGANIZED HEALTH CARE EDUCATION/TRAINING PROGRAM

## 2025-08-21 PROCEDURE — 3074F SYST BP LT 130 MM HG: CPT | Mod: CPTII,S$GLB,, | Performed by: STUDENT IN AN ORGANIZED HEALTH CARE EDUCATION/TRAINING PROGRAM

## 2025-08-21 PROCEDURE — 1160F RVW MEDS BY RX/DR IN RCRD: CPT | Mod: CPTII,S$GLB,, | Performed by: STUDENT IN AN ORGANIZED HEALTH CARE EDUCATION/TRAINING PROGRAM

## 2025-08-21 PROCEDURE — 3078F DIAST BP <80 MM HG: CPT | Mod: CPTII,S$GLB,, | Performed by: STUDENT IN AN ORGANIZED HEALTH CARE EDUCATION/TRAINING PROGRAM

## 2025-08-21 PROCEDURE — 99213 OFFICE O/P EST LOW 20 MIN: CPT | Mod: S$GLB,,, | Performed by: STUDENT IN AN ORGANIZED HEALTH CARE EDUCATION/TRAINING PROGRAM

## 2025-08-21 PROCEDURE — 3044F HG A1C LEVEL LT 7.0%: CPT | Mod: CPTII,S$GLB,, | Performed by: STUDENT IN AN ORGANIZED HEALTH CARE EDUCATION/TRAINING PROGRAM

## 2025-08-21 PROCEDURE — 1159F MED LIST DOCD IN RCRD: CPT | Mod: CPTII,S$GLB,, | Performed by: STUDENT IN AN ORGANIZED HEALTH CARE EDUCATION/TRAINING PROGRAM

## 2025-08-21 RX ORDER — TIRZEPATIDE 15 MG/.5ML
15 INJECTION, SOLUTION SUBCUTANEOUS WEEKLY
Qty: 6 ML | Refills: 2 | Status: SHIPPED | OUTPATIENT
Start: 2025-08-21